# Patient Record
Sex: MALE | Race: WHITE | NOT HISPANIC OR LATINO | Employment: OTHER | URBAN - METROPOLITAN AREA
[De-identification: names, ages, dates, MRNs, and addresses within clinical notes are randomized per-mention and may not be internally consistent; named-entity substitution may affect disease eponyms.]

---

## 2017-02-23 ENCOUNTER — OFFICE VISIT (OUTPATIENT)
Dept: INTERNAL MEDICINE | Facility: CLINIC | Age: 67
End: 2017-02-23

## 2017-02-23 VITALS
HEART RATE: 82 BPM | DIASTOLIC BLOOD PRESSURE: 84 MMHG | WEIGHT: 142 LBS | HEIGHT: 70 IN | RESPIRATION RATE: 18 BRPM | BODY MASS INDEX: 20.33 KG/M2 | SYSTOLIC BLOOD PRESSURE: 130 MMHG

## 2017-02-23 DIAGNOSIS — Z12.11 SCREENING FOR COLON CANCER: ICD-10-CM

## 2017-02-23 DIAGNOSIS — Z72.0 TOBACCO ABUSE: ICD-10-CM

## 2017-02-23 DIAGNOSIS — IMO0001 ELEVATED BLOOD PRESSURE: Primary | ICD-10-CM

## 2017-02-23 DIAGNOSIS — Z23 ENCOUNTER FOR IMMUNIZATION: ICD-10-CM

## 2017-02-23 PROCEDURE — 99213 OFFICE O/P EST LOW 20 MIN: CPT | Performed by: FAMILY MEDICINE

## 2017-02-23 PROCEDURE — 90732 PPSV23 VACC 2 YRS+ SUBQ/IM: CPT | Performed by: FAMILY MEDICINE

## 2017-02-23 PROCEDURE — G0009 ADMIN PNEUMOCOCCAL VACCINE: HCPCS | Performed by: FAMILY MEDICINE

## 2017-02-23 NOTE — PROGRESS NOTES
"Subjective   Obed Cotto is a 66 y.o. male who presents to follow-up for Follow-up (elevated blood pressure)      History of Present Illness   He was last seen in the office on 11/23/16 for elevated blood pressure, tobacco abuse, constipation.  Previous intervention/treatment includes: recommended lifestyle changes, including smoking cessation.    Patient reports that he has been limiting his salt intake, including eliminating processed meats from his diet, using a salt substitute and has also been rinsing canned vegetables.  Has been trying to walk more, depending on the weather.  Continues to smoke about 3/4 to a 1 PPD.  Not interested in quitting smoking at this time.  Denies any concerning symptoms at this time.    Has never had a colonoscopy done before and declines it today.  Has also never had a shingles or pneumonia vaccine before.  Declines scheduling a Medicare wellness exam at this time.     Review of Systems   Constitutional: Negative for chills and fever.   Respiratory: Negative for cough, shortness of breath and wheezing.    Cardiovascular: Negative for chest pain and palpitations.   Gastrointestinal: Negative for abdominal pain, blood in stool, constipation, diarrhea, nausea and vomiting.   Genitourinary: Positive for difficulty urinating (improved). Negative for dysuria and hematuria.   Neurological: Negative for dizziness, syncope and headaches.   Psychiatric/Behavioral: The patient is not nervous/anxious.         Negative for depressed mood.     The following portions of the patient's history were reviewed and updated as appropriate: current medications, past medical history, past social history and problem list.    Objective   Visit Vitals   • /84   • Pulse 82   • Resp 18   • Ht 70\" (177.8 cm)   • Wt 142 lb (64.4 kg)   • BMI 20.37 kg/m2     Physical Exam   Constitutional: He is oriented to person, place, and time. He appears well-developed and well-nourished.   No acute distress. Appears " older than stated age.   HENT:   Head: Normocephalic and atraumatic.   Eyes: Conjunctivae and EOM are normal.   Neck: Normal range of motion.   Cardiovascular: Normal rate, regular rhythm, normal heart sounds and intact distal pulses.    Pulmonary/Chest: Effort normal and breath sounds normal. No respiratory distress. He has no wheezes.   Abdominal: Soft. Bowel sounds are normal. There is no tenderness.   Musculoskeletal: Normal range of motion.   Moves all extremities.   Neurological: He is alert and oriented to person, place, and time.   Skin: Skin is warm and dry.   Psychiatric: He has a normal mood and affect. His behavior is normal.       Assessment/Plan   Obed was seen today for follow-up.    Diagnoses and all orders for this visit:    Elevated blood pressure    Improving.  Blood pressure today in office was 130/84.  Recommended that he continue implementing his current lifestyle and diet changes.  Advised patient to return to the clinic in 3 months for next routine follow-up.    Screening for colon cancer  -     Cologuard    Patient declined colonoscopy today.  Will order Cologuard today.    Encounter for immunization  -     Pneumococcal Polysaccharide Vaccine 23-Valent Greater Than or Equal To 3yo Subcutaneous / IM    Patient declined Zostavax vaccine today, but will consider it in the future.    Tobacco abuse    Encouraged smoking cessation today.  Patient declined a screening chest CT today, but will consider it in the future.

## 2017-02-23 NOTE — PATIENT INSTRUCTIONS
I have ordered the colon cancer screening kit for you and this will be mailed to your home.  When you have collected your sample, please send it back to the company with the return label provided.  Please consider getting a shingles vaccine and doing a chest CT in the future.  Continue with your diet and lifestyle changes to help decrease your blood pressure.  Recommend that you cut back on your smoking for your overall health.  Please follow-up as indicated.  Return to the clinic sooner if you have any other concerns.

## 2017-05-23 ENCOUNTER — OFFICE VISIT (OUTPATIENT)
Dept: INTERNAL MEDICINE | Facility: CLINIC | Age: 67
End: 2017-05-23

## 2017-05-23 VITALS
HEIGHT: 70 IN | SYSTOLIC BLOOD PRESSURE: 134 MMHG | DIASTOLIC BLOOD PRESSURE: 72 MMHG | HEART RATE: 72 BPM | BODY MASS INDEX: 21.19 KG/M2 | WEIGHT: 148 LBS | RESPIRATION RATE: 18 BRPM

## 2017-05-23 DIAGNOSIS — Z71.85 IMMUNIZATION COUNSELING: ICD-10-CM

## 2017-05-23 DIAGNOSIS — IMO0001 ELEVATED BLOOD PRESSURE: Primary | ICD-10-CM

## 2017-05-23 DIAGNOSIS — R19.5 POSITIVE COLORECTAL CANCER SCREENING USING DNA-BASED STOOL TEST: ICD-10-CM

## 2017-05-23 DIAGNOSIS — Z72.0 TOBACCO ABUSE: ICD-10-CM

## 2017-05-23 PROCEDURE — 99214 OFFICE O/P EST MOD 30 MIN: CPT | Performed by: FAMILY MEDICINE

## 2017-11-29 ENCOUNTER — OFFICE VISIT (OUTPATIENT)
Dept: INTERNAL MEDICINE | Facility: CLINIC | Age: 67
End: 2017-11-29

## 2017-11-29 VITALS
BODY MASS INDEX: 20.33 KG/M2 | WEIGHT: 142 LBS | RESPIRATION RATE: 20 BRPM | DIASTOLIC BLOOD PRESSURE: 72 MMHG | HEIGHT: 70 IN | SYSTOLIC BLOOD PRESSURE: 144 MMHG | OXYGEN SATURATION: 99 % | HEART RATE: 73 BPM

## 2017-11-29 DIAGNOSIS — Z72.0 TOBACCO ABUSE: ICD-10-CM

## 2017-11-29 DIAGNOSIS — R03.0 PREHYPERTENSION: Primary | ICD-10-CM

## 2017-11-29 PROCEDURE — 99213 OFFICE O/P EST LOW 20 MIN: CPT | Performed by: FAMILY MEDICINE

## 2017-11-29 NOTE — PROGRESS NOTES
"Subjective   Obed Cotto is a 67 y.o. male who presents to follow-up for Prehypertension      History of Present Illness   He was last seen in the office on 05/23/17 for pre-hypertension management.  Previous intervention/treatment includes: lifestyle modifications to help with blood pressure, continue to monitor.    Patient admits that he has been trying to limit his salt intake, but his finances are limited.  Currently smoking 2/3-3/4 PPD, but states that he does not inhale all the way.  Reports that he has been dealing with a lot of stress over the past few months.  Denies any concerning symptoms at this time including chest pain, dyspnea, hemoptysis, unintentional weight changes, leg swelling, syncope, severe headaches, acute vision changes.    Review of Systems   Constitutional: Negative for chills and fever.   Respiratory: Negative for cough, shortness of breath and wheezing.    Cardiovascular: Negative for chest pain and palpitations.   Gastrointestinal: Negative for abdominal pain, constipation, diarrhea, nausea and vomiting.   Genitourinary: Negative for decreased urine volume, dysuria and hematuria.   Neurological: Negative for dizziness, syncope and headaches.     The following portions of the patient's history were reviewed and updated as appropriate: current medications, past medical history, past social history and problem list.    Objective   /72  Pulse 73  Resp 20  Ht 70\" (177.8 cm)  Wt 142 lb (64.4 kg)  SpO2 99%  BMI 20.37 kg/m2     Physical Exam   Constitutional: He is oriented to person, place, and time. He appears well-developed and well-nourished.   No acute distress.   HENT:   Head: Normocephalic and atraumatic.   Eyes: Conjunctivae and EOM are normal.   Neck: Normal range of motion.   Cardiovascular: Normal rate, regular rhythm, normal heart sounds and intact distal pulses.    Pulmonary/Chest: Effort normal and breath sounds normal. He has no wheezes.   Abdominal: Soft. Bowel sounds " are normal. There is no tenderness.   Musculoskeletal: Normal range of motion.   Moves all extremities.   Neurological: He is alert and oriented to person, place, and time.   Skin: Skin is warm and dry.   Psychiatric: He has a normal mood and affect. His behavior is normal.   Vitals reviewed.      Assessment/Plan   Obed was seen today for prehypertension.    Diagnoses and all orders for this visit:    Prehypertension    Blood pressure today in office was 144/72.  Patient denies any concerning symptoms at this time.  Encouraged patient to monitor his blood pressure at his pharmacy and continue with lifestyle modifications, including diet and exercise, to help with blood pressure control.  Advised patient to return to the clinic in 6 months for next routine follow-up or sooner if needed.    Tobacco abuse    Encouraged smoking cessation today.  Patient declined ordering a screening CT chest scan today.

## 2017-11-29 NOTE — PATIENT INSTRUCTIONS
You may get your shingles vaccine at your pharmacy.  Recommend that you wait until the beginning of the new year to get the newer vaccine.  You will get your pneumonia vaccine end of February when you are due for your next Medicare Wellness exam.  Recommend that you continue to work on limiting your salt intake and cutting back on your cigarette smoking.  Also recommend that you consider doing a CT of your chest to screen for lung cancer.  Please check your blood pressure at your pharmacy.  Your goal blood pressure is to be below 150/90.  Recommend that you check more often if you have any concerning symptoms (like headaches, dizziness, blurred vision, etc).  Things you can do to help decrease your blood pressure:  Maintain a normal body weight (BMI between 18.5 and 24.9).  Consume a diet rich in fruits, vegetables and low-fat dairy products with a reduced content of saturated and total fat.  Reduce dietary sodium intake to no more than 100 mEq per day (2.4 g sodium or 6 g sodium chloride).  Continue to engage in regular aerobic physical activity, such as brisk walking (at least 30 minutes per day, most days of the week).  Stop smoking.  Please follow-up as indicated.  Return to the clinic sooner if you have any other concerns.

## 2018-02-28 ENCOUNTER — OFFICE VISIT (OUTPATIENT)
Dept: INTERNAL MEDICINE | Facility: CLINIC | Age: 68
End: 2018-02-28

## 2018-02-28 VITALS
WEIGHT: 149 LBS | RESPIRATION RATE: 18 BRPM | BODY MASS INDEX: 21.33 KG/M2 | OXYGEN SATURATION: 98 % | HEIGHT: 70 IN | DIASTOLIC BLOOD PRESSURE: 70 MMHG | SYSTOLIC BLOOD PRESSURE: 160 MMHG | HEART RATE: 86 BPM

## 2018-02-28 DIAGNOSIS — Z00.00 HEALTH CARE MAINTENANCE: Primary | ICD-10-CM

## 2018-02-28 DIAGNOSIS — R03.0 PREHYPERTENSION: ICD-10-CM

## 2018-02-28 DIAGNOSIS — Z72.0 TOBACCO ABUSE: ICD-10-CM

## 2018-02-28 LAB
ALBUMIN SERPL-MCNC: 4.7 G/DL (ref 3.2–4.8)
ALBUMIN/GLOB SERPL: 2.2 G/DL (ref 1.5–2.5)
ALP SERPL-CCNC: 99 U/L (ref 25–100)
ALT SERPL-CCNC: 38 U/L (ref 7–40)
AST SERPL-CCNC: 24 U/L (ref 0–33)
BASOPHILS # BLD AUTO: 0.06 10*3/MM3 (ref 0–0.2)
BASOPHILS NFR BLD AUTO: 0.4 % (ref 0–1)
BILIRUB SERPL-MCNC: 0.6 MG/DL (ref 0.3–1.2)
BUN SERPL-MCNC: 14 MG/DL (ref 9–23)
BUN/CREAT SERPL: 20 (ref 7–25)
CALCIUM SERPL-MCNC: 10 MG/DL (ref 8.7–10.4)
CHLORIDE SERPL-SCNC: 102 MMOL/L (ref 99–109)
CHOLEST SERPL-MCNC: 167 MG/DL (ref 0–200)
CO2 SERPL-SCNC: 26 MMOL/L (ref 20–31)
CREAT SERPL-MCNC: 0.7 MG/DL (ref 0.6–1.3)
EOSINOPHIL # BLD AUTO: 0.2 10*3/MM3 (ref 0–0.3)
EOSINOPHIL NFR BLD AUTO: 1.2 % (ref 0–3)
ERYTHROCYTE [DISTWIDTH] IN BLOOD BY AUTOMATED COUNT: 13.5 % (ref 11.3–14.5)
GFR SERPLBLD CREATININE-BSD FMLA CKD-EPI: 112 ML/MIN/1.73
GFR SERPLBLD CREATININE-BSD FMLA CKD-EPI: 136 ML/MIN/1.73
GLOBULIN SER CALC-MCNC: 2.1 GM/DL
GLUCOSE SERPL-MCNC: 103 MG/DL (ref 70–100)
HBA1C MFR BLD: 5.9 % (ref 4.8–5.6)
HCT VFR BLD AUTO: 46.7 % (ref 38.9–50.9)
HDLC SERPL-MCNC: 37 MG/DL (ref 40–60)
HGB BLD-MCNC: 15.8 G/DL (ref 13.1–17.5)
IMM GRANULOCYTES # BLD: 0.1 10*3/MM3 (ref 0–0.03)
IMM GRANULOCYTES NFR BLD: 0.6 % (ref 0–0.6)
LDLC SERPL CALC-MCNC: 108 MG/DL (ref 0–100)
LYMPHOCYTES # BLD AUTO: 7.23 10*3/MM3 (ref 0.6–4.8)
LYMPHOCYTES NFR BLD AUTO: 44 % (ref 24–44)
MCH RBC QN AUTO: 33.1 PG (ref 27–31)
MCHC RBC AUTO-ENTMCNC: 33.8 G/DL (ref 32–36)
MCV RBC AUTO: 97.7 FL (ref 80–99)
MONOCYTES # BLD AUTO: 1.03 10*3/MM3 (ref 0–1)
MONOCYTES NFR BLD AUTO: 6.3 % (ref 0–12)
NEUTROPHILS # BLD AUTO: 7.83 10*3/MM3 (ref 1.5–8.3)
NEUTROPHILS NFR BLD AUTO: 47.5 % (ref 41–71)
PLATELET # BLD AUTO: 315 10*3/MM3 (ref 150–450)
POTASSIUM SERPL-SCNC: 4.3 MMOL/L (ref 3.5–5.5)
PROT SERPL-MCNC: 6.8 G/DL (ref 5.7–8.2)
RBC # BLD AUTO: 4.78 10*6/MM3 (ref 4.2–5.76)
SODIUM SERPL-SCNC: 137 MMOL/L (ref 132–146)
TRIGL SERPL-MCNC: 109 MG/DL (ref 0–150)
VLDLC SERPL CALC-MCNC: 21.8 MG/DL
WBC # BLD AUTO: 16.45 10*3/MM3 (ref 3.5–10.8)

## 2018-02-28 PROCEDURE — 99397 PER PM REEVAL EST PAT 65+ YR: CPT | Performed by: FAMILY MEDICINE

## 2018-02-28 PROCEDURE — G0438 PPPS, INITIAL VISIT: HCPCS | Performed by: FAMILY MEDICINE

## 2018-02-28 PROCEDURE — 96160 PT-FOCUSED HLTH RISK ASSMT: CPT | Performed by: FAMILY MEDICINE

## 2018-02-28 RX ORDER — ASPIRIN 81 MG/1
81 TABLET ORAL DAILY
Qty: 90 TABLET | Refills: 1 | Status: SHIPPED | OUTPATIENT
Start: 2018-02-28 | End: 2019-03-01

## 2018-02-28 NOTE — PROGRESS NOTES
QUICK REFERENCE INFORMATION:  The ABCs of the Annual Wellness Visit    Initial Medicare Wellness Visit    HEALTH RISK ASSESSMENT    1950    Recent Hospitalizations:  No hospitalization(s) within the last year..        Current Medical Providers:  Patient Care Team:  Keke Brasher MD as PCP - General (Family Medicine)        Smoking Status:  History   Smoking Status   • Current Every Day Smoker   • Packs/day: 1.00   • Years: 48.00   • Types: Cigarettes   Smokeless Tobacco   • Never Used       Alcohol Consumption:  History   Alcohol Use No       Depression Screen:   PHQ-2/PHQ-9 Depression Screening 2/28/2018   Little interest or pleasure in doing things 0   Feeling down, depressed, or hopeless 0   Trouble falling or staying asleep, or sleeping too much 0   Feeling tired or having little energy 0   Poor appetite or overeating 0   Feeling bad about yourself - or that you are a failure or have let yourself or your family down 0   Trouble concentrating on things, such as reading the newspaper or watching television 0   Moving or speaking so slowly that other people could have noticed. Or the opposite - being so fidgety or restless that you have been moving around a lot more than usual 0   Thoughts that you would be better off dead, or of hurting yourself in some way 0   Total Score 0   If you checked off any problems, how difficult have these problems made it for you to do your work, take care of things at home, or get along with other people? Not difficult at all       Health Habits and Functional and Cognitive Screening:  Functional & Cognitive Status 2/28/2018   Do you have difficulty preparing food and eating? No   Do you have difficulty bathing yourself, getting dressed or grooming yourself? No   Do you have difficulty using the toilet? No   Do you have difficulty moving around from place to place? No   Do you have trouble with steps or getting out of a bed or a chair? No   In the past year have you fallen or  experienced a near fall? No   Current Diet Frequent Junk Food   Dental Exam Not up to date   Eye Exam Not up to date   Exercise (times per week) 7 times per week   Current Exercise Activities Include Walking   Do you need help using the phone?  No   Are you deaf or do you have serious difficulty hearing?  No   Do you need help with transportation? No   Do you need help shopping? No   Do you need help preparing meals?  No   Do you need help with housework?  No   Do you need help with laundry? No   Do you need help taking your medications? No   Do you need help managing money? No   Have you felt unusual stress, anger or loneliness in the last month? No   Who do you live with? Child   If you need help, do you have trouble finding someone available to you? No   Have you been bothered in the last four weeks by sexual problems? No   Do you have difficulty concentrating, remembering or making decisions? No           Does the patient have evidence of cognitive impairment? No    Aspirin use counseling: Start ASA 81 mg daily       Recent Lab Results:    Visual Acuity:  No exam data present    Age-appropriate Screening Schedule:  Refer to the list below for future screening recommendations based on patient's age, sex and/or medical conditions. Orders for these recommended tests are listed in the plan section. The patient has been provided with a written plan.    Health Maintenance   Topic Date Due   • TDAP/TD VACCINES (1 - Tdap) [birth year]/1968   • INFLUENZA VACCINE  08/01/2017   • PNEUMOCOCCAL VACCINES (65+ LOW/MEDIUM RISK) (2 of 2 - PCV13) 02/23/2018   • COLONOSCOPY  02/23/2027   • ZOSTER VACCINE  Completed        Subjective   History of Present Illness    Obed Cotto is a 67 y.o. male who presents for an Annual Wellness Visit.    The following portions of the patient's history were reviewed and updated as appropriate: allergies, current medications, past family history, past medical history, past social history, past surgical  history and problem list.    Outpatient Medications Prior to Visit   Medication Sig Dispense Refill   • finasteride (PROPECIA) 1 MG tablet Take 1 mg by mouth Daily.     • tamsulosin (FLOMAX) 0.4 MG capsule 24 hr capsule Take 1 capsule by mouth Every Night.       No facility-administered medications prior to visit.        Patient Active Problem List   Diagnosis   • Tobacco abuse   • Urinary retention   • Prehypertension   • Positive colorectal cancer screening using DNA-based stool test       Advance Care Planning:  has NO advance directive - information provided to the patient today    Identification of Risk Factors:  Risk factors include: unhealthy diet, cardiovascular risk, tobacco use and financial stress.    Review of Systems   Constitutional: Negative for chills, fatigue and fever.   HENT: Negative for congestion, ear pain, rhinorrhea, sinus pressure and sore throat.    Eyes: Negative for pain and visual disturbance.   Respiratory: Negative for cough and shortness of breath.    Cardiovascular: Negative for chest pain and palpitations.   Gastrointestinal: Negative for abdominal pain, constipation, diarrhea, nausea and vomiting.   Genitourinary: Negative for decreased urine volume, dysuria and hematuria.   Musculoskeletal: Negative for back pain and gait problem.   Skin: Negative for pallor and rash.   Neurological: Negative for dizziness, syncope and headaches.   Psychiatric/Behavioral: The patient is not nervous/anxious.         Negative for depressed mood.     Compared to one year ago, the patient feels his physical health is the same.  Compared to one year ago, the patient feels his mental health is the same.    Objective    Physical Exam   Constitutional: He is oriented to person, place, and time. He appears well-developed and well-nourished.   No acute distress.   HENT:   Head: Normocephalic and atraumatic.   Right Ear: Hearing, tympanic membrane, external ear and ear canal normal.   Left Ear: Hearing,  "external ear and ear canal normal.   Nose: Nose normal.   Mouth/Throat: Oropharynx is clear and moist.   Cerumen obstructing left TM.   Eyes: Conjunctivae and EOM are normal. Pupils are equal, round, and reactive to light.   Neck: Normal range of motion. Neck supple. No thyromegaly present.   Cardiovascular: Normal rate, regular rhythm, normal heart sounds and intact distal pulses.    Pulmonary/Chest: Effort normal and breath sounds normal. No respiratory distress. He has no wheezes.   Abdominal: Soft. Bowel sounds are normal. There is no tenderness. There is no CVA tenderness and negative Garcia's sign.   Genitourinary:   Genitourinary Comments: Deferred.   Musculoskeletal: Normal range of motion.        Cervical back: Normal.        Thoracic back: Normal.        Lumbar back: Normal.   Normal gait.   Neurological: He is alert and oriented to person, place, and time.   Skin: Skin is warm and dry.   Psychiatric: He has a normal mood and affect. His behavior is normal. Judgment and thought content normal.   Vitals reviewed.      Vitals:    02/28/18 0950   BP: 160/70   Pulse: 86   Resp: 18   SpO2: 98%   Weight: 67.6 kg (149 lb)   Height: 177.8 cm (70\")   PainSc: 0-No pain       Body mass index is 21.38 kg/(m^2).  Discussed the patient's BMI with him. BMI is within normal parameters. No follow-up required.    Assessment/Plan   Patient Self-Management and Personalized Health Advice  The patient has been provided with information about: diet, prevention of cardiac or vascular disease and tobacco cessation and preventive services including:   · Advance directive, Diabetes screening, see lab orders, screening lung CT scan, colonoscopy, Zoster vaccine.    Visit Diagnoses:    ICD-10-CM ICD-9-CM   1. Health care maintenance Z00.00 V70.0   2. Prehypertension R03.0 796.2   3. Tobacco abuse Z72.0 305.1       Orders Placed This Encounter   Procedures   • Comprehensive Metabolic Panel   • Lipid Panel   • Hemoglobin A1c   • CBC & " Differential     Order Specific Question:   Manual Differential     Answer:   No       Outpatient Encounter Prescriptions as of 2/28/2018   Medication Sig Dispense Refill   • aspirin 81 MG EC tablet Take 1 tablet by mouth Daily. 90 tablet 1   • finasteride (PROPECIA) 1 MG tablet Take 1 mg by mouth Daily.     • tamsulosin (FLOMAX) 0.4 MG capsule 24 hr capsule Take 1 capsule by mouth Every Night.       No facility-administered encounter medications on file as of 2/28/2018.        Reviewed use of high risk medication in the elderly: yes  Reviewed for potential of harmful drug interactions in the elderly: yes    Follow Up:  Return in about 4 weeks (around 3/28/2018), or as needed, for Recheck blood pressure.      Blood pressure today in office was 160/70.  When rechecked, it was unchanged so will recheck blood pressure again in 4 weeks.  Discussed the risks and benefits of daily Aspirin use.  Patient agreed to starting Aspirin 81 mg so will prescribe it today.  Patient declined Zoster vaccine and screening lung CT scan today.  Again discussed patient's previously positive Cologard test - patient again declined having a colonoscopy today.  Patient declined further smoking cessation counseling today.  An After Visit Summary and PPPS with all of these plans were given to the patient.

## 2018-02-28 NOTE — PATIENT INSTRUCTIONS
Please go to the lab before you leave to have your labs drawn.  You will be called with your results.  Please consider doing a screening lung CT and colonoscopy.  Also, recommend that you work on quitting smoking to help with your overall health.  I have prescribed Aspirin 81 mg for you.  Your new medication has been electronically prescribed and will be at your pharmacy.  Please pick this up and take as directed.  Please follow-up as indicated.  Return to the clinic sooner if your blood pressure worsens or if you have any other concerns.

## 2018-03-03 LAB — SPECIMEN STATUS: NORMAL

## 2018-03-06 LAB
HCV AB S/CO SERPL IA: NORMAL S/CO RATIO
REQUEST PROBLEM: NORMAL
WRITTEN AUTHORIZATION: NORMAL

## 2018-03-28 ENCOUNTER — OFFICE VISIT (OUTPATIENT)
Dept: INTERNAL MEDICINE | Facility: CLINIC | Age: 68
End: 2018-03-28

## 2018-03-28 VITALS
HEIGHT: 70 IN | RESPIRATION RATE: 20 BRPM | WEIGHT: 147 LBS | BODY MASS INDEX: 21.05 KG/M2 | SYSTOLIC BLOOD PRESSURE: 144 MMHG | HEART RATE: 72 BPM | DIASTOLIC BLOOD PRESSURE: 70 MMHG

## 2018-03-28 DIAGNOSIS — D72.829 LEUKOCYTOSIS, UNSPECIFIED TYPE: ICD-10-CM

## 2018-03-28 DIAGNOSIS — R03.0 PRE-HYPERTENSION: Primary | ICD-10-CM

## 2018-03-28 DIAGNOSIS — R73.03 PREDIABETES: ICD-10-CM

## 2018-03-28 DIAGNOSIS — Z13.6 SCREENING FOR AAA (ABDOMINAL AORTIC ANEURYSM): ICD-10-CM

## 2018-03-28 LAB
BASOPHILS # BLD AUTO: 0.06 10*3/MM3 (ref 0–0.2)
BASOPHILS NFR BLD AUTO: 0.4 % (ref 0–1)
EOSINOPHIL # BLD AUTO: 0.23 10*3/MM3 (ref 0–0.3)
EOSINOPHIL NFR BLD AUTO: 1.7 % (ref 0–3)
ERYTHROCYTE [DISTWIDTH] IN BLOOD BY AUTOMATED COUNT: 13.8 % (ref 11.3–14.5)
HCT VFR BLD AUTO: 45.7 % (ref 38.9–50.9)
HGB BLD-MCNC: 14.9 G/DL (ref 13.1–17.5)
IMM GRANULOCYTES # BLD: 0.1 10*3/MM3 (ref 0–0.03)
IMM GRANULOCYTES NFR BLD: 0.7 % (ref 0–0.6)
LYMPHOCYTES # BLD AUTO: 5.6 10*3/MM3 (ref 0.6–4.8)
LYMPHOCYTES NFR BLD AUTO: 40.8 % (ref 24–44)
MCH RBC QN AUTO: 32.7 PG (ref 27–31)
MCHC RBC AUTO-ENTMCNC: 32.6 G/DL (ref 32–36)
MCV RBC AUTO: 100.2 FL (ref 80–99)
MONOCYTES # BLD AUTO: 0.75 10*3/MM3 (ref 0–1)
MONOCYTES NFR BLD AUTO: 5.5 % (ref 0–12)
NEUTROPHILS # BLD AUTO: 6.98 10*3/MM3 (ref 1.5–8.3)
NEUTROPHILS NFR BLD AUTO: 50.9 % (ref 41–71)
PLATELET # BLD AUTO: 308 10*3/MM3 (ref 150–450)
RBC # BLD AUTO: 4.56 10*6/MM3 (ref 4.2–5.76)
WBC # BLD AUTO: 13.72 10*3/MM3 (ref 3.5–10.8)

## 2018-03-28 PROCEDURE — 99214 OFFICE O/P EST MOD 30 MIN: CPT | Performed by: FAMILY MEDICINE

## 2018-03-28 NOTE — PATIENT INSTRUCTIONS
Continue walking regularly like you have been.  Recommend that you try to limit your carbohydrates (like sweets, pasta, rice, potatoes, fruits) to help with your blood sugar control.  Recommend that you consider getting an abdominal aortic aneurysm screening ultrasound done.  Please continue taking your current medications as directed by your specialist.  Please work on quitting smoking.  Please follow-up as indicated.  Return to the clinic sooner if you have any other concerns.

## 2018-03-28 NOTE — PROGRESS NOTES
"Subjective   Obed Cotto is a 68 y.o. male who presents to follow-up for Hypertension      History of Present Illness   Patient presents for blood pressure recheck.  Blood pressure was noted to be 160/70 at his office visit on 02/28/18.  Reports that he has been walking about [place].  Has also been using a salt substitute and admits that he is feeling slightly less stressed/anxious compared to his last visit.  Continues to smoke, unchanged from last time.  Denies any concerning symptoms at this time.      Review of Systems   Constitutional: Negative for chills and fever.   Respiratory: Negative for cough, shortness of breath and wheezing.    Cardiovascular: Negative for chest pain, palpitations and leg swelling.   Gastrointestinal: Negative for abdominal pain, constipation, diarrhea, nausea and vomiting.   Neurological: Negative for dizziness, syncope and headaches.     The following portions of the patient's history were reviewed and updated as appropriate: current medications, past medical history, past social history and problem list.    Objective   /70   Pulse 72   Resp 20   Ht 177.8 cm (70\")   Wt 66.7 kg (147 lb)   BMI 21.09 kg/m²      Physical Exam   Constitutional: He is oriented to person, place, and time. He appears well-developed and well-nourished.   No acute distress.   HENT:   Head: Normocephalic and atraumatic.   Eyes: Conjunctivae and EOM are normal.   Neck: Normal range of motion.   Cardiovascular: Normal rate, regular rhythm, normal heart sounds and intact distal pulses.    Pulmonary/Chest: Effort normal and breath sounds normal. He has no wheezes.   Abdominal: Soft. Bowel sounds are normal. There is no tenderness.   Musculoskeletal: Normal range of motion.   Moves all extremities.   Neurological: He is alert and oriented to person, place, and time.   Skin: Skin is warm and dry.   Psychiatric: He has a normal mood and affect. His behavior is normal.   Vitals " reviewed.      Assessment/Plan   Obed was seen today for hypertension.    Diagnoses and all orders for this visit:    Prehypertension    Improved.  Blood pressure today in office was 144/70.  Encouraged patient to continue working on diet and exercise to help with his blood pressure control.  Also encouraged patient to quit smoking.  Advised patient to return to the clinic in 5 months for recheck or sooner if needed.    Leukocytosis, unspecified type  -     CBC & Differential    Discussed recent lab results with patient today.  Patient denies any concerning symptoms at this time.  The above labs were ordered today.  Will consider further work-up or specialist referral or if lab results indicate.    Prediabetes    Discussed recent lab results with patient today.  Encouraged patient to continue working on diet and exercise to help with his blood sugar control.  Will plan to recheck his blood sugar/Hgb A1C at his next annual Medicare wellness exam or sooner if needed.    Screening for AAA (abdominal aortic aneurysm)    Discussed screening for AAA with patient, but he declined today.

## 2018-03-29 DIAGNOSIS — D75.89 MACROCYTOSIS WITHOUT ANEMIA: Primary | ICD-10-CM

## 2018-03-29 LAB
FOLATE SERPL-MCNC: 13.74 NG/ML (ref 3.2–20)
VIT B12 SERPL-MCNC: 363 PG/ML (ref 211–911)

## 2018-04-30 ENCOUNTER — LAB (OUTPATIENT)
Dept: INTERNAL MEDICINE | Facility: CLINIC | Age: 68
End: 2018-04-30

## 2018-04-30 DIAGNOSIS — D72.828 OTHER ELEVATED WHITE BLOOD CELL (WBC) COUNT: Primary | ICD-10-CM

## 2018-04-30 DIAGNOSIS — Z11.59 ENCOUNTER FOR HEPATITIS C SCREENING TEST FOR LOW RISK PATIENT: ICD-10-CM

## 2018-04-30 LAB
ERYTHROCYTE [DISTWIDTH] IN BLOOD BY AUTOMATED COUNT: 13.6 % (ref 11.3–14.5)
HCT VFR BLD AUTO: 45.1 % (ref 38.9–50.9)
HGB BLD-MCNC: 15.1 G/DL (ref 13.1–17.5)
MCH RBC QN AUTO: 33 PG (ref 27–31)
MCHC RBC AUTO-ENTMCNC: 33.5 G/DL (ref 32–36)
MCV RBC AUTO: 98.5 FL (ref 80–99)
PLATELET # BLD AUTO: 296 10*3/MM3 (ref 150–450)
RBC # BLD AUTO: 4.58 10*6/MM3 (ref 4.2–5.76)
WBC # BLD AUTO: 12 10*3/MM3 (ref 3.5–10.8)

## 2018-05-01 LAB — HCV AB S/CO SERPL IA: <0.1 S/CO RATIO (ref 0–0.9)

## 2018-05-02 NOTE — PROGRESS NOTES
Advised states he would like to come in on the lab in 4 weeks to see if WBC goes down if it hasnt pt would like the referral then is this ok?

## 2018-05-31 DIAGNOSIS — D72.829 LEUKOCYTOSIS, UNSPECIFIED TYPE: Primary | ICD-10-CM

## 2018-08-15 ENCOUNTER — OFFICE VISIT (OUTPATIENT)
Dept: INTERNAL MEDICINE | Facility: CLINIC | Age: 68
End: 2018-08-15

## 2018-08-15 VITALS
RESPIRATION RATE: 20 BRPM | DIASTOLIC BLOOD PRESSURE: 70 MMHG | HEIGHT: 70 IN | BODY MASS INDEX: 20.19 KG/M2 | WEIGHT: 141 LBS | OXYGEN SATURATION: 99 % | SYSTOLIC BLOOD PRESSURE: 130 MMHG | HEART RATE: 80 BPM

## 2018-08-15 DIAGNOSIS — R03.0 PREHYPERTENSION: Primary | ICD-10-CM

## 2018-08-15 DIAGNOSIS — D72.829 LEUKOCYTOSIS, UNSPECIFIED TYPE: ICD-10-CM

## 2018-08-15 DIAGNOSIS — R19.5 POSITIVE COLORECTAL CANCER SCREENING USING DNA-BASED STOOL TEST: ICD-10-CM

## 2018-08-15 PROCEDURE — 99213 OFFICE O/P EST LOW 20 MIN: CPT | Performed by: FAMILY MEDICINE

## 2018-08-15 NOTE — PATIENT INSTRUCTIONS
Please continue taking your current medications as directed.  Please follow-up as indicated.  Return to the clinic sooner if you have any other concerns.

## 2018-08-15 NOTE — PROGRESS NOTES
"Subjective   Obed Cotto is a 68 y.o. male who presents to follow-up for Hypertension      History of Present Illness   Patient presents for prehypertension management.  Has not had been able to check his blood pressure at his pharmacy since his last office visit.  Reports that he continues to walk regularly.  Continues to use a salt substitute with his food.  Continues to smoke, unchanged from last time, about 3/4 PPD.  Denies any concerning symptoms at this time.     Review of Systems   Constitutional: Negative for chills and fever.   Respiratory: Negative for cough, shortness of breath and wheezing.    Cardiovascular: Negative for chest pain, palpitations and leg swelling.   Gastrointestinal: Negative for abdominal pain, anal bleeding, blood in stool, constipation, diarrhea, nausea and vomiting.   Neurological: Negative for dizziness, syncope and headaches.     The following portions of the patient's history were reviewed and updated as appropriate: current medications, past medical history and problem list.    Objective   /70   Pulse 80   Resp 20   Ht 177.8 cm (70\")   Wt 64 kg (141 lb)   SpO2 99%   BMI 20.23 kg/m²      Physical Exam   Constitutional: He is oriented to person, place, and time. He appears well-developed and well-nourished.   No acute distress.   HENT:   Head: Normocephalic and atraumatic.   Eyes: Conjunctivae and EOM are normal.   Neck: Normal range of motion.   Cardiovascular: Normal rate, regular rhythm, normal heart sounds and intact distal pulses.    Pulmonary/Chest: Effort normal and breath sounds normal. He has no wheezes.   Abdominal: Soft. Bowel sounds are normal. There is no tenderness.   Musculoskeletal: Normal range of motion.   Moves all extremities.   Neurological: He is alert and oriented to person, place, and time.   Skin: Skin is warm and dry.   Psychiatric: He has a normal mood and affect. His behavior is normal.   Vitals reviewed.      Assessment/Plan   Obed was seen " today for hypertension.    Diagnoses and all orders for this visit:    Prehypertension    Stable.  Blood pressure today in office was 130/70.  Recommended that patient establish care with a new provider in 3 months for blood pressure recheck or sooner if needed.    Leukocytosis, unspecified type    Possibly related to smoking.    Patient denies any concerning symptoms at this time.  Discussed having patient see a specialist for further evaluation, but he declined today.  He also declined rechecking labs at this time.  Recommended that patient establish care with a new provider in 3 months for recheck or sooner if needed.    Positive colorectal cancer screening using DNA-based stool test    Patient denies any concerning symptoms at this time and declined having a colonoscopy done again today.

## 2018-09-06 ENCOUNTER — OFFICE VISIT (OUTPATIENT)
Dept: FAMILY MEDICINE CLINIC | Facility: CLINIC | Age: 68
End: 2018-09-06

## 2018-09-06 VITALS
SYSTOLIC BLOOD PRESSURE: 138 MMHG | TEMPERATURE: 98.1 F | OXYGEN SATURATION: 98 % | WEIGHT: 141.6 LBS | HEIGHT: 70 IN | DIASTOLIC BLOOD PRESSURE: 84 MMHG | BODY MASS INDEX: 20.27 KG/M2 | HEART RATE: 73 BPM

## 2018-09-06 DIAGNOSIS — J01.80 ACUTE NON-RECURRENT SINUSITIS OF OTHER SINUS: Primary | ICD-10-CM

## 2018-09-06 PROCEDURE — 99213 OFFICE O/P EST LOW 20 MIN: CPT | Performed by: FAMILY MEDICINE

## 2018-09-06 RX ORDER — DOXYCYCLINE 100 MG/1
100 TABLET ORAL 2 TIMES DAILY
Qty: 14 TABLET | Refills: 0 | Status: SHIPPED | OUTPATIENT
Start: 2018-09-06 | End: 2018-09-13

## 2018-09-06 NOTE — PATIENT INSTRUCTIONS
DO NOT use sudafed or Mucinex-D.       Guaifenesin oral ER tablets  What is this medicine?  GUAIFENESIN (gwye FEN e sin) is an expectorant. It helps to thin mucous and make coughs more productive. This medicine is used to treat coughs caused by colds or the flu. It is not intended to treat chronic cough caused by smoking, asthma, emphysema, or heart failure.  This medicine may be used for other purposes; ask your health care provider or pharmacist if you have questions.  COMMON BRAND NAME(S): Humibid, Mucinex  What should I tell my health care provider before I take this medicine?  They need to know if you have any of these conditions:  -fever  -kidney disease  -an unusual or allergic reaction to guaifenesin, other medicines, foods, dyes, or preservatives  -pregnant or trying to get pregnant  -breast-feeding  How should I use this medicine?  Take this medicine by mouth with a full glass of water. Follow the directions on the prescription label. Do not break, chew or crush this medicine. You may take with food or on an empty stomach. Take your medicine at regular intervals. Do not take your medicine more often than directed.  Talk to your pediatrician regarding the use of this medicine in children. While this drug may be prescribed for children as young as 12 years old for selected conditions, precautions do apply.  Overdosage: If you think you have taken too much of this medicine contact a poison control center or emergency room at once.  NOTE: This medicine is only for you. Do not share this medicine with others.  What if I miss a dose?  If you miss a dose, take it as soon as you can. If it is almost time for your next dose, take only that dose. Do not take double or extra doses.  What may interact with this medicine?  Interactions are not expected.  This list may not describe all possible interactions. Give your health care provider a list of all the medicines, herbs, non-prescription drugs, or dietary supplements  you use. Also tell them if you smoke, drink alcohol, or use illegal drugs. Some items may interact with your medicine.  What should I watch for while using this medicine?  Do not treat a cough for more than 1 week without consulting your doctor or health care professional. If you also have a high fever, skin rash, continuing headache, or sore throat, see your doctor.  For best results, drink 6 to 8 glasses water daily while you are taking this medicine.  What side effects may I notice from receiving this medicine?  Side effects that you should report to your doctor or health care professional as soon as possible:  -allergic reactions like skin rash, itching or hives, swelling of the face, lips, or tongue  Side effects that usually do not require medical attention (report to your doctor or health care professional if they continue or are bothersome):  -dizziness  -headache  -stomach upset  This list may not describe all possible side effects. Call your doctor for medical advice about side effects. You may report side effects to FDA at 6-662-FDA-1866.  Where should I keep my medicine?  Keep out of the reach of children.  Store at room temperature between 20 and 25 degrees C (68 and 77 degrees F). Keep container tightly closed. Throw away any unused medicine after the expiration date.  NOTE: This sheet is a summary. It may not cover all possible information. If you have questions about this medicine, talk to your doctor, pharmacist, or health care provider.  © 2018 [employer]vier/Gold Standard (2009-04-29 12:14:14)

## 2018-09-06 NOTE — PROGRESS NOTES
bOed Cotto presents today to establish care.    Chief Complaint   Patient presents with   • Sinusitis     ongoing for 8 days    • Cough   • Sore Throat        Sinusitis   This is a new problem. The current episode started 1 to 4 weeks ago. There has been no fever. Associated symptoms include chills, congestion, coughing and a sore throat. Pertinent negatives include no shortness of breath.   Cough   Associated symptoms include chills, rhinorrhea and a sore throat. Pertinent negatives include no chest pain, fever or shortness of breath. His past medical history is significant for environmental allergies.   Sore Throat    Associated symptoms include congestion and coughing. Pertinent negatives include no shortness of breath.    Got caught in the rain. Sneezing, blowing nose 100 times. Already had flu and pneumonia shots, worried about sinus infection. Scalp is sore. Productive cough this morning, clear, improving. Rale in his chest, no pain. Sinus problems all his life. Not tried any OTC medications.   Broke out in hives when he had PCN once.       Review of Systems   Constitutional: Positive for chills. Negative for fever.   HENT: Positive for congestion, rhinorrhea and sore throat.    Eyes: Negative.    Respiratory: Positive for cough. Negative for shortness of breath.    Cardiovascular: Negative for chest pain.   Gastrointestinal: Negative.    Endocrine: Negative.    Genitourinary: Negative.    Musculoskeletal: Negative.    Skin: Negative.    Allergic/Immunologic: Positive for environmental allergies.   Neurological: Negative.    Hematological: Negative.    Psychiatric/Behavioral: Negative.         Past Medical History:   Diagnosis Date   • Chronic bronchitis (CMS/HCC)    • Elevated blood pressure    • Hyperlipidemia    • Hypertension         Past Surgical History:   Procedure Laterality Date   • FINGER SURGERY      Right hand 4th finger   • FINGER SURGERY      R ring finger, partial amputation repair        Family  "History   Problem Relation Age of Onset   • Lymphoma Mother         Social History     Social History   • Marital status:      Spouse name: N/A   • Number of children: N/A   • Years of education: N/A     Occupational History   • Not on file.     Social History Main Topics   • Smoking status: Current Every Day Smoker     Packs/day: 1.00     Years: 48.00     Types: Cigarettes   • Smokeless tobacco: Never Used   • Alcohol use No   • Drug use: No   • Sexual activity: Defer     Other Topics Concern   • Not on file     Social History Narrative   • No narrative on file        Current Outpatient Prescriptions   Medication Sig Dispense Refill   • aspirin 81 MG EC tablet Take 1 tablet by mouth Daily. 90 tablet 1   • finasteride (PROPECIA) 1 MG tablet Take 1 mg by mouth Daily.     • tamsulosin (FLOMAX) 0.4 MG capsule 24 hr capsule Take 1 capsule by mouth Every Night.     • doxycycline (ADOXA) 100 MG tablet Take 1 tablet by mouth 2 (Two) Times a Day for 7 days. 14 tablet 0     No current facility-administered medications for this visit.        Allergies   Allergen Reactions   • Penicillins Hives        Visit Vitals  /84 (BP Location: Left arm, Patient Position: Sitting, Cuff Size: Adult)   Pulse 73   Temp 98.1 °F (36.7 °C)   Ht 177.8 cm (70\")   Wt 64.2 kg (141 lb 9.6 oz)   SpO2 98%   BMI 20.32 kg/m²        Physical Exam   Constitutional: He is oriented to person, place, and time. No distress.   HENT:   Right Ear: Tympanic membrane and ear canal normal.   Left Ear: Tympanic membrane and ear canal normal.   Nose: Mucosal edema ( moderate hypertrophy, erythema) present.   Mouth/Throat: Uvula is midline, oropharynx is clear and moist and mucous membranes are normal. Tonsils are 0 on the right. Tonsils are 0 on the left.   Cardiovascular: Normal rate and regular rhythm.    No murmur heard.  Pulmonary/Chest: Effort normal and breath sounds normal.   Lymphadenopathy:     He has cervical adenopathy.   Neurological: He is " alert and oriented to person, place, and time.   Psychiatric: He has a normal mood and affect.   Vitals reviewed.            Obed was seen today for sinusitis, cough and sore throat.    Diagnoses and all orders for this visit:    Acute non-recurrent sinusitis of other sinus  -     doxycycline (ADOXA) 100 MG tablet; Take 1 tablet by mouth 2 (Two) Times a Day for 7 days.    New. Allergy to PCN, will use doxy. May use OTC Mucinex as needed. Avoid sinus decongestants due to prehypertension.     Return if symptoms worsen or fail to improve.

## 2019-03-01 ENCOUNTER — OFFICE VISIT (OUTPATIENT)
Dept: FAMILY MEDICINE CLINIC | Facility: CLINIC | Age: 69
End: 2019-03-01

## 2019-03-01 VITALS
OXYGEN SATURATION: 97 % | HEART RATE: 90 BPM | DIASTOLIC BLOOD PRESSURE: 82 MMHG | BODY MASS INDEX: 21.9 KG/M2 | SYSTOLIC BLOOD PRESSURE: 132 MMHG | HEIGHT: 70 IN | WEIGHT: 153 LBS

## 2019-03-01 DIAGNOSIS — Z23 NEED FOR PNEUMOCOCCAL VACCINE: ICD-10-CM

## 2019-03-01 DIAGNOSIS — Z13.220 SCREENING, LIPID: ICD-10-CM

## 2019-03-01 DIAGNOSIS — Z72.0 TOBACCO ABUSE: ICD-10-CM

## 2019-03-01 DIAGNOSIS — H61.22 IMPACTED CERUMEN OF LEFT EAR: ICD-10-CM

## 2019-03-01 DIAGNOSIS — Z00.00 WELL ADULT EXAM: Primary | ICD-10-CM

## 2019-03-01 DIAGNOSIS — R73.03 PREDIABETES: ICD-10-CM

## 2019-03-01 DIAGNOSIS — Z13.0 SCREENING FOR DEFICIENCY ANEMIA: ICD-10-CM

## 2019-03-01 DIAGNOSIS — R19.5 POSITIVE COLORECTAL CANCER SCREENING USING DNA-BASED STOOL TEST: ICD-10-CM

## 2019-03-01 DIAGNOSIS — Z13.29 SCREENING FOR THYROID DISORDER: ICD-10-CM

## 2019-03-01 PROCEDURE — G0009 ADMIN PNEUMOCOCCAL VACCINE: HCPCS | Performed by: FAMILY MEDICINE

## 2019-03-01 PROCEDURE — 69209 REMOVE IMPACTED EAR WAX UNI: CPT | Performed by: FAMILY MEDICINE

## 2019-03-01 PROCEDURE — 96160 PT-FOCUSED HLTH RISK ASSMT: CPT | Performed by: FAMILY MEDICINE

## 2019-03-01 PROCEDURE — 99397 PER PM REEVAL EST PAT 65+ YR: CPT | Performed by: FAMILY MEDICINE

## 2019-03-01 PROCEDURE — 90670 PCV13 VACCINE IM: CPT | Performed by: FAMILY MEDICINE

## 2019-03-01 PROCEDURE — G0439 PPPS, SUBSEQ VISIT: HCPCS | Performed by: FAMILY MEDICINE

## 2019-03-01 NOTE — PATIENT INSTRUCTIONS
Medicare Wellness  Personal Prevention Plan of Service     Date of Office Visit:  2019  Encounter Provider:  Lorraine Snider MD  Place of Service:  [facility]  Patient Name: Obed Cotto  :  1950    As part of the Medicare Wellness portion of your visit today, we are providing you with this personalized preventive plan of services (PPPS). This plan is based upon recommendations of the [place] Preventive Services Task Force (USPSTF) and the Advisory Committee on Immunization Practices (ACIP).    This lists the preventive care services that should be considered, and provides dates of when you are due. Items listed as completed are up-to-date and do not require any further intervention.    Health Maintenance   Topic Date Due   • TDAP/TD VACCINES (1 - Tdap) 1968   • AAA SCREEN (ONE-TIME)  10/24/2016   • ZOSTER VACCINE (2 of 2) 2017   • PNEUMOCOCCAL VACCINES (65+ LOW/MEDIUM RISK) (2 of 2 - PCV13) 2018   • INFLUENZA VACCINE  2018   • MEDICARE ANNUAL WELLNESS  2019   • LIPID PANEL  2019   • LUNG CANCER SCREENING  2019   • COLOGUARD  2020   • HEPATITIS C SCREENING  Completed       Orders Placed This Encounter   Procedures   • Ear Cerumen Removal Lavage   • Pneumococcal Conjugate Vaccine 13-Valent All (PCV13)   • Comprehensive Metabolic Panel     Standing Status:   Future     Standing Expiration Date:   3/1/2020   • Lipid Panel     Standing Status:   Future     Standing Expiration Date:   3/1/2020   • TSH Rfx On Abnormal To Free T4     Standing Status:   Future     Standing Expiration Date:   3/1/2020   • Hemoglobin A1c     Standing Status:   Future     Standing Expiration Date:   3/1/2020   • CBC & Differential     Standing Status:   Future     Standing Expiration Date:   3/1/2020     Order Specific Question:   Manual Differential     Answer:   No       Return in about 1 year (around 3/1/2020) for Medicare  Wellness.

## 2019-03-01 NOTE — PROGRESS NOTES
QUICK REFERENCE INFORMATION:  The ABCs of the Annual Wellness Visit  Obed Cotto is a 68 y.o. male presenting for Medicare Subsequent Wellness visit and  Annual Exam (Medicare wellness)  .     Medicare Subsequent Wellness Visit    Chief Complaint   Patient presents with   • Annual Exam     Medicare wellness        HPI   He was taking baby aspirin but stopped when he ran out.     Eats coffee and cookies for breakfast.      PSA is stable at urologist.    [place]     Smokes but he doesn't inhale. Declines lung cancer screening.     Cologuard positive 3/14/17. He had trouble popping and was taking a laxative, he had noticed a little bit of red. He is straightened out now, goes every day. Declines colonoscopy.     Probably needs glasses but afford them.     Review of Systems   HENT: Negative for hearing loss.    Eyes: Positive for visual disturbance.   Gastrointestinal: Negative for blood in stool.   Neurological: Negative for memory problem.   Psychiatric/Behavioral: Negative for depressed mood.        The following portions of the patient's history were reviewed and updated as appropriate:   He  has a past medical history of BPH with elevated PSA, Chronic bronchitis (CMS/HCC), Elevated blood pressure, Hyperlipidemia, and Hypertension.  He does not have any pertinent problems on file.  He  has a past surgical history that includes Finger surgery and Finger surgery.  His family history includes Lymphoma in his mother.  He  reports that he has been smoking cigarettes.  He has a 48.00 pack-year smoking history. he has never used smokeless tobacco. He reports that he does not drink alcohol or use drugs.       Current Outpatient Medications on File Prior to Visit   Medication Sig   • finasteride (PROPECIA) 1 MG tablet Take 1 mg by mouth Daily.   • tamsulosin (FLOMAX) 0.4 MG capsule 24 hr capsule Take 1 capsule by mouth Every Night.   • [DISCONTINUED] aspirin 81 MG EC tablet Take 1 tablet by mouth  "Daily.     No current facility-administered medications on file prior to visit.      He is allergic to penicillins..      Objective    Visit Vitals  /82 (BP Location: Left arm, Patient Position: Sitting, Cuff Size: Adult)   Pulse 90   Ht 177.8 cm (70\")   Wt 69.4 kg (153 lb)   SpO2 97%   BMI 21.95 kg/m²      Physical Exam   Constitutional: He is oriented to person, place, and time. No distress.   HENT:   Right Ear: Tympanic membrane and ear canal normal.   Left Ear: An impacted cerumen is present.  Nose: Nose normal.   Mouth/Throat: Oropharynx is clear and moist.   Eyes: Conjunctivae are normal. Pupils are equal, round, and reactive to light.   Neck: Neck supple. No thyromegaly present.   Cardiovascular: Normal rate and regular rhythm.   No murmur heard.  Pulses:       Posterior tibial pulses are 2+ on the right side, and 2+ on the left side.   Pulmonary/Chest: Effort normal and breath sounds normal.   Abdominal: Soft. There is no hepatosplenomegaly. There is no tenderness.   Lymphadenopathy:     He has no cervical adenopathy.   Neurological: He is alert and oriented to person, place, and time.   Skin: Skin is warm and dry. No rash noted.   Psychiatric: He has a normal mood and affect. His behavior is normal. Judgment and thought content normal.   Vitals reviewed.         HEALTH RISK ASSESSMENT    1950    Recent Hospitalizations:  No hospitalization(s) within the last year..      Current Medical Providers:  Patient Care Team:  Lorraine Morfin MD as PCP - General (Family Medicine)      Smoking Status:  Social History     Tobacco Use   Smoking Status Current Every Day Smoker   • Packs/day: 1.00   • Years: 48.00   • Pack years: 48.00   • Types: Cigarettes   Smokeless Tobacco Never Used       Alcohol Consumption:  Social History     Substance and Sexual Activity   Alcohol Use No       Depression Screen:   PHQ-2/PHQ-9 Depression Screening 3/1/2019   Little interest or pleasure in doing things 0 "   Feeling down, depressed, or hopeless 0   Trouble falling or staying asleep, or sleeping too much -   Feeling tired or having little energy -   Poor appetite or overeating -   Feeling bad about yourself - or that you are a failure or have let yourself or your family down -   Trouble concentrating on things, such as reading the newspaper or watching television -   Moving or speaking so slowly that other people could have noticed. Or the opposite - being so fidgety or restless that you have been moving around a lot more than usual -   Thoughts that you would be better off dead, or of hurting yourself in some way -   Total Score 0   If you checked off any problems, how difficult have these problems made it for you to do your work, take care of things at home, or get along with other people? -       Health Habits and Functional and Cognitive Screening:  Functional & Cognitive Status 3/1/2019   Do you have difficulty preparing food and eating? No   Do you have difficulty bathing yourself, getting dressed or grooming yourself? No   Do you have difficulty using the toilet? No   Do you have difficulty moving around from place to place? No   Do you have trouble with steps or getting out of a bed or a chair? No   In the past year have you fallen or experienced a near fall? No   Current Diet Well Balanced Diet   Dental Exam Not up to date   Eye Exam Not up to date   Exercise (times per week) 5 times per week   Current Exercise Activities Include Walking   Do you need help using the phone?  No   Are you deaf or do you have serious difficulty hearing?  No   Do you need help with transportation? No   Do you need help shopping? No   Do you need help preparing meals?  No   Do you need help with housework?  No   Do you need help with laundry? No   Do you need help taking your medications? No   Do you need help managing money? No   Do you ever drive or ride in a car without wearing a seat belt? No   Have you felt unusual stress,  anger or loneliness in the last month? -   Who do you live with? -   If you need help, do you have trouble finding someone available to you? -   Have you been bothered in the last four weeks by sexual problems? -   Do you have difficulty concentrating, remembering or making decisions? -         Does the patient have evidence of cognitive impairment? Yes   Mini-cog 2/5.  Recalled 2 out of 3 words.  Clock with numbers in appropriate sequence but set to wrong time.  He denies any problems with his memory, forgetting things.    Aspirin use counseling? Does not need ASA (and currently is not on it)    Recent Lab Results:  CMP:  Lab Results   Component Value Date     (H) 02/28/2018    BUN 14 02/28/2018    CREATININE 0.70 02/28/2018    EGFRIFNONA 112 02/28/2018    EGFRIFAFRI 136 02/28/2018    BCR 20.0 02/28/2018     02/28/2018    K 4.3 02/28/2018    CO2 26.0 02/28/2018    CALCIUM 10.0 02/28/2018    PROTENTOTREF 6.8 02/28/2018    ALBUMIN 4.70 02/28/2018    LABGLOBREF 2.1 02/28/2018    LABIL2 2.2 02/28/2018    BILITOT 0.6 02/28/2018    ALKPHOS 99 02/28/2018    AST 24 02/28/2018    ALT 38 02/28/2018     Lipid Panel:  Lab Results   Component Value Date    TRIG 109 02/28/2018    HDL 37 (L) 02/28/2018     (H) 02/28/2018     HbA1c:  Lab Results   Component Value Date    HGBA1C 5.90 (H) 02/28/2018         Age-appropriate Screening Schedule:  Refer to the list below for future screening recommendations based on patient's age, sex and/or medical conditions. Orders for these recommended tests are listed in the plan section. The patient has been provided with a written plan.    Health Maintenance   Topic Date Due   • TDAP/TD VACCINES (1 - Tdap) 03/03/1968   • ZOSTER VACCINE (2 of 2) 07/18/2017   • PNEUMOCOCCAL VACCINES (65+ LOW/MEDIUM RISK) (2 of 2 - PCV13) 02/23/2018   • INFLUENZA VACCINE  08/01/2018   • LIPID PANEL  02/28/2019        Immunization History   Administered Date(s) Administered   • Pneumococcal  Conjugate 13-Valent (PCV13) 03/01/2019   • Pneumococcal Polysaccharide (PPSV23) 02/23/2017        Advance Care Planning:  has NO advance directive - information provided to the patient today    Identification of Risk Factors:  Risk factors include: tobacco use.    Compared to one year ago, the patient feels his physical health is the same.  Compared to one year ago, the patient feels his mental health is the same.  Reviewed use of high risk medication in the elderly: not applicable  Reviewed for potential of harmful drug interactions in the elderly: not applicable    Ear Cerumen Removal Lavage  Date/Time: 3/1/2019 10:22 AM  Performed by: Lorraine Morfin MD  Authorized by: Lorraine Morfin MD     Anesthesia:  Local Anesthetic: none  Location details: left ear  Patient tolerance: Patient tolerated the procedure well with no immediate complications  Comments: Successful removal.  Normal tympanic membrane and canal.  Procedure type: irrigation   Sedation:  Patient sedated: shaun            Obed was seen today for annual exam.    Diagnoses and all orders for this visit:    Well adult exam  Patient is nonfasting today.  He will return for fasting labs.  Prediabetes  -     Comprehensive Metabolic Panel; Future  -     Hemoglobin A1c; Future    Screening, lipid  -     Lipid Panel; Future    Screening for thyroid disorder  -     TSH Rfx On Abnormal To Free T4; Future    Screening for deficiency anemia  -     CBC & Differential; Future    Positive colorectal cancer screening using DNA-based stool test  Patient reports that he is not currently having symptoms and does not want to have a colonoscopy.  His next [place] guard would be due in 2020.  Tobacco abuse  Patient is not ready to quit.  He declines lung cancer screening.  Need for pneumococcal vaccine  -     Pneumococcal Conjugate Vaccine 13-Valent All (PCV13)    Impacted cerumen of left ear  -     Ear Cerumen Removal Lavage  Successful  removal.        Patient Self-Management and Personalized Health Advice  The patient has been provided with information about: designing advance directives and supplements and preventive services including:   · Diabetes screening, see lab orders, Fall Risk assessment done, Pneumococcal vaccine .      Follow Up:  Return in about 1 year (around 3/1/2020) for Medicare Wellness.     An After Visit Summary and PPPS with all of these plans were given to the patient.

## 2019-03-13 ENCOUNTER — LAB REQUISITION (OUTPATIENT)
Dept: LAB | Facility: HOSPITAL | Age: 69
End: 2019-03-13

## 2019-03-13 DIAGNOSIS — Z00.00 ROUTINE GENERAL MEDICAL EXAMINATION AT A HEALTH CARE FACILITY: ICD-10-CM

## 2019-03-13 LAB
ALBUMIN SERPL-MCNC: 4.77 G/DL (ref 3.2–4.8)
ALBUMIN/GLOB SERPL: 2.5 G/DL (ref 1.5–2.5)
ALP SERPL-CCNC: 91 U/L (ref 25–100)
ALT SERPL-CCNC: 28 U/L (ref 7–40)
AST SERPL-CCNC: 23 U/L (ref 0–33)
BASOPHILS # BLD AUTO: 0.06 10*3/MM3 (ref 0–0.2)
BASOPHILS NFR BLD AUTO: 0.4 % (ref 0–1)
BILIRUB SERPL-MCNC: 0.9 MG/DL (ref 0.3–1.2)
BUN SERPL-MCNC: 18 MG/DL (ref 9–23)
BUN/CREAT SERPL: 24 (ref 7–25)
CALCIUM SERPL-MCNC: 10.4 MG/DL (ref 8.7–10.4)
CHLORIDE SERPL-SCNC: 106 MMOL/L (ref 99–109)
CHOLEST SERPL-MCNC: 157 MG/DL (ref 0–200)
CO2 SERPL-SCNC: 28 MMOL/L (ref 20–31)
CREAT SERPL-MCNC: 0.75 MG/DL (ref 0.6–1.3)
EOSINOPHIL # BLD AUTO: 0.19 10*3/MM3 (ref 0–0.3)
EOSINOPHIL NFR BLD AUTO: 1.3 % (ref 0–3)
ERYTHROCYTE [DISTWIDTH] IN BLOOD BY AUTOMATED COUNT: 13.2 % (ref 11.3–14.5)
GLOBULIN SER CALC-MCNC: 1.9 GM/DL
GLUCOSE SERPL-MCNC: 114 MG/DL (ref 70–100)
HBA1C MFR BLD: 5.5 % (ref 4.8–5.6)
HCT VFR BLD AUTO: 45.2 % (ref 38.9–50.9)
HDLC SERPL-MCNC: 40 MG/DL (ref 40–60)
HGB BLD-MCNC: 15.3 G/DL (ref 13.1–17.5)
IMM GRANULOCYTES # BLD AUTO: 0.07 10*3/MM3 (ref 0–0.05)
IMM GRANULOCYTES NFR BLD AUTO: 0.5 % (ref 0–0.6)
LDLC SERPL CALC-MCNC: 99 MG/DL (ref 0–100)
LYMPHOCYTES # BLD AUTO: 5.34 10*3/MM3 (ref 0.6–4.8)
LYMPHOCYTES NFR BLD AUTO: 36 % (ref 24–44)
MCH RBC QN AUTO: 33.6 PG (ref 27–31)
MCHC RBC AUTO-ENTMCNC: 33.8 G/DL (ref 32–36)
MCV RBC AUTO: 99.1 FL (ref 80–99)
MONOCYTES # BLD AUTO: 1.03 10*3/MM3 (ref 0–1)
MONOCYTES NFR BLD AUTO: 6.9 % (ref 0–12)
NEUTROPHILS # BLD AUTO: 8.16 10*3/MM3 (ref 1.5–8.3)
NEUTROPHILS NFR BLD AUTO: 54.9 % (ref 41–71)
PLATELET # BLD AUTO: 318 10*3/MM3 (ref 150–450)
POTASSIUM SERPL-SCNC: 4.7 MMOL/L (ref 3.5–5.5)
PROT SERPL-MCNC: 6.7 G/DL (ref 5.7–8.2)
RBC # BLD AUTO: 4.56 10*6/MM3 (ref 4.2–5.76)
SODIUM SERPL-SCNC: 142 MMOL/L (ref 132–146)
TRIGL SERPL-MCNC: 92 MG/DL (ref 0–150)
TSH SERPL DL<=0.005 MIU/L-ACNC: 0.66 MIU/ML (ref 0.35–5.35)
VLDLC SERPL CALC-MCNC: 18.4 MG/DL
WBC # BLD AUTO: 14.85 10*3/MM3 (ref 3.5–10.8)

## 2019-03-13 PROCEDURE — 36415 COLL VENOUS BLD VENIPUNCTURE: CPT | Performed by: FAMILY MEDICINE

## 2019-05-07 ENCOUNTER — OFFICE VISIT (OUTPATIENT)
Dept: FAMILY MEDICINE CLINIC | Facility: CLINIC | Age: 69
End: 2019-05-07

## 2019-05-07 VITALS
HEART RATE: 92 BPM | TEMPERATURE: 98.2 F | SYSTOLIC BLOOD PRESSURE: 140 MMHG | OXYGEN SATURATION: 96 % | WEIGHT: 146 LBS | DIASTOLIC BLOOD PRESSURE: 72 MMHG | HEIGHT: 70 IN | BODY MASS INDEX: 20.9 KG/M2

## 2019-05-07 DIAGNOSIS — L98.9 SKIN LESION: ICD-10-CM

## 2019-05-07 DIAGNOSIS — J18.9 PNEUMONIA OF LEFT LOWER LOBE DUE TO INFECTIOUS ORGANISM: Primary | ICD-10-CM

## 2019-05-07 PROCEDURE — 99214 OFFICE O/P EST MOD 30 MIN: CPT | Performed by: FAMILY MEDICINE

## 2019-05-07 RX ORDER — LEVOFLOXACIN 750 MG/1
750 TABLET ORAL DAILY
Qty: 5 TABLET | Refills: 0 | Status: SHIPPED | OUTPATIENT
Start: 2019-05-07 | End: 2019-05-08 | Stop reason: SDDI

## 2019-05-07 NOTE — PROGRESS NOTES
Chief Complaint   Patient presents with   • Sinusitis     runny nose, place on skin on right side, blister on left hip        Sinusitis   This is a new problem. The current episode started 1 to 4 weeks ago (10 days). The problem has been gradually improving since onset. There has been no fever. Associated symptoms include congestion, coughing and a sore throat. Pertinent negatives include no ear pain or sinus pressure. Past treatments include nothing.      Worse with weather change.     Right chest wall dark place like a mole present for years.      Blister on left hip for 1-2 months, treated with neosporin and it improved. Dime sized red spot with a black spot in it. When walking sometimes feels like pins.     Review of Systems   Constitutional: Negative for fever.   HENT: Positive for congestion, postnasal drip, rhinorrhea, sore throat and voice change. Negative for ear pain and sinus pressure.    Respiratory: Positive for cough.    Neurological: Negative for headache.        Current Outpatient Medications on File Prior to Visit   Medication Sig Dispense Refill   • finasteride (PROPECIA) 1 MG tablet Take 1 mg by mouth Daily.     • tamsulosin (FLOMAX) 0.4 MG capsule 24 hr capsule Take 1 capsule by mouth Every Night.       No current facility-administered medications on file prior to visit.        Allergies   Allergen Reactions   • Penicillins Hives       Past Medical History:   Diagnosis Date   • BPH with elevated PSA    • Chronic bronchitis (CMS/HCC)    • Elevated blood pressure    • Hyperlipidemia    • Hypertension         Past Surgical History:   Procedure Laterality Date   • FINGER SURGERY      Right hand 4th finger   • FINGER SURGERY      R ring finger, partial amputation repair        Family History   Problem Relation Age of Onset   • Lymphoma Mother         Social History     Socioeconomic History   • Marital status:      Spouse name: Not on file   • Number of children: Not on file   • Years of  "education: Not on file   • Highest education level: Not on file   Tobacco Use   • Smoking status: Current Every Day Smoker     Packs/day: 1.00     Years: 48.00     Pack years: 48.00     Types: Cigarettes   • Smokeless tobacco: Never Used   Substance and Sexual Activity   • Alcohol use: No   • Drug use: No   • Sexual activity: Defer        Visit Vitals  /72 (BP Location: Left arm, Patient Position: Sitting, Cuff Size: Adult)   Pulse 92   Temp 98.2 °F (36.8 °C)   Ht 177.8 cm (70\")   Wt 66.2 kg (146 lb)   SpO2 96%   BMI 20.95 kg/m²        Physical Exam   Constitutional: No distress.   HENT:   Right Ear: Tympanic membrane and ear canal normal.   Left Ear: Tympanic membrane and ear canal normal.   Nose: Mucosal edema ( erythema) present.   Mouth/Throat: Oropharynx is clear and moist and mucous membranes are normal. No oral lesions.   Cardiovascular: Normal rate and regular rhythm.   No murmur heard.  Pulmonary/Chest: Effort normal. No respiratory distress. He has no wheezes. He has rales in the left lower field.   Skin:   Right chest wall 4 mm oval tan raised papule with asymmetric darkening.    Left lateral hip open comedone with black plugging, no erythema or drainage, nontender   Psychiatric: He has a normal mood and affect.   Vitals reviewed.                 Obed was seen today for sinusitis.    Diagnoses and all orders for this visit:    Pneumonia of left lower lobe due to infectious organism (CMS/[facility])  -     levoFLOXacin (LEVAQUIN) 750 MG tablet; Take 1 tablet by mouth Daily for 5 days.  New.  Allergic reaction to penicillin is hives therefore will not use cephalosporins either.  Treat with Levaquin for 5 days.  Advised if fever over 101, shortness of breath, worsening cough to return to clinic for reevaluation.  Recommend that he not walk for exercise this week while under treatment.  Skin lesion  Lesion on left hip appears to be a blocked pore and not concerning for infection he may discontinue Neosporin.  " Lesion on right chest wall likely seborrheic keratosis which is been present for years and not changing picture added to the chart and will monitor.      Return if symptoms worsen or fail to improve.

## 2019-05-08 ENCOUNTER — TELEPHONE (OUTPATIENT)
Dept: FAMILY MEDICINE CLINIC | Facility: CLINIC | Age: 69
End: 2019-05-08

## 2019-05-08 RX ORDER — AZITHROMYCIN 250 MG/1
TABLET, FILM COATED ORAL
Qty: 6 TABLET | Refills: 0 | Status: SHIPPED | OUTPATIENT
Start: 2019-05-08 | End: 2020-03-02

## 2020-03-02 ENCOUNTER — OFFICE VISIT (OUTPATIENT)
Dept: FAMILY MEDICINE CLINIC | Facility: CLINIC | Age: 70
End: 2020-03-02

## 2020-03-02 ENCOUNTER — LAB REQUISITION (OUTPATIENT)
Dept: LAB | Facility: HOSPITAL | Age: 70
End: 2020-03-02

## 2020-03-02 VITALS
SYSTOLIC BLOOD PRESSURE: 142 MMHG | HEIGHT: 70 IN | OXYGEN SATURATION: 97 % | BODY MASS INDEX: 20.7 KG/M2 | HEART RATE: 102 BPM | DIASTOLIC BLOOD PRESSURE: 70 MMHG | WEIGHT: 144.6 LBS

## 2020-03-02 DIAGNOSIS — I10 ESSENTIAL HYPERTENSION: ICD-10-CM

## 2020-03-02 DIAGNOSIS — Z00.00 ROUTINE GENERAL MEDICAL EXAMINATION AT A HEALTH CARE FACILITY: ICD-10-CM

## 2020-03-02 DIAGNOSIS — Z71.89 ADVANCED DIRECTIVES, COUNSELING/DISCUSSION: ICD-10-CM

## 2020-03-02 DIAGNOSIS — R73.03 PREDIABETES: ICD-10-CM

## 2020-03-02 DIAGNOSIS — Z00.00 WELL ADULT EXAM: Primary | ICD-10-CM

## 2020-03-02 DIAGNOSIS — D72.829 LEUKOCYTOSIS, UNSPECIFIED TYPE: ICD-10-CM

## 2020-03-02 DIAGNOSIS — N40.1 BENIGN PROSTATIC HYPERPLASIA WITH LOWER URINARY TRACT SYMPTOMS, SYMPTOM DETAILS UNSPECIFIED: ICD-10-CM

## 2020-03-02 PROCEDURE — 36415 COLL VENOUS BLD VENIPUNCTURE: CPT | Performed by: FAMILY MEDICINE

## 2020-03-02 PROCEDURE — 99397 PER PM REEVAL EST PAT 65+ YR: CPT | Performed by: FAMILY MEDICINE

## 2020-03-02 PROCEDURE — 96160 PT-FOCUSED HLTH RISK ASSMT: CPT | Performed by: FAMILY MEDICINE

## 2020-03-02 PROCEDURE — G0439 PPPS, SUBSEQ VISIT: HCPCS | Performed by: FAMILY MEDICINE

## 2020-03-02 NOTE — PATIENT INSTRUCTIONS
Medicare Wellness  Personal Prevention Plan of Service     Date of Office Visit:  2020  Encounter Provider:  Lorraine nSider MD  Place of Service:  [facility]  Patient Name: Obed Cotto  :  1950    As part of the Medicare Wellness portion of your visit today, we are providing you with this personalized preventive plan of services (PPPS). This plan is based upon recommendations of the [place] Preventive Services Task Force (USPSTF) and the Advisory Committee on Immunization Practices (ACIP).    This lists the preventive care services that should be considered, and provides dates of when you are due. Items listed as completed are up-to-date and do not require any further intervention.    Health Maintenance   Topic Date Due   • TDAP/TD VACCINES (1 - Tdap) 1960   • AAA SCREEN (ONE-TIME)  10/24/2016   • ZOSTER VACCINE (2 of 2) 2017   • LUNG CANCER SCREENING  2019   • LIPID PANEL  2020   • COLOGUARD  2020   • MEDICARE ANNUAL WELLNESS  2021   • HEPATITIS C SCREENING  Completed   • Pneumococcal Vaccine Once at 65 Years Old  Completed   • INFLUENZA VACCINE  Completed       Orders Placed This Encounter   Procedures   • Comprehensive Metabolic Panel     Standing Status:   Future     Standing Expiration Date:   3/2/2021   • Lipid Panel     Standing Status:   Future     Standing Expiration Date:   3/2/2021   • Hemoglobin A1c     Standing Status:   Future     Standing Expiration Date:   3/2/2021   • TSH Rfx On Abnormal To Free T4     Standing Status:   Future     Standing Expiration Date:   3/2/2021   • Ambulatory Referral to Advance Care Planning     Referral Priority:   Routine     Referral Type:   Routine Care     Number of Visits Requested:   1   • CBC & Differential     Standing Status:   Future     Standing Expiration Date:   3/2/2021     Order Specific Question:   Manual Differential     Answer:   No       Return in about 1  year (around 3/2/2021) for Medicare Wellness.

## 2020-03-03 LAB
ALBUMIN SERPL-MCNC: 4.9 G/DL (ref 3.8–4.8)
ALBUMIN/GLOB SERPL: 2.2 {RATIO} (ref 1.2–2.2)
ALP SERPL-CCNC: 88 IU/L (ref 39–117)
ALT SERPL-CCNC: 25 IU/L (ref 0–44)
AST SERPL-CCNC: 23 IU/L (ref 0–40)
BASOPHILS # BLD AUTO: 0.1 X10E3/UL (ref 0–0.2)
BASOPHILS NFR BLD AUTO: 1 %
BILIRUB SERPL-MCNC: 0.5 MG/DL (ref 0–1.2)
BUN SERPL-MCNC: 14 MG/DL (ref 8–27)
BUN/CREAT SERPL: 20 (ref 10–24)
CALCIUM SERPL-MCNC: 10.3 MG/DL (ref 8.6–10.2)
CHLORIDE SERPL-SCNC: 103 MMOL/L (ref 96–106)
CHOLEST SERPL-MCNC: 171 MG/DL (ref 100–199)
CO2 SERPL-SCNC: 22 MMOL/L (ref 20–29)
CREAT SERPL-MCNC: 0.69 MG/DL (ref 0.76–1.27)
EOSINOPHIL # BLD AUTO: 0.2 X10E3/UL (ref 0–0.4)
EOSINOPHIL NFR BLD AUTO: 2 %
ERYTHROCYTE [DISTWIDTH] IN BLOOD BY AUTOMATED COUNT: 12 % (ref 11.6–15.4)
GLOBULIN SER CALC-MCNC: 2.2 G/DL (ref 1.5–4.5)
GLUCOSE SERPL-MCNC: 103 MG/DL (ref 65–99)
HBA1C MFR BLD: 5.6 % (ref 4.8–5.6)
HCT VFR BLD AUTO: 47.2 % (ref 37.5–51)
HDLC SERPL-MCNC: 41 MG/DL
HGB BLD-MCNC: 16.3 G/DL (ref 13–17.7)
IMM GRANULOCYTES # BLD AUTO: 0.1 X10E3/UL (ref 0–0.1)
IMM GRANULOCYTES NFR BLD AUTO: 1 %
LDLC SERPL CALC-MCNC: 114 MG/DL (ref 0–99)
LYMPHOCYTES # BLD AUTO: 5.5 X10E3/UL (ref 0.7–3.1)
LYMPHOCYTES NFR BLD AUTO: 38 %
MCH RBC QN AUTO: 33.7 PG (ref 26.6–33)
MCHC RBC AUTO-ENTMCNC: 34.5 G/DL (ref 31.5–35.7)
MCV RBC AUTO: 98 FL (ref 79–97)
MONOCYTES # BLD AUTO: 1 X10E3/UL (ref 0.1–0.9)
MONOCYTES NFR BLD AUTO: 7 %
NEUTROPHILS # BLD AUTO: 7.5 X10E3/UL (ref 1.4–7)
NEUTROPHILS NFR BLD AUTO: 51 %
PLATELET # BLD AUTO: 323 X10E3/UL (ref 150–450)
POTASSIUM SERPL-SCNC: 5 MMOL/L (ref 3.5–5.2)
PROT SERPL-MCNC: 7.1 G/DL (ref 6–8.5)
RBC # BLD AUTO: 4.83 X10E6/UL (ref 4.14–5.8)
SODIUM SERPL-SCNC: 143 MMOL/L (ref 134–144)
TRIGL SERPL-MCNC: 79 MG/DL (ref 0–149)
TSH SERPL DL<=0.005 MIU/L-ACNC: 1.22 UIU/ML (ref 0.45–4.5)
VLDLC SERPL CALC-MCNC: 16 MG/DL (ref 5–40)
WBC # BLD AUTO: 14.4 X10E3/UL (ref 3.4–10.8)

## 2021-03-05 ENCOUNTER — TELEPHONE (OUTPATIENT)
Dept: FAMILY MEDICINE CLINIC | Facility: CLINIC | Age: 71
End: 2021-03-05

## 2021-03-05 ENCOUNTER — OFFICE VISIT (OUTPATIENT)
Dept: FAMILY MEDICINE CLINIC | Facility: CLINIC | Age: 71
End: 2021-03-05

## 2021-03-05 VITALS
HEART RATE: 101 BPM | OXYGEN SATURATION: 96 % | DIASTOLIC BLOOD PRESSURE: 84 MMHG | BODY MASS INDEX: 21.3 KG/M2 | SYSTOLIC BLOOD PRESSURE: 162 MMHG | WEIGHT: 148.8 LBS | HEIGHT: 70 IN

## 2021-03-05 DIAGNOSIS — Z71.89 ADVANCED CARE PLANNING/COUNSELING DISCUSSION: ICD-10-CM

## 2021-03-05 DIAGNOSIS — R73.03 PREDIABETES: ICD-10-CM

## 2021-03-05 DIAGNOSIS — Z13.0 SCREENING FOR DEFICIENCY ANEMIA: ICD-10-CM

## 2021-03-05 DIAGNOSIS — Z00.00 WELL ADULT EXAM: Primary | ICD-10-CM

## 2021-03-05 DIAGNOSIS — I10 ESSENTIAL HYPERTENSION: ICD-10-CM

## 2021-03-05 DIAGNOSIS — R00.0 TACHYCARDIA: ICD-10-CM

## 2021-03-05 DIAGNOSIS — R94.31 ABNORMAL EKG: ICD-10-CM

## 2021-03-05 PROCEDURE — 1126F AMNT PAIN NOTED NONE PRSNT: CPT | Performed by: FAMILY MEDICINE

## 2021-03-05 PROCEDURE — 96160 PT-FOCUSED HLTH RISK ASSMT: CPT | Performed by: FAMILY MEDICINE

## 2021-03-05 PROCEDURE — 1159F MED LIST DOCD IN RCRD: CPT | Performed by: FAMILY MEDICINE

## 2021-03-05 PROCEDURE — 99397 PER PM REEVAL EST PAT 65+ YR: CPT | Performed by: FAMILY MEDICINE

## 2021-03-05 PROCEDURE — G0439 PPPS, SUBSEQ VISIT: HCPCS | Performed by: FAMILY MEDICINE

## 2021-03-05 PROCEDURE — 1170F FXNL STATUS ASSESSED: CPT | Performed by: FAMILY MEDICINE

## 2021-03-05 RX ORDER — FINASTERIDE 5 MG/1
5 TABLET, FILM COATED ORAL DAILY
COMMUNITY
End: 2021-08-05 | Stop reason: SDUPTHER

## 2021-03-05 RX ORDER — METOPROLOL SUCCINATE 25 MG/1
25 TABLET, EXTENDED RELEASE ORAL DAILY
Qty: 30 TABLET | Refills: 1 | Status: SHIPPED | OUTPATIENT
Start: 2021-03-05 | End: 2021-07-08

## 2021-03-05 NOTE — PATIENT INSTRUCTIONS
Medicare Wellness  Personal Prevention Plan of Service     Date of Office Visit:  2021  Encounter Provider:  Lorraine Snider MD  Place of Service:  [facility]  Patient Name: Obed Cotto  :  1950    As part of the Medicare Wellness portion of your visit today, we are providing you with this personalized preventive plan of services (PPPS). This plan is based upon recommendations of the [place] Preventive Services Task Force (USPSTF) and the Advisory Committee on Immunization Practices (ACIP).    This lists the preventive care services that should be considered, and provides dates of when you are due. Items listed as completed are up-to-date and do not require any further intervention.    Health Maintenance   Topic Date Due   • COVID-19 Vaccine (1 of 2) 1965   • TDAP/TD VACCINES (1 - Tdap) 1968   • AAA SCREEN (ONE-TIME)  10/24/2016   • ZOSTER VACCINE (2 of 2) 2017   • LUNG CANCER SCREENING  2019   • COLOGUARD  2020   • INFLUENZA VACCINE  2020   • ANNUAL WELLNESS VISIT  2021   • LIPID PANEL  2021   • HEPATITIS C SCREENING  Completed   • Pneumococcal Vaccine 65+  Completed   • MENINGOCOCCAL VACCINE  Aged Out       Orders Placed This Encounter   Procedures   • Comprehensive Metabolic Panel     Standing Status:   Future     Standing Expiration Date:   3/5/2022   • Lipid Panel     Standing Status:   Future     Standing Expiration Date:   3/5/2022   • TSH Rfx On Abnormal To Free T4     Standing Status:   Future     Standing Expiration Date:   3/5/2022   • Hemoglobin A1c     Standing Status:   Future     Standing Expiration Date:   3/5/2022   • Ambulatory Referral to Advance Care Planning     Referral Priority:   Routine     Referral Type:   Routine Care     Number of Visits Requested:   1   • Ambulatory Referral to Cardiology     Referral Priority:   Urgent     Referral Type:   Consultation     Referral Reason:    Specialty Services Required     Requested Specialty:   Cardiology     Number of Visits Requested:   1   • ECG 12 Lead     Order Specific Question:   Reason for Exam:     Answer:   tachycardia   • CBC & Differential     Standing Status:   Future     Standing Expiration Date:   3/5/2022     Order Specific Question:   Manual Differential     Answer:   No       Return in about 1 year (around 3/5/2022) for Medicare Wellness.

## 2021-03-05 NOTE — PROGRESS NOTES
The ABCs of the Annual Wellness Visit  Subsequent Medicare Wellness Visit    Chief Complaint   Patient presents with   • Medicare Wellness-subsequent       Subjective   History of Present Illness:  Obed Cotto is a 71 y.o. male who presents for a Subsequent Medicare Wellness Visit.    Denies chest pain or palpitations. He walks 15 miles a week. Denies dyspnea on exertion. He had urology appt in the past week. BP usually 140/70s.         HEALTH RISK ASSESSMENT    Recent Hospitalizations:  No hospitalization(s) within the last year.    Current Medical Providers:  Patient Care Team:  Lorraine Morfin MD as PCP - General (Family Medicine)  Dieudonne Hartman MD as Consulting Physician (Urology)    Smoking Status:  Social History     Tobacco Use   Smoking Status Current Every Day Smoker   • Packs/day: 1.00   • Years: 48.00   • Pack years: 48.00   • Types: Cigarettes   Smokeless Tobacco Never Used       Alcohol Consumption:  Social History     Substance and Sexual Activity   Alcohol Use No       Depression Screen:   PHQ-2/PHQ-9 Depression Screening 3/5/2021   Little interest or pleasure in doing things 0   Feeling down, depressed, or hopeless 0   Trouble falling or staying asleep, or sleeping too much -   Feeling tired or having little energy -   Poor appetite or overeating -   Feeling bad about yourself - or that you are a failure or have let yourself or your family down -   Trouble concentrating on things, such as reading the newspaper or watching television -   Moving or speaking so slowly that other people could have noticed. Or the opposite - being so fidgety or restless that you have been moving around a lot more than usual -   Thoughts that you would be better off dead, or of hurting yourself in some way -   Total Score 0   If you checked off any problems, how difficult have these problems made it for you to do your work, take care of things at home, or get along with other people? -       Fall Risk  Screen:  KAIN Fall Risk Assessment was completed, and patient is at LOW risk for falls.Assessment completed on:3/5/2021   STEDELFINO Fall Risk Clinician Key Questions   Have you fallen in the past year?: No  Do you feel unsteady with walking?: No  Are you worried about falling?: No    Stay Idependant Patient Questions   Have you been advised to use a cane or a walker to get around safely?: No  Do you steady yourself by holding onto furniture when walking at home?: No  Do you need to push with your hands to stand up from a chair?: No  Do you have trouble stepping up onto a curb?: No  Do you have to rush to the toilet?: No  Have you lost some feeling in your feet?: No  Do you take medicine that sometimes makes you feel light headed or more tired than usual?: No  Do you take medicine to help you sleep or improve your mood?: No  Do you often feel sad or depressed?: No  Patient Fall Risk Assessment Score : 0        Health Habits and Functional and Cognitive Screening:  Functional & Cognitive Status 3/5/2021   Do you have difficulty preparing food and eating? No   Do you have difficulty bathing yourself, getting dressed or grooming yourself? No   Do you have difficulty using the toilet? No   Do you have difficulty moving around from place to place? No   Do you have trouble with steps or getting out of a bed or a chair? No   Current Diet Unhealthy Diet   Dental Exam Up to date   Eye Exam Not up to date   Exercise (times per week) 7 times per week   Current Exercise Activities Include Walking   Do you need help using the phone?  No   Are you deaf or do you have serious difficulty hearing?  No   Do you need help with transportation? No   Do you need help shopping? No   Do you need help preparing meals?  No   Do you need help with housework?  No   Do you need help with laundry? No   Do you need help taking your medications? No   Do you need help managing money? No   Do you ever drive or ride in a car without wearing a seat  belt? No   Have you felt unusual stress, anger or loneliness in the last month? No   Who do you live with? Child   If you need help, do you have trouble finding someone available to you? No   Have you been bothered in the last four weeks by sexual problems? No   Do you have difficulty concentrating, remembering or making decisions? No         Does the patient have evidence of cognitive impairment? No    Asprin use counseling:Does not need ASA (and currently is not on it)    Age-appropriate Screening Schedule:  Refer to the list below for future screening recommendations based on patient's age, sex and/or medical conditions. Orders for these recommended tests are listed in the plan section. The patient has been provided with a written plan.    Health Maintenance   Topic Date Due   • TDAP/TD VACCINES (1 - Tdap) 03/03/1968   • ZOSTER VACCINE (2 of 2) 07/18/2017   • INFLUENZA VACCINE  08/01/2020   • LIPID PANEL  03/02/2021          The following portions of the patient's history were reviewed and updated as appropriate: allergies, current medications, past family history, past medical history, past social history, past surgical history and problem list.    Outpatient Medications Prior to Visit   Medication Sig Dispense Refill   • finasteride (PROSCAR) 5 MG tablet Take 5 mg by mouth Daily.     • tamsulosin (FLOMAX) 0.4 MG capsule 24 hr capsule Take 1 capsule by mouth Every Night.     • finasteride (PROPECIA) 1 MG tablet Take 1 mg by mouth Daily.       No facility-administered medications prior to visit.        Patient Active Problem List   Diagnosis   • Tobacco abuse   • Urinary retention   • Prehypertension   • Positive colorectal cancer screening using DNA-based stool test   • Leukocytosis   • Prediabetes   • BPH (benign prostatic hyperplasia)   • BPH with elevated PSA   • Essential hypertension       Advanced Care Planning:  ACP discussion was held with the patient during this visit. Patient does not have an advance  "directive, information provided.        Review of Systems    Compared to one year ago, the patient feels his physical health is worse.  Compared to one year ago, the patient feels his mental health is the same.    Reviewed chart for potential of high risk medication in the elderly: not applicable  Reviewed chart for potential of harmful drug interactions in the elderly:not applicable    Objective         Vitals:    03/05/21 1401   BP: 162/84   BP Location: Left arm   Patient Position: Sitting   Cuff Size: Adult   Pulse: 101   SpO2: 96%   Weight: 67.5 kg (148 lb 12.8 oz)   Height: 179 cm (70.47\")   PainSc: 0-No pain       Body mass index is 21.07 kg/m².  Discussed the patient's BMI with him. The BMI is in the acceptable range.    Physical Exam  Constitutional:       General: He is not in acute distress.  HENT:      Right Ear: Tympanic membrane and ear canal normal.      Left Ear: Tympanic membrane and ear canal normal.   Eyes:      General:         Right eye: No discharge.         Left eye: No discharge.      Conjunctiva/sclera: Conjunctivae normal.   Neck:      Musculoskeletal: Neck supple.      Thyroid: No thyromegaly.   Cardiovascular:      Rate and Rhythm: Regular rhythm. Tachycardia present.      Heart sounds: Normal heart sounds.   Pulmonary:      Effort: Pulmonary effort is normal.      Breath sounds: Normal breath sounds.   Lymphadenopathy:      Head:      Right side of head: No submandibular, preauricular or posterior auricular adenopathy.      Left side of head: No submandibular, preauricular or posterior auricular adenopathy.      Cervical: No cervical adenopathy.   Skin:     General: Skin is warm and dry.   Neurological:      Mental Status: He is alert and oriented to person, place, and time.   Psychiatric:         Mood and Affect: Mood normal.         Behavior: Behavior normal.         Thought Content: Thought content normal.         Judgment: Judgment normal.               ECG 12 Lead    Date/Time: " 3/5/2021 2:50 PM  Performed by: Lorraine Morfin MD  Authorized by: Lorraine Morfin MD   Previous ECG: no previous ECG available  Rhythm: sinus tachycardia  Ectopy: atrial premature contractions  Rate comments: 102  Other findings: left ventricular hypertrophy    Clinical impression: abnormal EKG            No prior EKGs.     Assessment/Plan   Medicare Risks and Personalized Health Plan  CMS Preventative Services Quick Reference  Advance Directive Discussion  Cardiovascular risk  Diabetic Lab Screening    Counseled on health maintenance topics and preventative care recommendations: supplements      The above risks/problems have been discussed with the patient.  Pertinent information has been shared with the patient in the After Visit Summary.  Follow up plans and orders are seen below in the Assessment/Plan Section.    Diagnoses and all orders for this visit:    1. Well adult exam (Primary)  Check fasting labs today.  2. Tachycardia  -     ECG 12 Lead  -     Ambulatory Referral to Cardiology  -     metoprolol succinate XL (TOPROL-XL) 25 MG 24 hr tablet; Take 1 tablet by mouth Daily.  Dispense: 30 tablet; Refill: 1  -     Comprehensive Metabolic Panel; Future  -     TSH Rfx On Abnormal To Free T4; Future  -     TSH Rfx On Abnormal To Free T4  -     Comprehensive Metabolic Panel  New.  Also the finding of hypertension and abnormal EKG.  Will initiate beta-blocker today and refer to cardiology.  3. Advanced care planning/counseling discussion  -     Ambulatory Referral to Advance Care Planning    4. Essential hypertension  -     Ambulatory Referral to Cardiology  -     metoprolol succinate XL (TOPROL-XL) 25 MG 24 hr tablet; Take 1 tablet by mouth Daily.  Dispense: 30 tablet; Refill: 1  -     Comprehensive Metabolic Panel; Future  -     Lipid Panel; Future  -     TSH Rfx On Abnormal To Free T4; Future  -     TSH Rfx On Abnormal To Free T4  -     Lipid Panel  -     Comprehensive Metabolic  Panel  Patient has had hypertension for some time but not on medications.  His blood pressure is much higher than his previous readings.  Also in the setting of tachycardia and abnormal EKG we will initiate beta-blocker therapy and referred to cardiology.  5. Abnormal EKG  -     Ambulatory Referral to Cardiology  New.  No prior EKGs for comparison.  6. Prediabetes  -     Comprehensive Metabolic Panel; Future  -     Hemoglobin A1c; Future  -     Hemoglobin A1c  -     Comprehensive Metabolic Panel    7. Screening for deficiency anemia  -     CBC & Differential; Future  -     CBC & Differential      Follow Up:  Return in about 1 year (around 3/5/2022) for Medicare Wellness.     An After Visit Summary and PPPS were given to the patient.       Electronically signed by Lorraine Snider MD, 03/05/21, 5:14 PM EST.

## 2021-03-05 NOTE — TELEPHONE ENCOUNTER
Pt states that he thought Dr. LAUREN told him that he will need to have another EKG in a couple weeks. Was this discussed? I did not see an order in the chart. Please advise.

## 2021-03-06 LAB
ALBUMIN SERPL-MCNC: 4.5 G/DL (ref 3.5–5.2)
ALBUMIN/GLOB SERPL: 2.1 G/DL
ALP SERPL-CCNC: 83 U/L (ref 39–117)
ALT SERPL-CCNC: 19 U/L (ref 1–41)
AST SERPL-CCNC: 18 U/L (ref 1–40)
BASOPHILS # BLD AUTO: 0.1 10*3/MM3 (ref 0–0.2)
BASOPHILS NFR BLD AUTO: 0.7 % (ref 0–1.5)
BILIRUB SERPL-MCNC: 0.7 MG/DL (ref 0–1.2)
BUN SERPL-MCNC: 15 MG/DL (ref 8–23)
BUN/CREAT SERPL: 20.8 (ref 7–25)
CALCIUM SERPL-MCNC: 10 MG/DL (ref 8.6–10.5)
CHLORIDE SERPL-SCNC: 102 MMOL/L (ref 98–107)
CHOLEST SERPL-MCNC: 174 MG/DL (ref 0–200)
CO2 SERPL-SCNC: 27 MMOL/L (ref 22–29)
CREAT SERPL-MCNC: 0.72 MG/DL (ref 0.76–1.27)
EOSINOPHIL # BLD AUTO: 0.2 10*3/MM3 (ref 0–0.4)
EOSINOPHIL NFR BLD AUTO: 1.3 % (ref 0.3–6.2)
ERYTHROCYTE [DISTWIDTH] IN BLOOD BY AUTOMATED COUNT: 12 % (ref 12.3–15.4)
GLOBULIN SER CALC-MCNC: 2.1 GM/DL
GLUCOSE SERPL-MCNC: 116 MG/DL (ref 65–99)
HBA1C MFR BLD: 5.7 % (ref 4.8–5.6)
HCT VFR BLD AUTO: 44.2 % (ref 37.5–51)
HDLC SERPL-MCNC: 44 MG/DL (ref 40–60)
HGB BLD-MCNC: 15.6 G/DL (ref 13–17.7)
IMM GRANULOCYTES # BLD AUTO: 0.09 10*3/MM3 (ref 0–0.05)
IMM GRANULOCYTES NFR BLD AUTO: 0.6 % (ref 0–0.5)
LDLC SERPL CALC-MCNC: 113 MG/DL (ref 0–100)
LYMPHOCYTES # BLD AUTO: 5.01 10*3/MM3 (ref 0.7–3.1)
LYMPHOCYTES NFR BLD AUTO: 33.6 % (ref 19.6–45.3)
MCH RBC QN AUTO: 33.4 PG (ref 26.6–33)
MCHC RBC AUTO-ENTMCNC: 35.3 G/DL (ref 31.5–35.7)
MCV RBC AUTO: 94.6 FL (ref 79–97)
MONOCYTES # BLD AUTO: 1.04 10*3/MM3 (ref 0.1–0.9)
MONOCYTES NFR BLD AUTO: 7 % (ref 5–12)
NEUTROPHILS # BLD AUTO: 8.46 10*3/MM3 (ref 1.7–7)
NEUTROPHILS NFR BLD AUTO: 56.8 % (ref 42.7–76)
NRBC BLD AUTO-RTO: 0 /100 WBC (ref 0–0.2)
PLATELET # BLD AUTO: 308 10*3/MM3 (ref 140–450)
POTASSIUM SERPL-SCNC: 4.3 MMOL/L (ref 3.5–5.2)
PROT SERPL-MCNC: 6.6 G/DL (ref 6–8.5)
RBC # BLD AUTO: 4.67 10*6/MM3 (ref 4.14–5.8)
SODIUM SERPL-SCNC: 141 MMOL/L (ref 136–145)
TRIGL SERPL-MCNC: 93 MG/DL (ref 0–150)
TSH SERPL DL<=0.005 MIU/L-ACNC: 0.96 UIU/ML (ref 0.27–4.2)
VLDLC SERPL CALC-MCNC: 17 MG/DL (ref 5–40)
WBC # BLD AUTO: 14.9 10*3/MM3 (ref 3.4–10.8)

## 2021-03-08 NOTE — TELEPHONE ENCOUNTER
He needs to see a cardiology for further evaluation of the abnormal EKG.  I did not place an order because they will do one at the cardiologist office.  He will be contacted when the appointment is scheduled.

## 2021-03-08 NOTE — TELEPHONE ENCOUNTER
Attempted to call patient, no answer. LVM with office # given.     The patient will have another EKG done when he see's cardiology and to be further evaluated by them. He will be contacted for an appointment.     Hub may relay message and document.

## 2021-04-02 ENCOUNTER — PATIENT OUTREACH (OUTPATIENT)
Dept: CASE MANAGEMENT | Facility: OTHER | Age: 71
End: 2021-04-02

## 2021-04-02 NOTE — OUTREACH NOTE
Patient Outreach Note  Unable to reach patient x 3 attempts regarding Advance Care Planning referral. Voicemail left with contact information. Will arrange mailing of Advance Care Planning material with ACP Coordinators contact information for any questions. This note will be routed to PCP.     Emelia Durbin RN  Ambulatory     4/2/2021, 12:19 EDT

## 2021-04-09 ENCOUNTER — EPISODE CHANGES (OUTPATIENT)
Dept: CASE MANAGEMENT | Facility: OTHER | Age: 71
End: 2021-04-09

## 2021-05-30 ENCOUNTER — APPOINTMENT (OUTPATIENT)
Dept: MRI IMAGING | Facility: HOSPITAL | Age: 71
End: 2021-05-30

## 2021-05-30 ENCOUNTER — HOSPITAL ENCOUNTER (INPATIENT)
Facility: HOSPITAL | Age: 71
LOS: 4 days | Discharge: NURSING, REHAB OR LONG-TERM CARE | End: 2021-06-03
Attending: EMERGENCY MEDICINE | Admitting: INTERNAL MEDICINE

## 2021-05-30 ENCOUNTER — APPOINTMENT (OUTPATIENT)
Dept: CT IMAGING | Facility: HOSPITAL | Age: 71
End: 2021-05-30

## 2021-05-30 ENCOUNTER — APPOINTMENT (OUTPATIENT)
Dept: GENERAL RADIOLOGY | Facility: HOSPITAL | Age: 71
End: 2021-05-30

## 2021-05-30 ENCOUNTER — APPOINTMENT (OUTPATIENT)
Dept: CARDIOLOGY | Facility: HOSPITAL | Age: 71
End: 2021-05-30

## 2021-05-30 DIAGNOSIS — R41.841 COGNITIVE COMMUNICATION DEFICIT: ICD-10-CM

## 2021-05-30 DIAGNOSIS — R13.12 OROPHARYNGEAL DYSPHAGIA: Primary | ICD-10-CM

## 2021-05-30 DIAGNOSIS — R42 DIZZINESS: ICD-10-CM

## 2021-05-30 DIAGNOSIS — R53.1 ACUTE LEFT-SIDED WEAKNESS: ICD-10-CM

## 2021-05-30 DIAGNOSIS — I63.9 ACUTE ISCHEMIC STROKE (HCC): ICD-10-CM

## 2021-05-30 PROBLEM — R09.89 SUSPECTED CEREBROVASCULAR ACCIDENT (CVA): Status: ACTIVE | Noted: 2021-05-30

## 2021-05-30 LAB
ALBUMIN SERPL-MCNC: 4.2 G/DL (ref 3.5–5.2)
ALBUMIN/GLOB SERPL: 1.6 G/DL
ALP SERPL-CCNC: 105 U/L (ref 39–117)
ALT SERPL W P-5'-P-CCNC: 19 U/L (ref 1–41)
ALT SERPL W P-5'-P-CCNC: 19 U/L (ref 1–41)
AMPHET+METHAMPHET UR QL: NEGATIVE
AMPHETAMINES UR QL: NEGATIVE
ANION GAP SERPL CALCULATED.3IONS-SCNC: 10 MMOL/L (ref 5–15)
APTT PPP: 27.7 SECONDS (ref 22–39)
AST SERPL-CCNC: 17 U/L (ref 1–40)
AST SERPL-CCNC: 17 U/L (ref 1–40)
BARBITURATES UR QL SCN: NEGATIVE
BASE EXCESS BLDA CALC-SCNC: 0 MMOL/L (ref -5–5)
BASOPHILS # BLD AUTO: 0.12 10*3/MM3 (ref 0–0.2)
BASOPHILS NFR BLD AUTO: 0.8 % (ref 0–1.5)
BENZODIAZ UR QL SCN: NEGATIVE
BILIRUB SERPL-MCNC: 0.4 MG/DL (ref 0–1.2)
BILIRUB UR QL STRIP: NEGATIVE
BUN SERPL-MCNC: 18 MG/DL (ref 8–23)
BUN/CREAT SERPL: 25 (ref 7–25)
BUPRENORPHINE SERPL-MCNC: NEGATIVE NG/ML
CA-I BLDA-SCNC: 1.37 MMOL/L (ref 1.2–1.32)
CALCIUM SPEC-SCNC: 10.2 MG/DL (ref 8.6–10.5)
CANNABINOIDS SERPL QL: NEGATIVE
CHLORIDE SERPL-SCNC: 103 MMOL/L (ref 98–107)
CHOLEST SERPL-MCNC: 143 MG/DL (ref 0–200)
CLARITY UR: CLEAR
CO2 BLDA-SCNC: 27 MMOL/L (ref 24–29)
CO2 SERPL-SCNC: 24 MMOL/L (ref 22–29)
COCAINE UR QL: NEGATIVE
COLOR UR: YELLOW
CREAT BLDA-MCNC: 0.7 MG/DL (ref 0.6–1.3)
CREAT SERPL-MCNC: 0.72 MG/DL (ref 0.76–1.27)
D-LACTATE SERPL-SCNC: 1.2 MMOL/L (ref 0.5–2)
DEPRECATED RDW RBC AUTO: 45.9 FL (ref 37–54)
EOSINOPHIL # BLD AUTO: 0.4 10*3/MM3 (ref 0–0.4)
EOSINOPHIL NFR BLD AUTO: 2.7 % (ref 0.3–6.2)
ERYTHROCYTE [DISTWIDTH] IN BLOOD BY AUTOMATED COUNT: 12.9 % (ref 12.3–15.4)
ETHANOL BLD-MCNC: <10 MG/DL (ref 0–10)
FLUAV RNA RESP QL NAA+PROBE: NOT DETECTED
FLUBV RNA RESP QL NAA+PROBE: NOT DETECTED
GFR SERPL CREATININE-BSD FRML MDRD: 107 ML/MIN/1.73
GLOBULIN UR ELPH-MCNC: 2.6 GM/DL
GLUCOSE BLDC GLUCOMTR-MCNC: 121 MG/DL (ref 70–130)
GLUCOSE BLDC GLUCOMTR-MCNC: 123 MG/DL (ref 70–130)
GLUCOSE SERPL-MCNC: 128 MG/DL (ref 65–99)
GLUCOSE UR STRIP-MCNC: NEGATIVE MG/DL
HBA1C MFR BLD: 5.7 % (ref 4.8–5.6)
HCO3 BLDA-SCNC: 25.3 MMOL/L (ref 22–26)
HCT VFR BLD AUTO: 44.9 % (ref 37.5–51)
HCT VFR BLDA CALC: 43 % (ref 38–51)
HDLC SERPL-MCNC: 40 MG/DL (ref 40–60)
HGB BLD-MCNC: 14.9 G/DL (ref 13–17.7)
HGB BLDA-MCNC: 14.6 G/DL (ref 12–17)
HGB UR QL STRIP.AUTO: NEGATIVE
HOLD SPECIMEN: NORMAL
IMM GRANULOCYTES # BLD AUTO: 0.08 10*3/MM3 (ref 0–0.05)
IMM GRANULOCYTES NFR BLD AUTO: 0.5 % (ref 0–0.5)
INR PPP: 1 (ref 0.8–1.2)
KETONES UR QL STRIP: NEGATIVE
LDLC SERPL CALC-MCNC: 83 MG/DL (ref 0–100)
LDLC/HDLC SERPL: 2.04 {RATIO}
LEUKOCYTE ESTERASE UR QL STRIP.AUTO: NEGATIVE
LYMPHOCYTES # BLD AUTO: 5.06 10*3/MM3 (ref 0.7–3.1)
LYMPHOCYTES NFR BLD AUTO: 34.2 % (ref 19.6–45.3)
MCH RBC QN AUTO: 32.1 PG (ref 26.6–33)
MCHC RBC AUTO-ENTMCNC: 33.2 G/DL (ref 31.5–35.7)
MCV RBC AUTO: 96.8 FL (ref 79–97)
METHADONE UR QL SCN: NEGATIVE
MONOCYTES # BLD AUTO: 0.98 10*3/MM3 (ref 0.1–0.9)
MONOCYTES NFR BLD AUTO: 6.6 % (ref 5–12)
NEUTROPHILS NFR BLD AUTO: 55.2 % (ref 42.7–76)
NEUTROPHILS NFR BLD AUTO: 8.15 10*3/MM3 (ref 1.7–7)
NITRITE UR QL STRIP: NEGATIVE
NRBC BLD AUTO-RTO: 0 /100 WBC (ref 0–0.2)
NT-PROBNP SERPL-MCNC: 80.4 PG/ML (ref 0–900)
OPIATES UR QL: NEGATIVE
OXYCODONE UR QL SCN: NEGATIVE
PCO2 BLDA: 43.8 MM HG (ref 35–45)
PCP UR QL SCN: NEGATIVE
PH BLDA: 7.37 PH UNITS (ref 7.35–7.6)
PH UR STRIP.AUTO: 5.5 [PH] (ref 5–8)
PLATELET # BLD AUTO: 326 10*3/MM3 (ref 140–450)
PMV BLD AUTO: 10.2 FL (ref 6–12)
PO2 BLDA: 25 MMHG (ref 80–105)
POTASSIUM BLDA-SCNC: 3.8 MMOL/L (ref 3.5–4.9)
POTASSIUM SERPL-SCNC: 3.8 MMOL/L (ref 3.5–5.2)
PROCALCITONIN SERPL-MCNC: 0.04 NG/ML (ref 0–0.25)
PROPOXYPH UR QL: NEGATIVE
PROT SERPL-MCNC: 6.8 G/DL (ref 6–8.5)
PROT UR QL STRIP: NEGATIVE
PROTHROMBIN TIME: 11.5 SECONDS (ref 12.8–15.2)
RBC # BLD AUTO: 4.64 10*6/MM3 (ref 4.14–5.8)
SAO2 % BLDA: 42 % (ref 95–98)
SARS-COV-2 RNA RESP QL NAA+PROBE: NOT DETECTED
SODIUM BLD-SCNC: 139 MMOL/L (ref 138–146)
SODIUM SERPL-SCNC: 137 MMOL/L (ref 136–145)
SP GR UR STRIP: 1.04 (ref 1–1.03)
TRICYCLICS UR QL SCN: NEGATIVE
TRIGL SERPL-MCNC: 107 MG/DL (ref 0–150)
TROPONIN T SERPL-MCNC: <0.01 NG/ML (ref 0–0.03)
UROBILINOGEN UR QL STRIP: ABNORMAL
VLDLC SERPL-MCNC: 20 MG/DL (ref 5–40)
WBC # BLD AUTO: 14.79 10*3/MM3 (ref 3.4–10.8)
WHOLE BLOOD HOLD SPECIMEN: NORMAL
WHOLE BLOOD HOLD SPECIMEN: NORMAL

## 2021-05-30 PROCEDURE — 80306 DRUG TEST PRSMV INSTRMNT: CPT | Performed by: INTERNAL MEDICINE

## 2021-05-30 PROCEDURE — 87636 SARSCOV2 & INF A&B AMP PRB: CPT | Performed by: NURSE PRACTITIONER

## 2021-05-30 PROCEDURE — 0 IOPAMIDOL PER 1 ML: Performed by: EMERGENCY MEDICINE

## 2021-05-30 PROCEDURE — 93005 ELECTROCARDIOGRAM TRACING: CPT | Performed by: EMERGENCY MEDICINE

## 2021-05-30 PROCEDURE — 71045 X-RAY EXAM CHEST 1 VIEW: CPT

## 2021-05-30 PROCEDURE — 83880 ASSAY OF NATRIURETIC PEPTIDE: CPT | Performed by: INTERNAL MEDICINE

## 2021-05-30 PROCEDURE — 84132 ASSAY OF SERUM POTASSIUM: CPT

## 2021-05-30 PROCEDURE — 85014 HEMATOCRIT: CPT

## 2021-05-30 PROCEDURE — 82947 ASSAY GLUCOSE BLOOD QUANT: CPT

## 2021-05-30 PROCEDURE — 82330 ASSAY OF CALCIUM: CPT

## 2021-05-30 PROCEDURE — 82565 ASSAY OF CREATININE: CPT

## 2021-05-30 PROCEDURE — 80053 COMPREHEN METABOLIC PANEL: CPT | Performed by: INTERNAL MEDICINE

## 2021-05-30 PROCEDURE — 85610 PROTHROMBIN TIME: CPT

## 2021-05-30 PROCEDURE — 99223 1ST HOSP IP/OBS HIGH 75: CPT | Performed by: STUDENT IN AN ORGANIZED HEALTH CARE EDUCATION/TRAINING PROGRAM

## 2021-05-30 PROCEDURE — 81003 URINALYSIS AUTO W/O SCOPE: CPT | Performed by: EMERGENCY MEDICINE

## 2021-05-30 PROCEDURE — 84460 ALANINE AMINO (ALT) (SGPT): CPT | Performed by: EMERGENCY MEDICINE

## 2021-05-30 PROCEDURE — 84145 PROCALCITONIN (PCT): CPT | Performed by: NURSE PRACTITIONER

## 2021-05-30 PROCEDURE — 85730 THROMBOPLASTIN TIME PARTIAL: CPT | Performed by: EMERGENCY MEDICINE

## 2021-05-30 PROCEDURE — 70498 CT ANGIOGRAPHY NECK: CPT

## 2021-05-30 PROCEDURE — 99223 1ST HOSP IP/OBS HIGH 75: CPT | Performed by: INTERNAL MEDICINE

## 2021-05-30 PROCEDURE — 82077 ASSAY SPEC XCP UR&BREATH IA: CPT | Performed by: INTERNAL MEDICINE

## 2021-05-30 PROCEDURE — 85025 COMPLETE CBC W/AUTO DIFF WBC: CPT | Performed by: EMERGENCY MEDICINE

## 2021-05-30 PROCEDURE — 83036 HEMOGLOBIN GLYCOSYLATED A1C: CPT | Performed by: NURSE PRACTITIONER

## 2021-05-30 PROCEDURE — 92610 EVALUATE SWALLOWING FUNCTION: CPT

## 2021-05-30 PROCEDURE — 0042T HC CT CEREBRAL PERFUSION W/WO CONTRAST: CPT

## 2021-05-30 PROCEDURE — 85060 BLOOD SMEAR INTERPRETATION: CPT | Performed by: INTERNAL MEDICINE

## 2021-05-30 PROCEDURE — 99285 EMERGENCY DEPT VISIT HI MDM: CPT

## 2021-05-30 PROCEDURE — 80061 LIPID PANEL: CPT | Performed by: NURSE PRACTITIONER

## 2021-05-30 PROCEDURE — 70551 MRI BRAIN STEM W/O DYE: CPT

## 2021-05-30 PROCEDURE — 82803 BLOOD GASES ANY COMBINATION: CPT

## 2021-05-30 PROCEDURE — 84484 ASSAY OF TROPONIN QUANT: CPT | Performed by: EMERGENCY MEDICINE

## 2021-05-30 PROCEDURE — 84295 ASSAY OF SERUM SODIUM: CPT

## 2021-05-30 PROCEDURE — 82962 GLUCOSE BLOOD TEST: CPT

## 2021-05-30 PROCEDURE — 70450 CT HEAD/BRAIN W/O DYE: CPT

## 2021-05-30 PROCEDURE — 84450 TRANSFERASE (AST) (SGOT): CPT | Performed by: EMERGENCY MEDICINE

## 2021-05-30 PROCEDURE — 83605 ASSAY OF LACTIC ACID: CPT | Performed by: NURSE PRACTITIONER

## 2021-05-30 PROCEDURE — 70496 CT ANGIOGRAPHY HEAD: CPT

## 2021-05-30 PROCEDURE — 93306 TTE W/DOPPLER COMPLETE: CPT

## 2021-05-30 PROCEDURE — 92523 SPEECH SOUND LANG COMPREHEN: CPT

## 2021-05-30 RX ORDER — FINASTERIDE 5 MG/1
5 TABLET, FILM COATED ORAL DAILY
Status: DISCONTINUED | OUTPATIENT
Start: 2021-05-30 | End: 2021-05-31

## 2021-05-30 RX ORDER — LORAZEPAM 2 MG/ML
1 INJECTION INTRAMUSCULAR
Status: DISCONTINUED | OUTPATIENT
Start: 2021-05-30 | End: 2021-05-30

## 2021-05-30 RX ORDER — SODIUM CHLORIDE 0.9 % (FLUSH) 0.9 %
10 SYRINGE (ML) INJECTION AS NEEDED
Status: DISCONTINUED | OUTPATIENT
Start: 2021-05-30 | End: 2021-06-03 | Stop reason: HOSPADM

## 2021-05-30 RX ORDER — TAMSULOSIN HYDROCHLORIDE 0.4 MG/1
0.4 CAPSULE ORAL NIGHTLY
Status: DISCONTINUED | OUTPATIENT
Start: 2021-05-30 | End: 2021-05-31

## 2021-05-30 RX ORDER — SODIUM CHLORIDE 0.9 % (FLUSH) 0.9 %
10 SYRINGE (ML) INJECTION EVERY 12 HOURS SCHEDULED
Status: DISCONTINUED | OUTPATIENT
Start: 2021-05-30 | End: 2021-05-30

## 2021-05-30 RX ORDER — CLOPIDOGREL BISULFATE 75 MG/1
300 TABLET ORAL ONCE
Status: COMPLETED | OUTPATIENT
Start: 2021-05-30 | End: 2021-05-30

## 2021-05-30 RX ORDER — ACETAMINOPHEN 325 MG/1
650 TABLET ORAL EVERY 4 HOURS PRN
Status: DISCONTINUED | OUTPATIENT
Start: 2021-05-30 | End: 2021-06-03 | Stop reason: HOSPADM

## 2021-05-30 RX ORDER — ATORVASTATIN CALCIUM 40 MG/1
80 TABLET, FILM COATED ORAL NIGHTLY
Status: DISCONTINUED | OUTPATIENT
Start: 2021-05-30 | End: 2021-05-30 | Stop reason: SDUPTHER

## 2021-05-30 RX ORDER — LORAZEPAM 2 MG/ML
0.5 INJECTION INTRAMUSCULAR
Status: DISCONTINUED | OUTPATIENT
Start: 2021-05-30 | End: 2021-05-30

## 2021-05-30 RX ORDER — ATORVASTATIN CALCIUM 40 MG/1
80 TABLET, FILM COATED ORAL NIGHTLY
Status: DISCONTINUED | OUTPATIENT
Start: 2021-05-30 | End: 2021-06-03 | Stop reason: HOSPADM

## 2021-05-30 RX ORDER — LORAZEPAM 2 MG/ML
0.5 INJECTION INTRAMUSCULAR ONCE
Status: DISCONTINUED | OUTPATIENT
Start: 2021-05-30 | End: 2021-06-03 | Stop reason: HOSPADM

## 2021-05-30 RX ORDER — ONDANSETRON 4 MG/1
4 TABLET, FILM COATED ORAL EVERY 6 HOURS PRN
Status: DISCONTINUED | OUTPATIENT
Start: 2021-05-30 | End: 2021-06-03 | Stop reason: HOSPADM

## 2021-05-30 RX ORDER — ACETAMINOPHEN 160 MG/5ML
650 SOLUTION ORAL EVERY 4 HOURS PRN
Status: DISCONTINUED | OUTPATIENT
Start: 2021-05-30 | End: 2021-06-03 | Stop reason: HOSPADM

## 2021-05-30 RX ORDER — LORAZEPAM 0.5 MG/1
0.5 TABLET ORAL
Status: DISCONTINUED | OUTPATIENT
Start: 2021-05-30 | End: 2021-05-30

## 2021-05-30 RX ORDER — SODIUM CHLORIDE 0.9 % (FLUSH) 0.9 %
10 SYRINGE (ML) INJECTION EVERY 12 HOURS SCHEDULED
Status: DISCONTINUED | OUTPATIENT
Start: 2021-05-30 | End: 2021-06-03 | Stop reason: HOSPADM

## 2021-05-30 RX ORDER — ASPIRIN 300 MG/1
300 SUPPOSITORY RECTAL DAILY
Status: DISCONTINUED | OUTPATIENT
Start: 2021-05-31 | End: 2021-06-03 | Stop reason: HOSPADM

## 2021-05-30 RX ORDER — SODIUM CHLORIDE 9 MG/ML
100 INJECTION, SOLUTION INTRAVENOUS CONTINUOUS
Status: DISCONTINUED | OUTPATIENT
Start: 2021-05-30 | End: 2021-06-03 | Stop reason: HOSPADM

## 2021-05-30 RX ORDER — ASPIRIN 300 MG/1
300 SUPPOSITORY RECTAL ONCE
Status: COMPLETED | OUTPATIENT
Start: 2021-05-30 | End: 2021-05-30

## 2021-05-30 RX ORDER — ACETAMINOPHEN 650 MG/1
650 SUPPOSITORY RECTAL EVERY 4 HOURS PRN
Status: DISCONTINUED | OUTPATIENT
Start: 2021-05-30 | End: 2021-06-03 | Stop reason: HOSPADM

## 2021-05-30 RX ORDER — ASPIRIN 325 MG
325 TABLET ORAL DAILY
Status: DISCONTINUED | OUTPATIENT
Start: 2021-05-31 | End: 2021-06-03 | Stop reason: HOSPADM

## 2021-05-30 RX ORDER — LORAZEPAM 1 MG/1
1 TABLET ORAL
Status: DISCONTINUED | OUTPATIENT
Start: 2021-05-30 | End: 2021-05-30

## 2021-05-30 RX ORDER — ONDANSETRON 2 MG/ML
4 INJECTION INTRAMUSCULAR; INTRAVENOUS EVERY 6 HOURS PRN
Status: DISCONTINUED | OUTPATIENT
Start: 2021-05-30 | End: 2021-06-03 | Stop reason: HOSPADM

## 2021-05-30 RX ADMIN — FINASTERIDE 5 MG: 5 TABLET, FILM COATED ORAL at 11:19

## 2021-05-30 RX ADMIN — SODIUM CHLORIDE 1000 ML: 9 INJECTION, SOLUTION INTRAVENOUS at 23:25

## 2021-05-30 RX ADMIN — CLOPIDOGREL BISULFATE 300 MG: 75 TABLET ORAL at 11:19

## 2021-05-30 RX ADMIN — TAMSULOSIN HYDROCHLORIDE 0.4 MG: 0.4 CAPSULE ORAL at 13:21

## 2021-05-30 RX ADMIN — SODIUM CHLORIDE 75 ML/HR: 9 INJECTION, SOLUTION INTRAVENOUS at 11:47

## 2021-05-30 RX ADMIN — ATORVASTATIN CALCIUM 80 MG: 40 TABLET, FILM COATED ORAL at 20:07

## 2021-05-30 RX ADMIN — ASPIRIN 300 MG: 300 SUPPOSITORY RECTAL at 09:50

## 2021-05-30 RX ADMIN — IOPAMIDOL 115 ML: 755 INJECTION, SOLUTION INTRAVENOUS at 04:52

## 2021-05-30 RX ADMIN — SODIUM CHLORIDE 1000 ML: 9 INJECTION, SOLUTION INTRAVENOUS at 20:07

## 2021-05-31 ENCOUNTER — APPOINTMENT (OUTPATIENT)
Dept: CT IMAGING | Facility: HOSPITAL | Age: 71
End: 2021-05-31

## 2021-05-31 LAB
ANION GAP SERPL CALCULATED.3IONS-SCNC: 8 MMOL/L (ref 5–15)
BH CV ECHO MEAS - AO MAX PG (FULL): 0.5 MMHG
BH CV ECHO MEAS - AO MAX PG: 6.6 MMHG
BH CV ECHO MEAS - AO MEAN PG (FULL): 1 MMHG
BH CV ECHO MEAS - AO MEAN PG: 4 MMHG
BH CV ECHO MEAS - AO ROOT AREA (BSA CORRECTED): 1.8
BH CV ECHO MEAS - AO ROOT AREA: 8.6 CM^2
BH CV ECHO MEAS - AO ROOT DIAM: 3.3 CM
BH CV ECHO MEAS - AO V2 MAX: 128 CM/SEC
BH CV ECHO MEAS - AO V2 MEAN: 88.7 CM/SEC
BH CV ECHO MEAS - AO V2 VTI: 28.4 CM
BH CV ECHO MEAS - ASC AORTA: 3.1 CM
BH CV ECHO MEAS - AVA(I,A): 2.8 CM^2
BH CV ECHO MEAS - AVA(I,D): 2.8 CM^2
BH CV ECHO MEAS - AVA(V,A): 3 CM^2
BH CV ECHO MEAS - AVA(V,D): 3 CM^2
BH CV ECHO MEAS - BSA(HAYCOCK): 1.8 M^2
BH CV ECHO MEAS - BSA: 1.9 M^2
BH CV ECHO MEAS - BZI_BMI: 19.8 KILOGRAMS/M^2
BH CV ECHO MEAS - BZI_METRIC_HEIGHT: 182.9 CM
BH CV ECHO MEAS - BZI_METRIC_WEIGHT: 66.2 KG
BH CV ECHO MEAS - EDV(CUBED): 75.2 ML
BH CV ECHO MEAS - EDV(MOD-SP2): 114 ML
BH CV ECHO MEAS - EDV(MOD-SP4): 106 ML
BH CV ECHO MEAS - EDV(TEICH): 79.5 ML
BH CV ECHO MEAS - EF(CUBED): 69.8 %
BH CV ECHO MEAS - EF(MOD-BP): 65 %
BH CV ECHO MEAS - EF(MOD-SP2): 65.8 %
BH CV ECHO MEAS - EF(MOD-SP4): 62.3 %
BH CV ECHO MEAS - EF(TEICH): 61.8 %
BH CV ECHO MEAS - ESV(CUBED): 22.7 ML
BH CV ECHO MEAS - ESV(MOD-SP2): 39 ML
BH CV ECHO MEAS - ESV(MOD-SP4): 40 ML
BH CV ECHO MEAS - ESV(TEICH): 30.3 ML
BH CV ECHO MEAS - FS: 32.9 %
BH CV ECHO MEAS - IVS/LVPW: 1.3
BH CV ECHO MEAS - IVSD: 1.3 CM
BH CV ECHO MEAS - LA DIMENSION: 3.8 CM
BH CV ECHO MEAS - LA/AO: 1.1
BH CV ECHO MEAS - LAD MAJOR: 5 CM
BH CV ECHO MEAS - LAT PEAK E' VEL: 6.5 CM/SEC
BH CV ECHO MEAS - LATERAL E/E' RATIO: 11.5
BH CV ECHO MEAS - LV DIASTOLIC VOL/BSA (35-75): 56.9 ML/M^2
BH CV ECHO MEAS - LV IVRT: 0.06 SEC
BH CV ECHO MEAS - LV MASS(C)D: 162.9 GRAMS
BH CV ECHO MEAS - LV MASS(C)DI: 87.4 GRAMS/M^2
BH CV ECHO MEAS - LV MAX PG: 6.1 MMHG
BH CV ECHO MEAS - LV MEAN PG: 3 MMHG
BH CV ECHO MEAS - LV SYSTOLIC VOL/BSA (12-30): 21.5 ML/M^2
BH CV ECHO MEAS - LV V1 MAX: 123 CM/SEC
BH CV ECHO MEAS - LV V1 MEAN: 73.7 CM/SEC
BH CV ECHO MEAS - LV V1 VTI: 25.4 CM
BH CV ECHO MEAS - LVIDD: 4.2 CM
BH CV ECHO MEAS - LVIDS: 2.8 CM
BH CV ECHO MEAS - LVLD AP2: 8.4 CM
BH CV ECHO MEAS - LVLD AP4: 8.6 CM
BH CV ECHO MEAS - LVLS AP2: 7 CM
BH CV ECHO MEAS - LVLS AP4: 7 CM
BH CV ECHO MEAS - LVOT AREA (M): 3.1 CM^2
BH CV ECHO MEAS - LVOT AREA: 3.1 CM^2
BH CV ECHO MEAS - LVOT DIAM: 2 CM
BH CV ECHO MEAS - LVPWD: 0.98 CM
BH CV ECHO MEAS - MED PEAK E' VEL: 6.3 CM/SEC
BH CV ECHO MEAS - MEDIAL E/E' RATIO: 11.9
BH CV ECHO MEAS - MV A MAX VEL: 79.5 CM/SEC
BH CV ECHO MEAS - MV DEC SLOPE: 190 CM/SEC^2
BH CV ECHO MEAS - MV DEC TIME: 0.24 SEC
BH CV ECHO MEAS - MV E MAX VEL: 74.5 CM/SEC
BH CV ECHO MEAS - MV E/A: 0.94
BH CV ECHO MEAS - MV P1/2T MAX VEL: 86.9 CM/SEC
BH CV ECHO MEAS - MV P1/2T: 134 MSEC
BH CV ECHO MEAS - MVA P1/2T LCG: 2.5 CM^2
BH CV ECHO MEAS - MVA(P1/2T): 1.6 CM^2
BH CV ECHO MEAS - PA ACC SLOPE: 648 CM/SEC^2
BH CV ECHO MEAS - PA ACC TIME: 0.14 SEC
BH CV ECHO MEAS - PA MAX PG: 4.1 MMHG
BH CV ECHO MEAS - PA PR(ACCEL): 16.5 MMHG
BH CV ECHO MEAS - PA V2 MAX: 101 CM/SEC
BH CV ECHO MEAS - PI END-D VEL: 99.3 CM/SEC
BH CV ECHO MEAS - PULM A REVS VEL: 33.1 CM/SEC
BH CV ECHO MEAS - PULM DIAS VEL: 33.1 CM/SEC
BH CV ECHO MEAS - PULM S/D: 1.2
BH CV ECHO MEAS - PULM SYS VEL: 38.5 CM/SEC
BH CV ECHO MEAS - RAP SYSTOLE: 3 MMHG
BH CV ECHO MEAS - RVSP: 27 MMHG
BH CV ECHO MEAS - SI(AO): 130.3 ML/M^2
BH CV ECHO MEAS - SI(CUBED): 28.2 ML/M^2
BH CV ECHO MEAS - SI(LVOT): 42.8 ML/M^2
BH CV ECHO MEAS - SI(MOD-SP2): 40.2 ML/M^2
BH CV ECHO MEAS - SI(MOD-SP4): 35.4 ML/M^2
BH CV ECHO MEAS - SI(TEICH): 26.4 ML/M^2
BH CV ECHO MEAS - SV(AO): 242.9 ML
BH CV ECHO MEAS - SV(CUBED): 52.5 ML
BH CV ECHO MEAS - SV(LVOT): 79.8 ML
BH CV ECHO MEAS - SV(MOD-SP2): 75 ML
BH CV ECHO MEAS - SV(MOD-SP4): 66 ML
BH CV ECHO MEAS - SV(TEICH): 49.1 ML
BH CV ECHO MEAS - TAPSE (>1.6): 2.6 CM
BH CV ECHO MEAS - TR MAX PG: 24 MMHG
BH CV ECHO MEAS - TR MAX VEL: 243 CM/SEC
BH CV ECHO MEASUREMENTS AVERAGE E/E' RATIO: 11.64
BH CV VAS BP LEFT ARM: NORMAL MMHG
BH CV XLRA - RV BASE: 4.8 CM
BH CV XLRA - RV LENGTH: 7.2 CM
BH CV XLRA - RV MID: 4 CM
BH CV XLRA - TDI S': 18.4 CM/SEC
BUN SERPL-MCNC: 10 MG/DL (ref 8–23)
BUN/CREAT SERPL: 18.2 (ref 7–25)
CALCIUM SPEC-SCNC: 8.8 MG/DL (ref 8.6–10.5)
CHLORIDE SERPL-SCNC: 110 MMOL/L (ref 98–107)
CO2 SERPL-SCNC: 21 MMOL/L (ref 22–29)
CREAT SERPL-MCNC: 0.55 MG/DL (ref 0.76–1.27)
CYTOLOGIST CVX/VAG CYTO: NORMAL
DEPRECATED RDW RBC AUTO: 45.5 FL (ref 37–54)
ERYTHROCYTE [DISTWIDTH] IN BLOOD BY AUTOMATED COUNT: 12.8 % (ref 12.3–15.4)
GFR SERPL CREATININE-BSD FRML MDRD: 146 ML/MIN/1.73
GLUCOSE BLDC GLUCOMTR-MCNC: 106 MG/DL (ref 70–130)
GLUCOSE SERPL-MCNC: 106 MG/DL (ref 65–99)
HCT VFR BLD AUTO: 39.8 % (ref 37.5–51)
HGB BLD-MCNC: 13.3 G/DL (ref 13–17.7)
LEFT ATRIUM VOLUME INDEX: 28.4 ML/M^2
LEFT ATRIUM VOLUME: 53 ML
LV EF 2D ECHO EST: 65 %
MCH RBC QN AUTO: 32.4 PG (ref 26.6–33)
MCHC RBC AUTO-ENTMCNC: 33.4 G/DL (ref 31.5–35.7)
MCV RBC AUTO: 96.8 FL (ref 79–97)
PATH INTERP BLD-IMP: NORMAL
PLATELET # BLD AUTO: 283 10*3/MM3 (ref 140–450)
PMV BLD AUTO: 10.1 FL (ref 6–12)
POTASSIUM SERPL-SCNC: 3.8 MMOL/L (ref 3.5–5.2)
RBC # BLD AUTO: 4.11 10*6/MM3 (ref 4.14–5.8)
SODIUM SERPL-SCNC: 139 MMOL/L (ref 136–145)
WBC # BLD AUTO: 17.15 10*3/MM3 (ref 3.4–10.8)

## 2021-05-31 PROCEDURE — 70450 CT HEAD/BRAIN W/O DYE: CPT

## 2021-05-31 PROCEDURE — 85027 COMPLETE CBC AUTOMATED: CPT | Performed by: INTERNAL MEDICINE

## 2021-05-31 PROCEDURE — 99233 SBSQ HOSP IP/OBS HIGH 50: CPT | Performed by: STUDENT IN AN ORGANIZED HEALTH CARE EDUCATION/TRAINING PROGRAM

## 2021-05-31 PROCEDURE — 82962 GLUCOSE BLOOD TEST: CPT

## 2021-05-31 PROCEDURE — 99232 SBSQ HOSP IP/OBS MODERATE 35: CPT | Performed by: INTERNAL MEDICINE

## 2021-05-31 PROCEDURE — 80048 BASIC METABOLIC PNL TOTAL CA: CPT | Performed by: INTERNAL MEDICINE

## 2021-05-31 RX ORDER — MIDODRINE HYDROCHLORIDE 5 MG/1
2.5 TABLET ORAL ONCE
Status: COMPLETED | OUTPATIENT
Start: 2021-05-31 | End: 2021-05-31

## 2021-05-31 RX ORDER — CLOPIDOGREL BISULFATE 75 MG/1
75 TABLET ORAL DAILY
Status: DISCONTINUED | OUTPATIENT
Start: 2021-05-31 | End: 2021-06-03 | Stop reason: HOSPADM

## 2021-05-31 RX ADMIN — MIDODRINE HYDROCHLORIDE 2.5 MG: 5 TABLET ORAL at 13:58

## 2021-05-31 RX ADMIN — FINASTERIDE 5 MG: 5 TABLET, FILM COATED ORAL at 08:03

## 2021-05-31 RX ADMIN — SODIUM CHLORIDE 500 ML: 9 INJECTION, SOLUTION INTRAVENOUS at 20:09

## 2021-05-31 RX ADMIN — SODIUM CHLORIDE, PRESERVATIVE FREE 10 ML: 5 INJECTION INTRAVENOUS at 08:03

## 2021-05-31 RX ADMIN — MIDODRINE HYDROCHLORIDE 2.5 MG: 5 TABLET ORAL at 16:33

## 2021-05-31 RX ADMIN — ATORVASTATIN CALCIUM 80 MG: 40 TABLET, FILM COATED ORAL at 19:50

## 2021-05-31 RX ADMIN — ASPIRIN 325 MG ORAL TABLET 325 MG: 325 PILL ORAL at 08:02

## 2021-05-31 RX ADMIN — CLOPIDOGREL BISULFATE 75 MG: 75 TABLET ORAL at 08:03

## 2021-05-31 RX ADMIN — TAMSULOSIN HYDROCHLORIDE 0.4 MG: 0.4 CAPSULE ORAL at 08:02

## 2021-05-31 RX ADMIN — SODIUM CHLORIDE, PRESERVATIVE FREE 10 ML: 5 INJECTION INTRAVENOUS at 19:50

## 2021-05-31 RX ADMIN — SODIUM CHLORIDE 100 ML/HR: 9 INJECTION, SOLUTION INTRAVENOUS at 15:34

## 2021-06-01 ENCOUNTER — ANCILLARY PROCEDURE (OUTPATIENT)
Dept: SPEECH THERAPY | Facility: HOSPITAL | Age: 71
End: 2021-06-01

## 2021-06-01 LAB
ANION GAP SERPL CALCULATED.3IONS-SCNC: 7 MMOL/L (ref 5–15)
BUN SERPL-MCNC: 8 MG/DL (ref 8–23)
BUN/CREAT SERPL: 14.3 (ref 7–25)
CALCIUM SPEC-SCNC: 9.2 MG/DL (ref 8.6–10.5)
CHLORIDE SERPL-SCNC: 106 MMOL/L (ref 98–107)
CO2 SERPL-SCNC: 25 MMOL/L (ref 22–29)
CREAT SERPL-MCNC: 0.56 MG/DL (ref 0.76–1.27)
DEPRECATED RDW RBC AUTO: 44.6 FL (ref 37–54)
ERYTHROCYTE [DISTWIDTH] IN BLOOD BY AUTOMATED COUNT: 12.6 % (ref 12.3–15.4)
GFR SERPL CREATININE-BSD FRML MDRD: 143 ML/MIN/1.73
GLUCOSE BLDC GLUCOMTR-MCNC: 101 MG/DL (ref 70–130)
GLUCOSE SERPL-MCNC: 105 MG/DL (ref 65–99)
HCT VFR BLD AUTO: 40.6 % (ref 37.5–51)
HGB BLD-MCNC: 13.9 G/DL (ref 13–17.7)
MCH RBC QN AUTO: 32.7 PG (ref 26.6–33)
MCHC RBC AUTO-ENTMCNC: 34.2 G/DL (ref 31.5–35.7)
MCV RBC AUTO: 95.5 FL (ref 79–97)
PLATELET # BLD AUTO: 297 10*3/MM3 (ref 140–450)
PMV BLD AUTO: 10.3 FL (ref 6–12)
POTASSIUM SERPL-SCNC: 3.3 MMOL/L (ref 3.5–5.2)
POTASSIUM SERPL-SCNC: 3.7 MMOL/L (ref 3.5–5.2)
RBC # BLD AUTO: 4.25 10*6/MM3 (ref 4.14–5.8)
SODIUM SERPL-SCNC: 138 MMOL/L (ref 136–145)
WBC # BLD AUTO: 16.69 10*3/MM3 (ref 3.4–10.8)

## 2021-06-01 PROCEDURE — 80048 BASIC METABOLIC PNL TOTAL CA: CPT | Performed by: INTERNAL MEDICINE

## 2021-06-01 PROCEDURE — 97110 THERAPEUTIC EXERCISES: CPT

## 2021-06-01 PROCEDURE — 99232 SBSQ HOSP IP/OBS MODERATE 35: CPT | Performed by: INTERNAL MEDICINE

## 2021-06-01 PROCEDURE — 84132 ASSAY OF SERUM POTASSIUM: CPT | Performed by: INTERNAL MEDICINE

## 2021-06-01 PROCEDURE — 92612 ENDOSCOPY SWALLOW (FEES) VID: CPT

## 2021-06-01 PROCEDURE — 99231 SBSQ HOSP IP/OBS SF/LOW 25: CPT | Performed by: NURSE PRACTITIONER

## 2021-06-01 PROCEDURE — 85027 COMPLETE CBC AUTOMATED: CPT | Performed by: INTERNAL MEDICINE

## 2021-06-01 PROCEDURE — 97166 OT EVAL MOD COMPLEX 45 MIN: CPT

## 2021-06-01 PROCEDURE — 97116 GAIT TRAINING THERAPY: CPT

## 2021-06-01 PROCEDURE — 97162 PT EVAL MOD COMPLEX 30 MIN: CPT

## 2021-06-01 PROCEDURE — 82962 GLUCOSE BLOOD TEST: CPT

## 2021-06-01 RX ORDER — POTASSIUM CHLORIDE 1.5 G/1.77G
40 POWDER, FOR SOLUTION ORAL AS NEEDED
Status: DISCONTINUED | OUTPATIENT
Start: 2021-06-01 | End: 2021-06-03 | Stop reason: HOSPADM

## 2021-06-01 RX ORDER — POTASSIUM CHLORIDE 7.45 MG/ML
10 INJECTION INTRAVENOUS
Status: DISCONTINUED | OUTPATIENT
Start: 2021-06-01 | End: 2021-06-03 | Stop reason: HOSPADM

## 2021-06-01 RX ORDER — POTASSIUM CHLORIDE 750 MG/1
40 CAPSULE, EXTENDED RELEASE ORAL AS NEEDED
Status: DISCONTINUED | OUTPATIENT
Start: 2021-06-01 | End: 2021-06-03 | Stop reason: HOSPADM

## 2021-06-01 RX ADMIN — ASPIRIN 325 MG ORAL TABLET 325 MG: 325 PILL ORAL at 08:37

## 2021-06-01 RX ADMIN — CLOPIDOGREL BISULFATE 75 MG: 75 TABLET ORAL at 08:37

## 2021-06-01 RX ADMIN — SODIUM CHLORIDE 100 ML/HR: 9 INJECTION, SOLUTION INTRAVENOUS at 11:58

## 2021-06-01 RX ADMIN — POTASSIUM CHLORIDE 40 MEQ: 750 CAPSULE, EXTENDED RELEASE ORAL at 10:03

## 2021-06-01 RX ADMIN — SODIUM CHLORIDE, PRESERVATIVE FREE 10 ML: 5 INJECTION INTRAVENOUS at 20:30

## 2021-06-01 RX ADMIN — ATORVASTATIN CALCIUM 80 MG: 40 TABLET, FILM COATED ORAL at 20:30

## 2021-06-01 RX ADMIN — POTASSIUM CHLORIDE 40 MEQ: 750 CAPSULE, EXTENDED RELEASE ORAL at 14:09

## 2021-06-01 NOTE — PLAN OF CARE
Goal Outcome Evaluation:        Pt alert and oriented. VSS. NIH 7. No c/o pain. Worked with therapy and got up in chair. Several loose Bms today. CM following for placement.

## 2021-06-01 NOTE — PLAN OF CARE
Goal Outcome Evaluation:     Progress: no change     Pt alert and oriented x4. VSS. Stroke navigator notified at beginning of shift for BP of 121/65 since Dr. Rodríguez's note stated to keep -200 or possible transfer to neuro ICU. 500ml NS bolus ordered and administered. Stroke navigator wanted to be notified of midnight BP if it was still under 140. Midnight BP was 143/94. Left arm still unable to move. Left leg could be raised off the bed. NIH 7. SCDs in place. HOB flat. NS @ 100 running. NSR to Sinus bradycardia on telemetry, sometimes dropping down to 45 or 46 bpm. Q4 Neuro checks normal

## 2021-06-01 NOTE — MBS/VFSS/FEES
Acute Care - Speech Language Pathology   Swallow Initial Evaluation  [place]   Fiberoptic Endoscopic Evaluation of Swallowing (FEES)       Patient Name: Obed Cotto  : 1950  MRN: [id number]  Today's Date: 2021               Admit Date: 2021    Visit Dx:     ICD-10-CM ICD-9-CM   1. Oropharyngeal dysphagia  R13.12 787.22   2. Cognitive communication deficit  R41.841 799.52     Patient Active Problem List   Diagnosis   • Tobacco abuse   • Urinary retention   • Prehypertension   • Positive colorectal cancer screening using DNA-based stool test   • Leukocytosis   • Prediabetes   • BPH (benign prostatic hyperplasia)   • BPH with elevated PSA   • Essential hypertension   • Suspected cerebrovascular accident (CVA)   • Stroke (CMS/HCC)     Past Medical History:   Diagnosis Date   • Acute ischemic stroke (CMS/HCC) 2021     posterior limb of the right internal capsule   • BPH with elevated PSA    • Chronic bronchitis (CMS/HCC)    • Elevated blood pressure    • Hyperlipidemia    • Hypertension    • Lacunar infarction (CMS/HCC)    • Noncompliance with medication regimen     pt states he never took his metoprolol   • Prediabetes      Past Surgical History:   Procedure Laterality Date   • FINGER SURGERY      Right hand 4th finger   • FINGER SURGERY      R ring finger, partial amputation repair        SWALLOW EVALUATION (last 72 hours)      SLP Adult Swallow Evaluation     Row Name 21 1100 21 1015                Rehab Evaluation    Document Type  evaluation  -DV  evaluation  -RD       Subjective Information  no complaints  -DV  complains of being bored  -RD       Patient Observations  alert;cooperative  -DV  alert;agree to therapy  -RD       Patient/Family/Caregiver Comments/Observations  no family present  -DV  visitor present  -RD       Care Plan Review  evaluation/treatment results reviewed;care plan/treatment goals reviewed;risks/benefits reviewed;current/potential barriers  reviewed;patient/other agree to care plan  -DV  evaluation/treatment results reviewed;care plan/treatment goals reviewed;risks/benefits reviewed;current/potential barriers reviewed;patient/other agree to care plan  -RD       Patient Effort  good  -DV  adequate  -RD       Comment  evaluation completed in collaboration with   -DV  --       Symptoms Noted During/After Treatment  other (see comments) runny nose after  -DV  --          General Information    Patient Profile Reviewed  yes  -DV  yes  -RD       Pertinent History Of Current Problem  see initial eval  -DV  Adm w/ CVA- R internal capsule infarct per MRI. Old R & L infarcts per MRI. Incr'd WBC, HTN, tobacco abuse. Failed RN dysphagia screen. per stroke protocol.   -RD       Current Method of Nutrition  mechanical soft, no mixed consistencies;nectar/syrup-thick liquids  -DV  NPO  -RD       Precautions/Limitations, Vision  vision impairment, bilaterally  -DV  vision impairment, bilaterally  -RD       Precautions/Limitations, Hearing  WFL;for purposes of eval  -DV  WFL;for purposes of eval  -RD       Prior Level of Function-Communication  unknown  -DV  unknown  -RD       Prior Level of Function-Swallowing  mechanical soft textures  -DV  mechanical soft textures  -RD       Plans/Goals Discussed with  patient;agreed upon  -DV  patient;agreed upon  -RD       Barriers to Rehab  none identified  -DV  none identified  -RD       Patient's Goals for Discharge  return to regular diet  -DV  eat/drink without coughing/choking;return to PO diet;return home  -RD       Family Goals for Discharge  --  family did not state  -RD          Pain    Additional Documentation  Pain Scale: Numbers Pre/Post-Treatment (Group)  -DV  Pain Scale: FACES Pre/Post-Treatment (Group)  -RD          Pain Scale: Numbers Pre/Post-Treatment    Pretreatment Pain Rating  0/10 - no pain  -DV  --       Posttreatment Pain Rating  0/10 - no pain  -DV  --          Pain Scale: FACES Pre/Post-Treatment     Pain: FACES Scale, Pretreatment  --  2-->hurts little bit  -RD       Posttreatment Pain Rating  --  2-->hurts little bit  -RD       Pre/Posttreatment Pain Comment  --  c/o discomfort in bed  -RD          Oral Motor Structure and Function    Dentition Assessment  --  missing teeth;poor oral hygiene;teeth are in poor condition  -RD       Secretion Management  --  WNL/WFL  -RD       Mucosal Quality  --  dry  -RD       Volitional Swallow  --  delayed  -RD       Volitional Cough  --  WFL  -RD          Oral Musculature and Cranial Nerve Assessment    Oral Motor General Assessment  --  oral labial or buccal impairment  -RD       Oral Labial or Buccal Impairment, Detail, Cranial Nerve VII (Facial):  --  left labial droop  -RD          General Eating/Swallowing Observations    Respiratory Support Currently in Use  room air  -DV  room air  -RD       Eating/Swallowing Skills  fed by SLP;self-fed  -DV  fed by SLP;self-fed  -RD       Positioning During Eating  upright 90 degree;upright in bed  -DV  upright 90 degree;upright in bed  -RD       Utensils Used  --  spoon;cup;straw  -RD       Consistencies Trialed  --  thin liquids;nectar/syrup-thick liquids;pureed;regular textures  -RD          Clinical Swallow Eval    Oral Prep Phase  --  impaired  -RD       Oral Transit  --  impaired  -RD       Oral Residue  --  WFL  -RD       Pharyngeal Phase  --  suspected pharyngeal impairment  -RD       Esophageal Phase  --  unremarkable  -RD          Oral Prep Concerns    Oral Prep Concerns  --  prolonged mastication  -RD       Prolonged Mastication  --  regular consistencies  -RD       Oral Prep Concerns, Comment  --  Pt reports eating softer foods at baseline 2' poor dentition  -RD          Oral Transit Concerns    Oral Transit Concerns  --  increased oral transit time  -RD       Increased Oral Transit Time  --  regular consistencies  -RD          Pharyngeal Phase Concerns    Pharyngeal Phase Concerns  --  cough  -RD       Cough  --   thin;nectar;other (see comments) nectar via straw only  -RD       Pharyngeal Phase Concerns, Comment  --  Overt s/s of aspiration c/b coughing w/ thins inconsistently as well as nectar-thick liquids via straw only. Swallow appeared delayed per palpation w/ thins. No immediate s/s of aspiration w/ single sips nectar-thick via tsp/cup, pureed, or solids. Need to r/o pharyngeal dysphagia. Plan for modified diet until FEES tomorrow. Pt agreeable.   -RD          Fiberoptic Endoscopic Evaluation of Swallowing (FEES)    Risks/Benefits Reviewed  risks/benefits explained;patient;agreed to eval  -DV  --       Nasal Entry  right:  -DV  --       Scope serial number/identification  [id number]  -DV  --          Anatomy and Physiology    Anatomic Considerations  erythema;vocal folds;arytenoids;anatomic deviation observed (see comments)  -DV  --       Comment  protruding area of L posterior pharyngeal wall, ?tissue vs underlying hyoid made visible by L sided atrophy  -DV  --       Velopharyngeal  CNA  -DV  --       Base of Tongue  asymmetrical;range reduced;other (see comments) range reduced on L  -DV  --       Epiglottis  WFL  -DV  --       Laryngeal Function Breathing  symmetrical  -DV  --       Laryngeal Function Phonation  symmetrical  -DV  --       Laryngeal Function to Breath Hold  TVF/FVF/Arytenoid  -DV  --       Secretion Rating Scale (Paul et al. 1996)  0- normal, no visible secretions  -DV  --       Spontaneous Swallow  frequency adequate  -DV  --       Sensory  sensed scope  -DV  --       Utensils Used  Spoon;Cup;Straw  -DV  --       Consistencies Trialed  nectar-thick liquids;honey-thick liquids;pudding/puree;regular textures;ice chips  -DV  --          FEES Interpretation    Oral Phase  anterior loss  -DV  --          Initiation of Pharyngeal Swallow    Initiation of Pharyngeal Swallow  WFL  -DV  --       Pharyngeal Phase  impaired pharyngeal phase of swallowing  -DV  --       Aspiration During the Swallow  nectar-thick  liquids;honey-thick liquids;secondary to reduced laryngeal elevation;secondary to reduced vestibular closure;secondary to reduced laryngeal (VF) closure  -DV  --       Response to Aspiration  inconsistent response;other (see comments) mostly silent, only x1 appropriate throat clear response  -DV  --       Rosenbek's Scale  nectar:;honey:;8-->Level 8;pudding/puree:;regular textures:;1-->Level 1  -DV  --       Residue  other (see comments) minimal to no residue  -DV  --       Attempted Compensatory Maneuvers  bolus size;bolus presentation style;chin tuck;head turn to left;throat clear after swallow  -DV  --       Response to Attempted Compensatory Maneuvers  did not prevent aspiration  -DV  --       FEES Summary  FEES completed this am. Moderate-severe oropharyngeal dysphagia. Base of tongue range reduced on L side, consistent with L-sided extremity weakness. Anterior loss of liquids via cup and spoon. Initiation of swallow WFL. Aspiration occured during the swallow with nectar- and honey-thick liquids despite presentation style and despite attempted compensatory strategies. Mostly silent response, only x1 throat clear noted in response to aspiration. Cued cough + re-swallow ineffective at fully clearing material. No aspiration/penetration with pudding or regular solid consistencies. Minimal to no pharyngeal residue across all tested consistencies. Recommend L4 mechanical soft solids with pudding-thick liquids. Discussed with hospitalist potential need for additional means of hydration while pt on pudding-thick liquids. SLP to f/u in tx.  -DV  --          Clinical Impression    SLP Swallowing Diagnosis  mild;oral dysphagia;mod-severe;pharyngeal dysphagia  -DV  oral dysphagia;R/O pharyngeal dysphagia  -RD       Functional Impact  risk of aspiration/pneumonia  -DV  risk of aspiration/pneumonia  -RD       Rehab Potential/Prognosis, Swallowing  good, to achieve stated therapy goals  -DV  good, to achieve stated therapy  goals  -RD       Swallow Criteria for Skilled Therapeutic Interventions Met  demonstrates skilled criteria  -DV  demonstrates skilled criteria  -RD          Recommendations    Therapy Frequency (Swallow)  5 days per week  -DV  PRN  -RD       Predicted Duration Therapy Intervention (Days)  until discharge  -DV  until discharge  -RD       SLP Diet Recommendation  mechanical soft with no mixed consistencies;pudding thick liquids  -DV  mechanical soft with no mixed consistencies;nectar thick liquids  -RD       Recommended Diagnostics  --  reassess via FEES;other (see comments) 5/31/21 as appropriate  -RD       Recommended Precautions and Strategies  upright posture during/after eating;general aspiration precautions  -DV  upright posture during/after eating;small bites of food and sips of liquid;no straw;general aspiration precautions;reflux precautions  -RD       Oral Care Recommendations  Oral Care BID/PRN  -DV  Oral Care BID/PRN  -RD       SLP Rec. for Method of Medication Administration  meds whole;with pudding or applesauce;as tolerated  -DV  meds whole;with pudding or applesauce;as tolerated  -RD       Monitor for Signs of Aspiration  yes;notify SLP if any concerns  -DV  yes;notify SLP if any concerns  -RD       Anticipated Discharge Disposition (SLP)  inpatient rehabilitation facility;anticipate therapy at next level of care  -DV  inpatient rehabilitation facility;anticipate therapy at next level of care  -RD         User Key  (r) = Recorded By, (t) = Taken By, (c) = Cosigned By    Initials Name Effective Dates    RD Nida Villa MS CCC-SLP 04/03/18 -     Cynthia Wakefield MS CCC-SLP 02/28/20 -           EDUCATION  The patient has been educated in the following areas:   Dysphagia (Swallowing Impairment) Modified Diet Instruction.    SLP Recommendation and Plan  SLP Swallowing Diagnosis: mild, oral dysphagia, mod-severe, pharyngeal dysphagia  SLP Diet Recommendation: mechanical soft with no mixed  consistencies, pudding thick liquids  Recommended Precautions and Strategies: upright posture during/after eating, general aspiration precautions  SLP Rec. for Method of Medication Administration: meds whole, with pudding or applesauce, as tolerated     Monitor for Signs of Aspiration: yes, notify SLP if any concerns     Swallow Criteria for Skilled Therapeutic Interventions Met: demonstrates skilled criteria  Anticipated Discharge Disposition (SLP): inpatient rehabilitation facility, anticipate therapy at next level of care  Rehab Potential/Prognosis, Swallowing: good, to achieve stated therapy goals  Therapy Frequency (Swallow): 5 days per week  Predicted Duration Therapy Intervention (Days): until discharge                         Plan of Care Reviewed With: patient    SLP GOALS     Row Name 06/01/21 1100 05/30/21 1015          Oral Nutrition/Hydration Goal 1 (SLP)    Oral Nutrition/Hydration Goal 1, SLP  LTG: Pt will demonstrate functional swallow for return to baseline diet of soft solids and thin liquids with no s/sx aspiration with use of compensatory strategies as indicated.  -DV  --     Time Frame (Oral Nutrition/Hydration Goal 1, SLP)  by discharge  -DV  --        Oral Nutrition/Hydration Goal 2 (SLP)    Oral Nutrition/Hydration Goal 2, SLP  Pt will tolerate current diet of mechanical soft solids and pudding-thick liquids with no s/sx aspiration with 100% accuracy.  -DV  --     Time Frame (Oral Nutrition/Hydration Goal 2, SLP)  short term goal (STG)  -DV  --        Oral Nutrition/Hydration Goal (SLP)    Oral Nutrition/Hydration Goal, SLP  Pt will accept trials of ice chips and thin liquids with no s/sx aspiration with 80% accuracy to determine readiness for repeat swallow evaluation.  -DV  --     Time Frame (Oral Nutrition/Hydration Goal, SLP)  short term goal (STG)  -DV  --        Labial Strengthening Goal 1 (SLP)    Activity (Labial Strengthening Goal 1, SLP)  increase labial tone  -DV  --     Increase  Labial Tone  labial resistance exercises;swallow trials  -DV  --     Bertie/Accuracy (Labial Strengthening Goal 1, SLP)  with minimal cues (75-90% accuracy)  -DV  --     Time Frame (Labial Strengthening Goal 1, SLP)  short term goal (STG)  -DV  --        Pharyngeal Strengthening Exercise Goal 1 (SLP)    Activity (Pharyngeal Strengthening Goal 1, SLP)  increase superior movement of the hyolaryngeal complex;increase epiglottic inversion and retroflexion;increase closure at entrance to airway/closure of airway at glottis  -DV  --     Increase Superior Movement of the Hyolaryngeal Complex  falsetto;Mendelsohn  -DV  --     Increase Epiglottic Inversion and Retroflexion  shaker;Mendelsohn  -DV  --     Increase Closure at Entrance to Airway/Closure of Airway at Glottis  super-supraglottic swallow  -DV  --     Bertie/Accuracy (Pharyngeal Strengthening Goal 1, SLP)  with minimal cues (75-90% accuracy)  -DV  --     Time Frame (Pharyngeal Strengthening Goal 1, SLP)  short term goal (STG)  -DV  --        Articulation Goal 1 (SLP)    Improve Articulation Goal 1 (SLP)  --  by over-articulating in connected speech;90%;independently (over 90% accuracy)  -RD     Time Frame (Articulation Goal 1, SLP)  --  short term goal (STG)  -RD        Prosody Goal 1 ([facility])    Improve Prosody by Goal 1 (SLP)  --  increasing rate;90%;independently (over 90% accuracy)  -RD     Time Frame (Prosody Goal 1, SLP)  --  short term goal (STG)  -RD        Attention Goal 1 (SLP)    Improve Attention by Goal 1 (SLP)  --  looking at speaker;complete sustained attention task;90%;with minimal cues (75-90%)  -RD     Time Frame (Attention Goal 1, SLP)  --  short term goal (STG)  -RD        Orientation Goal 1 ([facility])    Improve Orientation Through Goal 1 (SLP)  --  demonstrating orientation to day;demonstrating orientation to month;demonstrating orientation to year;90%;independently (over 90% accuracy)  -RD     Time Frame (Orientation Goal 1, SLP)  --   short term goal (STG)  -RD        Additional Goal 1 (SLP)    Additional Goal 1, SLP  --  LTG: Pt will improve cognitive-communication skills to actively participate in care w/ 100% accuracy w/o cues  -RD     Time Frame (Additional Goal 1, SLP)  --  by discharge  -RD       User Key  (r) = Recorded By, (t) = Taken By, (c) = Cosigned By    Initials Name Provider Type    Nida James MS CCC-SLP Speech and Language Pathologist    Cynthia Wakefield MS CCC-SLP Speech and Language Pathologist             Time Calculation:   Time Calculation- SLP     Row Name 06/01/21 1311             Time Calculation- SLP    SLP Start Time  1100  -DV      SLP Received On  06/01/21  -DV         Untimed Charges    SLP Eval/Re-eval   ST Fiberoptic Endo Eval Swallow - 52225  -DV      [id number]-BL Fiberoptic Endo Eval Swallow Minutes  120  -DV         Total Minutes    Untimed Charges Total Minutes  120  -DV       Total Minutes  120  -DV        User Key  (r) = Recorded By, (t) = Taken By, (c) = Cosigned By    Initials Name Provider Type    Cynthia Wakefield MS CCC-SLP Speech and Language Pathologist          Therapy Charges for Today     Code Description Service Date Service Provider Modifiers Qty    [id number] HC ST FIBEROPTIC ENDO EVAL SWALL 8 6/1/2021 Cynthia Hope MS CCC-SLP GN 1            Patient was not wearing a face mask and did exhibit coughing during this therapy encounter.  Procedure performed was aerosolizing, involved close contact (within 6 feet for at least 15 minutes or longer), and involved contact with infectious secretions or specimens.  Therapist used appropriate personal protective equipment including gloves, standard procedure mask and eye protection.  Appropriate PPE was worn during the entire therapy session.  Hand hygiene was completed before and after therapy session.  LOLIS Simeon was also present during this encounter and the aforementioned applies to them, as well.       Cynthia Hope MS CCC-LOLIS  6/1/2021

## 2021-06-01 NOTE — PLAN OF CARE
Problem: Adult Inpatient Plan of Care  Goal: Plan of Care Review  Outcome: Ongoing, Progressing  Flowsheets (Taken 6/1/2021 1100)  Plan of Care Reviewed With: patient   Goal Outcome Evaluation:  Plan of Care Reviewed With: patient      SLP evaluation completed. Will address dysphagia in tx. Please see note for further details and recommendations.

## 2021-06-01 NOTE — PLAN OF CARE
Problem: Adult Inpatient Plan of Care  Goal: Plan of Care Review  Recent Flowsheet Documentation  Taken 6/1/2021 1118 by Anahi King, PT  Progress: improving  Plan of Care Reviewed With:   patient   son   daughter  Outcome Summary: Patient ambulated 10 feet with max assist x2, leaning heavily to L, ataxic on L with narrow base, limited by weakness and significantly impaired balance. Patient with significant tone in L quad, patient c/o spasming in L quad. Demonstrates significantly impaired balance with all mobility and static sitting EOB. Will attempt to progress to gait training with L UE platform RW next visit. Recommend IRF at d/c. Will continue to progress as able.   Goal Outcome Evaluation:  Plan of Care Reviewed With: patient, son, daughter  Progress: improving  Outcome Summary: Patient ambulated 10 feet with max assist x2, leaning heavily to L, ataxic on L with narrow base, limited by weakness and significantly impaired balance. Patient with significant tone in L quad, patient c/o spasming in L quad. Demonstrates significantly impaired balance with all mobility and static sitting EOB. Will attempt to progress to gait training with L UE platform RW next visit. Recommend IRF at d/c. Will continue to progress as able.

## 2021-06-01 NOTE — PROGRESS NOTES
Stroke Progress Note       Chief Complaint: Left-sided weakness    Subjective    Subjective     Subjective:  Resting in bed. No acute events overnight.     Review of Systems   Constitutional: No fatigue  HENT: Negative for nosebleeds and rhinorrhea.    Eyes: Negative for redness.   Respiratory: Negative for cough.    Gastrointestinal: Negative for anal bleeding.   Endocrine: Negative for polydipsia.   Genitourinary: Negative for enuresis and urgency.   Musculoskeletal: Negative for joint swelling.   Neurological: Negative for tremors.   Psychiatric/Behavioral: Negative for hallucinations.     Objective      Temp:  [97.3 °F (36.3 °C)-98.9 °F (37.2 °C)] 98.4 °F (36.9 °C)  Heart Rate:  [57-89] 58  Resp:  [16-18] 18  BP: (121-143)/(62-94) 131/62        NEURO    MENTAL STATUS: AAOx3, memory intact, fund of knowledge appropriate    LANG/SPEECH: Naming and repetition intact, fluent, follows 3-step commands    CRANIAL NERVES:      II: Pupils equal and reactive        III, IV, VI: EOM intact, no gaze preference or deviation, no nystagmus.     Lig lag on the left eye      V: normal sensation bilaterally      VII: left facial droop.      VIII: normal hearing to speech      IX, X: normal palatal elevation, no uvular deviation      XI: not done       XII: midline tongue protrusion    MOTOR:  Normal tone throughout  Right side 5/5  Left upper ext 3/5  Left lower ext 4-/5    SENSORY:normal to light touch    COORDINATION: unable to do due to patient condition    GAIT: Not assessed due to patient condition      Results Review:    I reviewed the patient's new clinical results.    Lab Results (last 24 hours)       Procedure Component Value Units Date/Time    Basic Metabolic Panel [[id number]]  (Abnormal) Collected: 06/01/21 0800    Specimen: Blood Updated: 06/01/21 0913     Glucose 105 mg/dL      BUN 8 mg/dL      Creatinine 0.56 mg/dL      Sodium 138 mmol/L      Potassium 3.3 mmol/L      Chloride 106 mmol/L      CO2 25.0 mmol/L       Calcium 9.2 mg/dL      eGFR Non African Amer 143 mL/min/1.73      BUN/Creatinine Ratio 14.3     Anion Gap 7.0 mmol/L     Narrative:      GFR Normal >60  Chronic Kidney Disease <60  Kidney Failure <15      CBC (No Diff) [[id number]]  (Abnormal) Collected: 06/01/21 0800    Specimen: Blood Updated: 06/01/21 0829     WBC 16.69 10*3/mm3      RBC 4.25 10*6/mm3      Hemoglobin 13.9 g/dL      Hematocrit 40.6 %      MCV 95.5 fL      MCH 32.7 pg      MCHC 34.2 g/dL      RDW 12.6 %      RDW-SD 44.6 fl      MPV 10.3 fL      Platelets 297 10*3/mm3     POC Glucose Once [[id number]]  (Normal) Collected: 05/31/21 2028    Specimen: Blood Updated: 05/31/21 2029     Glucose 106 mg/dL           CT Head Without Contrast    Result Date: 5/31/2021  Continued hyperdense asymmetry along the right tentorium concerning for subdural hemorrhage with stable to slightly decreased appearance or prominence from prior. No new intraparenchymal findings or hemorrhage and no intraventricular hemorrhage or hydrocephalus.  DICTATED:   05/31/2021 EDITED/ls :   05/31/2021       Results for orders placed during the hospital encounter of 05/30/21    Adult Transthoracic Echo Complete W/ Cont if Necessary Per Protocol (With Agitated Saline)    Interpretation Summary  · Saline test results are negative.  · Estimated left ventricular EF = 65% Left ventricular ejection fraction appears to be 61 - 65%. Left ventricular systolic function is normal.  · There is mild mitral valve prolapse of the posterior mitral leaflet.  · Left ventricular wall thickness is consistent with mild basal asymmetric hypertrophy.  · The right atrial cavity is mildly dilated.  · The right ventricular cavity is mildly dilated.  · Estimated right ventricular systolic pressure from tricuspid regurgitation is normal (<35 mmHg). Calculated right ventricular systolic pressure from tricuspid regurgitation is 27 mmHg.  · Left ventricular diastolic function was normal.            Assessment/Plan      Assessment/Plan:             This 71-year-old with multiple vascular risk factors and medication noncompliance presented with left-sided weakness.      CTA evidenced-supraclinoid right ICA calcification, not flow-limiting. Mild intracranial atherosclerosis   CT perfusion evidenced slow flow in the right basal ganglia  MRI of the brain-evidenced history diffusion in the right basal ganglia, with no much significant flair change. T2 flair evidence chronic microvascular ischemia   TTE- normal      1a. Acute ischemic stroke, right basal ganglia, likely etiology small vessel disease  Continue midodrine for now, bp staying 130s/140s. No need for further fluid boluses   1b. Stroke secondary prevention- will maintain on DAPT  1c. Stroke recovery- ok to work with PT/OT/SLP  1d. Stroke education- Educated on modifiable stroke risk factors, especially smoking.              #2 Hypertension-will continue midodrine for now and allow bp to autoregulate. Do not start antihypertensives at this time and no further fluid boluses.  #3 Tobacco abuse- counseled on smoking cessation.    #4. Hyperlipidemia- LDL is 83, continue high dose of Lipitor 80.       Stroke neurology will continue to follow. Case discussed with Dr Gomez, patient and nursing.     CHANG Do  06/01/21  10:26 EDT

## 2021-06-01 NOTE — THERAPY EVALUATION
Patient Name: Obed Cotto  : 1950    MRN: [id number]                              Today's Date: 2021       Admit Date: 2021    Visit Dx:     ICD-10-CM ICD-9-CM   1. Dysphagia, unspecified type  R13.10 787.20   2. Cognitive communication deficit  R41.841 799.52     Patient Active Problem List   Diagnosis   • Tobacco abuse   • Urinary retention   • Prehypertension   • Positive colorectal cancer screening using DNA-based stool test   • Leukocytosis   • Prediabetes   • BPH (benign prostatic hyperplasia)   • BPH with elevated PSA   • Essential hypertension   • Suspected cerebrovascular accident (CVA)   • Stroke (CMS/HCC)     Past Medical History:   Diagnosis Date   • Acute ischemic stroke (CMS/HCC) 2021     posterior limb of the right internal capsule   • BPH with elevated PSA    • Chronic bronchitis (CMS/HCC)    • Elevated blood pressure    • Hyperlipidemia    • Hypertension    • Lacunar infarction (CMS/HCC)    • Noncompliance with medication regimen     pt states he never took his metoprolol   • Prediabetes      Past Surgical History:   Procedure Laterality Date   • FINGER SURGERY      Right hand 4th finger   • FINGER SURGERY      R ring finger, partial amputation repair     General Information     Row Name 21 0832          OT Time and Intention    Document Type  evaluation  -TB     Mode of Treatment  occupational therapy;individual therapy  -TB     Row Name 21 0832          General Information    Patient Profile Reviewed  yes  -TB     Prior Level of Function  independent:;all household mobility;community mobility;transfer;bed mobility;ADL's;driving  -TB     Existing Precautions/Restrictions  fall;other (see comments) Acute L side weakness - LUE>LLE, facial droop with dysarthria  -TB     Barriers to Rehab  medically complex  -TB     Row Name 21 0832          Occupational Profile    Reason for Services/Referral (Occupational Profile)  to advance occupational engagement/facilitate  return to PLOF  -TB     Row Name 06/01/21 0832          Living Environment    Lives With  child(ibll), adult;other (see comments) Adult son, DIL and 15 y/o grandson  -TB     Row Name 06/01/21 0832          Home Main Entrance    Number of Stairs, Main Entrance  one threshold only at front and garage entrances  -TB     Row Name 06/01/21 0832          Stairs Within Home, Primary    Number of Stairs, Within Home, Primary  none  -TB     Row Name 06/01/21 0832          Cognition    Orientation Status (Cognition)  oriented x 4  -TB     Row Name 06/01/21 0832          Safety Issues, Functional Mobility    Safety Issues Affecting Function (Mobility)  safety precautions follow-through/compliance;safety precaution awareness;insight into deficits/self-awareness  -TB     Impairments Affecting Function (Mobility)  muscle tone abnormal;motor control;postural/trunk control;range of motion (ROM)  -TB     Comment, Safety Issues/Impairments (Mobility)  OT cleared for bed level assessment only per Neurology order.  -TB       User Key  (r) = Recorded By, (t) = Taken By, (c) = Cosigned By    Initials Name Provider Type    TB Emily Gil, OT Occupational Therapist          Mobility/ADL's     Row Name 06/01/21 0835          Bed Mobility    Bed Mobility  rolling left;rolling right  -TB     Rolling Left [place] (Bed Mobility)  minimum assist (75% patient effort);verbal cues  -TB     Rolling Right [place] (Bed Mobility)  maximum assist (25% patient effort);verbal cues  -TB     Bed Mobility, Safety Issues  decreased use of arms for pushing/pulling;decreased use of legs for bridging/pushing  -TB     Assistive Device (Bed Mobility)  bed rails  -TB     Comment (Bed Mobility)  Pt alert and following commands. Motivated to work with therapy. Eager to be cleared for OOB activity.  -TB     Row Name 06/01/21 0835          Transfers    Comment (Transfers)  OT cleared for bed level assessment only per Neurology order.  -TB     Row  "Name 06/01/21 0835          Functional Mobility    Functional Mobility- Comment  OT cleared for bed level assessment only per Neurology order.  -TB     Row Name 06/01/21 0835          Activities of Daily Living    BADL Assessment/Intervention  upper body dressing;grooming;feeding  -TB     Row Name 06/01/21 0835          Upper Body Dressing Assessment/Training    [place] Level (Upper Body Dressing)  doff;don;pajama/robe;moderate assist (50% patient effort);verbal cues  -TB     Position (Upper Body Dressing)  supine Pt is not cleared for EOB at this time  -TB     Comment (Upper Body Dressing)  Education initiated for LUE gayathri dressing strategies. Follow up teaching recommended.  -TB     Row Name 06/01/21 0835          Grooming Assessment/Training    [place] Level (Grooming)  set up;wash face, hands;minimum assist (75% patient effort);hair care, combing/brushing  -TB     Position (Grooming)  supine  -TB     Row Name 06/01/21 0835          Self-Feeding Assessment/Training    Comment (Feeding)  Dysphagia/diet per ST. Pt reports \"everything tastes bland.\" Declined breakfast meal.  -       User Key  (r) = Recorded By, (t) = Taken By, (c) = Cosigned By    Initials Name Provider Type     Emily Gil, OT Occupational Therapist        Obj/Interventions     Row Name 06/01/21 0840          Sensory Assessment (Somatosensory)    Sensory Assessment (Somatosensory)  left UE  -TB     Left UE Sensory Assessment  light touch awareness;light touch localization;intact  -     Sensory Subjective Reports  -- Pt reports BUE/hand sensation equal and intact. Able to localize touch.  -TB     Row Name 06/01/21 0840          Vision Assessment/Intervention    Visual Impairment/Limitations  blurry vision  -TB     Vision Assessment Comment  Pt reports blurred vision at baseline. \"I've needed new glasses for 25 years.\" Pt demonstrates smooth tracking to L side. Crosses midline and tracks all quadrants.  -TB     Row Name " 06/01/21 0840          Range of Motion Comprehensive    General Range of Motion  upper extremity range of motion deficits identified  -TB     Comment, General Range of Motion  Pt is R hand dominant. AROM RUE WNL. Pt presents with increased tone LUE. PROM to WFL hand/wrist/elbow. Increased tone limits L shoulder at end range FE/ABDuction  -     Row Name 06/01/21 0840          Strength Comprehensive (MMT)    Comment, General Manual Muscle Testing (MMT) Assessment  RUE WFL. LUE trace hand/wrist, 2/5 bicep  -     Row Name 06/01/21 0840          Shoulder (Therapeutic Exercise)    Shoulder (Therapeutic Exercise)  PROM (passive range of motion)  -     Shoulder PROM (Therapeutic Exercise)  left;flexion;extension;aBduction;aDduction;horizontal aBduction/aDduction;10 repetitions Slow reps with prolonged static stretch at end ranges  -     Row Name 06/01/21 0840          Elbow/Forearm (Therapeutic Exercise)    Elbow/Forearm (Therapeutic Exercise)  AAROM (active assistive range of motion);PROM (passive range of motion)  -     Elbow/Forearm AAROM (Therapeutic Exercise)  left;flexion;extension;10 repetitions  -     Elbow/Forearm PROM (Therapeutic Exercise)  left;supination;pronation;10 repetitions;15 second hold  -     Row Name 06/01/21 0840          Wrist (Therapeutic Exercise)    Wrist (Therapeutic Exercise)  PROM (passive range of motion)  -     Wrist PROM (Therapeutic Exercise)  left;flexion;extension;10 repetitions;15 second hold  -     Row Name 06/01/21 0840          Hand (Therapeutic Exercise)    Hand (Therapeutic Exercise)  PROM (passive range of motion)  -     Hand PROM (Therapeutic Exercise)  left;finger flexion;finger extension;10 repetitions;15 second hold  -     Row Name 06/01/21 0840          Motor Skills    Motor Skills  neuro-muscular function  -     Neuromuscular Function  left;upper extremity;severe impairment  -     Row Name 06/01/21 0840          Balance    Comment, Balance  OT  cleared for bed level assessment only per Neurology order.  -TB     Row Name 06/01/21 0840          Therapeutic Exercise    Therapeutic Exercise  (S) hand;wrist;elbow/forearm;shoulder Education initiated for LUE SROM HEP followed by teaching for Jt protection and positioning to prevent contractures in light of current increased tone. Pt demonstrates understanding. Follow up teaching recommended.  -TB       User Key  (r) = Recorded By, (t) = Taken By, (c) = Cosigned By    Initials Name Provider Type    TB Emily Gil OT Occupational Therapist        Goals/Plan     Row Name 06/01/21 0858          Bed Mobility Goal 1 (OT)    Activity/Assistive Device (Bed Mobility Goal 1, OT)  sit to supine;scooting  -TB     [place] Level/Cues Needed (Bed Mobility Goal 1, OT)  moderate assist (50-74% patient effort);tactile cues required;verbal cues required  -TB     Time Frame (Bed Mobility Goal 1, OT)  by discharge  -TB     Strategies/Barriers (Bed Mobility Goal 1, OT)  Acute L side weakness. Transitioning to stronger R side to exit bed.  -TB     Progress/Outcomes (Bed Mobility Goal 1, OT)  goal ongoing  -TB     Row Name 06/01/21 0858          Transfer Goal 1 (OT)    Activity/Assistive Device (Transfer Goal 1, OT)  sit-to-stand/stand-to-sit;toilet;commode, bedside with drop arms  -TB     [place] Level/Cues Needed (Transfer Goal 1, OT)  maximum assist (25-49% patient effort);tactile cues required;verbal cues required  -TB     Time Frame (Transfer Goal 1, OT)  by discharge  -TB     Strategies/Barriers (Transfers Goal 1, OT)  Acute L side weakness  -TB     Progress/Outcome (Transfer Goal 1, OT)  goal ongoing  -TB     Row Name 06/01/21 0858          Dressing Goal 1 (OT)    Activity/Device (Dressing Goal 1, OT)  upper body dressing  -TB     [place]/Cues Needed (Dressing Goal 1, OT)  moderate assist (50-74% patient effort);tactile cues required;verbal cues required  -TB     Time Frame (Dressing Goal 1, OT)  by  discharge  -TB     Strategies/Barriers (Dressing Goal 1, OT)  Acute L side weakness  -TB     Progress/Outcome (Dressing Goal 1, OT)  goal ongoing  -TB     Row Name 06/01/21 0858          ROM Goal 1 (OT)    ROM Goal 1 (OT)  Pt/family demonstrate understanding to complete LUE SROM/PROM/AAROM HEP to support function. Pt/family demonstrate understanding of LUE positioning as rest to prevent contractures.  -TB     Time Frame (ROM Goal 1, OT)  by discharge  -TB     Strategies/Barriers (ROM Goal 1, OT)  Acute L side weakness  -TB     Progress/Outcome (ROM Goal 1, OT)  goal ongoing  -TB       User Key  (r) = Recorded By, (t) = Taken By, (c) = Cosigned By    Initials Name Provider Type    TB Emily Gil, OT Occupational Therapist        Clinical Impression     Row Name 06/01/21 0852          Pain Assessment    Additional Documentation  Pain Scale: Numbers Pre/Post-Treatment (Group)  -TB     Row Name 06/01/21 0852          Pain Scale: Numbers Pre/Post-Treatment    Pretreatment Pain Rating  0/10 - no pain  -TB     Posttreatment Pain Rating  0/10 - no pain  -TB     Pre/Posttreatment Pain Comment  Pt denies pain with activity  -TB     Pain Intervention(s)  Repositioned  -TB     Row Name 06/01/21 0852          Plan of Care Review    Plan of Care Reviewed With  patient  -TB     Outcome Summary  OT IE completed. Pt is alert, Ox4 and motivated to work with therapy. Pt assessed from bed level per MD orders. Pt expresses eagerness to begin OOB activities. Pt repositioned in bed with Max A. Pt presents with LUE increased tone, decreased AROM and balance deficits which will limit mobility and self-care.  Education initiated for LUE SROM HEP, Jt protection and positioning to prevent contractures and ADL retraining. OT will follow IP. Recommend IRF at d/c for best outcome.  -TB     Row Name 06/01/21 0852          Therapy Assessment/Plan (OT)    Rehab Potential (OT)  fair, will monitor progress closely  -TB     Criteria for  Skilled Therapeutic Interventions Met (OT)  yes;skilled treatment is necessary  -TB     Therapy Frequency (OT)  daily  -TB     Row Name 06/01/21 0852          Therapy Plan Review/Discharge Plan (OT)    Equipment Needs Upon Discharge (OT)  -- Defer to IRF  -TB     Anticipated Discharge Disposition (OT)  inpatient rehabilitation facility  -TB     Row Name 06/01/21 0852          Vital Signs    Pre Systolic BP Rehab  -- RN cleared OT. Neurology set BP parameters /200. Pt's SBP is running lower. RN aware.  -TB     O2 Delivery Pre Treatment  room air  -TB     O2 Delivery Intra Treatment  room air  -TB     O2 Delivery Post Treatment  room air  -TB     Pre Patient Position  Supine  -TB     Intra Patient Position  Side Lying  -TB     Post Patient Position  Supine  -TB     Row Name 06/01/21 0852          Positioning and Restraints    Pre-Treatment Position  in bed  -TB     Post Treatment Position  bed  -TB     In Bed  supine;call light within reach;encouraged to call for assist;exit alarm on;SCD pump applied  -TB       User Key  (r) = Recorded By, (t) = Taken By, (c) = Cosigned By    Initials Name Provider Type    TB Emily Gil, OT Occupational Therapist        Outcome Measures     Row Name 06/01/21 0902          How much help from another is currently needed...    Putting on and taking off regular lower body clothing?  2  -TB     Bathing (including washing, rinsing, and drying)  2  -TB     Toileting (which includes using toilet bed pan or urinal)  1  -TB     Putting on and taking off regular upper body clothing  2  -TB     Taking care of personal grooming (such as brushing teeth)  3  -TB     Eating meals  3  -TB     AM-PAC 6 Clicks Score (OT)  13  -TB     Row Name 06/01/21 0902          Modified [place] Scale    Pre-Stroke Modified [place] Scale  0 - No Symptoms at all.  -TB     Modified [place] Scale  4 - Moderately severe disability.  Unable to walk without assistance, and unable to attend to own bodily  needs without assistance.  -     Row Name 06/01/21 0902          Functional Assessment    Outcome Measure Options  AM-PAC 6 Clicks Daily Activity (OT);Modified [place]  -       User Key  (r) = Recorded By, (t) = Taken By, (c) = Cosigned By    Initials Name Provider Type    TB Emily Gil OT Occupational Therapist        Occupational Therapy Education                 Title: PT OT SLP Therapies (Done)     Topic: Occupational Therapy (Done)     Point: ADL training (Done)     Description:   Instruct learner(s) on proper safety adaptation and remediation techniques during self care or transfers.   Instruct in proper use of assistive devices.              Learning Progress Summary           Patient Acceptance, E,D,TB, VU,DU,NR by TB at 6/1/2021 0902    Comment: Education initiated for LUE: SROM HEP, positioning to prevent contracture, and ADL retraining                   Point: Home exercise program (Done)     Description:   Instruct learner(s) on appropriate technique for monitoring, assisting and/or progressing therapeutic exercises/activities.              Learning Progress Summary           Patient Acceptance, E,D,TB, VU,DU,NR by TB at 6/1/2021 0902    Comment: Education initiated for LUE: SROM HEP, positioning to prevent contracture, and ADL retraining                   Point: Precautions (Done)     Description:   Instruct learner(s) on prescribed precautions during self-care and functional transfers.              Learning Progress Summary           Patient Acceptance, E,D,TB, VU,DU,NR by TB at 6/1/2021 0902    Comment: Education initiated for LUE: SROM HEP, positioning to prevent contracture, and ADL retraining                               User Key     Initials Effective Dates Name Provider Type [facility] 06/08/18 -  Emily Gil OT Occupational Therapist OT              OT Recommendation and Plan  Therapy Frequency (OT): daily  Plan of Care Review  Plan of Care Reviewed With:  patient  Outcome Summary: OT IE completed. Pt is alert, Ox4 and motivated to work with therapy. Pt assessed from bed level per MD orders. Pt expresses eagerness to begin OOB activities. Pt repositioned in bed with [place] JEROME. Pt presents with LUE increased tone, decreased AROM and balance deficits which will limit mobility and self-care.  Education initiated for LUE SROM HEP, Jt protection and positioning to prevent contractures and ADL retraining. OT will follow IP. Recommend IRF at d/c for best outcome.     Time Calculation:   Time Calculation- OT     Row Name 06/01/21 0800             Time Calculation- OT    OT Start Time  0800  -TB      OT Received On  06/01/21  -TB      OT Goal Re-Cert Due Date  06/11/21  -TB         Untimed Charges    OT Eval/Re-eval Minutes  60  -TB         Total Minutes    Untimed Charges Total Minutes  60  -TB       Total Minutes  60  -TB        User Key  (r) = Recorded By, (t) = Taken By, (c) = Cosigned By    Initials Name Provider Type    TB Emily Gil OT Occupational Therapist        Therapy Charges for Today     Code Description Service Date Service Provider Modifiers Qty    [id number] HC OT EVAL MOD COMPLEXITY 4 6/1/2021 Emily Gil OT GO 1               Emily Gil OT  6/1/2021

## 2021-06-01 NOTE — PROGRESS NOTES
"    [facility] Services  PROGRESS NOTE    Patient Name: Obed Cotto  : 1950  MRN: [id number]    Date of Admission: 2021  Primary Care Physician: Lorraine Morfin MD    Subjective   Subjective     CC:  Left sided weakness    HPI:  About the same today, still unable to move LUE but no worse.  Had FEES this AM and aspirating everything but pudding thick.  Just states that he is \"bored.\"    ROS:  Gen- No fevers, chills  CV- No chest pain, palpitations  Resp- No cough, dyspnea  GI- No N/V/D, abd pain        Objective   Objective     Vital Signs:   Temp:  [97.3 °F (36.3 °C)-98.9 °F (37.2 °C)] 98.4 °F (36.9 °C)  Heart Rate:  [57-89] 58  Resp:  [16-18] 18  BP: (121-143)/(62-94) 131/62  Total (NIH Stroke Scale): 7     Physical Exam:  Constitutional: No acute distress, awake, alert  HENT: NCAT, mucous membranes moist,dysarthria  Respiratory: Clear to auscultation bilaterally, respiratory effort normal   Cardiovascular: RRR, no murmurs, rubs, or gallops  Gastrointestinal: Positive bowel sounds, soft, nontender, nondistended  Musculoskeletal: No bilateral ankle edema  Psychiatric: Appropriate affect, cooperative  Neurologic: Oriented x 3, now unable to move LUE, decreased strength in LLE 3/5, Cranial Nerves grossly intact to confrontation, dysarthria but understandable  Skin: No rashes      Results Reviewed:  Results from last 7 days   Lab Units 21  0821  0650 219 21  0426   WBC 10*3/mm3 16.69* 17.15* 14.79*  --    HEMOGLOBIN g/dL 13.9 13.3 14.9  --    HEMATOCRIT % 40.6 39.8 44.9  --    PLATELETS 10*3/mm3 297 283 326  --    INR   --   --   --  1.0   PROCALCITONIN ng/mL  --   --  0.04  --      Results from last 7 days   Lab Units 21  0821  0650 21  0429   SODIUM mmol/L 138 139 137   POTASSIUM mmol/L 3.3* 3.8 3.8   CHLORIDE mmol/L 106 110* 103   CO2 mmol/L 25.0 21.0* 24.0   BUN mg/dL 8 10 18   CREATININE mg/dL 0.56* 0.55* " 0.72*   GLUCOSE mg/dL 105* 106* 128*   CALCIUM mg/dL 9.2 8.8 10.2   ALT (SGPT) U/L  --   --  19  19   AST (SGOT) U/L  --   --  17  17   TROPONIN T ng/mL  --   --  <0.010   PROBNP pg/mL  --   --  80.4     Estimated Creatinine Clearance: 78.2 mL/min (A) (by C-G formula based on SCr of 0.56 mg/dL (L)).    Microbiology Results Abnormal     Procedure Component Value - Date/Time    COVID PRE-OP / PRE-PROCEDURE SCREENING ORDER (NO ISOLATION) - Swab, Nasopharynx [[id number]]  (Normal) Collected: 05/30/21 0611    Lab Status: Final result Specimen: Swab from Nasopharynx Updated: 05/30/21 0655    Narrative:      The following orders were created for panel order COVID PRE-OP / PRE-PROCEDURE SCREENING ORDER (NO ISOLATION) - Swab, Nasopharynx.  Procedure                               Abnormality         Status                     ---------                               -----------         ------                     COVID-19 and FLU A/B PCR...[[id number]]  Normal              Final result                 Please view results for these tests on the individual orders.    COVID-19 and FLU A/B PCR - Swab, Nasopharynx [[id number]]  (Normal) Collected: 05/30/21 0611    Lab Status: Final result Specimen: Swab from Nasopharynx Updated: 05/30/21 0655     COVID19 Not Detected     Influenza A PCR Not Detected     Influenza B PCR Not Detected    Narrative:      Fact sheet for providers: https://www.fda.gov/media/428048/download    Fact sheet for patients: https://www.fda.gov/media/645137/download    Test performed by PCR.          Imaging Results (Last 24 Hours)     Procedure Component Value Units Date/Time    CT Head Without Contrast [[id number]] Collected: 05/31/21 1244     Updated: 05/31/21 1629    Narrative:      EXAMINATION: CT HEAD WO CONTRAST - 05/31/2021     INDICATION: R13.10-Dysphagia, unspecified; R41.841-Cognitive  communication deficit. Follow up stroke.     TECHNIQUE: CT head without intravenous contrast administration.     The  radiation dose reduction device was turned on for each scan per the  ALARA (As Low as Reasonably Achievable) protocol.     COMPARISON: CT head perfusion and angiogram one-day prior 05/30/2021.     FINDINGS: Midline structures are symmetric without evidence of mass,  mass effect or midline shift with continued hyperdense asymmetry along  the right tentorium concerning for subdural hemorrhage with stable to  slightly decreased appearance or prominence from prior. No new  intraparenchymal findings or hemorrhage and no intraventricular  hemorrhage or hydrocephalus. Globes and orbits unremarkable. Paranasal  sinuses and mastoid air cells are grossly clear. Calvarium intact.       Impression:      Continued hyperdense asymmetry along the right tentorium  concerning for subdural hemorrhage with stable to slightly decreased  appearance or prominence from prior. No new intraparenchymal findings or  hemorrhage and no intraventricular hemorrhage or hydrocephalus.     DICTATED:   05/31/2021  EDITED/ls :   05/31/2021                Results for orders placed during the hospital encounter of 05/30/21    Adult Transthoracic Echo Complete W/ Cont if Necessary Per Protocol (With Agitated Saline)    Interpretation Summary  · Saline test results are negative.  · Estimated left ventricular EF = 65% Left ventricular ejection fraction appears to be 61 - 65%. Left ventricular systolic function is normal.  · There is mild mitral valve prolapse of the posterior mitral leaflet.  · Left ventricular wall thickness is consistent with mild basal asymmetric hypertrophy.  · The right atrial cavity is mildly dilated.  · The right ventricular cavity is mildly dilated.  · Estimated right ventricular systolic pressure from tricuspid regurgitation is normal (<35 mmHg). Calculated right ventricular systolic pressure from tricuspid regurgitation is 27 mmHg.  · Left ventricular diastolic function was normal.      I have reviewed the medications:  Scheduled  "Meds:aspirin, 325 mg, Oral, Daily   Or  aspirin, 300 mg, Rectal, Daily  atorvastatin, 80 mg, Oral, Nightly  clopidogrel, 75 mg, Oral, Daily  LORazepam, 0.5 mg, Intravenous, Once  sodium chloride, 10 mL, Intravenous, Q12H      Continuous Infusions:sodium chloride, 100 mL/hr, Last Rate: 100 mL/hr (05/31/21 1534)      PRN Meds:.•  acetaminophen **OR** acetaminophen **OR** acetaminophen  •  ondansetron **OR** ondansetron  •  sodium chloride  •  sodium chloride  •  sodium chloride    Assessment/Plan   Assessment & Plan     Active Hospital Problems    Diagnosis  POA   • **Suspected cerebrovascular accident (CVA) [R09.89]  Yes   • Stroke (CMS/HCC) [I63.9]  Yes   • Essential hypertension [I10]  Yes   • Leukocytosis [D72.829]  Yes   • Tobacco abuse [Z72.0]  Yes      Resolved Hospital Problems   No resolved problems to display.        Brief Hospital Course to date:  Obed Cotto is a 71 y.o. male w/ a hx of HTN, HLD, chronic bronchitis, ongoing tobacco use, BPH who presented to the ED w/ c/o left sided weakness.      **Acute CVA posterior right internal capsule  **Trace subdural hemorrhage R>L along tentorium (per CT head)  -CT head, (partner discussed case with Dr. Horne-impression is likely over read, no real subdural hematoma, patient denies any complaint of head trauma.)  -repeat CT head today shows \"continued hyperdense asymmetry along right tentorium concerning for SDH with stable to solid decreased appearance from prior.\"  D/w neuro/Dr. Varela who agrees that this is likely artifact.  With His LUE worsened he is concerned that expanding stroke, so per his rec will try a low dose midodrine to increase his BP.  Stopped flomax and proscar in case they are affecting BP at all.    -CTA head/neck shows minimal atherosclerotic disease at the left carotid birfucation, kinking of mid left subclavian artery, some component of associated stenosis.   -CT cerebral perfusion normal  -MRI Brain-shows small area of acute infarct in " posterior limb of right internal capsule.  Aspirin 300 mg ordered.  -ECHO shows EF 65%, mild mitral valve prolapse, NO PFO,   -can now stop bedrest per neuro, DAPT, and keep -200.  BP better today holding flomax and proscar  -pt, ot, speech and case mgt consults  -high dose statin  -neuro checks and stroke scale per protocol   -bedside dysphagia screen failed with thins and straws, s/p FEEs today and recommended pudding thick  -tsh wnl,tot chol 143 HDL 40 LDL 83, hem a1c 5.7  -advised patient absolutely must quit smoking     **Leukocytosis (appears to be chronically elevated)   -WBC appears chronically elevated evidence as far back as 2018  -afebrile  -CXR shows no acute  -UA unremarkable  -lactic and procal both wnl  -monitor      **HTN  **HLD  -not on routine statin at home, started lipitor 80mg  -routine Toprol held for now- allowing permissive HTN     Hypokalemia  --replacement protocol     **Chronic bronchitis/smoker  -Smoking cessation advise given.    **Dysphagia  --s/p FEES today and SLP recs pudding thick        **BPH  -holding both Proscar and Flomax for now to allow permissive HTN        DVT prophylaxis:  Mechanical     Disposition: I expect the patient to be discharged when has rehab bed      CODE STATUS:   Code Status and Medical Interventions:   Ordered at: 05/30/21 0854     Level Of Support Discussed With:    Patient     Code Status:    CPR     Medical Interventions (Level of Support Prior to Arrest):    Full       Fredis Schmitz MD  06/01/21

## 2021-06-01 NOTE — PLAN OF CARE
Problem: Adult Inpatient Plan of Care  Goal: Plan of Care Review  Recent Flowsheet Documentation  Taken 6/1/2021 0852 by Emily Gil OT  Plan of Care Reviewed With: patient  Outcome Summary: OT IE completed. Pt is alert, Ox4 and motivated to work with therapy. Pt assessed from bed level per MD orders. Pt expresses eagerness to begin OOB activities. Pt repositioned in bed with Max A. Pt presents with LUE increased tone, decreased AROM and balance deficits which will limit mobility and self-care.  Education initiated for LUE SROM HEP, Jt protection and positioning to prevent contractures and ADL retraining. OT will follow IP. Recommend IRF at d/c for best outcome.

## 2021-06-01 NOTE — CASE MANAGEMENT/SOCIAL WORK
Discharge Planning Assessment  [facility]     Patient Name: Obed Cotto  MRN: [id number]  Today's Date: 6/1/2021    Admit Date: 5/30/2021    Discharge Needs Assessment    No documentation.       Discharge Plan     Row Name 06/01/21 1244       Plan    Plan  [facility]    Patient/Family in Agreement with Plan  yes    Plan Comments  Followed up with  [facility] regarding Mr. Cotto's weekend referral.  Referral is being reviewed by [facility] liaison.  Mr. Cotto is pending PT evaluation.  A prior auth with the patient's [employer] will be required for the rehab admission.    CM will follow up.    Final Discharge Disposition Code  03 - skilled nursing facility (SNF)        Continued Care and Services - Admitted Since 5/30/2021     Destination     Service Provider Request Status Selected Services Address Phone Fax Patient Preferred    [facility]  Pending - No Request Sent N/A [address] [facility] [address] [address] [phone] --              Expected Discharge Date and Time     Expected Discharge Date Expected Discharge Time    Jun 4, 2021         Demographic Summary    No documentation.       Functional Status    No documentation.       Psychosocial    No documentation.       Abuse/Neglect    No documentation.       Legal    No documentation.       Substance Abuse    No documentation.       Patient Forms    No documentation.           Renata Hung, RITA

## 2021-06-01 NOTE — THERAPY EVALUATION
Patient Name: Obed Cotto  : 1950    MRN: [id number]                              Today's Date: 2021       Admit Date: 2021    Visit Dx:     ICD-10-CM ICD-9-CM   1. Oropharyngeal dysphagia  R13.12 787.22   2. Cognitive communication deficit  R41.841 799.52     Patient Active Problem List   Diagnosis   • Tobacco abuse   • Urinary retention   • Prehypertension   • Positive colorectal cancer screening using DNA-based stool test   • Leukocytosis   • Prediabetes   • BPH (benign prostatic hyperplasia)   • BPH with elevated PSA   • Essential hypertension   • Suspected cerebrovascular accident (CVA)   • Stroke (CMS/HCC)     Past Medical History:   Diagnosis Date   • Acute ischemic stroke (CMS/HCC) 2021     posterior limb of the right internal capsule   • BPH with elevated PSA    • Chronic bronchitis (CMS/HCC)    • Elevated blood pressure    • Hyperlipidemia    • Hypertension    • Lacunar infarction (CMS/HCC)    • Noncompliance with medication regimen     pt states he never took his metoprolol   • Prediabetes      Past Surgical History:   Procedure Laterality Date   • FINGER SURGERY      Right hand 4th finger   • FINGER SURGERY      R ring finger, partial amputation repair     General Information     Row Name 21 1118          Physical Therapy Time and Intention    Document Type  evaluation  -LR     Mode of Treatment  physical therapy;individual therapy  -LR     Row Name 21 1118          General Information    Patient Profile Reviewed  yes  -LR     Prior Level of Function  independent:;all household mobility;community mobility;gait;transfer;bed mobility;ADL's;home management;cooking;cleaning;driving;shopping;using stairs  -LR     Existing Precautions/Restrictions  fall;other (see comments) L sided weakness (L UE>L LE), dysarthria  -LR     Barriers to Rehab  medically complex  -LR     Row Name 21 1118          Living Environment    Lives With  child(bill), adult;other (see  comments);grandchild(bill) lives with son, daughter in law, and grandson; someone is with patient at all times  -LR     Row Name 06/01/21 1118          Home Main Entrance    Number of Stairs, Main Entrance  one threshold step  -LR     Stair Railings, Main Entrance  none  -LR     Row Name 06/01/21 1118          Stairs Within Home, Primary    Number of Stairs, Within Home, Primary  none  -LR     Stairs Comment, Within Home, Primary  1 story home  -LR     Row Name 06/01/21 1118          Cognition    Orientation Status (Cognition)  oriented x 4  -LR     Row Name 06/01/21 1118          Safety Issues, Functional Mobility    Safety Issues Affecting Function (Mobility)  ability to follow commands;awareness of need for assistance;insight into deficits/self-awareness;judgment;safety precaution awareness;safety precautions follow-through/compliance;sequencing abilities  -LR     Impairments Affecting Function (Mobility)  balance;coordination;endurance/activity tolerance;postural/trunk control;muscle tone abnormal;strength;range of motion (ROM);motor control;motor planning;grasp  -LR       User Key  (r) = Recorded By, (t) = Taken By, (c) = Cosigned By    Initials Name Provider Type    LR Anahi King, PT Physical Therapist        Mobility     Row Name 06/01/21 1118          Bed Mobility    Bed Mobility  supine-sit  -LR     Supine-Sit [place] (Bed Mobility)  verbal cues;maximum assist (25% patient effort)  -LR     Assistive Device (Bed Mobility)  head of bed elevated;draw sheet  -LR     Comment (Bed Mobility)  Verbal cues to move LEs towards EOB, min assist to move L LE. Cues to push up from bed to raise trunk into sitting and to scoot hips out to get feet on floor. Max assist required at trunk to sit up and then via draw sheet to scoot hips out and around. Patient leaning posteriorly upon sitting up. Repeated cues to use R UE for support and to use abdominal muscles to pull trunk forward. Improved with cues. C/o  slight lightheadedness upon sitting up. Little improvement with rest. BP WNL.  -LR     Row Name 06/01/21 1118          Transfers    Comment (Transfers)  Verbal cues and assist for correct positioning of feet prior to t/f. Verbal cues to lean forward and push up from bed to stand. Cues to reach back for chair with R UE to lower into sitting.  -LR     Row Name 06/01/21 1118          Bed-Chair Transfer    Bed-Chair [place] (Transfers)  not tested  -LR     Row Name 06/01/21 1118          Sit-Stand Transfer    Sit-Stand [place] (Transfers)  verbal cues;moderate assist (50% patient effort);2 person assist  -LR     Assistive Device (Sit-Stand Transfers)  other (see comments) B UE support  -LR     Row Name 06/01/21 1118          Gait/Stairs (Locomotion)    [place] Level (Gait)  verbal cues;2 person assist;maximum assist (25% patient effort)  -LR     Assistive Device (Gait)  other (see comments) B UE support  -LR     Distance in Feet (Gait)  10  -LR     Deviations/Abnormal Patterns (Gait)  bilateral deviations;al decreased;gait speed decreased;stride length decreased;base of support, narrow;left sided deviations;ataxic  -LR     Bilateral Gait Deviations  forward flexed posture;heel strike decreased;leans left  -LR     Left Sided Gait Deviations  weight shift ability decreased;foot drop/toe drag  -LR     [place] Level (Stairs)  not tested  -LR     Comment (Gait/Stairs)  Patient ambulated with step to gait pattern at slow pace with narrow base, ataxic gait on L, and significant lean to the left. Repeated cues to correct balance, to shift weight to the right, and for correct sequencing of steps. Occasional assist required to initiate advancement of L foot. L lean worsened as patient fatigued. Chair brought up behind patient to return to sitting once too fatigued to progress.  -LR       User Key  (r) = Recorded By, (t) = Taken By, (c) = Cosigned By    Initials Name Provider Type    Anahi Huntley  [facility] [place] Physical Therapist        Obj/Interventions     Row Name 06/01/21 1118          Range of Motion Comprehensive    General Range of Motion  lower extremity range of motion deficits identified  -LR     Row Name 06/01/21 1118          Strength Comprehensive (MMT)    General Manual Muscle Testing (MMT) Assessment  lower extremity strength deficits identified  -LR     Row Name 06/01/21 1118          Motor Skills    Motor Skills  coordination;muscle tone  -     Coordination  gross motor deficit;left;lower extremity;ataxia;severe impairment patient could not perform [place] on L d/t inability to actively DF and could not perform heel shin slide on L d/t impaired ROM/strength in L LE  -LR     Muscle Tone  left;lower extremity(s);severe impairment;hypertonia;other (see comments) L quad spasming, patient unable to fully flex L knee d/t these spasms  -LR     Therapeutic Exercise  ankle;hip;knee cues for technique; unable to actively flex at L hip for seated marches, unable to perform L LAQ d/t increased L quad tone/spasms; max assist L seated marches, mod assist heel slides  -     Row Name 06/01/21 1118          Hip (Therapeutic Exercise)    Hip (Therapeutic Exercise)  strengthening exercise;isometric exercises  -LR     Hip Isometrics (Therapeutic Exercise)  bilateral;gluteal sets;aDduction;sitting;10 repetitions;other (see comments) seated hip adduction pillow squeezes  -     Hip Strengthening (Therapeutic Exercise)  bilateral;aBduction;heel slides;marching while seated;sitting;10 repetitions  -     Row Name 06/01/21 1118          Knee (Therapeutic Exercise)    Knee (Therapeutic Exercise)  strengthening exercise;isometric exercises  -LR     Knee Isometrics (Therapeutic Exercise)  bilateral;quad sets;sitting;10 repetitions  -     Knee Strengthening (Therapeutic Exercise)  bilateral;SLR (straight leg raise);SAQ (short arc quad);sitting;10 repetitions;right;LAQ (long arc quad) patient could not flex at L knee  d/t L quad spasm  -LR     Row Name 06/01/21 1118          Ankle (Therapeutic Exercise)    Ankle (Therapeutic Exercise)  AROM (active range of motion);PROM (passive range of motion)  -LR     Ankle AROM (Therapeutic Exercise)  plantarflexion;right;dorsiflexion;sitting;10 repetitions  -LR     Ankle PROM (Therapeutic Exercise)  left;dorsiflexion;plantarflexion;sitting;10 repetitions  -LR     Row Name 06/01/21 1118          Balance    Balance Assessment  sitting static balance;sitting dynamic balance;standing static balance;standing dynamic balance  -LR     Static Sitting Balance  moderate impairment;supported;sitting, edge of bed  -LR     Dynamic Sitting Balance  severe impairment;supported;sitting, edge of bed  -LR     Static Standing Balance  moderate impairment;supported;standing  -LR     Dynamic Standing Balance  severe impairment;supported;standing  -LR     Comment, Balance  patient with significant L lean with mobility, patient reports he cannot tell he is leaning  -LR     Row Name 06/01/21 1118          Sensory Assessment (Somatosensory)    Sensory Assessment (Somatosensory)  LE sensation intact;other (see comments) denies numbness/tingling in LEs, reports light touch equal and intact upon assessment  -LR     Row Name 06/01/21 1118          General Lower Extremity Assessment (Range of Motion)    Lower Extremity: Range of Motion  RLE ROM WFL  -LR     Comment: Lower Extremity ROM  L ankle DF AROM impaired 100%, L knee flexion AROM impaired 75% d/t increased L quad tone/spasms, L hip flexion AROM impaired 100% d/t weakness  -LR     Row Name 06/01/21 1118          Lower Extremity (Manual Muscle Testing)    Lower Extremity: Manual Muscle Testing (MMT)  right LE strength is WNL;left hip strength deficit;left knee strength deficit;left ankle strength deficit  -LR     Comment, MMT: Lower Extremity  L ankle DF 0/5, no activation noted; L knee flexors: 3-/5, L knee extensors: 4/5, L hip flexors: 2-/5  -LR       User Key   (r) = Recorded By, (t) = Taken By, (c) = Cosigned By    Initials Name Provider Type    LR Anahi King, PT Physical Therapist        Goals/Plan     Row Name 06/01/21 1118          Bed Mobility Goal 1 (PT)    Activity/Assistive Device (Bed Mobility Goal 1, PT)  sit to supine/supine to sit  -LR     [place] Level/Cues Needed (Bed Mobility Goal 1, PT)  minimum assist (75% or more patient effort);2 person assist  -LR     Time Frame (Bed Mobility Goal 1, PT)  long term goal (LTG);5 days  -LR     Progress/Outcomes (Bed Mobility Goal 1, PT)  goal ongoing  -LR     Row Name 06/01/21 1118          Transfer Goal 1 (PT)    Activity/Assistive Device (Transfer Goal 1, PT)  sit-to-stand/stand-to-sit;walker, rolling platform  -LR     [place] Level/Cues Needed (Transfer Goal 1, PT)  minimum assist (75% or more patient effort);2 person assist  -LR     Time Frame (Transfer Goal 1, PT)  long term goal (LTG);5 days  -LR     Progress/Outcome (Transfer Goal 1, PT)  goal ongoing  -LR     Row Name 06/01/21 1118          Gait Training Goal 1 (PT)    Activity/Assistive Device (Gait Training Goal 1, PT)  gait (walking locomotion);walker, rolling platform  -LR     [place] Level (Gait Training Goal 1, PT)  moderate assist (50-74% patient effort);2 person assist  -LR     Distance (Gait Training Goal 1, PT)  50 feet  -LR     Time Frame (Gait Training Goal 1, PT)  long term goal (LTG);5 days  -LR     Progress/Outcome (Gait Training Goal 1, PT)  goal ongoing  -LR       User Key  (r) = Recorded By, (t) = Taken By, (c) = Cosigned By    Initials Name Provider Type    LR Anahi King, PT Physical Therapist        Clinical Impression     Row Name 06/01/21 1118          Pain    Additional Documentation  Pain Scale: Numbers Pre/Post-Treatment (Group)  -LR     Row Name 06/01/21 1118          Pain Scale: Numbers Pre/Post-Treatment    Pretreatment Pain Rating  0/10 - no pain  -LR     Posttreatment Pain Rating  0/10 - no pain   -LR     Pain Intervention(s)  Ambulation/increased activity;Repositioned  -LR     Row Name 06/01/21 1118          Plan of Care Review    Plan of Care Reviewed With  patient;son;daughter  -LR     Progress  improving  -LR     Outcome Summary  Patient ambulated 10 feet with max assist x2, leaning heavily to L, ataxic on L with narrow base, limited by weakness and significantly impaired balance. Patient with significant tone in L quad, patient c/o spasming in L quad. Demonstrates significantly impaired balance with all mobility and static sitting EOB. Will attempt to progress to gait training with L UE platform RW next visit. Recommend IRF at d/c. Will continue to progress as able.  -LR     Row Name 06/01/21 1118          Therapy Assessment/Plan (PT)    Patient/Family Therapy Goals Statement (PT)  get better  -LR     Rehab Potential (PT)  fair, will monitor progress closely  -LR     Criteria for Skilled Interventions Met (PT)  yes;meets criteria;skilled treatment is necessary  -LR     Row Name 06/01/21 1118          Vital Signs    Pre Systolic BP Rehab  149  -LR     Pre Treatment Diastolic BP  76  -LR     Intra Systolic BP Rehab  154  -LR     Intra Treatment Diastolic BP  74  -LR     Post Systolic BP Rehab  136  -LR     Post Treatment Diastolic BP  71  -LR     Pretreatment Heart Rate (beats/min)  76  -LR     Posttreatment Heart Rate (beats/min)  70  -LR     Pre SpO2 (%)  94  -LR     O2 Delivery Pre Treatment  room air  -LR     Post SpO2 (%)  94  -LR     O2 Delivery Post Treatment  room air  -LR     Pre Patient Position  Supine  -LR     Intra Patient Position  Sitting  -LR     Post Patient Position  Sitting  -LR     Row Name 06/01/21 1118          Positioning and Restraints    Pre-Treatment Position  in bed  -LR     Post Treatment Position  chair  -LR     In Chair  notified nsg;reclined;sitting;call light within reach;encouraged to call for assist;exit alarm on;with family/caregiver;compression device;legs  elevated;waffle cushion;on mechanical lift sling  -LR       User Key  (r) = Recorded By, (t) = Taken By, (c) = Cosigned By    Initials Name Provider Type    Anahi Huntley, PT Physical Therapist        Outcome Measures     Row Name 06/01/21 1318          How much help from another person do you currently need...    Turning from your back to your side while in flat bed without using bedrails?  2  -LR     Moving from lying on back to sitting on the side of a flat bed without bedrails?  2  -LR     Moving to and from a bed to a chair (including a wheelchair)?  2  -LR     Standing up from a chair using your arms (e.g., wheelchair, bedside chair)?  2  -LR     Climbing 3-5 steps with a railing?  1  -LR     To walk in hospital room?  2  -LR     AM-PAC 6 Clicks Score (PT)  11  -LR     Row Name 06/01/21 1318          Modified [place] Scale    Pre-Stroke Modified [place] Scale  0 - No Symptoms at all.  -LR     Modified [place] Scale  4 - Moderately severe disability.  Unable to walk without assistance, and unable to attend to own bodily needs without assistance.  -LR     Row Name 06/01/21 1318          Functional Assessment    Outcome Measure Options  AM-PAC 6 Clicks Basic Mobility (PT);Modified Randall  -LR       User Key  (r) = Recorded By, (t) = Taken By, (c) = Cosigned By    Initials Name Provider Type    Anahi Huntley, PT Physical Therapist        Physical Therapy Education                 Title: PT OT SLP Therapies (Done)     Topic: Physical Therapy (Done)     Point: Mobility training (Done)     Learning Progress Summary           Patient Acceptance, E,D, VU,NR by LR at 6/1/2021 1118    Comment: Educated on precautions, safety with mobility, LE HEP, benefits of mobility, correct supine to sit t/f technique, correct sit<->stand t/f technique, correct gait mechanics, and progression of POC.   Family Acceptance, E,D, VU,NR by LR at 6/1/2021 1118    Comment: Educated on precautions, safety with mobility,  LE HEP, benefits of mobility, correct supine to sit t/f technique, correct sit<->stand t/f technique, correct gait mechanics, and progression of POC.                   Point: Home exercise program (Done)     Learning Progress Summary           Patient Acceptance, E,D, VU,NR by LR at 6/1/2021 1118    Comment: Educated on precautions, safety with mobility, LE HEP, benefits of mobility, correct supine to sit t/f technique, correct sit<->stand t/f technique, correct gait mechanics, and progression of POC.   Family Acceptance, E,D, VU,NR by LR at 6/1/2021 1118    Comment: Educated on precautions, safety with mobility, LE HEP, benefits of mobility, correct supine to sit t/f technique, correct sit<->stand t/f technique, correct gait mechanics, and progression of POC.                   Point: Body mechanics (Done)     Learning Progress Summary           Patient Acceptance, E,D, VU,NR by LR at 6/1/2021 1118    Comment: Educated on precautions, safety with mobility, LE HEP, benefits of mobility, correct supine to sit t/f technique, correct sit<->stand t/f technique, correct gait mechanics, and progression of POC.   Family Acceptance, E,D, VU,NR by LR at 6/1/2021 1118    Comment: Educated on precautions, safety with mobility, LE HEP, benefits of mobility, correct supine to sit t/f technique, correct sit<->stand t/f technique, correct gait mechanics, and progression of POC.                   Point: Precautions (Done)     Learning Progress Summary           Patient Acceptance, E,D, VU,NR by LR at 6/1/2021 1118    Comment: Educated on precautions, safety with mobility, LE HEP, benefits of mobility, correct supine to sit t/f technique, correct sit<->stand t/f technique, correct gait mechanics, and progression of POC.   Family Acceptance, E,D, VU,NR by LR at 6/1/2021 1118    Comment: Educated on precautions, safety with mobility, LE HEP, benefits of mobility, correct supine to sit t/f technique, correct sit<->stand t/f technique,  correct gait mechanics, and progression of POC.                               User Key     Initials Effective Dates Name Provider Type Discipline    LR 06/19/15 -  Anahi King, PT Physical Therapist PT              PT Recommendation and Plan  Planned Therapy Interventions (PT): balance training, bed mobility training, gait training, home exercise program, patient/family education, ROM (range of motion), strengthening, transfer training  Plan of Care Reviewed With: patient, son, daughter  Progress: improving  Outcome Summary: Patient ambulated 10 feet with max assist x2, leaning heavily to L, ataxic on L with narrow base, limited by weakness and significantly impaired balance. Patient with significant tone in L quad, patient c/o spasming in L quad. Demonstrates significantly impaired balance with all mobility and static sitting EOB. Will attempt to progress to gait training with L UE platform RW next visit. Recommend IRF at d/c. Will continue to progress as able.     Time Calculation:   PT Charges     Row Name 06/01/21 1118             Time Calculation    Start Time  1118  -LR      PT Received On  06/01/21  -LR      PT Goal Re-Cert Due Date  06/11/21  -LR         Timed Charges    61264 - PT Therapeutic Exercise Minutes  12  -LR      73993 - Gait Training Minutes   8  -LR      79087 - PT Therapeutic Activity Minutes  5  -LR         Untimed Charges    PT Eval/Re-eval Minutes  35  -LR         Total Minutes    Timed Charges Total Minutes  25  -LR      Untimed Charges Total Minutes  35  -LR       Total Minutes  60  -LR        User Key  (r) = Recorded By, (t) = Taken By, (c) = Cosigned By    Initials Name Provider Type    LR Anahi King, PT Physical Therapist        Therapy Charges for Today     Code Description Service Date Service Provider Modifiers Qty    05915868494 HC PT THER PROC EA 15 MIN 6/1/2021 Anahi King, PT GP 1    87498764810 HC GAIT TRAINING EA 15 MIN 6/1/2021 Anahi King  Mabel, PT GP 1    [id number]  PT THER SUPP EA 15 MIN 6/1/2021 Anahi King, PT GP 3    [id number]  PT EVAL MOD COMPLEXITY 3 6/1/2021 Anaih King, PT GP 1          PT G-Codes  Outcome Measure Options: AM-PAC 6 Clicks Basic Mobility (PT), Modified Randall  AM-PAC 6 Clicks Score (PT): 11  AM-PAC 6 Clicks Score (OT): 13  Modified [place] Scale: 4 - Moderately severe disability.  Unable to walk without assistance, and unable to attend to own bodily needs without assistance.    Anahi King, PT  6/1/2021

## 2021-06-01 NOTE — CONSULTS
Chart review for diabetes educator consult.    At the time of this review patient A1c is 5.7 , they have no noted history of diabetes and no home medications noted for treatment of diabetes. At this time we do not feel the patient would benefit from diabetes education. Thank you for this consult, should patient needs change please re consult us.

## 2021-06-02 ENCOUNTER — APPOINTMENT (OUTPATIENT)
Dept: GENERAL RADIOLOGY | Facility: HOSPITAL | Age: 71
End: 2021-06-02

## 2021-06-02 LAB
ANION GAP SERPL CALCULATED.3IONS-SCNC: 8 MMOL/L (ref 5–15)
BUN SERPL-MCNC: 7 MG/DL (ref 8–23)
BUN/CREAT SERPL: 13.5 (ref 7–25)
CALCIUM SPEC-SCNC: 9 MG/DL (ref 8.6–10.5)
CHLORIDE SERPL-SCNC: 106 MMOL/L (ref 98–107)
CO2 SERPL-SCNC: 24 MMOL/L (ref 22–29)
CREAT SERPL-MCNC: 0.52 MG/DL (ref 0.76–1.27)
DEPRECATED RDW RBC AUTO: 43.9 FL (ref 37–54)
ERYTHROCYTE [DISTWIDTH] IN BLOOD BY AUTOMATED COUNT: 12.4 % (ref 12.3–15.4)
GFR SERPL CREATININE-BSD FRML MDRD: >150 ML/MIN/1.73
GLUCOSE BLDC GLUCOMTR-MCNC: 107 MG/DL (ref 70–130)
GLUCOSE SERPL-MCNC: 98 MG/DL (ref 65–99)
HCT VFR BLD AUTO: 39.6 % (ref 37.5–51)
HGB BLD-MCNC: 13.5 G/DL (ref 13–17.7)
MAGNESIUM SERPL-MCNC: 2.2 MG/DL (ref 1.6–2.4)
MCH RBC QN AUTO: 32.7 PG (ref 26.6–33)
MCHC RBC AUTO-ENTMCNC: 34.1 G/DL (ref 31.5–35.7)
MCV RBC AUTO: 95.9 FL (ref 79–97)
PLATELET # BLD AUTO: 291 10*3/MM3 (ref 140–450)
PMV BLD AUTO: 10.5 FL (ref 6–12)
POTASSIUM SERPL-SCNC: 3.7 MMOL/L (ref 3.5–5.2)
RBC # BLD AUTO: 4.13 10*6/MM3 (ref 4.14–5.8)
SODIUM SERPL-SCNC: 138 MMOL/L (ref 136–145)
WBC # BLD AUTO: 18.56 10*3/MM3 (ref 3.4–10.8)

## 2021-06-02 PROCEDURE — 99231 SBSQ HOSP IP/OBS SF/LOW 25: CPT | Performed by: NURSE PRACTITIONER

## 2021-06-02 PROCEDURE — 85027 COMPLETE CBC AUTOMATED: CPT | Performed by: INTERNAL MEDICINE

## 2021-06-02 PROCEDURE — 80048 BASIC METABOLIC PNL TOTAL CA: CPT | Performed by: INTERNAL MEDICINE

## 2021-06-02 PROCEDURE — 83735 ASSAY OF MAGNESIUM: CPT | Performed by: INTERNAL MEDICINE

## 2021-06-02 PROCEDURE — 71045 X-RAY EXAM CHEST 1 VIEW: CPT

## 2021-06-02 PROCEDURE — 97530 THERAPEUTIC ACTIVITIES: CPT

## 2021-06-02 PROCEDURE — 99232 SBSQ HOSP IP/OBS MODERATE 35: CPT | Performed by: INTERNAL MEDICINE

## 2021-06-02 PROCEDURE — 82962 GLUCOSE BLOOD TEST: CPT

## 2021-06-02 RX ADMIN — ATORVASTATIN CALCIUM 80 MG: 40 TABLET, FILM COATED ORAL at 20:28

## 2021-06-02 RX ADMIN — SODIUM CHLORIDE, PRESERVATIVE FREE 10 ML: 5 INJECTION INTRAVENOUS at 08:06

## 2021-06-02 RX ADMIN — ASPIRIN 325 MG ORAL TABLET 325 MG: 325 PILL ORAL at 08:06

## 2021-06-02 RX ADMIN — CLOPIDOGREL BISULFATE 75 MG: 75 TABLET ORAL at 08:06

## 2021-06-02 RX ADMIN — SODIUM CHLORIDE, PRESERVATIVE FREE 10 ML: 5 INJECTION INTRAVENOUS at 20:28

## 2021-06-02 NOTE — PLAN OF CARE
Problem: Adult Inpatient Plan of Care  Goal: Plan of Care Review  Outcome: Ongoing, Progressing  Flowsheets (Taken 6/2/2021 0615)  Progress: improving  Plan of Care Reviewed With: patient  Goal: Patient-Specific Goal (Individualized)  Outcome: Ongoing, Progressing  Goal: Absence of Hospital-Acquired Illness or Injury  Outcome: Ongoing, Progressing  Intervention: Identify and Manage Fall Risk  Recent Flowsheet Documentation  Taken 6/2/2021 0600 by Veronica Ag RN  Safety Promotion/Fall Prevention:   activity supervised   assistive device/personal items within reach   safety round/check completed  Taken 6/2/2021 0400 by Veronica Ag RN  Safety Promotion/Fall Prevention:   activity supervised   assistive device/personal items within reach   safety round/check completed  Taken 6/2/2021 0200 by Veronica Ag RN  Safety Promotion/Fall Prevention:   activity supervised   assistive device/personal items within reach   safety round/check completed  Taken 6/2/2021 0000 by Veronica Ag RN  Safety Promotion/Fall Prevention:   activity supervised   assistive device/personal items within reach   safety round/check completed  Taken 6/1/2021 2200 by Veronica Ag RN  Safety Promotion/Fall Prevention:   activity supervised   assistive device/personal items within reach   safety round/check completed  Taken 6/1/2021 2000 by Veronica Ag RN  Safety Promotion/Fall Prevention:   activity supervised   assistive device/personal items within reach   clutter free environment maintained   fall prevention program maintained   lighting adjusted   muscle strengthening facilitated   nonskid shoes/slippers when out of bed   room organization consistent   safety round/check completed  Intervention: Prevent Skin Injury  Recent Flowsheet Documentation  Taken 6/2/2021 0600 by Veronica Ag RN  Body Position:   weight shift assistance provided   neutral body alignment  Taken 6/2/2021 0400 by Veronica Ag RN  Body  Position:   weight shift assistance provided   neutral body alignment  Taken 6/2/2021 0200 by Veronica Ag RN  Body Position: weight shift assistance provided  Taken 6/2/2021 0000 by Veronica Ag RN  Body Position: weight shift assistance provided  Taken 6/1/2021 2200 by Veronica Ag RN  Body Position:   weight shift assistance provided   neutral body alignment  Taken 6/1/2021 2000 by Veronica Ag RN  Body Position: supine  Skin Protection:   incontinence pads utilized   adhesive use limited  Intervention: Prevent and Manage VTE (venous thromboembolism) Risk  Recent Flowsheet Documentation  Taken 6/2/2021 0600 by Veronica Ag RN  VTE Prevention/Management:   bilateral   sequential compression devices on  Taken 6/2/2021 0400 by Veronica Ag RN  VTE Prevention/Management:   bilateral   sequential compression devices on  Taken 6/2/2021 0200 by Veronica Ag RN  VTE Prevention/Management:   bilateral   sequential compression devices on  Taken 6/2/2021 0000 by Veronica Ag RN  VTE Prevention/Management:   bilateral   sequential compression devices on  Taken 6/1/2021 2200 by Veronica Ag RN  VTE Prevention/Management:   bilateral   sequential compression devices on  Taken 6/1/2021 2000 by Veronica Ag RN  VTE Prevention/Management:   bilateral   sequential compression devices on  Intervention: Prevent Infection  Recent Flowsheet Documentation  Taken 6/2/2021 0600 by Veronica Ag RN  Infection Prevention:   hand hygiene promoted   rest/sleep promoted  Taken 6/2/2021 0400 by Veronica Ag RN  Infection Prevention:   hand hygiene promoted   rest/sleep promoted  Taken 6/2/2021 0200 by Veronica Ag RN  Infection Prevention:   hand hygiene promoted   rest/sleep promoted  Taken 6/2/2021 0000 by Veronica Ag RN  Infection Prevention:   hand hygiene promoted   rest/sleep promoted  Taken 6/1/2021 2200 by Veronica Ag RN  Infection Prevention:   hand hygiene promoted    rest/sleep promoted  Taken 6/1/2021 2000 by Veronica Ag RN  Infection Prevention:   hand hygiene promoted   rest/sleep promoted  Goal: Optimal Comfort and Wellbeing  Outcome: Ongoing, Progressing  Intervention: Provide Person-Centered Care  Recent Flowsheet Documentation  Taken 6/2/2021 0600 by Veronica Ag RN  Trust Relationship/Rapport: care explained  Taken 6/2/2021 0400 by Veronica Ag RN  Trust Relationship/Rapport: care explained  Taken 6/2/2021 0200 by Veronica Ag RN  Trust Relationship/Rapport: care explained  Taken 6/2/2021 0000 by Veronica Ag RN  Trust Relationship/Rapport: care explained  Taken 6/1/2021 2200 by Veronica Ag RN  Trust Relationship/Rapport: care explained  Taken 6/1/2021 2000 by Veronica Ag RN  Trust Relationship/Rapport:   care explained   choices provided  Goal: Readiness for Transition of Care  Outcome: Ongoing, Progressing     Problem: Skin Injury Risk Increased  Goal: Skin Health and Integrity  Outcome: Ongoing, Progressing  Intervention: Optimize Skin Protection  Recent Flowsheet Documentation  Taken 6/2/2021 0600 by Veronica Ag RN  Head of Bed (HOB): HOB elevated  Taken 6/2/2021 0400 by Veronica Ag RN  Head of Bed (HOB): HOB flat  Taken 6/2/2021 0200 by Veronica Ag RN  Head of Bed (HOB): HOB at 15 degrees  Taken 6/2/2021 0000 by Veronica Ag RN  Head of Bed (HOB): HOB elevated  Taken 6/1/2021 2200 by Veronica Ag RN  Head of Bed (HOB): HOB at 15 degrees  Taken 6/1/2021 2000 by Veronica Ag RN  Pressure Reduction Techniques: frequent weight shift encouraged  Head of Bed (HOB): HOB at 30 degrees  Pressure Reduction Devices: pressure-redistributing mattress utilized  Skin Protection:   incontinence pads utilized   adhesive use limited  Intervention: Promote and Optimize Oral Intake  Recent Flowsheet Documentation  Taken 6/1/2021 2000 by Veronica Ag RN  Oral Nutrition Promotion: rest periods promoted     Problem: Fall Injury  Risk  Goal: Absence of Fall and Fall-Related Injury  Outcome: Ongoing, Progressing  Intervention: Identify and Manage Contributors to Fall Injury Risk  Recent Flowsheet Documentation  Taken 6/2/2021 0600 by Veronica Ag RN  Self-Care Promotion: independence encouraged  Taken 6/2/2021 0400 by Veronica Ag RN  Self-Care Promotion: independence encouraged  Taken 6/2/2021 0000 by Veronica Ag RN  Self-Care Promotion: independence encouraged  Taken 6/1/2021 2200 by Veronica Ag RN  Self-Care Promotion: independence encouraged  Taken 6/1/2021 2000 by Veronica Ag RN  Medication Review/Management: medications reviewed  Self-Care Promotion: independence encouraged  Intervention: Promote Injury-Free Environment  Recent Flowsheet Documentation  Taken 6/2/2021 0600 by Veronica Ag RN  Safety Promotion/Fall Prevention:   activity supervised   assistive device/personal items within reach   safety round/check completed  Taken 6/2/2021 0400 by Veronica Ag RN  Safety Promotion/Fall Prevention:   activity supervised   assistive device/personal items within reach   safety round/check completed  Taken 6/2/2021 0200 by Veronica Ag RN  Safety Promotion/Fall Prevention:   activity supervised   assistive device/personal items within reach   safety round/check completed  Taken 6/2/2021 0000 by Veronica Ag RN  Safety Promotion/Fall Prevention:   activity supervised   assistive device/personal items within reach   safety round/check completed  Taken 6/1/2021 2200 by Veronica Ag RN  Safety Promotion/Fall Prevention:   activity supervised   assistive device/personal items within reach   safety round/check completed  Taken 6/1/2021 2000 by Veronica Ag RN  Safety Promotion/Fall Prevention:   activity supervised   assistive device/personal items within reach   clutter free environment maintained   fall prevention program maintained   lighting adjusted   muscle strengthening facilitated   nonskid  shoes/slippers when out of bed   room organization consistent   safety round/check completed     Problem: Adjustment to Illness (Stroke, Ischemic/Transient Ischemic Attack)  Goal: Optimal Coping  Outcome: Ongoing, Progressing  Intervention: Support Patient/Family Psychosocial Response to Stroke  Recent Flowsheet Documentation  Taken 6/1/2021 2000 by Veronica Ag RN  Supportive Measures:   active listening utilized   positive reinforcement provided  Family/Support System Care: support provided     Problem: Bowel Elimination Impaired (Stroke, Ischemic/Transient Ischemic Attack)  Goal: Effective Bowel Elimination  Outcome: Ongoing, Progressing     Problem: Cerebral Tissue Perfusion Risk (Stroke, Ischemic/Transient Ischemic Attack)  Goal: Optimal Cerebral Tissue Perfusion  Outcome: Ongoing, Progressing  Intervention: Protect and Optimize Cerebral Perfusion  Recent Flowsheet Documentation  Taken 6/1/2021 2000 by Veronica Ag RN  Sensory Stimulation Regulation: care clustered  Cerebral Perfusion Promotion: blood pressure monitored  Intervention: Optimize Oxygenation and Ventilation  Recent Flowsheet Documentation  Taken 6/2/2021 0600 by Veronica Ag RN  Head of Bed (HOB): HOB elevated  Taken 6/2/2021 0400 by Veronica Ag RN  Head of Bed (HOB): HOB flat  Taken 6/2/2021 0200 by Veronica Ag RN  Head of Bed (HOB): HOB at 15 degrees  Taken 6/2/2021 0000 by Veronica Ag RN  Head of Bed (HOB): HOB elevated  Taken 6/1/2021 2200 by Veronica Ag RN  Head of Bed (HOB): HOB at 15 degrees  Taken 6/1/2021 2000 by Veronica Ag RN  Head of Bed (HOB): HOB at 30 degrees     Problem: Communication Impairment (Stroke, Ischemic/Transient Ischemic Attack)  Goal: Improved Communication Skills  Outcome: Ongoing, Progressing  Intervention: Optimize Cognitive and Communication Skills  Recent Flowsheet Documentation  Taken 6/2/2021 0600 by Veronica Ag RN  Communication Enhancement Strategies: call light  answered in person  Taken 6/2/2021 0400 by Veronica Ag RN  Communication Enhancement Strategies: call light answered in person  Taken 6/2/2021 0200 by Veronica Ag RN  Communication Enhancement Strategies: call light answered in person  Taken 6/2/2021 0000 by Veronica Ag RN  Communication Enhancement Strategies: call light answered in person  Taken 6/1/2021 2200 by Veronica Ag RN  Communication Enhancement Strategies: call light answered in person  Taken 6/1/2021 2000 by Veronica Ag RN  Reorientation Measures: clock in view  Environment Familiarity/Consistency: daily routine followed  Communication Enhancement Strategies: call light answered in person     Problem: Eating/Swallowing Impairment (Stroke, Ischemic/Transient Ischemic Attack)  Goal: Oral Intake without Aspiration  Outcome: Ongoing, Progressing  Intervention: Optimize Eating and Swallowing  Recent Flowsheet Documentation  Taken 6/1/2021 2000 by Veronica Ag RN  Aspiration Precautions: awake/alert before oral intake  Swallowing Interventions: Dysphagia: small bites/sips encouraged     Problem: Functional Ability Impaired (Stroke, Ischemic/Transient Ischemic Attack)  Goal: Optimal Functional Ability  Outcome: Ongoing, Progressing  Intervention: Optimize Functional Ability  Recent Flowsheet Documentation  Taken 6/2/2021 0600 by Veronica Ag RN  Activity Management: activity adjusted per tolerance  Self-Care Promotion: independence encouraged  Taken 6/2/2021 0400 by Veronica Ag RN  Activity Management: activity adjusted per tolerance  Self-Care Promotion: independence encouraged  Taken 6/2/2021 0200 by Veronica Ag RN  Activity Management: activity adjusted per tolerance  Taken 6/2/2021 0000 by Veronica Ag RN  Activity Management: activity adjusted per tolerance  Self-Care Promotion: independence encouraged  Taken 6/1/2021 2200 by Veronica Ag RN  Activity Management: activity adjusted per tolerance  Self-Care  Promotion: independence encouraged  Taken 6/1/2021 2000 by Veronica Ag RN  Activity Management: activity adjusted per tolerance  Self-Care Promotion: independence encouraged     Problem: Hemodynamic Instability (Stroke, Ischemic/Transient Ischemic Attack)  Goal: Vital Signs Remain in Desired Range  Outcome: Ongoing, Progressing  Intervention: Optimize Blood Flow  Recent Flowsheet Documentation  Taken 6/1/2021 2000 by Veronica Ag RN  Fluid/Electrolyte Management: oral rehydration therapy initiated     Problem: Pain (Stroke, Ischemic/Transient Ischemic Attack)  Goal: Acceptable Pain Control  Outcome: Ongoing, Progressing     Problem: Sensorimotor Impairment (Stroke, Ischemic/Transient Ischemic Attack)  Goal: Improved Sensorimotor Function  Outcome: Ongoing, Progressing  Intervention: Optimize Range of Motion, Motor Control and Function  Recent Flowsheet Documentation  Taken 6/2/2021 0600 by Veronica Ag RN  Positioning/Transfer Devices:   pillows   in use  Taken 6/2/2021 0400 by Veronica Ag RN  Positioning/Transfer Devices:   pillows   in use  Taken 6/2/2021 0200 by Veronica Ag RN  Positioning/Transfer Devices:   pillows   in use  Taken 6/2/2021 0000 by Veronica Ag RN  Positioning/Transfer Devices:   pillows   in use  Taken 6/1/2021 2200 by Veronica Ag RN  Positioning/Transfer Devices:   pillows   in use  Taken 6/1/2021 2000 by Veronica Ag RN  Positioning/Transfer Devices:   pillows   in use  Range of Motion: active ROM (range of motion) encouraged  Intervention: Optimize Sensory and Perceptual Abilities  Recent Flowsheet Documentation  Taken 6/1/2021 2000 by Veronica Ag RN  Pressure Reduction Techniques: frequent weight shift encouraged  Pressure Reduction Devices: pressure-redistributing mattress utilized     Problem: Urinary Elimination Impaired (Stroke, Ischemic/Transient Ischemic Attack)  Goal: Effective Urinary Elimination  Outcome: Ongoing, Progressing  Intervention:  Promote Effective Bladder Elimination  Recent Flowsheet Documentation  Taken 6/1/2021 2000 by Veronica Ag RN  Urinary Elimination Promotion:   toileting offered   toileting scheduled   Goal Outcome Evaluation:  Plan of Care Reviewed With: patient  Progress: improving  Pt currently in bed resting quietly. No complaints of pain or discomfort at this time. Left sided weakness continues. NIH of 7. Pt incontinent of bladder. Vitals WNL on room air. SKUDS on. No other observations at this time.Will continue to monitor, call bell in reach.

## 2021-06-02 NOTE — PLAN OF CARE
Goal Outcome Evaluation:  Plan of Care Reviewed With: patient  Progress: improving   Pt. Has not complained of any pain. NIH=8. He worked with therapy and sat up in the chair for several hours. He has had several Bms today. Plan is for [facility] tomorrow if medically ready. Will continue tomorrow.

## 2021-06-02 NOTE — PLAN OF CARE
Problem: Adult Inpatient Plan of Care  Goal: Plan of Care Review  Recent Flowsheet Documentation  Taken 6/2/2021 1412 by Emily Gil OT  Plan of Care Reviewed With:   patient   son  Outcome Summary: Pt is alert and eager to get OOB. Mod Ax2 supine to sit. Presents with L side posterior lean in sitting EOB. Assist to achieve and hold midline orientation. Max Ax2 Std Pvt EOB to UIC. Education reinforced for LUE: ADL retraining, SROM/PROM HEP and positioning/jt protection at rest. Family present for teaching. OT will continue to follow IP. Recommend IRF for best outcome.

## 2021-06-02 NOTE — CASE MANAGEMENT/SOCIAL WORK
Continued Stay Note  [facility]     Patient Name: Obed Cotto  MRN: [id number]  Today's Date: 6/2/2021    Admit Date: 5/30/2021    Discharge Plan     Row Name 06/02/21 1524       Plan    Plan  [facility]    Plan Comments  Received a call from No[facility] with [facility]. [facility] accepted patient for IP rehab. Patient's [employer] Medicare insurance has also approved  IP rehab. Spoke with patient at bedside and he is agreeable to discharge plan. [facility] ambulance tentatively set for Thursday, 6/3 @ 3:30pm. CM following.    Final Discharge Disposition Code  62 - inpatient rehab facility             Expected Discharge Date and Time     Expected Discharge Date Expected Discharge Time    Jun 4, 2021             Richelle Motta RN

## 2021-06-02 NOTE — PROGRESS NOTES
Stroke Progress Note       Chief Complaint: Left-sided weakness    Subjective    Subjective     Subjective:  Resting in chair. No acute events overnight. Complaining of loose stools.    Review of Systems   Constitutional: No fatigue  HENT: Negative for nosebleeds and rhinorrhea.    Eyes: Negative for redness.   Respiratory: Negative for cough.    Gastrointestinal: Negative for anal bleeding. + diarrhea  Endocrine: Negative for polydipsia.   Genitourinary: Negative for enuresis and urgency.   Musculoskeletal: Negative for joint swelling.   Neurological: Negative for tremors.   Psychiatric/Behavioral: Negative for hallucinations.     Objective      Temp:  [98 °F (36.7 °C)-98.7 °F (37.1 °C)] 98.5 °F (36.9 °C)  Heart Rate:  [61-80] 61  Resp:  [16-18] 16  BP: (147-158)/(72-90) 153/80        NEURO    MENTAL STATUS: AAOx3, memory intact, fund of knowledge appropriate    LANG/SPEECH: Naming and repetition intact, fluent, follows 3-step commands    CRANIAL NERVES:      II: Pupils equal and reactive        III, IV, VI: EOM intact, no gaze preference or deviation, no nystagmus.     Lig lag on the left eye      V: normal sensation bilaterally      VII: left facial droop.      VIII: normal hearing to speech      IX, X: normal palatal elevation, no uvular deviation      XI: not done       XII: midline tongue protrusion    MOTOR:  Normal tone throughout  Right side 5/5  Left upper ext 3/5  Left lower ext 4-/5    SENSORY:normal to light touch    COORDINATION: unable to do due to patient condition    GAIT: Not assessed due to patient condition      Results Review:    I reviewed the patient's new clinical results.    Lab Results (last 24 hours)     Procedure Component Value Units Date/Time    Basic Metabolic Panel [[id number]]  (Abnormal) Collected: 06/02/21 0609    Specimen: Blood Updated: 06/02/21 0810     Glucose 98 mg/dL      BUN 7 mg/dL      Creatinine 0.52 mg/dL      Sodium 138 mmol/L      Potassium 3.7 mmol/L      Chloride 106  mmol/L      CO2 24.0 mmol/L      Calcium 9.0 mg/dL      eGFR Non African Amer >150 mL/min/1.73      BUN/Creatinine Ratio 13.5     Anion Gap 8.0 mmol/L     Narrative:      GFR Normal >60  Chronic Kidney Disease <60  Kidney Failure <15      Magnesium [[id number]]  (Normal) Collected: 06/02/21 0609    Specimen: Blood Updated: 06/02/21 0810     Magnesium 2.2 mg/dL     CBC (No Diff) [[id number]]  (Abnormal) Collected: 06/02/21 0609    Specimen: Blood Updated: 06/02/21 0727     WBC 18.56 10*3/mm3      RBC 4.13 10*6/mm3      Hemoglobin 13.5 g/dL      Hematocrit 39.6 %      MCV 95.9 fL      MCH 32.7 pg      MCHC 34.1 g/dL      RDW 12.4 %      RDW-SD 43.9 fl      MPV 10.5 fL      Platelets 291 10*3/mm3     POC Glucose Once [[id number]]  (Normal) Collected: 06/01/21 2029    Specimen: Blood Updated: 06/01/21 2032     Glucose 101 mg/dL     Potassium [[id number]]  (Normal) Collected: 06/01/21 1749    Specimen: Blood Updated: 06/01/21 1906     Potassium 3.7 mmol/L         XR Chest 1 View    Result Date: 6/2/2021  Worsening of the airspace disease in the perihilar regions and left lung base in the interval with development of a small left pleural effusion.  D:  06/02/2021 E:  06/02/2021       Results for orders placed during the hospital encounter of 05/30/21    Adult Transthoracic Echo Complete W/ Cont if Necessary Per Protocol (With Agitated Saline)    Interpretation Summary  · Saline test results are negative.  · Estimated left ventricular EF = 65% Left ventricular ejection fraction appears to be 61 - 65%. Left ventricular systolic function is normal.  · There is mild mitral valve prolapse of the posterior mitral leaflet.  · Left ventricular wall thickness is consistent with mild basal asymmetric hypertrophy.  · The right atrial cavity is mildly dilated.  · The right ventricular cavity is mildly dilated.  · Estimated right ventricular systolic pressure from tricuspid regurgitation is normal (<35 mmHg). Calculated right ventricular  systolic pressure from tricuspid regurgitation is 27 mmHg.  · Left ventricular diastolic function was normal.            Assessment/Plan     Assessment/Plan:             This 71-year-old with multiple vascular risk factors and medication noncompliance presented with left-sided weakness.      CTA evidenced-supraclinoid right ICA calcification, not flow-limiting. Mild intracranial atherosclerosis   CT perfusion evidenced slow flow in the right basal ganglia  MRI of the brain-evidenced history diffusion in the right basal ganglia, with no much significant flair change. T2 flair evidence chronic microvascular ischemia   TTE- normal      1a. Acute ischemic stroke, right basal ganglia, likely etiology small vessel disease  Ok to slowly normalize BP, I do not see where patient has received mididrone  1b. Stroke secondary prevention- will maintain on DAPT  1c. Stroke recovery- ok to work with PT/OT/SLP  1d. Stroke education- Educated on modifiable stroke risk factors, especially smoking.              #2 Hypertension-will continue midodrine for now and allow bp to autoregulate. Do not start antihypertensives at this time and no further fluid boluses.  #3 Tobacco abuse- counseled on smoking cessation.    #4. Hyperlipidemia- LDL is 83, continue high dose of Lipitor 80.       Stroke neurology will continue to follow. Case discussed with Dr Gomez, patient and nursing.     Mariela Greenwood, CHANG  06/02/21  13:18 EDT

## 2021-06-02 NOTE — THERAPY TREATMENT NOTE
Patient Name: Obed Cotto  : 1950    MRN: [id number]                              Today's Date: 2021       Admit Date: 2021    Visit Dx:     ICD-10-CM ICD-9-CM   1. Oropharyngeal dysphagia  R13.12 787.22   2. Cognitive communication deficit  R41.841 799.52     Patient Active Problem List   Diagnosis   • Tobacco abuse   • Urinary retention   • Prehypertension   • Positive colorectal cancer screening using DNA-based stool test   • Leukocytosis   • Prediabetes   • BPH (benign prostatic hyperplasia)   • BPH with elevated PSA   • Essential hypertension   • Suspected cerebrovascular accident (CVA)   • Stroke (CMS/HCC)     Past Medical History:   Diagnosis Date   • Acute ischemic stroke (CMS/HCC) 2021     posterior limb of the right internal capsule   • BPH with elevated PSA    • Chronic bronchitis (CMS/HCC)    • Elevated blood pressure    • Hyperlipidemia    • Hypertension    • Lacunar infarction (CMS/HCC)    • Noncompliance with medication regimen     pt states he never took his metoprolol   • Prediabetes      Past Surgical History:   Procedure Laterality Date   • FINGER SURGERY      Right hand 4th finger   • FINGER SURGERY      R ring finger, partial amputation repair     General Information     Row Name 21 1357          OT Time and Intention    Document Type  therapy note (daily note)  -TB     Mode of Treatment  occupational therapy;individual therapy  -TB     Row Name 21 1353          General Information    Patient Profile Reviewed  yes  -TB     Existing Precautions/Restrictions  fall;other (see comments) Acute L side weakness, hypertonicity LLE>LUE, ataxia  -TB     Barriers to Rehab  medically complex  -TB     Row Name 21 1350          Cognition    Orientation Status (Cognition)  oriented x 4  -TB     Row Name 21 1354          Safety Issues, Functional Mobility    Safety Issues Affecting Function (Mobility)  safety precautions follow-through/compliance;safety precaution  awareness;insight into deficits/self-awareness;awareness of need for assistance;judgment;sequencing abilities  -TB     Impairments Affecting Function (Mobility)  balance;coordination;endurance/activity tolerance;motor control;muscle tone abnormal;strength;range of motion (ROM);postural/trunk control;grasp  -TB     Comment, Safety Issues/Impairments (Mobility)  Pt transfers with Max Ax2. Strong L side lean. Ataxic  -TB       User Key  (r) = Recorded By, (t) = Taken By, (c) = Cosigned By    Initials Name Provider Type    TB Emily Gil, OT Occupational Therapist          Mobility/ADL's     Row Name 06/02/21 1400          Bed Mobility    Bed Mobility  supine-sit;scooting/bridging  -TB     Scooting/Bridging [place] (Bed Mobility)  maximum assist (25% patient effort);verbal cues  -TB     Supine-Sit [place] (Bed Mobility)  moderate assist (50% patient effort);2 person assist;verbal cues  -TB     Bed Mobility, Safety Issues  decreased use of arms for pushing/pulling;decreased use of legs for bridging/pushing;impaired trunk control for bed mobility  -TB     Assistive Device (Bed Mobility)  draw sheet;bed rails  -TB     Comment (Bed Mobility)  Encouragement, cues and assist to sequence transition to EOB. L side/posterior lean in static sit EOB.  -TB     Row Name 06/02/21 1400          Transfers    Transfers  sit-stand transfer;bed-chair transfer  -TB     Comment (Transfers)  Education, cues and assist. Requires input through LLE with STS and L knee block with stand pivot to EOB  -TB     Bed-Chair [place] (Transfers)  maximum assist (25% patient effort);2 person assist;verbal cues  -TB     Assistive Device (Bed-Chair Transfers)  -- BUE support  -TB     Sit-Stand [place] (Transfers)  moderate assist (50% patient effort);2 person assist;verbal cues  -TB     Row Name 06/02/21 1400          Sit-Stand Transfer    Assistive Device (Sit-Stand Transfers)  -- BUE support  -TB     Row Name 06/02/21 1400           Functional Mobility    Functional Mobility- Comment  Defer to PT  -TB     Row Name 06/02/21 1400          Activities of Daily Living    BADL Assessment/Intervention  upper body dressing;grooming  -TB     Row Name 06/02/21 1400          Upper Body Dressing Assessment/Training    [place] Level (Upper Body Dressing)  don;pajama/robe;verbal cues;moderate assist (50% patient effort)  -TB     Position (Upper Body Dressing)  supported sitting  -TB     Comment (Upper Body Dressing)  Education reinforced for LUE gayathri dressing strategies. Son and DIL present for teaching. Follow up teaching recommended.  -TB     Row Name 06/02/21 1400          Grooming Assessment/Training    [place] Level (Grooming)  set up;wash face, hands;hair care, combing/brushing  -TB     Position (Grooming)  supported sitting  -TB       User Key  (r) = Recorded By, (t) = Taken By, (c) = Cosigned By    Initials Name Provider Type     Emily Gil, OT Occupational Therapist        Obj/Interventions     Row Name 06/02/21 1405          Vision Assessment/Intervention    Visual Impairment/Limitations  corrective lenses full-time  -TB     Vision Assessment Comment  Family brought in pt's glasses (bifocal). Report prescription is 23 y/o.  -TB     Row Name 06/02/21 1405          Shoulder (Therapeutic Exercise)    Shoulder PROM (Therapeutic Exercise)  left;flexion;extension;aBduction;aDduction;10 repetitions;15 second hold  -     Row Name 06/02/21 1405          Elbow/Forearm (Therapeutic Exercise)    Elbow/Forearm (Therapeutic Exercise)  AAROM (active assistive range of motion);PROM (passive range of motion)  -     Elbow/Forearm AAROM (Therapeutic Exercise)  left;flexion;extension;10 repetitions  -     Elbow/Forearm PROM (Therapeutic Exercise)  left;supination;pronation;10 repetitions;15 second hold  -     Row Name 06/02/21 1405          Wrist (Therapeutic Exercise)    Wrist (Therapeutic Exercise)  PROM (passive range of  motion)  -     Wrist PROM (Therapeutic Exercise)  left;flexion;extension;10 repetitions;15 second hold  -     Row Name 06/02/21 1405          Hand (Therapeutic Exercise)    Hand (Therapeutic Exercise)  PROM (passive range of motion)  -     Hand PROM (Therapeutic Exercise)  left;finger flexion;finger extension;10 repetitions;15 second hold  -     Row Name 06/02/21 1405          Motor Skills    Motor Skills  neuro-muscular function;motor control/coordination interventions  -     Motor Control/Coordination Interventions  neuro-muscular re-education;occupation/activity based treatment  -     Row Name 06/02/21 1405          Balance    Balance Assessment  sitting dynamic balance;standing dynamic balance  -     Dynamic Sitting Balance  moderate impairment;supported;sitting, edge of bed;sitting in chair  -     Dynamic Standing Balance  severe impairment;supported;standing  -     Balance Interventions  sitting;standing;sit to stand;supported;dynamic;occupation based/functional task;UE activity with balance activity  -     Comment, Balance  Pt presents with L side posterior lean in sit and stand. Pt requires assist to achieve midline orientation. Able to hold midline sitting in chair with pressure through LLE to keep feet grounded.  -     Row Name 06/02/21 1405          Therapeutic Exercise    Therapeutic Exercise  hand;wrist;elbow/forearm;shoulder Education reinforced for LUE SROM/PROM/AAROM HEP. Son and DIL present for teaching.  -       User Key  (r) = Recorded By, (t) = Taken By, (c) = Cosigned By    Initials Name Provider Type    TB Emily Gil OT Occupational Therapist        Goals/Plan    No documentation.       Clinical Impression     Row Name 06/02/21 1412          Pain Assessment    Additional Documentation  Pain Scale: Numbers Pre/Post-Treatment (Group)  -     Row Name 06/02/21 1412          Pain Scale: Numbers Pre/Post-Treatment    Pretreatment Pain Rating  0/10 - no pain  -      Posttreatment Pain Rating  0/10 - no pain  -TB     Pre/Posttreatment Pain Comment  Pt denies pain with EOB/OOB activity and LUE Neuro-muscular re-education.  -TB     Pain Intervention(s)  Ambulation/increased activity;Repositioned  -TB     Row Name 06/02/21 1412          Plan of Care Review    Plan of Care Reviewed With  patient;son  -TB     Outcome Summary  Pt is alert and eager to get OOB. Mod Ax2 supine to sit. Presents with L side posterior lean in sitting EOB. Assist to achieve and hold midline orientation. Max Ax2 Std Pvt EOB to C. Education reinforced for LUE: ADL retraining, SROM/PROM HEP and positioning/jt protection at rest. Family present for teaching. OT will continue to follow IP. Recommend IRF for best outcome.  -TB     Row Name 06/02/21 1412          Therapy Plan Review/Discharge Plan (OT)    Anticipated Discharge Disposition (OT)  inpatient rehabilitation facility  -TB     Row Name 06/02/21 1412          Vital Signs    Pre Systolic BP Rehab  -- RN cleared OT  -TB     O2 Delivery Pre Treatment  room air  -TB     O2 Delivery Intra Treatment  room air  -TB     O2 Delivery Post Treatment  room air  -TB     Pre Patient Position  Supine  -TB     Intra Patient Position  Standing  -TB     Post Patient Position  Sitting  -TB     Row Name 06/02/21 1412          Positioning and Restraints    Pre-Treatment Position  in bed  -TB     Post Treatment Position  chair  -TB     In Chair  notified nsg;reclined;call light within reach;encouraged to call for assist;exit alarm on;legs elevated;with family/caregiver  -TB       User Key  (r) = Recorded By, (t) = Taken By, (c) = Cosigned By    Initials Name Provider Type    TB Emily Gil OT Occupational Therapist        Outcome Measures     Row Name 06/02/21 1419          How much help from another is currently needed...    Putting on and taking off regular lower body clothing?  2  -TB     Bathing (including washing, rinsing, and drying)  2  -TB      Toileting (which includes using toilet bed pan or urinal)  1  -TB     Putting on and taking off regular upper body clothing  2  -TB     Taking care of personal grooming (such as brushing teeth)  3  -TB     Eating meals  3  -TB     AM-PAC 6 Clicks Score (OT)  13  -TB     Row Name 06/02/21 1419          Functional Assessment    Outcome Measure Options  AM-PAC 6 Clicks Daily Activity (OT)  -TB       User Key  (r) = Recorded By, (t) = Taken By, (c) = Cosigned By    Initials Name Provider Type    TB Emily Gil, OT Occupational Therapist        Occupational Therapy Education                 Title: PT OT SLP Therapies (Done)     Topic: Occupational Therapy (Done)     Point: ADL training (Done)     Description:   Instruct learner(s) on proper safety adaptation and remediation techniques during self care or transfers.   Instruct in proper use of assistive devices.              Learning Progress Summary           Patient Acceptance, E,D, VU,NR by TB at 6/2/2021 1420    Acceptance, E,D,TB, VU,DU,NR by TB at 6/1/2021 0902    Comment: Education initiated for LUE: SROM HEP, positioning to prevent contracture, and ADL retraining   Family Acceptance, E,D, VU,NR by TB at 6/2/2021 1420                   Point: Home exercise program (Done)     Description:   Instruct learner(s) on appropriate technique for monitoring, assisting and/or progressing therapeutic exercises/activities.              Learning Progress Summary           Patient Acceptance, E,D, VU,NR by TB at 6/2/2021 1420    Acceptance, E,D,TB, VU,DU,NR by TB at 6/1/2021 0902    Comment: Education initiated for LUE: SROM HEP, positioning to prevent contracture, and ADL retraining   Family Acceptance, E,D, VU,NR by TB at 6/2/2021 1420                   Point: Precautions (Done)     Description:   Instruct learner(s) on prescribed precautions during self-care and functional transfers.              Learning Progress Summary           Patient Acceptance, E,D, VU,NR by  TB at 6/2/2021 1420    Acceptance, E,D,TB, VU,DU,NR by TB at 6/1/2021 0902    Comment: Education initiated for LUE: SROM HEP, positioning to prevent contracture, and ADL retraining   Family Acceptance, E,D, VU,NR by TB at 6/2/2021 1420                               User Key     Initials Effective Dates Name Provider Type Discipline     06/08/18 -  Emily Gil OT Occupational Therapist OT              OT Recommendation and Plan  Therapy Frequency (OT): daily  Plan of Care Review  Plan of Care Reviewed With: patient, son  Outcome Summary: Pt is alert and eager to get OOB. Mod Ax2 supine to sit. Presents with L side posterior lean in sitting EOB. Assist to achieve and hold midline orientation. Max Ax2 Std Pvt EOB to [facility]. Education reinforced for LUE: ADL retraining, SROM/PROM HEP and positioning/jt protection at rest. Family present for teaching. OT will continue to follow IP. Recommend IRF for best outcome.     Time Calculation:   Time Calculation- OT     Row Name 06/02/21 1045             Time Calculation- OT    OT Start Time  1045  -TB      OT Received On  06/02/21  -TB      OT Goal Re-Cert Due Date  06/11/21  -TB         Timed Charges    07124 - OT Therapeutic Activity Minutes  28  -TB         Total Minutes    Timed Charges Total Minutes  28  -TB       Total Minutes  28  -TB        User Key  (r) = Recorded By, (t) = Taken By, (c) = Cosigned By    Initials Name Provider Type    TB Emily Gil, OT Occupational Therapist        Therapy Charges for Today     Code Description Service Date Service Provider Modifiers Qty    79650370330 HC OT EVAL MOD COMPLEXITY 4 6/1/2021 Emily Gil, OT GO 1    12608295141 HC OT THERAPEUTIC ACT EA 15 MIN 6/2/2021 Emily Gil, OT GO 2    97245094104 HC OT THER SUPP EA 15 MIN 6/2/2021 Emily Gil, OT GO 1               Emily Gil OT  6/2/2021

## 2021-06-02 NOTE — PROGRESS NOTES
[facility] Medicine Services  PROGRESS NOTE    Patient Name: Obed Cotto  : 1950  MRN: [id number]    Date of Admission: 2021  Primary Care Physician: Lorraine Morfin MD    Subjective   Subjective     CC:  Left sided weakness    HPI:  Feels ok.  Still unable to move LUE but no worse.  In much better spirits today as son and daughter in-law our visiting.      ROS:  Gen- No fevers, chills  CV- No chest pain, palpitations  Resp- No cough, dyspnea  GI- No N/V/D, abd pain        Objective   Objective     Vital Signs:   Temp:  [98 °F (36.7 °C)-98.7 °F (37.1 °C)] 98.4 °F (36.9 °C)  Heart Rate:  [61-80] 61  Resp:  [16-18] 16  BP: (147-158)/(72-90) 156/75  Total (NIH Stroke Scale): 8     Physical Exam:  Constitutional: No acute distress, awake, alert, sitting up in chair  HENT: NCAT, mucous membranes moist,dysarthria  Respiratory: Clear to auscultation bilaterally, respiratory effort normal   Cardiovascular: RRR, no murmurs, rubs, or gallops  Gastrointestinal: Positive bowel sounds, soft, nontender, nondistended  Musculoskeletal: No bilateral ankle edema  Psychiatric: Appropriate affect, cooperative  Neurologic: Oriented x 3, unable to move LUE, decreased strength in LLE 3/5, Cranial Nerves grossly intact to confrontation, dysarthria but understandable  Skin: No rashes      Results Reviewed:  Results from last 7 days   Lab Units 21  0609 21  0800 21  0650 21  0429 21  0426   WBC 10*3/mm3 18.56* 16.69* 17.15* 14.79*  --    HEMOGLOBIN g/dL 13.5 13.9 13.3 14.9  --    HEMATOCRIT % 39.6 40.6 39.8 44.9  --    PLATELETS 10*3/mm3 291 297 283 326  --    INR   --   --   --   --  1.0   PROCALCITONIN ng/mL  --   --   --  0.04  --      Results from last 7 days   Lab Units 21  0609 21  1749 21  0800 21  0650 21  0429   SODIUM mmol/L 138  --  138 139 137   POTASSIUM mmol/L 3.7 3.7 3.3* 3.8 3.8   CHLORIDE mmol/L 106  --  106 110* 103    CO2 mmol/L 24.0  --  25.0 21.0* 24.0   BUN mg/dL 7*  --  8 10 18   CREATININE mg/dL 0.52*  --  0.56* 0.55* 0.72*   GLUCOSE mg/dL 98  --  105* 106* 128*   CALCIUM mg/dL 9.0  --  9.2 8.8 10.2   ALT (SGPT) U/L  --   --   --   --  19  19   AST (SGOT) U/L  --   --   --   --  17  17   TROPONIN T ng/mL  --   --   --   --  <0.010   PROBNP pg/mL  --   --   --   --  80.4     Estimated Creatinine Clearance: 78.2 mL/min (A) (by C-G formula based on SCr of 0.52 mg/dL (L)).    Microbiology Results Abnormal     Procedure Component Value - Date/Time    COVID PRE-OP / PRE-PROCEDURE SCREENING ORDER (NO ISOLATION) - Swab, Nasopharynx [[id number]]  (Normal) Collected: 05/30/21 0611    Lab Status: Final result Specimen: Swab from Nasopharynx Updated: 05/30/21 0655    Narrative:      The following orders were created for panel order COVID PRE-OP / PRE-PROCEDURE SCREENING ORDER (NO ISOLATION) - Swab, Nasopharynx.  Procedure                               Abnormality         Status                     ---------                               -----------         ------                     COVID-19 and FLU A/B PCR...[[id number]]  Normal              Final result                 Please view results for these tests on the individual orders.    COVID-19 and FLU A/B PCR - Swab, Nasopharynx [[id number]]  (Normal) Collected: 05/30/21 0611    Lab Status: Final result Specimen: Swab from Nasopharynx Updated: 05/30/21 0655     COVID19 Not Detected     Influenza A PCR Not Detected     Influenza B PCR Not Detected    Narrative:      Fact sheet for providers: https://www.fda.gov/media/746153/download    Fact sheet for patients: https://www.fda.gov/media/339923/download    Test performed by PCR.          Imaging Results (Last 24 Hours)     ** No results found for the last 24 hours. **          Results for orders placed during the hospital encounter of 05/30/21    Adult Transthoracic Echo Complete W/ Cont if Necessary Per Protocol (With Agitated  Saline)    Interpretation Summary  · Saline test results are negative.  · Estimated left ventricular EF = 65% Left ventricular ejection fraction appears to be 61 - 65%. Left ventricular systolic function is normal.  · There is mild mitral valve prolapse of the posterior mitral leaflet.  · Left ventricular wall thickness is consistent with mild basal asymmetric hypertrophy.  · The right atrial cavity is mildly dilated.  · The right ventricular cavity is mildly dilated.  · Estimated right ventricular systolic pressure from tricuspid regurgitation is normal (<35 mmHg). Calculated right ventricular systolic pressure from tricuspid regurgitation is 27 mmHg.  · Left ventricular diastolic function was normal.      I have reviewed the medications:  Scheduled Meds:aspirin, 325 mg, Oral, Daily   Or  aspirin, 300 mg, Rectal, Daily  atorvastatin, 80 mg, Oral, Nightly  clopidogrel, 75 mg, Oral, Daily  LORazepam, 0.5 mg, Intravenous, Once  sodium chloride, 10 mL, Intravenous, Q12H      Continuous Infusions:sodium chloride, 100 mL/hr, Last Rate: 100 mL/hr (06/01/21 1158)      PRN Meds:.•  acetaminophen **OR** acetaminophen **OR** acetaminophen  •  ondansetron **OR** ondansetron  •  potassium chloride **OR** potassium chloride **OR** potassium chloride  •  sodium chloride  •  sodium chloride  •  sodium chloride    Assessment/Plan   Assessment & Plan     Active Hospital Problems    Diagnosis  POA   • **Suspected cerebrovascular accident (CVA) [R09.89]  Yes   • Stroke (CMS/HCC) [I63.9]  Yes   • Essential hypertension [I10]  Yes   • Leukocytosis [D72.829]  Yes   • Tobacco abuse [Z72.0]  Yes      Resolved Hospital Problems   No resolved problems to display.        Brief Hospital Course to date:  Obed Cotto is a 71 y.o. male w/ a hx of HTN, HLD, chronic bronchitis, ongoing tobacco use, BPH who presented to the ED w/ c/o left sided weakness.      **Acute CVA posterior right internal capsule  **Trace subdural hemorrhage R>L along  "tentorium (per CT head)  -CT head, (partner discussed case with Dr. Horne-impression is likely over read, no real subdural hematoma, patient denies any complaint of head trauma.)  -repeat CT head today shows \"continued hyperdense asymmetry along right tentorium concerning for SDH with stable to solid decreased appearance from prior.\"  D/w neuro/Dr. Varela who agrees that this is likely artifact.  With His LUE worsened he is concerned that expanding stroke, so per his rec gave a one time low dose midodrine.  Stopped flomax and proscar and now BP maintaining -150's without midodrine  -CTA head/neck shows minimal atherosclerotic disease at the left carotid birfucation, kinking of mid left subclavian artery, some component of associated stenosis.   -CT cerebral perfusion normal  -MRI Brain-shows small area of acute infarct in posterior limb of right internal capsule.  Aspirin 300 mg ordered.  -ECHO shows EF 65%, mild mitral valve prolapse, NO PFO,   - neuro recs DAPT, and keep -200.  BP better  holding flomax and proscar  -pt, ot, speech and case mgt consults  -high dose statin  -neuro checks and stroke scale per protocol   -bedside dysphagia screen failed with thins and straws, s/p FEEs 6/1 and recommended pudding thick  -tsh wnl,tot chol 143 HDL 40 LDL 83, hem a1c 5.7  -advised patient absolutely must quit smoking     **Leukocytosis (appears to be chronically elevated)   -WBC appears chronically elevated evidence as far back as 2018  -afebrile  -CXR shows no acute.  A little worse today so repeated CXR today shows worsening airspace disease in the perihilar regions and left lung base with small left pleural effusion.  He looks good though and sats good on RA.  Likely d/t all the IVF that he was given a couple days ago to improve his BP  -UA unremarkable  -lactic and procal both wnl  -monitor      **HTN  **HLD  -not on routine statin at home, started lipitor 80mg  -routine Toprol held for now- " allowing permissive HTN     Hypokalemia  --replacement protocol     **Chronic bronchitis/smoker  -Smoking cessation advise given.    **Dysphagia  --s/p FEES yesterday and SLP recs pudding thick        **BPH  -holding both Proscar and Flomax for now to allow permissive HTN        DVT prophylaxis:  Mechanical     Disposition: I expect the patient to be discharged possibly to [facility] in AM if improved by insurance/ok with neurology    CODE STATUS:   Code Status and Medical Interventions:   Ordered at: 05/30/21 0854     Level Of Support Discussed With:    Patient     Code Status:    CPR     Medical Interventions (Level of Support Prior to Arrest):    Full       Fredis Schmitz MD  06/02/21

## 2021-06-03 VITALS
WEIGHT: 146 LBS | DIASTOLIC BLOOD PRESSURE: 66 MMHG | BODY MASS INDEX: 19.77 KG/M2 | HEART RATE: 68 BPM | SYSTOLIC BLOOD PRESSURE: 139 MMHG | RESPIRATION RATE: 16 BRPM | OXYGEN SATURATION: 94 % | HEIGHT: 72 IN | TEMPERATURE: 98.4 F

## 2021-06-03 PROCEDURE — 92507 TX SP LANG VOICE COMM INDIV: CPT

## 2021-06-03 PROCEDURE — 99239 HOSP IP/OBS DSCHRG MGMT >30: CPT | Performed by: INTERNAL MEDICINE

## 2021-06-03 PROCEDURE — 92526 ORAL FUNCTION THERAPY: CPT

## 2021-06-03 PROCEDURE — 99231 SBSQ HOSP IP/OBS SF/LOW 25: CPT | Performed by: NURSE PRACTITIONER

## 2021-06-03 RX ORDER — ASPIRIN 81 MG/1
81 TABLET ORAL DAILY
Start: 2021-06-03 | End: 2021-07-23 | Stop reason: SDUPTHER

## 2021-06-03 RX ORDER — CLOPIDOGREL BISULFATE 75 MG/1
75 TABLET ORAL DAILY
Qty: 90 TABLET
Start: 2021-06-04 | End: 2021-07-23 | Stop reason: SDUPTHER

## 2021-06-03 RX ORDER — ATORVASTATIN CALCIUM 80 MG/1
80 TABLET, FILM COATED ORAL NIGHTLY
Start: 2021-06-03 | End: 2021-07-23 | Stop reason: SDUPTHER

## 2021-06-03 RX ORDER — LISINOPRIL 20 MG/1
20 TABLET ORAL
Status: DISCONTINUED | OUTPATIENT
Start: 2021-06-03 | End: 2021-06-03 | Stop reason: HOSPADM

## 2021-06-03 RX ORDER — METOPROLOL SUCCINATE 25 MG/1
25 TABLET, EXTENDED RELEASE ORAL DAILY
Status: DISCONTINUED | OUTPATIENT
Start: 2021-06-03 | End: 2021-06-03 | Stop reason: HOSPADM

## 2021-06-03 RX ORDER — LISINOPRIL 20 MG/1
20 TABLET ORAL
Start: 2021-06-03 | End: 2021-07-08

## 2021-06-03 RX ORDER — LISINOPRIL 40 MG/1
40 TABLET ORAL
Status: DISCONTINUED | OUTPATIENT
Start: 2021-06-03 | End: 2021-06-03

## 2021-06-03 RX ADMIN — ASPIRIN 325 MG ORAL TABLET 325 MG: 325 PILL ORAL at 08:07

## 2021-06-03 RX ADMIN — LISINOPRIL 20 MG: 20 TABLET ORAL at 13:09

## 2021-06-03 RX ADMIN — METOPROLOL SUCCINATE 25 MG: 25 TABLET, EXTENDED RELEASE ORAL at 09:38

## 2021-06-03 RX ADMIN — SODIUM CHLORIDE, PRESERVATIVE FREE 10 ML: 5 INJECTION INTRAVENOUS at 08:08

## 2021-06-03 RX ADMIN — CLOPIDOGREL BISULFATE 75 MG: 75 TABLET ORAL at 08:07

## 2021-06-03 NOTE — PLAN OF CARE
Goal Outcome Evaluation:  Plan of Care Reviewed With: patient, friend    SLP treatment completed. Will continue to address cognitive-communication and dysphagia in tx. Please see note for further details and recommendations.

## 2021-06-03 NOTE — PROGRESS NOTES
Stroke Progress Note       Chief Complaint: Left-sided weakness    Subjective    Subjective     Subjective:  Patient is resting in bed comfortably. Reports he slept well. No new complaints or questions. Is hopeful for rehab today     Review of Systems   Constitutional: No fatigue  HENT: Negative for nosebleeds and rhinorrhea.    Eyes: Negative for redness.   Respiratory: Negative for cough.    Gastrointestinal: Negative for anal bleeding. + diarrhea  Endocrine: Negative for polydipsia.   Genitourinary: Negative for enuresis and urgency.   Musculoskeletal: Negative for joint swelling.   Neurological: Negative for tremors.   Psychiatric/Behavioral: Negative for hallucinations.     Objective      Temp:  [97.7 °F (36.5 °C)-98.5 °F (36.9 °C)] 97.7 °F (36.5 °C)  Heart Rate:  [61-74] 74  Resp:  [16] 16  BP: (145-156)/(68-87) 145/68        NEURO    MENTAL STATUS: AAOx3, memory intact, fund of knowledge appropriate    LANG/SPEECH: Naming and repetition intact, fluent, follows 3-step commands    CRANIAL NERVES:      II: Pupils equal and reactive        III, IV, VI: EOM intact, no gaze preference or deviation, no nystagmus.     Lig lag on the left eye      V: normal sensation bilaterally      VII: left facial droop.      VIII: normal hearing to speech      IX, X: normal palatal elevation, no uvular deviation      XI: not done       XII: midline tongue protrusion    MOTOR:  Normal tone throughout  Right side 5/5  Left upper ext 2/5  Left lower ext 4-/5    SENSORY:normal to light touch    COORDINATION: unable to do due to patient condition    GAIT: Not assessed due to patient condition      Results Review:    I reviewed the patient's new clinical results.    Lab Results (last 24 hours)     Procedure Component Value Units Date/Time    POC Glucose Once [[id number]]  (Normal) Collected: 06/02/21 2058    Specimen: Blood Updated: 06/02/21 2106     Glucose 107 mg/dL         XR Chest 1 View    Result Date: 6/2/2021  Worsening of the  airspace disease in the perihilar regions and left lung base in the interval with development of a small left pleural effusion.  D:  06/02/2021 E:  06/02/2021       Results for orders placed during the hospital encounter of 05/30/21    Adult Transthoracic Echo Complete W/ Cont if Necessary Per Protocol (With Agitated Saline)    Interpretation Summary  · Saline test results are negative.  · Estimated left ventricular EF = 65% Left ventricular ejection fraction appears to be 61 - 65%. Left ventricular systolic function is normal.  · There is mild mitral valve prolapse of the posterior mitral leaflet.  · Left ventricular wall thickness is consistent with mild basal asymmetric hypertrophy.  · The right atrial cavity is mildly dilated.  · The right ventricular cavity is mildly dilated.  · Estimated right ventricular systolic pressure from tricuspid regurgitation is normal (<35 mmHg). Calculated right ventricular systolic pressure from tricuspid regurgitation is 27 mmHg.  · Left ventricular diastolic function was normal.            Assessment/Plan     Assessment/Plan:             This 71-year-old with multiple vascular risk factors and medication noncompliance presented with left-sided weakness.      CTA evidenced-supraclinoid right ICA calcification, not flow-limiting. Mild intracranial atherosclerosis   CT perfusion evidenced slow flow in the right basal ganglia  MRI of the brain-evidenced history diffusion in the right basal ganglia, with no much significant flair change. T2 flair evidence chronic microvascular ischemia   TTE- normal      1a. Acute ischemic stroke, right basal ganglia, likely etiology small vessel disease.  1b. Stroke secondary prevention- will maintain on DAPT and high intensity statin   1c. Stroke recovery- ok to work with PT/OT/SLP. Recommending inpatient rehab for best outcomes. Will likely leave today    1d. Stroke education- Educated on modifiable stroke risk factors, especially smoking.               2. Hypertension-ok to normalize BP goals     3. Tobacco abuse- counseled on smoking cessation.      4. Hyperlipidemia- LDL is 83, continue high dose of Lipitor 80.       Case discussed with Dr Gomez, patient and nursing. Stroke neurology will sign off. Patient will need to follow up with neurology outpatient in 4-6 weeks. Please do not hesitate to call with questions or concerns.     CHANG Charles  06/03/21  08:07 EDT

## 2021-06-03 NOTE — DISCHARGE SUMMARY
"    [facility] Medicine Services  DISCHARGE SUMMARY    Patient Name: Obed Cotto  : 1950  MRN: [id number]    Date of Admission: 2021  4:14 AM  Date of Discharge: 6/3/2021  Primary Care Physician: Lorraine Morfin MD    Consults     Date and Time Order Name Status Description    2021  4:15 AM Inpatient Neurology Consult Stroke Completed           Hospital Course     Presenting Problem:   Stroke (CMS/HCC) [I63.9]    Active Hospital Problems    Diagnosis  POA   • **Suspected cerebrovascular accident (CVA) [R09.89]  Yes   • Stroke (CMS/HCC) [I63.9]  Yes   • Essential hypertension [I10]  Yes   • Leukocytosis [D72.829]  Yes   • Tobacco abuse [Z72.0]  Yes      Resolved Hospital Problems   No resolved problems to display.          Hospital Course:  Obed Cotto is a 71 y.o. male w/ a hx of HTN, HLD, chronic bronchitis, ongoing tobacco use, BPH who presented to the ED w/ c/o left sided weakness.      Acute CVA posterior right internal capsule  Trace subdural hemorrhage R>L along tentorium (per CT head)  -CT head was performed and evaluated by neurosurgery, Dr. Horne, who felt the impression was likely an over read with no subdural hematoma. Repeat CT head today shows \"continued hyperdense asymmetry along right tentorium concerning for SDH with stable to solid decreased appearance from prior.\"    -CTA head/neck shows minimal atherosclerotic disease at the left carotid birfucation, kinking of mid left subclavian artery, some component of associated stenosis.   -CT cerebral perfusion normal  -MRI Brain-shows small area of acute infarct in posterior limb of right internal capsule.  -ECHO shows EF 65%, mild mitral valve prolapse, NO PFO,   -bedside dysphagia screen failed with thins and straws, s/p FEEs  and recommended pudding thick  -Neurology evaluated patient.  Recommends DAPT and high intensity statin.  Patient will need follow-up with neurology in 4 to 6 " weeks.     Leukocytosis (appears to be chronically elevated)   -WBC appears chronically elevated evidence as far back as 2018.  No evidence of infection on work-up.     HTN  HLD  -Discharged on atorvastatin  -Resumed home metoprolol.  -We will add lisinopril 40 mg daily.  He can follow-up with his PCP for additional blood pressure management.     Hypokalemia  --replacement protocol     Chronic bronchitis/smoker  -Smoking cessation advise given.     Dysphagia  --s/p FEES yesterday and SLP recs pudding thick        BPH  -holding both Proscar and Flomax for now to allow permissive HTN         Discharge Follow Up Recommendations for outpatient labs/diagnostics:  Follow-up with neurology in 4 to 6 weeks  Follow-up with PCP in 2 to 4 weeks        Day of Discharge     HPI:   Patient reports that he is doing well.  Ready to go to rehab today.  Still unable to move his left upper extremity.  Improved lower extremity strength.    Review of Systems  General: denies fevers or chills  CV: denies chest pain  Resp: denies shortness of breath  Abd: denies abd pain, nausea      Vital Signs:   Temp:  [97.7 °F (36.5 °C)-98.4 °F (36.9 °C)] 98.4 °F (36.9 °C)  Heart Rate:  [61-74] 65  Resp:  [16] 16  BP: (145-159)/(68-87) 159/84     Physical Exam:  Constitutional: No acute distress, awake, alert  Respiratory: Clear to auscultation bilaterally, respiratory effort normal   Cardiovascular: RRR, no murmurs, rubs, or gallops  Gastrointestinal: Positive bowel sounds, soft, nontender, nondistended  Musculoskeletal: No bilateral ankle edema  Psychiatric: Appropriate affect, cooperative  Neurologic: Oriented x 3, no slurred speech, unable to move left upper extremity, 4 out of 5 strength in left lower extremity, 5 out of 5 strength in right lower extremity.,  No facial droop  Skin: No rashes      Pertinent  and/or Most Recent Results     LAB RESULTS:      Lab 06/02/21  0609 06/01/21  0800 05/31/21  0650 05/30/21  0709 05/30/21  0429  05/30/21  0426 05/30/21  0423   WBC 18.56* 16.69* 17.15*  --  14.79*  --   --    HEMOGLOBIN 13.5 13.9 13.3  --  14.9  --   --    HEMOGLOBIN, POC  --   --   --   --   --   --  14.6   HEMATOCRIT 39.6 40.6 39.8  --  44.9  --   --    HEMATOCRIT POC  --   --   --   --   --   --  43   PLATELETS 291 297 283  --  326  --   --    NEUTROS ABS  --   --   --   --  8.15*  --   --    IMMATURE GRANS (ABS)  --   --   --   --  0.08*  --   --    LYMPHS ABS  --   --   --   --  5.06*  --   --    MONOS ABS  --   --   --   --  0.98*  --   --    EOS ABS  --   --   --   --  0.40  --   --    MCV 95.9 95.5 96.8  --  96.8  --   --    PROCALCITONIN  --   --   --   --  0.04  --   --    LACTATE  --   --   --  1.2  --   --   --    PROTIME  --   --   --   --   --  11.5*  --    APTT  --   --   --   --  27.7  --   --          Lab 06/02/21  0609 06/01/21  1749 06/01/21  0800 05/31/21  0650 05/30/21  0429 05/30/21  0423   SODIUM 138  --  138 139 137  --    POTASSIUM 3.7 3.7 3.3* 3.8 3.8  --    CHLORIDE 106  --  106 110* 103  --    CO2 24.0  --  25.0 21.0* 24.0  --    ANION GAP 8.0  --  7.0 8.0 10.0  --    BUN 7*  --  8 10 18  --    CREATININE 0.52*  --  0.56* 0.55* 0.72* 0.70   GLUCOSE 98  --  105* 106* 128*  --    CALCIUM 9.0  --  9.2 8.8 10.2  --    MAGNESIUM 2.2  --   --   --   --   --    HEMOGLOBIN A1C  --   --   --   --  5.70*  --          Lab 05/30/21  0429   TOTAL PROTEIN 6.8   ALBUMIN 4.20   GLOBULIN 2.6   ALT (SGPT) 19  19   AST (SGOT) 17  17   BILIRUBIN 0.4   ALK PHOS 105         Lab 05/30/21  0429 05/30/21  0426   PROBNP 80.4  --    TROPONIN T <0.010  --    PROTIME  --  11.5*   INR  --  1.0         Lab 05/30/21  0429   CHOLESTEROL 143   LDL CHOL 83   HDL CHOL 40   TRIGLYCERIDES 107             Lab 05/30/21  0423   PH, ARTERIAL 7.37     Brief Urine Lab Results  (Last result in the past 365 days)      Color   Clarity   Blood   Leuk Est   Nitrite   Protein   CREAT   Urine HCG        05/30/21 0610 Yellow Clear Negative Negative Negative  Negative             Microbiology Results (last 10 days)     Procedure Component Value - Date/Time    COVID PRE-OP / PRE-PROCEDURE SCREENING ORDER (NO ISOLATION) - Swab, Nasopharynx [[id number]]  (Normal) Collected: 05/30/21 0611    Lab Status: Final result Specimen: Swab from Nasopharynx Updated: 05/30/21 0655    Narrative:      The following orders were created for panel order COVID PRE-OP / PRE-PROCEDURE SCREENING ORDER (NO ISOLATION) - Swab, Nasopharynx.  Procedure                               Abnormality         Status                     ---------                               -----------         ------                     COVID-19 and FLU A/B PCR...[[id number]]  Normal              Final result                 Please view results for these tests on the individual orders.    COVID-19 and FLU A/B PCR - Swab, Nasopharynx [[id number]]  (Normal) Collected: 05/30/21 0611    Lab Status: Final result Specimen: Swab from Nasopharynx Updated: 05/30/21 0655     COVID19 Not Detected     Influenza A PCR Not Detected     Influenza B PCR Not Detected    Narrative:      Fact sheet for providers: https://www.fda.gov/media/212141/download    Fact sheet for patients: https://www.fda.gov/media/358089/download    Test performed by PCR.          Adult Transthoracic Echo Complete W/ Cont if Necessary Per Protocol (With Agitated Saline)    Result Date: 5/31/2021  · Saline test results are negative. · Estimated left ventricular EF = 65% Left ventricular ejection fraction appears to be 61 - 65%. Left ventricular systolic function is normal. · There is mild mitral valve prolapse of the posterior mitral leaflet. · Left ventricular wall thickness is consistent with mild basal asymmetric hypertrophy. · The right atrial cavity is mildly dilated. · The right ventricular cavity is mildly dilated. · Estimated right ventricular systolic pressure from tricuspid regurgitation is normal (<35 mmHg). Calculated right ventricular systolic pressure  from tricuspid regurgitation is 27 mmHg. · Left ventricular diastolic function was normal.      CT Head Without Contrast    Result Date: 6/1/2021  EXAMINATION: CT HEAD WO CONTRAST - 05/31/2021  INDICATION: R13.10-Dysphagia, unspecified; R41.841-Cognitive communication deficit. Follow up stroke.  TECHNIQUE: CT head without intravenous contrast administration.  The radiation dose reduction device was turned on for each scan per the ALARA (As Low as Reasonably Achievable) protocol.  COMPARISON: CT head perfusion and angiogram one-day prior 05/30/2021.  FINDINGS: Midline structures are symmetric without evidence of mass, mass effect or midline shift with continued hyperdense asymmetry along the right tentorium concerning for subdural hemorrhage with stable to slightly decreased appearance or prominence from prior. No new intraparenchymal findings or hemorrhage and no intraventricular hemorrhage or hydrocephalus. Globes and orbits unremarkable. Paranasal sinuses and mastoid air cells are grossly clear. Calvarium intact.      Continued hyperdense asymmetry along the right tentorium concerning for subdural hemorrhage with stable to slightly decreased appearance or prominence from prior. No new intraparenchymal findings or hemorrhage and no intraventricular hemorrhage or hydrocephalus.  DICTATED:   05/31/2021 EDITED/ls :   05/31/2021   This report was finalized on 6/1/2021 12:08 PM by Dr. Jose De Jesus Hutchinson.      CT Angiogram Neck    Result Date: 5/30/2021  CTA  HEAD W AI ANALYSIS FOR LVO, CTA Neck INDICATION: Left-sided weakness. Abnormal gait. Strokelike symptoms. TECHNIQUE: CT angiogram of the head and neck with and without IV contrast. 3-D reconstructions were obtained and reviewed.  Evaluation for a significant carotid arterial stenosis is based on the NASCET criteria. Radiation dose reduction techniques included automated exposure control or exposure modulation based on body size. Count of known CT and cardiac nuc med  studies performed in previous 12 months: 0. AI analysis of LVO was utilized COMPARISON: Head CT same day FINDINGS: CTA neck:  Visualized lung apices are clear. Visualized aortic arch is normal. Great vessel origins are widely patent. Vertebral arteries are widely patent and codominant. There is some minimal plaque at the left carotid bifurcation with no plaque at the right carotid bifurcation. There is no carotid stenosis on either side of the neck. There is no carotid or vertebral arterial dissection. Partially seen is some kinking of the mid left subclavian artery. Associated stenosis is likely present. The vessel is otherwise widely patent. CTA head:  There is a tiny atherosclerotic plaque in the distal left vertebral artery with no associated stenosis. The vertebrobasilar system is otherwise normal. There is some plaque in both carotid siphons, but no stenosis is identified. The anterior, middle, and posterior cerebral arteries are widely patent. No flow-limiting stenosis or vessel cut off is identified. There is no aneurysm. Major dural venous sinuses are patent.     1. Minimal atherosclerotic disease at the left carotid bifurcation. No carotid or vertebral stenosis in the neck. No dissection. 2. No intracranial flow-limiting stenosis or vessel cut off. No aneurysm. 3. Please see head CT report dictated separately. 4. Kinking of the mid left subclavian artery. There does appear to be some component of associated stenosis. The vessel distal to this is normal. Signer Name: Bismark Hurst MD  Signed: 5/30/2021 5:27 AM  Workstation [facility]: [email or web address]  [facility]    MRI Brain Without Contrast    Result Date: 5/30/2021  MRI Brain WO INDICATION: Left side weakness with abnormal gait. Strokelike symptoms. Abnormal head CT showing subdural hemorrhage along the tentorium. TECHNIQUE: MRI of the brain without IV contrast. COMPARISON:  Head CT same day FINDINGS: Diffusion images reveal a small linear  focus of restricted diffusion in the posterior limb of the right internal capsule adjacent to the thalamus concerning for a small acute infarct. No associated hemorrhage is seen. Diffusion series is otherwise negative. There is mild atrophy with chronic small vessel ischemic disease in the white matter. Old lacunar infarcts noted in the right putamen and left posterior limb of the internal capsule. No acute hemorrhage is identified. Findings on the earlier head CT are felt to be artifactual. Craniovertebral junction is normal. No masses are identified. Major intracranial flow voids are maintained. There is mild chronic mucosal thickening in the paranasal sinuses.     1. Small area of acute infarct in the posterior limb of the right internal capsule. 2. Atrophy with chronic small vessel ischemic disease in the white matter. Old lacunar infarcts as above. 3. No acute hemorrhage is identified. Findings on the earlier head CT are felt to be secondary to artifact. Signer Name: Bismark Hurst MD  Signed: 5/30/2021 6:13 AM  Workstation Name: [facility]  [facility] FEES - Fiberoptic Endo Eval Swallow    Result Date: 6/1/2021  This procedure was auto-finalized with no dictation required.    XR Chest 1 View    Result Date: 6/2/2021  EXAMINATION: XR CHEST 1 VW- 06/02/2021  INDICATION: Leukocytosis; R13.12-Dysphagia, oropharyngeal phase; R41.841-Cognitive communication deficit  COMPARISON: 05/30/2021  FINDINGS: Portable chest reveals cardiac silhouette to be borderline enlarged. Some increased markings seen in the perihilar regions bilaterally with bronchial cuffing. Increasing perihilar infiltrate is of concern. There is some mild increased markings at the left lung base with small left pleural effusion.        Worsening of the airspace disease in the perihilar regions and left lung base in the interval with development of a small left pleural effusion.  D:  06/02/2021 E:  06/02/2021       XR  Chest 1 View    Result Date: 5/30/2021  CR Chest 1 Vw INDICATION: Acute stroke. COMPARISON:  None available. FINDINGS: Single portable AP view(s) of the chest.  The heart and mediastinal contours are normal. The lungs are clear. No pneumothorax or pleural effusion. Background COPD is suspected. Correlate with smoking history.     No acute cardiopulmonary findings. Signer Name: Bismark Hurst MD  Signed: 5/30/2021 5:29 AM  Workstation Name: [facility]  [facility]    CT Head Without Contrast Stroke Protocol    Result Date: 5/30/2021  CT Head WO Code Stroke HISTORY: Left side weakness with abnormal gait for one day. TECHNIQUE: Axial unenhanced head CT with multiplanar reformats. Radiation dose reduction techniques included automated exposure control or exposure modulation based on body size. Count of known CT and cardiac nuc med studies performed in previous 12 months: 0. COMPARISON: None. FINDINGS: Ventricular size and configuration are normal. There is mild atrophy with chronic small vessel ischemic changes in the white matter. No acute infarct is seen. There is no skull fracture. Atherosclerotic calcifications are present in the carotid siphons and left distal vertebral artery. There is some chronic mucosal thickening in the ethmoid air cells and maxillary sinuses. The tentorium appears hyperdense, and findings are suspicious for a trace amount of subdural hemorrhage along the tentorium, right greater than left.  Hemorrhage volume > 30 mL          NO Intraventricular hemorrhage         NO Infratentorial origin of hemorrhage   NO     1. Chronic senescent changes in the brain with no evidence of acute infarct. 2. Hyperdensity along the tentorium concerning for a trace amount of subdural hemorrhage, right greater than left. The examination was performed at 0421 hours and results were called by telephone at 5/30/2021 4:26 AM to April, CT technologist who verbally acknowledged these results.  Signer Name: Bismark Hurst MD  Signed: 5/30/2021 4:29 AM  Workstation Name: [email or web address]  [facility]    CT Angiogram Head w AI Analysis of LVO    Result Date: 5/30/2021  CTA  HEAD W AI ANALYSIS FOR LVO, CTA Neck INDICATION: Left-sided weakness. Abnormal gait. Strokelike symptoms. TECHNIQUE: CT angiogram of the head and neck with and without IV contrast. 3-D reconstructions were obtained and reviewed.  Evaluation for a significant carotid arterial stenosis is based on the NASCET criteria. Radiation dose reduction techniques included automated exposure control or exposure modulation based on body size. Count of known CT and cardiac nuc med studies performed in previous 12 months: 0. AI analysis of LVO was utilized COMPARISON: Head CT same day FINDINGS: CTA neck:  Visualized lung apices are clear. Visualized aortic arch is normal. Great vessel origins are widely patent. Vertebral arteries are widely patent and codominant. There is some minimal plaque at the left carotid bifurcation with no plaque at the right carotid bifurcation. There is no carotid stenosis on either side of the neck. There is no carotid or vertebral arterial dissection. Partially seen is some kinking of the mid left subclavian artery. Associated stenosis is likely present. The vessel is otherwise widely patent. CTA head:  There is a tiny atherosclerotic plaque in the distal left vertebral artery with no associated stenosis. The vertebrobasilar system is otherwise normal. There is some plaque in both carotid siphons, but no stenosis is identified. The anterior, middle, and posterior cerebral arteries are widely patent. No flow-limiting stenosis or vessel cut off is identified. There is no aneurysm. Major dural venous sinuses are patent.     1. Minimal atherosclerotic disease at the left carotid bifurcation. No carotid or vertebral stenosis in the neck. No dissection. 2. No intracranial flow-limiting stenosis or vessel cut off.  No aneurysm. 3. Please see head CT report dictated separately. 4. Kinking of the mid left subclavian artery. There does appear to be some component of associated stenosis. The vessel distal to this is normal. Signer Name: Bismark Hurst MD  Signed: 5/30/2021 5:27 AM  Workstation Name: [facility]  Radiology [facility]    CT CEREBRAL PERFUSION WITH & WITHOUT CONTRAST    Result Date: 5/30/2021  CT CEREBRAL PERFUSION W WO CONTRAST HISTORY: Left-sided weakness and abnormal gait. Strokelike symptoms. TECHNIQUE: Axial CT images of the brain without and with intravenous contrast using cerebral perfusion protocol. Post-processing parametric maps were created using RAPID software and reviewed. Radiation dose reduction techniques included automated exposure control or exposure modulation based on body size. CT and nuclear cardiology exams in the last 12 months: 0. COMPARISON: Head CT same day FINDINGS: Arterial input function is optimal. Perfusion maps are symmetric without evidence of cerebral ischemia or core infarction.     Normal brain perfusion CT. Signer Name: Bismark Hurst MD  Signed: 5/30/2021 5:09 AM  Workstation Name: [facility]              Results for orders placed during the hospital encounter of 05/30/21    Adult Transthoracic Echo Complete W/ Cont if Necessary Per Protocol (With Agitated Saline)    Interpretation Summary  · Saline test results are negative.  · Estimated left ventricular EF = 65% Left ventricular ejection fraction appears to be 61 - 65%. Left ventricular systolic function is normal.  · There is mild mitral valve prolapse of the posterior mitral leaflet.  · Left ventricular wall thickness is consistent with mild basal asymmetric hypertrophy.  · The right atrial cavity is mildly dilated.  · The right ventricular cavity is mildly dilated.  · Estimated right ventricular systolic pressure from tricuspid regurgitation is normal (<35 mmHg).  Calculated right ventricular systolic pressure from tricuspid regurgitation is 27 mmHg.  · Left ventricular diastolic function was normal.      Plan for Follow-up of Pending Labs/Results:     Discharge Details        Discharge Medications      ASK your doctor about these medications      Instructions Start Date   finasteride 5 MG tablet  Commonly known as: PROSCAR   5 mg, Oral, Daily      metoprolol succinate XL 25 MG 24 hr tablet  Commonly known as: TOPROL-XL   25 mg, Oral, Daily      tamsulosin 0.4 MG capsule 24 hr capsule  Commonly known as: FLOMAX   1 capsule, Oral, Nightly             Allergies   Allergen Reactions   • Penicillins Hives         Discharge Disposition:      Diet:  Hospital:  Diet Order   Procedures   • Diet Dysphagia; IV - Mechanical Soft No Mixed Consistencies; Pudding Thick; No Straws; Cardiac       Activity:      Restrictions or Other Recommendations:         CODE STATUS:    Code Status and Medical Interventions:   Ordered at: 05/30/21 0854     Level Of Support Discussed With:    Patient     Code Status:    CPR     Medical Interventions (Level of Support Prior to Arrest):    Full       Future Appointments   Date Time Provider Department Center   3/9/2022  9:45 AM Lorraine Morfin MD [facility] Kathryn L Seward, MD  06/03/21      Time Spent on Discharge:  I spent  35  minutes on this discharge activity which included: face-to-face encounter with the patient, reviewing the data in the system, coordination of the care with the nursing staff as well as consultants, documentation, and entering orders.

## 2021-06-03 NOTE — PLAN OF CARE
Problem: Adult Inpatient Plan of Care  Goal: Plan of Care Review  Outcome: Ongoing, Progressing  Flowsheets (Taken 6/3/2021 0506)  Progress: improving  Plan of Care Reviewed With: patient  Goal: Patient-Specific Goal (Individualized)  Outcome: Ongoing, Progressing  Goal: Absence of Hospital-Acquired Illness or Injury  Outcome: Ongoing, Progressing  Intervention: Identify and Manage Fall Risk  Recent Flowsheet Documentation  Taken 6/3/2021 0400 by Veronica Ag RN  Safety Promotion/Fall Prevention:   activity supervised   assistive device/personal items within reach   safety round/check completed  Taken 6/3/2021 0200 by Veronica Ag RN  Safety Promotion/Fall Prevention:   activity supervised   assistive device/personal items within reach   safety round/check completed  Taken 6/3/2021 0000 by Veronica Ag RN  Safety Promotion/Fall Prevention:   activity supervised   assistive device/personal items within reach   safety round/check completed  Taken 6/2/2021 2200 by Veronica Ag RN  Safety Promotion/Fall Prevention:   activity supervised   assistive device/personal items within reach   safety round/check completed  Taken 6/2/2021 2000 by Veronica Ag RN  Safety Promotion/Fall Prevention:   activity supervised   assistive device/personal items within reach   clutter free environment maintained   fall prevention program maintained   lighting adjusted   muscle strengthening facilitated   nonskid shoes/slippers when out of bed   room organization consistent   safety round/check completed  Intervention: Prevent Skin Injury  Recent Flowsheet Documentation  Taken 6/3/2021 0400 by Veronica Ag RN  Body Position: position changed independently  Taken 6/3/2021 0200 by Veronica Ag RN  Body Position: supine, legs elevated  Taken 6/3/2021 0000 by Veronica Ag RN  Body Position:   supine   position changed independently  Taken 6/2/2021 2200 by Veronica Ag RN  Body Position:   supine, legs  elevated   position changed independently  Taken 6/2/2021 2000 by Veronica Ag RN  Body Position: supine  Skin Protection: incontinence pads utilized  Intervention: Prevent and Manage VTE (venous thromboembolism) Risk  Recent Flowsheet Documentation  Taken 6/3/2021 0400 by Veronica Ag RN  VTE Prevention/Management:   bilateral   sequential compression devices off   patient refused intervention  Taken 6/3/2021 0200 by Veronica Ag RN  VTE Prevention/Management:   bilateral   sequential compression devices off  Taken 6/3/2021 0000 by Veronica Ag RN  VTE Prevention/Management:   bilateral   sequential compression devices off  Taken 6/2/2021 2200 by Veronica Ag RN  VTE Prevention/Management:   bilateral   sequential compression devices off  Taken 6/2/2021 2000 by Veronica Ag RN  VTE Prevention/Management:   bilateral   sequential compression devices off  Intervention: Prevent Infection  Recent Flowsheet Documentation  Taken 6/3/2021 0400 by Veroinca Ag RN  Infection Prevention:   hand hygiene promoted   rest/sleep promoted  Taken 6/3/2021 0200 by Veronica Ag RN  Infection Prevention:   hand hygiene promoted   rest/sleep promoted  Taken 6/3/2021 0000 by Veronica Ag RN  Infection Prevention:   hand hygiene promoted   rest/sleep promoted  Taken 6/2/2021 2200 by Veronica Ag RN  Infection Prevention:   hand hygiene promoted   rest/sleep promoted  Taken 6/2/2021 2000 by Veronica Ag RN  Infection Prevention:   hand hygiene promoted   rest/sleep promoted  Goal: Optimal Comfort and Wellbeing  Outcome: Ongoing, Progressing  Intervention: Provide Person-Centered Care  Recent Flowsheet Documentation  Taken 6/3/2021 0200 by Veronica Ag RN  Trust Relationship/Rapport: care explained  Taken 6/3/2021 0000 by Veronica Ag RN  Trust Relationship/Rapport: care explained  Taken 6/2/2021 2200 by Veronica Ag RN  Trust Relationship/Rapport: care explained  Taken 6/2/2021 2000  by Veronica Ag RN  Trust Relationship/Rapport:   care explained   choices provided   questions answered   questions encouraged   reassurance provided  Goal: Readiness for Transition of Care  Outcome: Ongoing, Progressing     Problem: Skin Injury Risk Increased  Goal: Skin Health and Integrity  Outcome: Ongoing, Progressing  Intervention: Optimize Skin Protection  Recent Flowsheet Documentation  Taken 6/3/2021 0400 by Veronica Ag RN  Head of Bed (HOB): HOB at 15 degrees  Taken 6/3/2021 0200 by Veronica Ag RN  Head of Bed (HOB): HOB at 20-30 degrees  Taken 6/3/2021 0000 by Veronica Ag RN  Head of Bed (HOB): HOB at 20-30 degrees  Taken 6/2/2021 2200 by Veronica Ag RN  Head of Bed (HOB): HOB at 20-30 degrees  Taken 6/2/2021 2000 by Veronica Ag RN  Pressure Reduction Techniques: weight shift assistance provided  Head of Bed (HOB): HOB at 30-45 degrees  Pressure Reduction Devices: pressure-redistributing mattress utilized  Skin Protection: incontinence pads utilized     Problem: Fall Injury Risk  Goal: Absence of Fall and Fall-Related Injury  Outcome: Ongoing, Progressing  Intervention: Identify and Manage Contributors to Fall Injury Risk  Recent Flowsheet Documentation  Taken 6/3/2021 0400 by Veronica Ag RN  Self-Care Promotion: independence encouraged  Taken 6/3/2021 0200 by Veronica Ag RN  Self-Care Promotion: independence encouraged  Taken 6/3/2021 0000 by Veronica Ag RN  Self-Care Promotion: independence encouraged  Taken 6/2/2021 2200 by Veronica Ag RN  Self-Care Promotion: independence encouraged  Taken 6/2/2021 2000 by Veronica Ag RN  Medication Review/Management: medications reviewed  Self-Care Promotion: independence encouraged  Intervention: Promote Injury-Free Environment  Recent Flowsheet Documentation  Taken 6/3/2021 0400 by Veronica Ag RN  Safety Promotion/Fall Prevention:   activity supervised   assistive device/personal items within reach    safety round/check completed  Taken 6/3/2021 0200 by Veronica Ag RN  Safety Promotion/Fall Prevention:   activity supervised   assistive device/personal items within reach   safety round/check completed  Taken 6/3/2021 0000 by Veronica Ag RN  Safety Promotion/Fall Prevention:   activity supervised   assistive device/personal items within reach   safety round/check completed  Taken 6/2/2021 2200 by Veronica Ag RN  Safety Promotion/Fall Prevention:   activity supervised   assistive device/personal items within reach   safety round/check completed  Taken 6/2/2021 2000 by Veronica Ag RN  Safety Promotion/Fall Prevention:   activity supervised   assistive device/personal items within reach   clutter free environment maintained   fall prevention program maintained   lighting adjusted   muscle strengthening facilitated   nonskid shoes/slippers when out of bed   room organization consistent   safety round/check completed     Problem: Adjustment to Illness (Stroke, Ischemic/Transient Ischemic Attack)  Goal: Optimal Coping  Outcome: Ongoing, Progressing  Intervention: Support Patient/Family Psychosocial Response to Stroke  Recent Flowsheet Documentation  Taken 6/2/2021 2000 by Veronica Ag RN  Family/Support System Care: support provided     Problem: Bowel Elimination Impaired (Stroke, Ischemic/Transient Ischemic Attack)  Goal: Effective Bowel Elimination  Outcome: Ongoing, Progressing     Problem: Cerebral Tissue Perfusion Risk (Stroke, Ischemic/Transient Ischemic Attack)  Goal: Optimal Cerebral Tissue Perfusion  Outcome: Ongoing, Progressing  Intervention: Optimize Oxygenation and Ventilation  Recent Flowsheet Documentation  Taken 6/3/2021 0400 by Veronica Ag RN  Head of Bed (HOB): HOB at 15 degrees  Taken 6/3/2021 0200 by Veronica Ag RN  Head of Bed (HOB): HOB at 20-30 degrees  Taken 6/3/2021 0000 by Veronica Ag RN  Head of Bed (HOB): HOB at 20-30 degrees  Taken 6/2/2021 2200 by  Veronica Ag RN  Head of Bed (HOB): HOB at 20-30 degrees  Taken 6/2/2021 2000 by Veronica Ag RN  Head of Bed (HOB): HOB at 30-45 degrees     Problem: Communication Impairment (Stroke, Ischemic/Transient Ischemic Attack)  Goal: Improved Communication Skills  Outcome: Ongoing, Progressing  Intervention: Optimize Cognitive and Communication Skills  Recent Flowsheet Documentation  Taken 6/3/2021 0400 by Veronica Ag RN  Communication Enhancement Strategies: call light answered in person  Taken 6/3/2021 0200 by Veronica Ag RN  Communication Enhancement Strategies: call light answered in person  Taken 6/3/2021 0000 by Veronica Ag RN  Communication Enhancement Strategies: call light answered in person  Taken 6/2/2021 2200 by Veronica Ag RN  Communication Enhancement Strategies: call light answered in person  Taken 6/2/2021 2000 by Veronica Ag RN  Communication Enhancement Strategies: call light answered in person     Problem: Eating/Swallowing Impairment (Stroke, Ischemic/Transient Ischemic Attack)  Goal: Oral Intake without Aspiration  Outcome: Ongoing, Progressing  Intervention: Optimize Eating and Swallowing  Recent Flowsheet Documentation  Taken 6/2/2021 2000 by Veronica Ag RN  Aspiration Precautions: awake/alert before oral intake     Problem: Functional Ability Impaired (Stroke, Ischemic/Transient Ischemic Attack)  Goal: Optimal Functional Ability  Outcome: Ongoing, Progressing  Intervention: Optimize Functional Ability  Recent Flowsheet Documentation  Taken 6/3/2021 0400 by Veronica Ag RN  Activity Management: activity adjusted per tolerance  Self-Care Promotion: independence encouraged  Taken 6/3/2021 0200 by Veronica Ag RN  Activity Management: activity adjusted per tolerance  Self-Care Promotion: independence encouraged  Taken 6/3/2021 0000 by Veronica Ag RN  Activity Management: activity adjusted per tolerance  Self-Care Promotion: independence  encouraged  Taken 6/2/2021 2200 by Veronica Ag RN  Activity Management: activity adjusted per tolerance  Self-Care Promotion: independence encouraged  Taken 6/2/2021 2000 by Veronica Ag RN  Activity Management: activity adjusted per tolerance  Self-Care Promotion: independence encouraged     Problem: Hemodynamic Instability (Stroke, Ischemic/Transient Ischemic Attack)  Goal: Vital Signs Remain in Desired Range  Outcome: Ongoing, Progressing  Intervention: Optimize Blood Flow  Recent Flowsheet Documentation  Taken 6/2/2021 2000 by Veronica Ag RN  Stabilization Measures: airway opened     Problem: Pain (Stroke, Ischemic/Transient Ischemic Attack)  Goal: Acceptable Pain Control  Outcome: Ongoing, Progressing     Problem: Sensorimotor Impairment (Stroke, Ischemic/Transient Ischemic Attack)  Goal: Improved Sensorimotor Function  Outcome: Ongoing, Progressing  Intervention: Optimize Range of Motion, Motor Control and Function  Recent Flowsheet Documentation  Taken 6/3/2021 0400 by Veronica Ag RN  Positioning/Transfer Devices:   pillows   in use  Taken 6/3/2021 0200 by Veronica Ag RN  Positioning/Transfer Devices:   pillows   in use  Taken 6/3/2021 0000 by Veronica Ag RN  Positioning/Transfer Devices:   pillows   in use  Taken 6/2/2021 2200 by Veronica Ag RN  Positioning/Transfer Devices:   pillows   in use  Taken 6/2/2021 2000 by Veronica Ag RN  Positioning/Transfer Devices:   pillows   in use  Intervention: Optimize Sensory and Perceptual Abilities  Recent Flowsheet Documentation  Taken 6/2/2021 2000 by Veronica Ag RN  Pressure Reduction Techniques: weight shift assistance provided  Pressure Reduction Devices: pressure-redistributing mattress utilized     Problem: Urinary Elimination Impaired (Stroke, Ischemic/Transient Ischemic Attack)  Goal: Effective Urinary Elimination  Outcome: Ongoing, Progressing   Goal Outcome Evaluation:  Plan of Care Reviewed With: patient  Progress:  improving    Pt currently in bed resting quietly. No complaints of pain or discomfort at this time. NIH 7 related to left sided weakness. Pt incontinent of bowel. Stool is dark and liquid. Vitals WNL on room air. No other observations at this time. Will continue to monitor, call bell in reach.

## 2021-06-03 NOTE — THERAPY TREATMENT NOTE
Acute Care - Speech Language Pathology Treatment Note  [facility]     Patient Name: Obed Cotto  : 1950  MRN: [id number]  Today's Date: 6/3/2021               Admit Date: 2021     Visit Dx:    ICD-10-CM ICD-9-CM   1. Oropharyngeal dysphagia  R13.12 787.22   2. Cognitive communication deficit  R41.841 799.52   3. Acute ischemic stroke (CMS/HCC)  I63.9 434.91   4. Dizziness  R42 780.4   5. Acute left-sided weakness  R53.1 728.87     Patient Active Problem List   Diagnosis   • Tobacco abuse   • Urinary retention   • Prehypertension   • Positive colorectal cancer screening using DNA-based stool test   • Leukocytosis   • Prediabetes   • BPH (benign prostatic hyperplasia)   • BPH with elevated PSA   • Essential hypertension   • Stroke (CMS/HCC)     Past Medical History:   Diagnosis Date   • Acute ischemic stroke (CMS/HCC) 2021     posterior limb of the right internal capsule   • BPH with elevated PSA    • Chronic bronchitis (CMS/HCC)    • Elevated blood pressure    • Hyperlipidemia    • Hypertension    • Lacunar infarction (CMS/HCC)    • Noncompliance with medication regimen     pt states he never took his metoprolol   • Prediabetes      Past Surgical History:   Procedure Laterality Date   • FINGER SURGERY      Right hand 4th finger   • FINGER SURGERY      R ring finger, partial amputation repair        SLP EVALUATION (last 72 hours)      SLP SLC Evaluation     Row Name 21 1030                   Communication Assessment/Intervention    Document Type  therapy note (daily note)  -        Subjective Information  no complaints  -        Patient Observations  alert;cooperative;agree to therapy  -        Patient/Family/Caregiver Comments/Observations  Visitor present  -        Care Plan Review  evaluation/treatment results reviewed;care plan/treatment goals reviewed;risks/benefits reviewed;current/potential barriers reviewed;patient/other agree to care plan  -        Patient Effort  good  -            General Information    Patient Profile Reviewed  yes  -           Pain    Additional Documentation  Pain Scale: FACES Pre/Post-Treatment (Group);Pain Scale: Numbers Pre/Post-Treatment (Group)  -           Pain Scale: Numbers Pre/Post-Treatment    Pretreatment Pain Rating  0/10 - no pain  -CH        Posttreatment Pain Rating  0/10 - no pain  -CH           Pain Scale: FACES Pre/Post-Treatment    Pain: FACES Scale, Pretreatment  0-->no hurt  -CH        Posttreatment Pain Rating  0-->no hurt  -           SLP Clinical Impressions    Daily Summary of Progress (SLP)  progress toward functional goals as expected  -        Plan for Continued Treatment (SLP)  Continue to follow to address cognitive communication and dysphagia in tx  -           Recommendations    Therapy Frequency (SLP SLC)  5 days per week  -        Predicted Duration Therapy Intervention (Days)  until discharge  -        Anticipated Discharge Disposition (SLP)  inpatient rehabilitation facility;anticipate therapy at next level of care  -          User Key  (r) = Recorded By, (t) = Taken By, (c) = Cosigned By    Initials Name Effective Dates     Miesha Conti, MS CCC-SLP 12/04/20 -              EDUCATION  The patient has been educated in the following areas:     Cognitive Impairment Communication Impairment Home Exercise Program (HEP) Dysphagia (Swallowing Impairment) Oral Care/Hydration Modified Diet Instruction.    SLP Recommendation and Plan              Anticipated Discharge Disposition (SLP): inpatient rehabilitation facility, anticipate therapy at next level of care        Predicted Duration Therapy Intervention (Days): until discharge  Daily Summary of Progress (SLP): progress toward functional goals as expected  Plan for Continued Treatment (SLP): Continue to follow to address cognitive communication and dysphagia in tx                    SLP GOALS     Row Name 06/03/21 1030 06/01/21 1100          Oral  Nutrition/Hydration Goal 1 (SLP)    Oral Nutrition/Hydration Goal 1, SLP  LTG: Pt will demonstrate functional swallow for return to baseline diet of soft solids and thin liquids with no s/sx aspiration with use of compensatory strategies as indicated.  -  LTG: Pt will demonstrate functional swallow for return to baseline diet of soft solids and thin liquids with no s/sx aspiration with use of compensatory strategies as indicated.  -DV     Time Frame (Oral Nutrition/Hydration Goal 1, SLP)  by discharge  -CH  by discharge  -DV     Progress/Outcomes (Oral Nutrition/Hydration Goal 1, SLP)  continuing progress toward goal  -CH  --        Oral Nutrition/Hydration Goal 2 (SLP)    Oral Nutrition/Hydration Goal 2, SLP  Pt will tolerate current diet of mechanical soft solids and pudding-thick liquids with no s/sx aspiration with 100% accuracy.  -  Pt will tolerate current diet of mechanical soft solids and pudding-thick liquids with no s/sx aspiration with 100% accuracy.  -DV     Time Frame (Oral Nutrition/Hydration Goal 2, SLP)  short term goal (STG)  -CH  short term goal (STG)  -DV     Barriers (Oral Nutrition/Hydration Goal 2, SLP)  No s/s of aspiratoin with soft and regular solid trials and pudding thick liquids via sm cup sips.   -CH  --     Progress/Outcomes (Oral Nutrition/Hydration Goal 2, SLP)  continuing progress toward goal  -CH  --        Oral Nutrition/Hydration Goal (SLP)    Oral Nutrition/Hydration Goal, SLP  Pt will accept trials of ice chips and thin liquids with no s/sx aspiration with 80% accuracy to determine readiness for repeat swallow evaluation.  -  Pt will accept trials of ice chips and thin liquids with no s/sx aspiration with 80% accuracy to determine readiness for repeat swallow evaluation.  -DV     Time Frame (Oral Nutrition/Hydration Goal, SLP)  short term goal (STG)  -CH  short term goal (STG)  -DV     Barriers (Oral Nutrition/Hydration Goal, SLP)  Cough x 2/5 trials with ice chips  -   --     Progress/Outcomes (Oral Nutrition/Hydration Goal, SLP)  continuing progress toward goal  -CH  --        Labial Strengthening Goal 1 (SLP)    Activity (Labial Strengthening Goal 1, SLP)  increase labial tone  -CH  increase labial tone  -DV     Increase Labial Tone  labial resistance exercises;swallow trials  -CH  labial resistance exercises;swallow trials  -DV     [place]/Accuracy (Labial Strengthening Goal 1, SLP)  with minimal cues (75-90% accuracy)  -CH  with minimal cues (75-90% accuracy)  -DV     Time Frame (Labial Strengthening Goal 1, SLP)  short term goal (STG)  -CH  short term goal (STG)  -DV     Barriers (Labial Strengthening Goal 1, SLP)  Patient completed labial tone/resistence exercises with good effort x 5 each.   -CH  --     Progress/Outcomes (Labial Strengthening Goal 1, SLP)  continuing progress toward goal  -CH  --        Pharyngeal Strengthening Exercise Goal 1 (SLP)    Activity (Pharyngeal Strengthening Goal 1, SLP)  increase superior movement of the hyolaryngeal complex;increase epiglottic inversion and retroflexion;increase closure at entrance to airway/closure of airway at glottis  -CH  increase superior movement of the hyolaryngeal complex;increase epiglottic inversion and retroflexion;increase closure at entrance to airway/closure of airway at glottis  -DV     Increase Superior Movement of the Hyolaryngeal Complex  falsetto;Mendelsohn  -CH  falsetto;Mendelsohn  -DV     Increase Epiglottic Inversion and Retroflexion  shaker;Mendelsohn  -CH  shaker;Mendelsohn  -DV     Increase Closure at Entrance to Airway/Closure of Airway at Glottis  super-supraglottic swallow  -CH  super-supraglottic swallow  -DV     [place]/Accuracy (Pharyngeal Strengthening Goal 1, SLP)  with minimal cues (75-90% accuracy)  -CH  with minimal cues (75-90% accuracy)  -DV     Time Frame (Pharyngeal Strengthening Goal 1, SLP)  short term goal (STG)  -CH  short term goal (STG)  -DV     Barriers (Pharyngeal  Strengthening Goal 1, SLP)  completed all exercises x 5 with min cues.   -CH  --     Progress/Outcomes (Pharyngeal Strengthening Goal 1, SLP)  continuing progress toward goal  -CH  --        Articulation Goal 1 (SLP)    Improve Articulation Goal 1 (SLP)  by over-articulating in connected speech;90%;independently (over 90% accuracy)  -CH  --     Time Frame (Articulation Goal 1, SLP)  short term goal (STG)  -CH  --     Progress (Articulation Goal 1, SLP)  60%;with minimal cues (75-90%)  -CH  --     Progress/Outcomes (Articulation Goal 1, SLP)  continuing progress toward goal  -CH  --        [place] Goal 1 (SLP)    Improve [place] by Goal 1 (SLP)  increasing rate;90%;independently (over 90% accuracy)  -CH  --     Time Frame ([place] Goal 1, SLP)  short term goal (STG)  -CH  --     Progress ([place] Goal 1, SLP)  70%;with minimal cues (75-90%)  -CH  --     Progress/Outcomes ([place] Goal 1, SLP)  continuing progress toward goal  -CH  --        Attention Goal 1 (SLP)    Improve Attention by Goal 1 (SLP)  looking at speaker;complete sustained attention task;90%;with minimal cues (75-90%)  -CH  --     Time Frame (Attention Goal 1, SLP)  short term goal (STG)  -CH  --     Progress (Attention Goal 1, SLP)  80%;with minimal cues (75-90%)  -CH  --     Progress/Outcomes (Attention Goal 1, SLP)  continuing progress toward goal  -CH  --        Orientation Goal 1 (SLP)    Improve Orientation Through Goal 1 (SLP)  demonstrating orientation to day;demonstrating orientation to month;demonstrating orientation to year;90%;independently (over 90% accuracy)  -CH  --     Time Frame (Orientation Goal 1, SLP)  short term goal (STG)  -CH  --     Progress (Orientation Goal 1, SLP)  60%;with minimal cues (75-90%)  -CH  --     Progress/Outcomes (Orientation Goal 1, SLP)  continuing progress toward goal  -CH  --     Comment (Orientation Goal 1, SLP)  oriented to place and year, not to date, day  -CH  --        Additional Goal 1 (SLP)     Additional Goal 1, SLP  LTG: Pt will improve cognitive-communication skills to actively participate in care w/ 100% accuracy w/o cues  -CH  --     Time Frame (Additional Goal 1, SLP)  by discharge  -CH  --     Progress/Outcomes (Additional Goal 1, SLP)  continuing progress toward goal  -CH  --       User Key  (r) = Recorded By, (t) = Taken By, (c) = Cosigned By    Initials Name Provider Type    Miesha Gardner MS CCC-SLP Speech and Language Pathologist    Cynthia Wakefield MS CCC-SLP Speech and Language Pathologist                  Time Calculation:     Time Calculation- SLP     Row Name 06/03/21 1344             Time Calculation- SLP    SLP Start Time  1030  -CH      SLP Received On  06/03/21  -CH         Untimed Charges    01830-FO Treatment/ST Modification Prosth Aug Alter   45  -CH      90978-XA Treatment Swallow Minutes  48  -CH         Total Minutes    Untimed Charges Total Minutes  93  -CH       Total Minutes  93  -CH        User Key  (r) = Recorded By, (t) = Taken By, (c) = Cosigned By    Initials Name Provider Type    Miesha Gardner MS CCC-SLP Speech and Language Pathologist          Therapy Charges for Today     Code Description Service Date Service Provider Modifiers Qty    85305847120 HC ST TREATMENT SWALLOW 3 6/3/2021 Miesha Conti MS CCC-SLP GN 1    [id number] HC ST TREATMENT SPEECH 3 6/3/2021 Miehsa Conti MS CCC-SLP GN 1                     Miesha Conti MS CCC-SLP  6/3/2021     Patient was not wearing a face mask and did not exhibit coughing during this therapy encounter.  Procedure performed was aerosolizing, involved close contact (within 6 feet for at least 15 minutes or longer), and did not involve contact with infectious secretions or specimens.  Therapist used appropriate personal protective equipment including gloves, standard procedure mask and eye protection.  Appropriate PPE was worn during the entire therapy session.  Hand hygiene was completed before and after  therapy session.

## 2021-06-03 NOTE — CASE MANAGEMENT/SOCIAL WORK
Case Management Discharge Note      Final Note: Mr. Cotto has an inpatient rehab bed at [facility] today.  Insurance approval received from [employer] per Molly at [facility].  [facility] ambulance scheduled for today at 3:30pm.  PCS form on the chart.  Please call report to [facility] at ph [phone] and have a copy of the transfer summary and AVS in the DC packet.  Thank you.    Provided Post Acute Provider List?: Yes  Post Acute Provider List: Nursing Home  Provided Post Acute Provider Quality & Resource List?: Yes  Post Acute Provider Quality and Resource List: Nursing Home  Delivered To: Patient    Selected Continued Care - Admitted Since 5/30/2021     Destination Coordination complete    Service Provider Selected Services Address Phone Fax Patient Preferred    [facility]  Inpatient Rehabilitation [address] [address] [phone] --          Durable Medical Equipment    No services have been selected for the patient.              Dialysis/Infusion    No services have been selected for the patient.              Home Medical Care    No services have been selected for the patient.              Therapy    No services have been selected for the patient.              Community Resources    No services have been selected for the patient.                  Transportation Services  Ambulance: [facility]    Final Discharge Disposition Code: 62 - inpatient rehab facility

## 2021-06-07 LAB
QT INTERVAL: 338 MS
QTC INTERVAL: 417 MS

## 2021-06-27 ENCOUNTER — READMISSION MANAGEMENT (OUTPATIENT)
Dept: CALL CENTER | Facility: HOSPITAL | Age: 71
End: 2021-06-27

## 2021-06-27 NOTE — OUTREACH NOTE
Prep Survey      Responses   [facility] discharged [facility]?  Non-BH   Is LACE score < 7 ?  Non-BH Discharge   Emergency Room discharge w/ pulse ox?  No   Eligibility  [facility]   Date of Discharge  06/26/21   Discharge Disposition  Home-Health Care Sv   Discharge diagnosis  Acute CVA posterior right internal capsule   Does the patient have one of the following disease processes/diagnoses(primary or secondary)?  Stroke (TIA)   Does the patient have Home health ordered?  Yes   Prep survey completed?  Yes          Keke Diallo RN

## 2021-06-28 ENCOUNTER — TRANSITIONAL CARE MANAGEMENT TELEPHONE ENCOUNTER (OUTPATIENT)
Dept: CALL CENTER | Facility: HOSPITAL | Age: 71
End: 2021-06-28

## 2021-06-28 ENCOUNTER — TELEPHONE (OUTPATIENT)
Dept: FAMILY MEDICINE CLINIC | Facility: CLINIC | Age: 71
End: 2021-06-28

## 2021-06-28 NOTE — OUTREACH NOTE
Call Center TCM Note      Responses   [facility] patient discharged from?  Non-BH   Does the patient have one of the following disease processes/diagnoses(primary or secondary)?  Stroke (TIA)   TCM attempt successful?  No [Verbal release is over a year old however according to case management notes from hospital admission on 6/3/21 son lives with Tip and his wife.]   Unsuccessful attempts  Attempt 1   Call Status  Left message [on patients voicemail.]   Comments regarding PCP  ([facility] d/c f/u appt is on 7/15/21 at 11:00 am however that does not satisfy TCM guidelines.)          Roxy Lucas RN    6/28/2021, 11:49 EDT

## 2021-06-28 NOTE — TELEPHONE ENCOUNTER
He needs to be seen for hospital follow-up since discharge from [facility].  Please find a 30-minute time slot to schedule him for a hospital follow-up.  He needs to be seen within 14 days of discharge 6/26/2021.    Okay for hub to relay the message.

## 2021-06-28 NOTE — TELEPHONE ENCOUNTER
Caller: BRITTANY WITH [facility]     Relationship: [facility]    Best call back number: [phone]    What orders are you requesting (i.e. lab or imaging): SKILLED PHYSICAL THERAPY , OCCUPATIONAL THERAPY, AND SPEECH THERAPY    In what timeframe would the patient need to come in: ASAP    Where will you receive your lab/imaging services: HOME HEALTH    Additional notes:

## 2021-06-28 NOTE — OUTREACH NOTE
Call Center TCM Note      Responses   [facility] patient discharged from?  Non-   Does the patient have one of the following disease processes/diagnoses(primary or secondary)?  Stroke (TIA)   TCM attempt successful?  Yes   Call start time  1318   Call end time  1323   Discharge diagnosis  Acute CVA posterior right internal capsule   Person spoke with today (if not patient) and relationship  Tip-tk   Meds reviewed with patient/caregiver?  Yes   Is the patient having any side effects they believe may be caused by any medication additions or changes?  No   Does the patient have all medications ordered at discharge?  Yes   Is the patient taking all medications as directed (includes completed medication regime)?  Yes   Does the patient have a primary care provider?   Yes   Comments regarding PCP  Hospital d/c f/u is on 7/8/21 at 2:15 pm    Has the patient kept scheduled appointments due by today?  N/A   What is the Home health agency?   Home health   Has home health visited the patient within 72 hours of discharge?  Yes   Home health comments  Present at time of call   Psychosocial issues?  No   Did the patient receive a copy of their discharge instructions?  Yes   Nursing interventions  Reviewed instructions with patient   What is the patient's perception of their health status since discharge?  Improving   Is the patient/caregiver able to teach back signs and symptoms related to disease process for when to call PCP?  Yes   Is the patient/caregiver able to teach back signs and symptoms related to disease process for when to call 911?  Yes   Is the patient/caregiver able to teach back the hierarchy of who to call/visit for symptoms/problems? PCP, Specialist, Home health nurse, Urgent Care, ED, 911  Yes   If the patient is a current smoker, are they able to teach back resources for cessation?  Not a smoker [Former smoker. He quit with hospital admission on 5/30/21.]   TCM call completed?  Yes          Roxy SWIFT  RITA Lucas    6/28/2021, 13:26 EDT

## 2021-07-01 ENCOUNTER — TELEPHONE (OUTPATIENT)
Dept: FAMILY MEDICINE CLINIC | Facility: CLINIC | Age: 71
End: 2021-07-01

## 2021-07-01 NOTE — TELEPHONE ENCOUNTER
[facility] called and wanted verbal consent for continuation of OT and PT.    Phone number: [phone]

## 2021-07-01 NOTE — TELEPHONE ENCOUNTER
Called and left message for Natalie at [facility]. There was no answer. I left  to call office.

## 2021-07-02 NOTE — TELEPHONE ENCOUNTER
Second attempt to contact Natalie at [facility]. Left another voicemail for call back.

## 2021-07-07 ENCOUNTER — TELEPHONE (OUTPATIENT)
Dept: FAMILY MEDICINE CLINIC | Facility: CLINIC | Age: 71
End: 2021-07-07

## 2021-07-07 NOTE — TELEPHONE ENCOUNTER
Caller: TEOFILO WITH [facility]    Relationship: [facility]    Best call back number: [phone]    What was the call regarding: TEOFILO IS NEEDING A VERBAL ORDER FOR SPEECH THERAPY FOR ONCE A WEEK FOR SEVEN WEEKS.    Do you require a callback: YES

## 2021-07-07 NOTE — TELEPHONE ENCOUNTER
Called and spoke to Domitila with [facility] and gave verbal okay per [facility] for speech therapy, once a week for seven weeks. Domitila verbalized understanding and has no further questions at this time.

## 2021-07-08 ENCOUNTER — OFFICE VISIT (OUTPATIENT)
Dept: FAMILY MEDICINE CLINIC | Facility: CLINIC | Age: 71
End: 2021-07-08

## 2021-07-08 VITALS
HEART RATE: 72 BPM | OXYGEN SATURATION: 98 % | SYSTOLIC BLOOD PRESSURE: 120 MMHG | TEMPERATURE: 97.8 F | DIASTOLIC BLOOD PRESSURE: 52 MMHG

## 2021-07-08 DIAGNOSIS — R73.03 PREDIABETES: ICD-10-CM

## 2021-07-08 DIAGNOSIS — R25.2 LEG CRAMPS: ICD-10-CM

## 2021-07-08 DIAGNOSIS — Z87.891 HISTORY OF TOBACCO ABUSE: ICD-10-CM

## 2021-07-08 DIAGNOSIS — I10 ESSENTIAL HYPERTENSION: ICD-10-CM

## 2021-07-08 DIAGNOSIS — B37.0 THRUSH: ICD-10-CM

## 2021-07-08 DIAGNOSIS — R82.998 DARK URINE: ICD-10-CM

## 2021-07-08 DIAGNOSIS — R13.10 DYSPHAGIA, UNSPECIFIED TYPE: ICD-10-CM

## 2021-07-08 DIAGNOSIS — I63.9 CEREBROVASCULAR ACCIDENT (CVA), UNSPECIFIED MECHANISM (HCC): Primary | ICD-10-CM

## 2021-07-08 DIAGNOSIS — G81.94 LEFT HEMIPLEGIA (HCC): ICD-10-CM

## 2021-07-08 PROBLEM — R41.3 MEMORY IMPAIRMENT: Status: ACTIVE | Noted: 2021-07-08

## 2021-07-08 PROBLEM — Z74.09 IMPAIRED MOBILITY: Status: ACTIVE | Noted: 2021-07-08

## 2021-07-08 PROBLEM — R47.1 DYSARTHRIA: Status: ACTIVE | Noted: 2021-07-08

## 2021-07-08 PROCEDURE — 1111F DSCHRG MED/CURRENT MED MERGE: CPT | Performed by: NURSE PRACTITIONER

## 2021-07-08 PROCEDURE — 99495 TRANSJ CARE MGMT MOD F2F 14D: CPT | Performed by: NURSE PRACTITIONER

## 2021-07-08 RX ORDER — LISINOPRIL 10 MG/1
10 TABLET ORAL DAILY
COMMUNITY
End: 2022-08-02 | Stop reason: SDUPTHER

## 2021-07-08 RX ORDER — POTASSIUM CHLORIDE 750 MG/1
10 TABLET, FILM COATED, EXTENDED RELEASE ORAL 2 TIMES DAILY
COMMUNITY
End: 2021-07-23 | Stop reason: SDUPTHER

## 2021-07-08 RX ORDER — METOPROLOL SUCCINATE 25 MG/1
25 TABLET, EXTENDED RELEASE ORAL DAILY
COMMUNITY
End: 2021-07-23 | Stop reason: SDUPTHER

## 2021-07-08 RX ORDER — FAMOTIDINE 20 MG/1
TABLET, FILM COATED ORAL
COMMUNITY
Start: 2021-06-21 | End: 2021-07-23 | Stop reason: SDUPTHER

## 2021-07-08 RX ORDER — BACLOFEN 10 MG/1
TABLET ORAL
COMMUNITY
Start: 2021-06-21 | End: 2021-07-23 | Stop reason: SDUPTHER

## 2021-07-08 NOTE — PROGRESS NOTES
"Transitional Care Follow Up Visit  Subjective     Obed Cotto is a 71 y.o. male who presents for a transitional care management visit.    Within 48 business hours after discharge our office contacted him via telephone to coordinate his care and needs.      I reviewed and discussed the details of that call along with the discharge summary, hospital problems, inpatient lab results, inpatient diagnostic studies, and consultation reports with Obed.     Current outpatient and discharge medications have been reconciled for the patient.  Reviewed by: Yadira Fam, CHANG      Date of TCM Phone Call 6/27/2021   [facility]   Date of Discharge 6/26/2021   Discharge Disposition Home-Health Care Svc     Risk for Readmission (LACE) No data recorded    History of Present Illness   Course During Hospital Stay:  5/30-6/3/21    Presenting Problem:   Stroke (CMS/HCC) [I63.9]           Active Hospital Problems     Diagnosis   POA   • **Suspected cerebrovascular accident (CVA) [R09.89]   Yes   • Stroke (CMS/HCC) [I63.9]   Yes   • Essential hypertension [I10]   Yes   • Leukocytosis [D72.829]   Yes   • Tobacco abuse [Z72.0]   Yes       Resolved Hospital Problems   No resolved problems to display.            Hospital Course:  Obed Cotto is a 71 y.o. male w/ a hx of HTN, HLD, chronic bronchitis, ongoing tobacco use, BPH who presented to the ED w/ c/o left sided weakness.      Acute CVA posterior right internal capsule  Trace subdural hemorrhage R>L along tentorium (per CT head)  -CT head was performed and evaluated by neurosurgery, Dr. Horne, who felt the impression was likely an over read with no subdural hematoma. Repeat CT head today shows \"continued hyperdense asymmetry along right tentorium concerning for SDH with stable to solid decreased appearance from prior.\"    -CTA head/neck shows minimal atherosclerotic disease at the left carotid birfucation, kinking of mid left subclavian " artery, some component of associated stenosis.   -CT cerebral perfusion normal  -MRI Brain-shows small area of acute infarct in posterior limb of right internal capsule.  -ECHO shows EF 65%, mild mitral valve prolapse, NO PFO,   -bedside dysphagia screen failed with thins and straws, s/p FEEs 6/1 and recommended pudding thick  -Neurology evaluated patient.  Recommends DAPT and high intensity statin.  Patient will need follow-up with neurology in 4 to 6 weeks.     Leukocytosis (appears to be chronically elevated)   -WBC appears chronically elevated evidence as far back as 2018.  No evidence of infection on work-up.     HTN  HLD  -Discharged on atorvastatin  -Resumed home metoprolol.  -We will add lisinopril 40 mg daily.  He can follow-up with his PCP for additional blood pressure management.     Hypokalemia  --replacement protocol     Chronic bronchitis/smoker  -Smoking cessation advise given.     Dysphagia  --s/p FEES yesterday and SLP recs pudding thick        BPH  -holding both Proscar and Flomax for now to allow permissive HTN           Discharge Follow Up Recommendations for outpatient labs/diagnostics:  Follow-up with neurology in 4 to 6 weeks  Follow-up with PCP in 2 to 4 weeks     CT Head Without Contrast    Result Date: 6/1/2021  Continued hyperdense asymmetry along the right tentorium concerning for subdural hemorrhage with stable to slightly decreased appearance or prominence from prior. No new intraparenchymal findings or hemorrhage and no intraventricular hemorrhage or hydrocephalus.  DICTATED:   05/31/2021 EDITED/ls :   05/31/2021   This report was finalized on 6/1/2021 12:08 PM by Dr. Jose De Jesus Hutchinson.      CT Angiogram Neck    Result Date: 5/30/2021  1. Minimal atherosclerotic disease at the left carotid bifurcation. No carotid or vertebral stenosis in the neck. No dissection. 2. No intracranial flow-limiting stenosis or vessel cut off. No aneurysm. 3. Please see head CT report dictated separately. 4.  Kinking of the mid left subclavian artery. There does appear to be some component of associated stenosis. The vessel distal to this is normal. Signer Name: Bismark Hurst MD  Signed: 5/30/2021 5:27 AM  Workstation Name: [facility]    MRI Brain Without Contrast    Result Date: 5/30/2021  1. Small area of acute infarct in the posterior limb of the right internal capsule. 2. Atrophy with chronic small vessel ischemic disease in the white matter. Old lacunar infarcts as above. 3. No acute hemorrhage is identified. Findings on the earlier head CT are felt to be secondary to artifact. Signer Name: Bismark Hurst MD  Signed: 5/30/2021 6:13 AM  Workstation Name: [facility]    XR Chest 1 View    Result Date: 6/3/2021  Worsening of the airspace disease in the perihilar regions and left lung base in the interval with development of a small left pleural effusion.  D:  06/02/2021 E:  06/02/2021  This report was finalized on 6/3/2021 5:02 PM by Dr. Lori Parrish MD.      XR Chest 1 View    Result Date: 5/30/2021  No acute cardiopulmonary findings. Signer Name: Bismark Hurst MD  Signed: 5/30/2021 5:29 AM  Workstation Name: [facility]    CT Head Without Contrast Stroke Protocol    Result Date: 5/30/2021  1. Chronic senescent changes in the brain with no evidence of acute infarct. 2. Hyperdensity along the tentorium concerning for a trace amount of subdural hemorrhage, right greater than left. The examination was performed at 0421 hours and results were called by telephone at 5/30/2021 4:26 AM to April, CT technologist who verbally acknowledged these results. Signer Name: Bismark Hurst MD  Signed: 5/30/2021 4:29 AM  Workstation Name: [facility]    CT Angiogram Head w AI Analysis of LVO    Result Date: 5/30/2021  1. Minimal atherosclerotic disease at the left carotid  bifurcation. No carotid or vertebral stenosis in the neck. No dissection. 2. No intracranial flow-limiting stenosis or vessel cut off. No aneurysm. 3. Please see head CT report dictated separately. 4. Kinking of the mid left subclavian artery. There does appear to be some component of associated stenosis. The vessel distal to this is normal. Signer Name: Bismark Hurst MD  Signed: 5/30/2021 5:27 AM  Workstation Name: [facility]  Radiology [facility]    CT CEREBRAL PERFUSION WITH & WITHOUT CONTRAST    Result Date: 5/30/2021  Normal brain perfusion CT. Signer Name: Bismark Hurst MD  Signed: 5/30/2021 5:09 AM  Workstation Name: [facility]  Radiology [facility]    7/8/21:  Pt was in [facility] from 5/30-6/3.  He was discharged to [facility] and was discharged from there on 6/26.  Since he was discharged, [facility] is his home health provider.    He lives with his son and daughter-in-law.  He had gone to [employer] the day he presented to the ED and couldn't feel the left side of his body.  1 week before that it had happened also at the mall.  He spaced out and had to walk very slow.  He felt dizzy and off balance.  The episode at [employer] lasted for about 7-8 minutes.  It got better and then he drove home.  He woke up to go to the bathroom later that night.  When he stood up, he fell over on his dresser.  He managed to make it to the bathroom.  He then called his son who lives in the same house and let them know what was going on.  They called 911 and he was sent to the ED.  He had an MRI that showed small area of acute infarct in posterior limb of right internal capsule.  He had left sided paralysis.  He failed his dysphagia screen and was started on pudding thick liquids.    He is having nectar thick liquids now.  Moist and minced diet.  Speech therapy has come out to the house once and didn't make any changes to his regimen.  The look of the food doesn't look appetizing and it is hard  for him to eat it.  They think he has lost weight, but doesn't know how much.    Pt was able to quit smoking after his stroke.    He does feel like his left sided weakness has been improving.  He has a gayathri-walker.  His son will put a gait belt on him and help him to walk around.  His balance still isn't great. He feels like he isn't getting enough PT since his [facility] discharge.  He would like to see about getting more therapy.      He has a urinal and bedside commode.  He has a shower chair also.  He was having trouble with constipation.  His family gave him Miralax yesterday and he was able to go.  He is worried about taking a shower, because he feels like his shower chair is not very stable.    His [facility] nurse was concerned about his urine as it was very dark.  Pt hasn't been drinking as much fluid as he was previously.       The following portions of the patient's history were reviewed and updated as appropriate: allergies, current medications, past family history, past medical history, past social history, past surgical history and problem list.        Vitals:    07/08/21 1412   BP: 120/52   Pulse: 72   Temp: 97.8 °F (36.6 °C)   SpO2: 98%   Weight: Comment: Pt is unable to step on the scale   PainSc: 0-No pain       Objective   Physical Exam  Vitals reviewed.   Constitutional:       Appearance: Normal appearance.   HENT:      Head: Normocephalic and atraumatic.      Mouth/Throat:      Comments: Speech is slightly slurred.  White coating noted to posterior tongue.  Cardiovascular:      Rate and Rhythm: Normal rate and regular rhythm.      Heart sounds: Normal heart sounds.   Pulmonary:      Effort: Pulmonary effort is normal.      Breath sounds: Normal breath sounds.   Musculoskeletal:      Comments: In wheelchair.  Left hemiplegia.  Pt can raise up left arm and leg with great effort.  It is difficult to bend left knee after it is straightened.  Muscles are very tight.     Skin:     General: Skin  is warm and dry.   Neurological:      General: No focal deficit present.      Mental Status: He is alert and oriented to person, place, and time.   Psychiatric:         Mood and Affect: Mood normal.         Behavior: Behavior normal.         Thought Content: Thought content normal.         Judgment: Judgment normal.         Assessment/Plan   Diagnoses and all orders for this visit:    1. Cerebrovascular accident (CVA), unspecified mechanism (CMS/HCC) (Primary) -MRI showed small area of infarct in the posterior limb of the right internal capsule and atrophy with chronic small vessel ischemic disease in the white matter.  He does have left hemiplegia and dysphagia.  B/p is well controlled.  --Continue Atorvastatin 80mg daily.  --Continue ASA 81mg daily.  --Continue Plavix 75mg daily.  --Keep neuro appt.  --Continue with PT, OT, SLP, and home health.    2. Thrush -likely the cause of the white film on his tongue.  --Start Nystatin 5mL QID x 7 days.  -     nystatin (MYCOSTATIN) 191920 UNIT/ML suspension; Swish and swallow 5 mL 4 (Four) Times a Day.  Dispense: 140 mL; Refill: 0    3. Leg cramps -this started prior to his stroke.  Pt has not had any improvement with muscle relaxers.    --Will check labs for electrolyte abnormalities.  He had hypokalemia in the hospital and was started on supplementation, but it hasn't been rechecked.  -     CBC & Differential  -     Comprehensive Metabolic Panel  -     Magnesium    4. Dark urine -pt unable to urinate during visit.  Provided pt with cup to take home to obtain urine sample.    -     POCT urinalysis dipstick, automated    5. Essential hypertension -under excellent control.  --Continue Lisinopril 10mg daily.  --Continue Metoprolol XL 25mg daily.    6. Prediabetes -last a1c was 5.7%.    --Discussed healthy eating.    7. Dysphagia, unspecified type -continues to be an issue.  He has trouble swallowing the nectar thick liquids.  He has been evaluated by SLP since he has been  home and no changes were made to his regimen.  It is possible that the thrush is causing him issues with swallowing as he feels like there is a coating on his tongue causing his issues with swallowing also.  --Start Nystatin 5mL QID x 7 days.  --Encouraged pt to make sure that he does brush his teeth twice a day also.    --Continue with recommended thin liquid consistency and moist minced food as instructed by SLP.  It is important to keep SLP notified of changes in swallowing.    8. Left hemiplegia (CMS/HCC) -pt has seen an improvement in his strength since going to [facility].  Pt is not happy with the therapy that [facility] has been providing.  Will check with [facility] to see if insurance will cover more frequent PT/OT/SLP.    --Perform exercises as recommended at home.  --Continue to use wheelchair, cane, gait belt as directed by PT.    --Daughter-in-law will check with [facility] to see if he can do an outpatient program as this was something that they had recommended.      7. History of tobacco abuse -pt quit smoking after his stroke.  He has been doing well with this.  Congratulated pt on his success.    Addendum 7/19/21:  8. Stage 2 Decubitus Ulcer on coccyx -this was reported by pt's home health company.  This is currently worsening and pt would benefit from a gel overlay mattress to help keep pressure off of the area and to allow for healing.    Return in about 4 weeks (around 8/5/2021) for Follow-up.    Yadira Fam, CHANG

## 2021-07-09 ENCOUNTER — TELEPHONE (OUTPATIENT)
Dept: FAMILY MEDICINE CLINIC | Facility: CLINIC | Age: 71
End: 2021-07-09

## 2021-07-09 DIAGNOSIS — I63.9 CEREBROVASCULAR ACCIDENT (CVA), UNSPECIFIED MECHANISM (HCC): Primary | ICD-10-CM

## 2021-07-09 PROBLEM — R25.2 LEG CRAMPS: Status: ACTIVE | Noted: 2021-07-09

## 2021-07-09 PROBLEM — Z87.891 HISTORY OF TOBACCO ABUSE: Status: ACTIVE | Noted: 2021-07-09

## 2021-07-09 PROBLEM — G81.94 LEFT HEMIPLEGIA (HCC): Status: ACTIVE | Noted: 2021-07-09

## 2021-07-09 NOTE — TELEPHONE ENCOUNTER
Contacted nurse at [facility] and informed her that the patient's PCP would like for him to have visits twice a week if possible. Amparo states they can definitely  make that happen but would need an order faxed to [phone] stating which services she would like for Obed to have twice a week (PT/OT/Speech/Skilled nursing etc/) & for each specialty to have a specific reason attached so they can get twice a week covered. If any additional questions RITA Christensen can be contacted at [phone].

## 2021-07-12 ENCOUNTER — TELEPHONE (OUTPATIENT)
Dept: FAMILY MEDICINE CLINIC | Facility: CLINIC | Age: 71
End: 2021-07-12

## 2021-07-12 DIAGNOSIS — L89.151 PRESSURE INJURY OF SACRAL REGION, STAGE 1: ICD-10-CM

## 2021-07-12 DIAGNOSIS — L89.301 PRESSURE INJURY OF BUTTOCK, STAGE 1, UNSPECIFIED LATERALITY: ICD-10-CM

## 2021-07-12 DIAGNOSIS — L89.91 DIABETIC PRESSURE ULCER, STAGE 1 (HCC): Primary | ICD-10-CM

## 2021-07-12 DIAGNOSIS — E11.622 DIABETIC PRESSURE ULCER, STAGE 1 (HCC): Primary | ICD-10-CM

## 2021-07-12 NOTE — TELEPHONE ENCOUNTER
Caller: ANISA    Relationship: Other    Best call back number:     What is the best time to reach you: ANYTIME    Who are you requesting to speak with (clinical staff, provider,  specific staff member): DR LAUREN OR CLINICAL STAFF    Do you know the name of the person who called: ANISA    What was the call regarding: SHE ADVISED THAT SHE NEEDS TWO VERBALS: ONE FROM DR LAUREN STATING THAT B CLICK IS SIGNING OFF ON HOME HEALTH ORDERS AND ONE FROM ANIA LIN TO SAY SHE WILL FOLLOW PATIENT ON HOME HEALTH    Do you require a callback: YES

## 2021-07-12 NOTE — TELEPHONE ENCOUNTER
Caller: ANISA    Relationship: [facility]    Best call back number: [phone]    Medication needed:    GEL OVERLAY MATTRESS      When do you need the refill by:TODAY    What additional details did the patient provide when requesting the medication:   HOME HEALTH NURSE STATES THAT PATIENT HAS A STAGE 1 COCCYX AND THE PATIENT WILL NEED A PRESCRIPTION FOR A GEL OVERLAY MATTRESS.    Does the patient have less than a 3 day supply:  [x] Yes  [] No    What is the patient's preferred pharmacy:    [facility]  [address] [place] [address]  PHONE: [phone]  FAX: [phone]    PATIENT HAS BEEN ADVISED THAT THIS REQUEST HAS BEEN MARKED AS A HIGH PRIORITY TO ALLOW 48 HOURS FOR THE CLINICAL TEAM TO FOLLOW UP ON THIS REQUEST,  IF SYMPTOMS WORSENS TO SEEK OUT EMERGENT CARE. PATIENT  FULLY UNDERSTANDS.

## 2021-07-12 NOTE — TELEPHONE ENCOUNTER
Called and spoke to Amparo with [facility]. Amparo stated he is taking all medications in the morning. Amparo verified pt is taking metoprolol succinate 25 mg daily and lisinopril 10 mg daily.    Mattress order has been faxed to [facility] as requested.

## 2021-07-12 NOTE — TELEPHONE ENCOUNTER
[facility] called regarding this patient's referral they received. Pt is currently active with [facility], they will go ahead disregard the referral.

## 2021-07-12 NOTE — TELEPHONE ENCOUNTER
I would like him to stop the lisinopril for now.  Please have home health see the patient again this week.

## 2021-07-12 NOTE — TELEPHONE ENCOUNTER
ANISA CALLED TO INFORM US THAT EVERY TIME PT STANDS HIS BP IS 80 OVER 50. SHE IS INCREASING HIS WATER INTAKE. SHE SAID HE SEEMS FINE SITTING BUT EVERY TIME HE STANDS THE BP DROPS AND HE BECOMES DIZZY. SHE WILL CONTINUE TO MONITOR IT TODAY.

## 2021-07-12 NOTE — TELEPHONE ENCOUNTER
Called and spoke to Amparo with [facility]. Amparo stated it was okay to get both orders from [facility]. Is it okay to give verbal order?

## 2021-07-12 NOTE — TELEPHONE ENCOUNTER
Caller: ANISA    Relationship: [facility]    Best call back number: [phone]    What orders are you requesting (i.e. lab or imaging):  INCREASE FOR OCCUPATIONAL THERAPY/ PHYSICAL THERAPY 2-3 TIMES A WEEK, AND NURSING 2 TIMES A WEEK    In what timeframe would the patient need to come in: AS SOON AS POSSIBLE    Where will you receive your lab/imaging services: IN PATIENT HOME    Additional notes: ANISA STATES THAT SHE HAD RECEIVED A CALL FROM ANIA LIN'S NURSE TO INCREASE THE OCCUPATIONAL/ PHYSICAL THERAPY FOR THE PATIENT THIS WEEK. PATIENT HAS A STAGE 1 COCCYX AND THE PATIENT WILL NEED A PRESCRIPTION FOR A GEL OVERLAY MATTRESS.    PATIENT HAS BEEN ADVISED TO ALLOW 48 HOURS FOR THE CLINICAL TEAM TO FOLLOW UP ON THIS REQUEST,  IF SYMPTOMS WORSENS TO SEEK OUT EMERGENT CARE. PATIENT  FULLY UNDERSTANDS.

## 2021-07-12 NOTE — TELEPHONE ENCOUNTER
Please verify medications.  Is he taking metoprolol succinate 25 daily in the morning or evening.  Is he taking lisinopril 10 mg morning or evening.    Please also fax prescription in the chart for mattress.

## 2021-07-13 NOTE — TELEPHONE ENCOUNTER
Called and spoke with Amparo at home health, relayed information sent through by EDS. Amparo needed nothing else at this time.

## 2021-07-13 NOTE — TELEPHONE ENCOUNTER
Called and spoke with Amparo with home health. I clarified the information sent through by EDS. Amparo verbally understood and needed nothing else at this time.

## 2021-07-13 NOTE — TELEPHONE ENCOUNTER
Yadira corralesun able to signas attending physician but I am supervising physician.  I will sign home health orders.  Please also clarify yesterday's messages to hold the lisinopril and recheck blood pressure.

## 2021-07-15 ENCOUNTER — LAB (OUTPATIENT)
Dept: LAB | Facility: HOSPITAL | Age: 71
End: 2021-07-15

## 2021-07-15 DIAGNOSIS — R82.998 DARK URINE: ICD-10-CM

## 2021-07-15 DIAGNOSIS — R97.20 BPH WITH ELEVATED PSA: ICD-10-CM

## 2021-07-15 DIAGNOSIS — D72.829 LEUKOCYTOSIS, UNSPECIFIED TYPE: Primary | ICD-10-CM

## 2021-07-15 DIAGNOSIS — N40.0 BPH WITH ELEVATED PSA: ICD-10-CM

## 2021-07-16 ENCOUNTER — TELEPHONE (OUTPATIENT)
Dept: FAMILY MEDICINE CLINIC | Facility: CLINIC | Age: 71
End: 2021-07-16

## 2021-07-16 NOTE — TELEPHONE ENCOUNTER
Nurse clinical manager called regarding the request for the patient to be seen more often by [facility]. She states he's already being seen 3-4 times a week between each discipline(OT, speech, and nursing). She states he is not getting any physical therapy because based upon the evaulation there was not a need. She states this is a lot of visits for this patient and wants to know if the provider is wanting to increase that. She states if the PCP would like visits to be increased there has to be a separate order for each discipline and there has to be a diagnosis/need attached to each one individually. I did inform her that the patient's PCP is out of the office this week. Heidi is requesting a callback from clinical staff regarding the matter once PCP returns to office.     Heidi Mcgraw- Clinical Manager [phone] with [facility]

## 2021-07-16 NOTE — TELEPHONE ENCOUNTER
PTS SON CALLED SAYING THAT PATIENT AIDS NEEDS A RECENT OFFICE VISIT NOTE DISCUSSING THE GEL OVERLAY MATRESS

## 2021-07-16 NOTE — TELEPHONE ENCOUNTER
Contacted Amparo with [facility], she states they do need the gel overlay mattress for the patient because his wound is at stage 2. She states she just noticed it recently and it is progressively getting worse. I did inform her that I would send the message over to the pt's PCP but she is out of the office so the office note will have to be update when she returns. Amparo verbalized understanding and did not have any additional questions at this time.

## 2021-07-19 NOTE — TELEPHONE ENCOUNTER
I addended the note to include need for gel mattress.  Please fax to [employer] [facility] [employer].

## 2021-07-19 NOTE — TELEPHONE ENCOUNTER
He is not eating much d/t his continued trouble swallowing and the food not being appetizing.  He doesn't feel like they are working much with him on walking which was very important to him.  I think that if we could work more on this, it would help the patient.  How has his blood pressure been doing since holding the medication?

## 2021-07-20 DIAGNOSIS — N30.00 ACUTE CYSTITIS WITHOUT HEMATURIA: Primary | ICD-10-CM

## 2021-07-20 LAB
APPEARANCE UR: CLEAR
BACTERIA #/AREA URNS HPF: ABNORMAL /[HPF]
BACTERIA UR CULT: ABNORMAL
BILIRUB UR QL STRIP: NEGATIVE
CASTS URNS QL MICRO: ABNORMAL /LPF
COLOR UR: YELLOW
CRYSTALS URNS MICRO: ABNORMAL
EPI CELLS #/AREA URNS HPF: ABNORMAL /HPF (ref 0–10)
GLUCOSE UR QL: NEGATIVE
HGB UR QL STRIP: NEGATIVE
KETONES UR QL STRIP: NEGATIVE
LEUKOCYTE ESTERASE UR QL STRIP: ABNORMAL
MICRO URNS: ABNORMAL
NITRITE UR QL STRIP: NEGATIVE
OTHER ANTIBIOTIC SUSC ISLT: ABNORMAL
PH UR STRIP: 7 [PH] (ref 5–7.5)
PROT UR QL STRIP: ABNORMAL
RBC #/AREA URNS HPF: ABNORMAL /HPF (ref 0–2)
SP GR UR: 1.02 (ref 1–1.03)
UNIDENT CRYS URNS QL MICRO: PRESENT
URINALYSIS REFLEX: ABNORMAL
UROBILINOGEN UR STRIP-MCNC: 1 MG/DL (ref 0.2–1)
WBC #/AREA URNS HPF: ABNORMAL /HPF (ref 0–5)

## 2021-07-20 RX ORDER — NITROFURANTOIN 25; 75 MG/1; MG/1
100 CAPSULE ORAL EVERY 12 HOURS SCHEDULED
Qty: 10 CAPSULE | Refills: 0 | Status: SHIPPED | OUTPATIENT
Start: 2021-07-20 | End: 2021-07-25

## 2021-07-23 ENCOUNTER — TELEPHONE (OUTPATIENT)
Dept: FAMILY MEDICINE CLINIC | Facility: CLINIC | Age: 71
End: 2021-07-23

## 2021-07-23 DIAGNOSIS — I63.9 CEREBROVASCULAR ACCIDENT (CVA), UNSPECIFIED MECHANISM (HCC): Primary | ICD-10-CM

## 2021-07-23 RX ORDER — CLOPIDOGREL BISULFATE 75 MG/1
75 TABLET ORAL DAILY
Qty: 30 TABLET | Refills: 2 | Status: SHIPPED | OUTPATIENT
Start: 2021-07-23 | End: 2021-10-25

## 2021-07-23 RX ORDER — LISINOPRIL 10 MG/1
10 TABLET ORAL DAILY
OUTPATIENT
Start: 2021-07-23

## 2021-07-23 RX ORDER — METOPROLOL SUCCINATE 25 MG/1
25 TABLET, EXTENDED RELEASE ORAL DAILY
Qty: 30 TABLET | Refills: 2 | Status: SHIPPED | OUTPATIENT
Start: 2021-07-23 | End: 2021-11-23

## 2021-07-23 RX ORDER — BACLOFEN 10 MG/1
10 TABLET ORAL 3 TIMES DAILY
Qty: 90 TABLET | Refills: 2 | Status: SHIPPED | OUTPATIENT
Start: 2021-07-23 | End: 2021-10-05 | Stop reason: SDUPTHER

## 2021-07-23 RX ORDER — POTASSIUM CHLORIDE 750 MG/1
10 TABLET, FILM COATED, EXTENDED RELEASE ORAL 2 TIMES DAILY
Qty: 60 TABLET | Refills: 2 | Status: SHIPPED | OUTPATIENT
Start: 2021-07-23 | End: 2021-11-29

## 2021-07-23 RX ORDER — ATORVASTATIN CALCIUM 80 MG/1
80 TABLET, FILM COATED ORAL NIGHTLY
Qty: 30 TABLET | Refills: 2 | Status: SHIPPED | OUTPATIENT
Start: 2021-07-23 | End: 2021-09-03

## 2021-07-23 RX ORDER — FAMOTIDINE 20 MG/1
20 TABLET, FILM COATED ORAL 2 TIMES DAILY
Qty: 60 TABLET | Refills: 2 | Status: SHIPPED | OUTPATIENT
Start: 2021-07-23 | End: 2021-10-26

## 2021-07-23 RX ORDER — ASPIRIN 81 MG/1
81 TABLET ORAL DAILY
Qty: 30 TABLET | Refills: 2 | Status: SHIPPED | OUTPATIENT
Start: 2021-07-23

## 2021-07-23 NOTE — TELEPHONE ENCOUNTER
Attempted to contact patient's son, daughter in law is not on the verbal release. Spoke with patient and he states he will have his son call the office back because he is unsure of what is going on.     Please transfer son to clinical staff when he calls back.

## 2021-07-23 NOTE — TELEPHONE ENCOUNTER
New ordered faxed via [employer] to [facility].    Fax:[phone]   ATTN:CHANELLE

## 2021-07-23 NOTE — TELEPHONE ENCOUNTER
Caller: FAUSTINA CORTES     Relationship: SON      What was the call regarding: FAUSTINA CALLED BACK AND WAS INFORMED OF THE MESSAGE. HE CONFIRMED UNDERSTANDING, HOWEVER, HE DID NOT KNOW THE PATIENT'S RECENT BLOOD PRESSURE READING.

## 2021-07-23 NOTE — TELEPHONE ENCOUNTER
Heidi with [facility] called asking for a verbal order for physical therapy to reevaluate. She states the family called upset and demanding physical therapy so she needs an official order from PCP to go back to the home an evaluate.

## 2021-07-23 NOTE — TELEPHONE ENCOUNTER
Caller: YVETTE CORTES    Relationship: Emergency Contact    Best call back number: [phone]    Medication needed:   Requested Prescriptions     Pending Prescriptions Disp Refills   • atorvastatin (LIPITOR) 80 MG tablet       Sig: Take 1 tablet by mouth Every Night.   • baclofen (LIORESAL) 10 MG tablet     • clopidogrel (PLAVIX) 75 MG tablet 90 tablet      Sig: Take 1 tablet by mouth Daily.   • famotidine (PEPCID) 20 MG tablet     • lisinopril (PRINIVIL,ZESTRIL) 10 MG tablet       Sig: Take 1 tablet by mouth Daily.   • metoprolol succinate XL (TOPROL-XL) 25 MG 24 hr tablet       Sig: Take 1 tablet by mouth Daily.   • potassium chloride 10 MEQ CR tablet       Sig: Take 1 tablet by mouth.   • aspirin (aspirin) 81 MG EC tablet       Sig: Take 1 tablet by mouth Daily.       When do you need the refill by: TODAY    What additional details did the patient provide when requesting the medication: PATIENT HAS TWO DAYS LEFT.  HE WILL NOT MAKE IT THROUGH THE WEEKEND.    Does the patient have less than a 3 day supply:  [x] Yes  [] No    What is the patient's preferred pharmacy: [facility] [address] [phone] [facility] [phone]

## 2021-07-23 NOTE — TELEPHONE ENCOUNTER
Caller: SHIRA    Relationship: DAUGHTER IN LAW    Best call back number: [phone]    Who are you requesting to speak with (clinical staff, provider,  specific staff member): B. CLICK     What was the call regarding: SHIRA REQUESTED A CALL BACK AND WOULD NOT PROVIDE ANY FURTHER INFORMATION ABOUT HER REQUEST    Do you require a callback: YES

## 2021-07-23 NOTE — TELEPHONE ENCOUNTER
Attempted to contact patient's son(on verbal release), no answer.    Hub may relay message below & document.     Please notify patient's son that the requested medications were sent to the pharmacy except the Lisinopril because it was held due to low blood pressure. Please ask for recent blood pressure readings.

## 2021-07-26 LAB
ALBUMIN SERPL-MCNC: 4.3 G/DL (ref 3.7–4.7)
ALBUMIN/GLOB SERPL: 2.2 {RATIO} (ref 1.2–2.2)
ALP SERPL-CCNC: 192 IU/L (ref 48–121)
ALT SERPL-CCNC: 44 IU/L (ref 0–44)
AST SERPL-CCNC: 29 IU/L (ref 0–40)
BASOPHILS # BLD AUTO: NORMAL 10*3/UL
BILIRUB SERPL-MCNC: 0.6 MG/DL (ref 0–1.2)
BUN SERPL-MCNC: 29 MG/DL (ref 8–27)
BUN/CREAT SERPL: 36 (ref 10–24)
CALCIUM SERPL-MCNC: 10.8 MG/DL (ref 8.6–10.2)
CHLORIDE SERPL-SCNC: 103 MMOL/L (ref 96–106)
CO2 SERPL-SCNC: 25 MMOL/L (ref 20–29)
CREAT SERPL-MCNC: 0.81 MG/DL (ref 0.76–1.27)
EOSINOPHIL # BLD AUTO: NORMAL 10*3/UL
EOSINOPHIL NFR BLD AUTO: NORMAL %
GLOBULIN SER CALC-MCNC: 2 G/DL (ref 1.5–4.5)
GLUCOSE SERPL-MCNC: 103 MG/DL (ref 65–99)
HCT VFR BLD AUTO: NORMAL %
HGB BLD-MCNC: NORMAL G/DL
LYMPHOCYTES # BLD AUTO: NORMAL 10*3/UL
LYMPHOCYTES NFR BLD AUTO: NORMAL %
MAGNESIUM SERPL-MCNC: 2.3 MG/DL (ref 1.6–2.3)
MONOCYTES NFR BLD AUTO: NORMAL %
NEUTROPHILS NFR BLD AUTO: NORMAL %
PLATELET # BLD AUTO: NORMAL 10*3/UL
POTASSIUM SERPL-SCNC: 4.7 MMOL/L (ref 3.5–5.2)
PROT SERPL-MCNC: 6.3 G/DL (ref 6–8.5)
RBC # BLD AUTO: NORMAL 10*6/UL
SODIUM SERPL-SCNC: 140 MMOL/L (ref 134–144)
SPECIMEN STATUS: NORMAL
WBC # BLD AUTO: NORMAL X10E3/UL

## 2021-07-26 RX ORDER — ATORVASTATIN CALCIUM 80 MG/1
80 TABLET, FILM COATED ORAL NIGHTLY
Qty: 30 TABLET | Refills: 2 | OUTPATIENT
Start: 2021-07-26

## 2021-07-26 NOTE — TELEPHONE ENCOUNTER
Caller: YVETTE CORTES    Relationship: Emergency Contact    Best call back number: [phone]    Medication needed:   Requested Prescriptions     Pending Prescriptions Disp Refills   • atorvastatin (LIPITOR) 80 MG tablet 30 tablet 2     Sig: Take 1 tablet by mouth Every Night.       What additional details did the patient provide when requesting the medication: PATIENT TOTALLY OUT OF MEDICATION.     Does the patient have less than a 3 day supply:  [x] Yes  [] No    What is the patient's preferred pharmacy: [facility] [address] [phone] [facility] [phone]

## 2021-07-26 NOTE — TELEPHONE ENCOUNTER
Last seen 7/8/21 Next appointment 8/5/21    Spoke with patient to let him know prescription was sent 7/23/21 and to call his pharmacy. Patient understands

## 2021-08-05 ENCOUNTER — OFFICE VISIT (OUTPATIENT)
Dept: FAMILY MEDICINE CLINIC | Facility: CLINIC | Age: 71
End: 2021-08-05

## 2021-08-05 VITALS
SYSTOLIC BLOOD PRESSURE: 122 MMHG | DIASTOLIC BLOOD PRESSURE: 60 MMHG | HEART RATE: 62 BPM | OXYGEN SATURATION: 98 % | HEIGHT: 72 IN | BODY MASS INDEX: 16.15 KG/M2 | WEIGHT: 119.2 LBS

## 2021-08-05 DIAGNOSIS — R13.10 DYSPHAGIA, UNSPECIFIED TYPE: ICD-10-CM

## 2021-08-05 DIAGNOSIS — F32.2 CURRENT SEVERE EPISODE OF MAJOR DEPRESSIVE DISORDER WITHOUT PSYCHOTIC FEATURES WITHOUT PRIOR EPISODE (HCC): ICD-10-CM

## 2021-08-05 DIAGNOSIS — I63.9 CEREBROVASCULAR ACCIDENT (CVA), UNSPECIFIED MECHANISM (HCC): ICD-10-CM

## 2021-08-05 DIAGNOSIS — G81.94 LEFT HEMIPLEGIA (HCC): ICD-10-CM

## 2021-08-05 DIAGNOSIS — N40.1 BENIGN PROSTATIC HYPERPLASIA WITH LOWER URINARY TRACT SYMPTOMS, SYMPTOM DETAILS UNSPECIFIED: Primary | ICD-10-CM

## 2021-08-05 DIAGNOSIS — R63.0 POOR APPETITE: ICD-10-CM

## 2021-08-05 PROCEDURE — 99215 OFFICE O/P EST HI 40 MIN: CPT | Performed by: NURSE PRACTITIONER

## 2021-08-05 RX ORDER — ESCITALOPRAM OXALATE 5 MG/1
5 TABLET ORAL DAILY
Qty: 30 TABLET | Refills: 1 | Status: SHIPPED | OUTPATIENT
Start: 2021-08-05 | End: 2021-09-02 | Stop reason: SDUPTHER

## 2021-08-05 RX ORDER — FINASTERIDE 5 MG/1
5 TABLET, FILM COATED ORAL DAILY
Qty: 90 TABLET | Refills: 1 | Status: SHIPPED | OUTPATIENT
Start: 2021-08-05 | End: 2022-09-02 | Stop reason: SDUPTHER

## 2021-08-05 RX ORDER — TAMSULOSIN HYDROCHLORIDE 0.4 MG/1
1 CAPSULE ORAL NIGHTLY
Qty: 90 CAPSULE | Refills: 1 | Status: SHIPPED | OUTPATIENT
Start: 2021-08-05 | End: 2022-12-19 | Stop reason: SDUPTHER

## 2021-08-05 NOTE — PROGRESS NOTES
"Chief Complaint  Cerebrovascular Accident (Pt and family statest that they have not had any physical therapy. ), Difficulty Swallowing (Pt statest that the diffculty swallowing has not changed since he has been in the hospital. ), Anorexia (Pt family states that he does not want to eat. Pt family states that he is only wanting to drink Ensure just not wanting to eat whole foods. ), Insomnia (Pt family states that the pt has not been able to sleep at all other than when he is in his wheelchair. ), and Shoulder Pain (Pt family states that the pt has had a lot of problems with his left shoulder. )    Subjective          Obed Cotto presents to [facility]   History of Present Illness  When his DIL called and complained about him not getting therapy and they told them that they didn't need it.  They sent someone to re-evaluate him.  Then daughter requested him to have outpatient physical therapy rather than in home.  He is scheduled for next Wednesday.  They think he will be getting SLP and OT.      He is still not eating well.  Food is not appetizing.      He is still doing the exercises that SLP.  He won't do his speech exercises.    He is having left shoulder issues.  His left arm has been hanging down.  The nurse told them to put a pillow between the wheelchair and him to help keep his left arm propped up.      He has a hard time getting comfortable in the bed.  He sleeps with his bed propped up.  He has also been sleeping in his wheelchair in the driveway where it is warm.  He has a lot of thoughts going through his head.  He doesn't nap during the day.  He does keep the TV running lightly at night.  He also keeps a light on beside of his bed.  He had a hard time sleeping at [facility] also.      His urine has cleared up.    Objective   Vital Signs:   /60   Pulse 62   Ht 182.9 cm (72.01\")   Wt 54.1 kg (119 lb 3.2 oz)   SpO2 98%   BMI 16.16 kg/m²       Physical Exam  Vitals " reviewed.   Constitutional:       Appearance: Normal appearance.   HENT:      Head: Normocephalic and atraumatic.   Cardiovascular:      Rate and Rhythm: Normal rate and regular rhythm.      Heart sounds: Normal heart sounds.   Pulmonary:      Effort: Pulmonary effort is normal.      Breath sounds: Normal breath sounds.   Musculoskeletal:      Comments: In wheelchair.  Left hemiplegia.   Skin:     General: Skin is warm and dry.      Comments: No redness noted to coccyx as had been reported by home health nurse.   Neurological:      General: No focal deficit present.      Mental Status: He is alert and oriented to person, place, and time.   Psychiatric:         Mood and Affect: Mood normal.         Behavior: Behavior normal.         Thought Content: Thought content normal.         Judgment: Judgment normal.            PHQ-9 Depression Screening  Little interest or pleasure in doing things? 3   Feeling down, depressed, or hopeless? 3   Trouble falling or staying asleep, or sleeping too much? 3   Feeling tired or having little energy? 3   Poor appetite or overeating? 3   Feeling bad about yourself - or that you are a failure or have let yourself or your family down? 3   Trouble concentrating on things, such as reading the newspaper or watching television? 3   Moving or speaking so slowly that other people could have noticed? Or the opposite - being so fidgety or restless that you have been moving around a lot more than usual? 3   Thoughts that you would be better off dead, or of hurting yourself in some way? 0   PHQ-9 Total Score 24   If you checked off any problems, how difficult have these problems made it for you to do your work, take care of things at home, or get along with other people? Very difficult         Result Review :                 Assessment and Plan    Diagnoses and all orders for this visit:    1. Benign prostatic hyperplasia with lower urinary tract symptoms, symptom details unspecified (Primary)  -stable.  --Continue tamsulosin 0.4 mg daily.  --Continue finasteride 5 mg daily.  -     tamsulosin (FLOMAX) 0.4 MG capsule 24 hr capsule; Take 1 capsule by mouth Every Night.  Dispense: 90 capsule; Refill: 1  -     finasteride (PROSCAR) 5 MG tablet; Take 1 tablet by mouth Daily.  Dispense: 90 tablet; Refill: 1    2. Dysphagia, unspecified type -still a significant issue for pt. he is using the thickener and making nectar thick liquids, but it is still difficult for him to swallow.    --He will be seen by therapy next week and hopefully some changes can be made.  --Will order another barium swallow for further evaluation.  --Encourage patient to continue to use his thickener until he is told by a speech therapist that this can be changed.  -     FL Esophagram Complete Single Contrast; Future    3. Current severe episode of major depressive disorder without psychotic features without prior episode (CMS/HCC) -PHQ-9 Total Score: 24.  Positive.  --Start Lexapro 5 mg daily.  Discussed side effects and agrees to use.  --If symptoms worsen or do not improve, return to clinic.  --With improvement of his mood, hopefully his appetite will increase.  -     escitalopram (Lexapro) 5 MG tablet; Take 1 tablet by mouth Daily.  Dispense: 30 tablet; Refill: 1    4. Poor appetite -this has been an ongoing issue.  He finds that the nectar thick liquids and the mashed food is not appetizing.  His depression is also affecting this.  --We will start Lexapro 5 mg daily.  --We will follow him closely on his weight loss to ensure that he does not lose any more weight.    5. Cerebrovascular accident (CVA), unspecified mechanism (CMS/HCC) -patient does have issues with his left shoulder being difficult to move.  He would benefit from having an arm trough to put on his wheelchair to keep his arm up and flexed.  The physical therapist had recommended that he have pressure on his shoulder to keep it from moving out of place.  --Will order arm  trough.    Will repeat labs at next visit.  Pt did not want to have today.    I spent 54 minutes caring for Obed on this date of service. This time includes time spent by me in the following activities:preparing for the visit, reviewing tests, obtaining and/or reviewing a separately obtained history, performing a medically appropriate examination and/or evaluation , counseling and educating the patient/family/caregiver, ordering medications, tests, or procedures and documenting information in the medical record  Follow Up   Return in about 4 weeks (around 9/2/2021) for Follow-up -30 minutes.  Patient was given instructions and counseling regarding his condition or for health maintenance advice. Please see specific information pulled into the AVS if appropriate.

## 2021-08-11 ENCOUNTER — TREATMENT (OUTPATIENT)
Dept: PHYSICAL THERAPY | Facility: CLINIC | Age: 71
End: 2021-08-11

## 2021-08-11 ENCOUNTER — TRANSCRIBE ORDERS (OUTPATIENT)
Dept: PHYSICAL THERAPY | Facility: CLINIC | Age: 71
End: 2021-08-11

## 2021-08-11 DIAGNOSIS — G81.94 LEFT HEMIPARESIS (HCC): Primary | ICD-10-CM

## 2021-08-11 DIAGNOSIS — I63.9 CEREBROVASCULAR ACCIDENT (CVA), UNSPECIFIED MECHANISM (HCC): Primary | ICD-10-CM

## 2021-08-11 DIAGNOSIS — Z74.09 IMPAIRED FUNCTIONAL MOBILITY, BALANCE, GAIT, AND ENDURANCE: ICD-10-CM

## 2021-08-11 PROCEDURE — 97163 PT EVAL HIGH COMPLEX 45 MIN: CPT | Performed by: PHYSICAL THERAPIST

## 2021-08-11 NOTE — PROGRESS NOTES
Physical Therapy Initial Evaluation and Plan of Care      Patient: Obed Cotto   : 1950  Diagnosis/ICD-10 Code:  Cerebrovascular accident (CVA), unspecified mechanism (CMS/[facility]) [I63.9]  Referring practitioner: CHANG Mathew  Date of Initial Visit: 2021  Today's Date: 2021  Patient seen for 1 sessions      Subjective:     Subjective Questionnaire: KC       Subjective Evaluation    History of Present Illness  Mechanism of injury: Patient reports he had a stroke on May 30. Pt was discharged from [facility] on . Patients son helps him walk occasionally. Patient states he is having issues with his left arm and trouble chewing. Pt reports a swallow test in the near future. Pt reports he uses a gayathri walker as he walks but his son holds onto him. Pt reports he walks about 50 or 60 feet at a time due to not having much room. Pt reports his strength in the left leg is weaker and he has N/T all over occasionally. Patient reports he has a small elevation between the garage and house that his son or grandson push his wheelchair over so he can sit outside on nice days. Pt reports he sleeps in a hospital bed and has some difficulty rolling in bed. He states he spends most of his time in his wheelchair, recliner, or bed. Pt's son reports he helps him getting up from any surface. Pt states he is taking Baclofen but he does not sleep much.     Pain  No pain reported    Social Support  Lives in: one-story house  Lives with: alone (Someone is always with him)    Hand dominance: right             Objective          Static Posture     Head  Forward.    Shoulders  Rounded.    Comments  Patient sits with LUE flexed and held against body    Neurological Testing     Additional Neurological Details  Extensor tone throughout entire LLE    Strength/Myotome Testing     Left Hip   Planes of Motion   Flexion: 1    Right Hip   Planes of Motion   Flexion: 4-    Right Knee   Flexion: 4-  Extension:  4-    Right Ankle/Foot   Dorsiflexion: 4        PT Neuro         Assessment & Plan     Assessment  Impairments: abnormal coordination, abnormal gait, abnormal muscle tone, abnormal or restricted ROM, activity intolerance, impaired balance, impaired physical strength, lacks appropriate home exercise program and safety issue  Assessment details: Patient is a 71 Y.O. male presenting to PT post-CVA with complaints of left sided weakness, difficulty performing ADL's, and requiring assistance to transfer and stand. Pt demonstrated impairments of bed mobility, transferring, and static balance. Patient required mod/max A with transferring from wheelchair to bed and sit to stand and min A with bed mobility. Patient also required mod A in standing to maintain his balance. Patient will require skilled physical therapy for strengthening, balance, transfers, gait and stair training with AD, and endurance to improve his overall functional mobility and QOL. Patient and son were educated on HEP and demonstrated understanding.   Prognosis: fair  Functional Limitations: carrying objects, lifting, walking, moving in bed, standing and stooping  Goals  Plan Goals: ST Goals (6 wks):  1. Pt will demonstrate compliance with initial HEP.  2. Pt will improve KC to >/= to 12/56 for decreased risk of falls.  3. Pt will demonstrate sit to stand with Ad from PT for increased independence.   4. Pt will perform squat pivot transfer from wheelchair to bed with SBA to improve his independence.    LT Goals (12wks):  1. Pt will demonstrate independence with HEP.  2. Pt will improve KC to >/= to 20/56 for decreased risk of falls.  3. Pt will demonstrate sit to stand with SBA from PT for increased independence.  4. Pt will perform stand pivot transfer from chair to bed with SBA with appropriate AD to improve his independence.      Plan  Therapy options: will be seen for skilled physical therapy services  Planned modality interventions: cryotherapy  and TENS  Planned therapy interventions: abdominal trunk stabilization, balance/weight-bearing training, flexibility, gait training, home exercise program, manual therapy, motor coordination training, neuromuscular re-education, strengthening, stretching, therapeutic activities and transfer training  Frequency: 1x week  Duration in weeks: 12  Treatment plan discussed with: patient and family  Plan details: Patient will be seen 1x/wk for 12 wks with treatment to include strengthening, balance, endurance, coordination, manual therapy, AD training, gait and stair training, neuromuscular re-education, and stretching.         Timed:  Manual Therapy:    0     mins  97520;  Therapeutic Exercise:    0     mins  63989;     Neuromuscular Lulu:    0    mins  46850;    Therapeutic Activity:     0     mins  88616;     Gait Trainin     mins  13909;     Electrical Stimulation:    0     mins  78786 ( );    Untimed:  Canalith Repositioning    0     mins 47786    Timed Treatment:   0   mins   Total Treatment:     40   mins    Patrick Chaparro, Physical Therapist Student completed treatment under my direct supervision.     2021    PT SIGNATURE: Fabiana Latham, PT, DPT, MSCS, CDP  [place] License #[id number]  DATE TREATMENT INITIATED: 2021    Medicare Initial Certification Certification Period: 2021  I certify that the therapy services are furnished while this patient is under my care.  The services outlined above are required by this patient, and will be reviewed every 90 days.     PHYSICIAN: Yadira Fam APRN      DATE:     Please sign and return via fax to [phone].   Thank you,   [facility].

## 2021-08-16 ENCOUNTER — TREATMENT (OUTPATIENT)
Dept: PHYSICAL THERAPY | Facility: CLINIC | Age: 71
End: 2021-08-16

## 2021-08-16 DIAGNOSIS — Z74.09 IMPAIRED FUNCTIONAL MOBILITY, BALANCE, GAIT, AND ENDURANCE: ICD-10-CM

## 2021-08-16 DIAGNOSIS — I63.9 CEREBROVASCULAR ACCIDENT (CVA), UNSPECIFIED MECHANISM (HCC): Primary | ICD-10-CM

## 2021-08-16 PROCEDURE — 97530 THERAPEUTIC ACTIVITIES: CPT | Performed by: PHYSICAL THERAPIST

## 2021-08-16 PROCEDURE — 97116 GAIT TRAINING THERAPY: CPT | Performed by: PHYSICAL THERAPIST

## 2021-08-16 NOTE — PROGRESS NOTES
Physical Therapy Daily Progress Note  Visit: 2  Date of Initial Visit: Type: THERAPY  Noted: 2021    Patient: Obed Cotto   : 1950  Diagnosis/ICD-10 Code:  Cerebrovascular accident (CVA), unspecified mechanism (CMS/HCC) [I63.9]  Referring practitioner: CHANG Mathew  Date of Initial Visit: Type: THERAPY  Noted: 2021  Today's Date: 2021  Patient seen for 2 sessions      Subjective:   Patient reports: he feels okay this morning  Pain: 0/10  Clinical Progress: unchanged  Home Program Compliance: Yes  Treatment has included: therapeutic activity and gait training    Objective   See Exercise, Manual, and Modality Logs for complete treatment.    PT Neuro          Assessment & Plan     Assessment  Assessment details: Patient had good tolerance to gait training with min A and required VC's for gait pattern and to step through for a more functional gait. Patient also required min/mod A due to extensor tone for transfers toward the affected side from bed to WC and WC to chair. With a quad cane, patient was able to transfer with min A from therapist. Patient was educated to scoot toward the edge of the chair and angle himself in the direction he would transfer toward. Patient was able to transfer toward the non-affected side with SBA only.     Plan  Plan details: Patient will continue with PT services for transfers, gait mechanics, strengthening, balance, endurance, and coordination.         Timed:  Manual Therapy:            0     mins  52750;  Therapeutic Exercise:    0    mins  61858;     Neuromuscular Lulu:    0    mins  04190;    Therapeutic Activity:      32     mins  59887;     Gait Trainin    mins  67886;     Electrical Stimulation:    0    mins  39312 ( );     Untimed:  Canalith Repositioning techniques _[id number]      Timed Treatment:   40   mins   Total Treatment:     40   mins    Patrick Chaparro, Physical Therapist Student completed treatment under my direct  supervision.     08/16/2021    Fabiana Latham, PT, DPT, MSCS, CDP  [place] License #: [id number]  Physical Therapist

## 2021-08-17 ENCOUNTER — APPOINTMENT (OUTPATIENT)
Dept: PREADMISSION TESTING | Facility: HOSPITAL | Age: 71
End: 2021-08-17

## 2021-08-17 DIAGNOSIS — Z11.52 ENCOUNTER FOR SCREENING FOR COVID-19: Primary | ICD-10-CM

## 2021-08-18 ENCOUNTER — TREATMENT (OUTPATIENT)
Dept: PHYSICAL THERAPY | Facility: CLINIC | Age: 71
End: 2021-08-18

## 2021-08-18 DIAGNOSIS — Z78.9 IMPAIRED MOBILITY AND ADLS: ICD-10-CM

## 2021-08-18 DIAGNOSIS — Z74.09 IMPAIRED MOBILITY AND ADLS: ICD-10-CM

## 2021-08-18 DIAGNOSIS — R29.898 DECREASED GRIP STRENGTH OF LEFT HAND: Primary | ICD-10-CM

## 2021-08-18 DIAGNOSIS — M25.60 LIMITED JOINT RANGE OF MOTION (ROM): ICD-10-CM

## 2021-08-18 DIAGNOSIS — G81.94 LEFT HEMIPARESIS (HCC): ICD-10-CM

## 2021-08-18 PROCEDURE — 97166 OT EVAL MOD COMPLEX 45 MIN: CPT | Performed by: OCCUPATIONAL THERAPIST

## 2021-08-18 NOTE — PROGRESS NOTES
Occupational Therapy Initial Evaluation and Plan of Care      Patient: Obed Cotto   : 1950  Diagnosis/ICD-10 Code:  Decreased  strength of left hand [R29.898]  Referring practitioner: CHANG Mathew  Date of Initial Visit: Type: THERAPY  Noted: 2021  Today's Date: 2021  Patient seen for 1 sessions      Subjective:     Subjective Questionnaire: see functional ROM measurements as not currently able to perform grasp for FMC testing       Subjective Evaluation    History of Present Illness  Mechanism of injury: Pt notes in the past couple of days swelling in left hand along with aching pain in fingers. He has not had this previously. Pt sustained CVA May 30th, 202. Pt states he has considerable tone in the hand and has been constant since CVA occurred. He does not have a splint. Pt reports no sensory deficits in L vs R UE. Denies hypersensitivity to hot/cold.   Pt sleeps in hospital bed w/half rails w/adjustable HOB. Can perform rolling and scooting up in bed using bed rails and feet/RUE. Pt uses urinal on his own but needs assist from [facility]. To transfer from bed pt uses either his w/c or gayathri walker with help from son. Pt has a reacher and uses to assist himself with gayathri dressing. Pt needs assist with UBD and socks/shoes.   Tub shower with tub bench, hand held shower. Pt currently on modified diet but is having f/u MBS this wk.        Objective          Active Range of Motion     Left Elbow   Flexion: 90 degrees   Extension: 45 degrees     Left Wrist   Wrist flexion: 0 degrees   Wrist extension: 10 degrees     Additional Active Range of Motion Details  Able to perform gross grasp in digit flexion but unable to actively perform any digit extension; pt with pain in all digits when OT completing PROM digit extension L digits.     Passive Range of Motion   Left Shoulder   Flexion: 110 degrees     Strength/Myotome Testing     Left Wrist/Hand      (2nd hand position)     Trial 1: 5 lbs    Trial  2: 5 lbs    Trial 3: 5 lbs    Average: 5 lbs      OT Neuro        Assessment & Plan     Assessment  Impairments: abnormal coordination, abnormal gait, activity intolerance, impaired balance, impaired physical strength, lacks appropriate home exercise program and safety issue  Assessment details: Pt presents with deficits related to his stroke that affects daily participation in ADLs/IADLs and transfers, mobility. Pt with very limited use of LUE with trace to no active movement in digits/wrist. Pt to benefit from skilled OT services to address LUE movement along with independence and engagement in ADL tasks. Recommend Laura Splint and Botox.   Prognosis: fair  Functional Limitations: walking, moving in bed and standing  Goals  Plan Goals:     Pt will increase L  strength by 3 # by 8 wks to demonstrate improved strength and function for daily tasks.     Pt will be independent with SROM UE HEP to increase performance in ADL/IADL tasks by 8 wks.     Pt will be independent assist with UB dressing tasks to promote independence and efficiency by 4 wks.     Pt will be independent assist with LBD dressing tasks to promote independence and efficiency by 4 wks.     Pt will be independent assist with UE stretching HEP to promote increased function and improved comfort level during ADL/IADL tasks by 8 wks.       Plan  Planned therapy interventions: ADL retraining, balance/weight-bearing training, fine motor coordination training, flexibility, functional ROM exercises, home exercise program, motor coordination training, neuromuscular re-education, postural training, strengthening, stretching, therapeutic activities and transfer training  Frequency: 1x week  Duration in visits: 12  Treatment plan discussed with: patient and family  Plan details: Est OT POC and goals to reflect above needs and promote improved independence, safety and engagement in daily tasks.         Timed:  Manual Therapy:    0     mins  58005;  Therapeutic  Exercise:    0     mins  74504;     Neuromuscular Lulu:    0    mins  73167;    Therapeutic Activity:     0     mins  48172;     Self-Care/ADL     0     mins  29933;   Sensory Int. Tech      0     mins 73727;  Ultrasound:     0     mins  82748;    Electrical Stimulation:    0     mins  37522 ( );    Untimed:  Electrical Stimulation:    0     mins  09218 ( );    Timed Treatment:   0   mins   Total Treatment:     45   mins    OT Signature: Keara Avila MS, OTR/L, CORINNE  [place] License #: [id number]  DATE TREATMENT INITIATED: 8/18/2021    Initial Certification Certification Period: 11/16/2021  I certify that the therapy services are furnished while this patient is under my care. The services outlined above are required by this patient and will be reviewed every 90 days.     Physician Signature: __________________________________      Please sign and return via fax to [phone]  Thank you,   [facility] Occupational Therapy

## 2021-08-21 ENCOUNTER — HOSPITAL ENCOUNTER (EMERGENCY)
Facility: HOSPITAL | Age: 71
Discharge: HOME | End: 2021-08-21
Attending: EMERGENCY MEDICINE | Admitting: EMERGENCY MEDICINE

## 2021-08-21 ENCOUNTER — APPOINTMENT (OUTPATIENT)
Dept: GENERAL RADIOLOGY | Facility: HOSPITAL | Age: 71
End: 2021-08-21

## 2021-08-21 VITALS
OXYGEN SATURATION: 97 % | BODY MASS INDEX: 16.12 KG/M2 | TEMPERATURE: 98 F | WEIGHT: 119 LBS | SYSTOLIC BLOOD PRESSURE: 136 MMHG | RESPIRATION RATE: 16 BRPM | HEART RATE: 80 BPM | HEIGHT: 72 IN | DIASTOLIC BLOOD PRESSURE: 73 MMHG

## 2021-08-21 DIAGNOSIS — N40.1 BENIGN PROSTATIC HYPERPLASIA WITH URINARY HESITANCY: ICD-10-CM

## 2021-08-21 DIAGNOSIS — Z86.73 HISTORY OF STROKE: ICD-10-CM

## 2021-08-21 DIAGNOSIS — K59.09 CHRONIC CONSTIPATION: ICD-10-CM

## 2021-08-21 DIAGNOSIS — R39.11 BENIGN PROSTATIC HYPERPLASIA WITH URINARY HESITANCY: ICD-10-CM

## 2021-08-21 DIAGNOSIS — K56.41 FECAL IMPACTION (HCC): Primary | ICD-10-CM

## 2021-08-21 LAB
ALBUMIN SERPL-MCNC: 3.9 G/DL (ref 3.5–5.2)
ALBUMIN/GLOB SERPL: 1.5 G/DL
ALP SERPL-CCNC: 131 U/L (ref 39–117)
ALT SERPL W P-5'-P-CCNC: 23 U/L (ref 1–41)
ANION GAP SERPL CALCULATED.3IONS-SCNC: 11 MMOL/L (ref 5–15)
AST SERPL-CCNC: 22 U/L (ref 1–40)
BACTERIA UR QL AUTO: ABNORMAL /HPF
BASOPHILS # BLD AUTO: 0.07 10*3/MM3 (ref 0–0.2)
BASOPHILS NFR BLD AUTO: 0.4 % (ref 0–1.5)
BILIRUB SERPL-MCNC: 0.7 MG/DL (ref 0–1.2)
BILIRUB UR QL STRIP: NEGATIVE
BUN SERPL-MCNC: 20 MG/DL (ref 8–23)
BUN/CREAT SERPL: 33.3 (ref 7–25)
CALCIUM SPEC-SCNC: 10.4 MG/DL (ref 8.6–10.5)
CHLORIDE SERPL-SCNC: 102 MMOL/L (ref 98–107)
CLARITY UR: ABNORMAL
CO2 SERPL-SCNC: 26 MMOL/L (ref 22–29)
COD CRY URNS QL: ABNORMAL /HPF
COLOR UR: ABNORMAL
CREAT SERPL-MCNC: 0.6 MG/DL (ref 0.76–1.27)
D-LACTATE SERPL-SCNC: 1.8 MMOL/L (ref 0.5–2)
DEPRECATED RDW RBC AUTO: 51.3 FL (ref 37–54)
EOSINOPHIL # BLD AUTO: 0.05 10*3/MM3 (ref 0–0.4)
EOSINOPHIL NFR BLD AUTO: 0.3 % (ref 0.3–6.2)
ERYTHROCYTE [DISTWIDTH] IN BLOOD BY AUTOMATED COUNT: 14.3 % (ref 12.3–15.4)
GFR SERPL CREATININE-BSD FRML MDRD: 132 ML/MIN/1.73
GLOBULIN UR ELPH-MCNC: 2.6 GM/DL
GLUCOSE SERPL-MCNC: 124 MG/DL (ref 65–99)
GLUCOSE UR STRIP-MCNC: NEGATIVE MG/DL
HCT VFR BLD AUTO: 34.4 % (ref 37.5–51)
HGB BLD-MCNC: 11.6 G/DL (ref 13–17.7)
HGB UR QL STRIP.AUTO: NEGATIVE
HYALINE CASTS UR QL AUTO: ABNORMAL /LPF
IMM GRANULOCYTES # BLD AUTO: 0.08 10*3/MM3 (ref 0–0.05)
IMM GRANULOCYTES NFR BLD AUTO: 0.4 % (ref 0–0.5)
KETONES UR QL STRIP: ABNORMAL
LEUKOCYTE ESTERASE UR QL STRIP.AUTO: ABNORMAL
LIPASE SERPL-CCNC: 21 U/L (ref 13–60)
LYMPHOCYTES # BLD AUTO: 2.57 10*3/MM3 (ref 0.7–3.1)
LYMPHOCYTES NFR BLD AUTO: 13.7 % (ref 19.6–45.3)
MCH RBC QN AUTO: 32.8 PG (ref 26.6–33)
MCHC RBC AUTO-ENTMCNC: 33.7 G/DL (ref 31.5–35.7)
MCV RBC AUTO: 97.2 FL (ref 79–97)
MONOCYTES # BLD AUTO: 1.32 10*3/MM3 (ref 0.1–0.9)
MONOCYTES NFR BLD AUTO: 7.1 % (ref 5–12)
NEUTROPHILS NFR BLD AUTO: 14.63 10*3/MM3 (ref 1.7–7)
NEUTROPHILS NFR BLD AUTO: 78.1 % (ref 42.7–76)
NITRITE UR QL STRIP: NEGATIVE
NRBC BLD AUTO-RTO: 0 /100 WBC (ref 0–0.2)
PH UR STRIP.AUTO: 5.5 [PH] (ref 5–8)
PLATELET # BLD AUTO: 329 10*3/MM3 (ref 140–450)
PMV BLD AUTO: 10.4 FL (ref 6–12)
POTASSIUM SERPL-SCNC: 4 MMOL/L (ref 3.5–5.2)
PROCALCITONIN SERPL-MCNC: 0.06 NG/ML (ref 0–0.25)
PROT SERPL-MCNC: 6.5 G/DL (ref 6–8.5)
PROT UR QL STRIP: ABNORMAL
RBC # BLD AUTO: 3.54 10*6/MM3 (ref 4.14–5.8)
RBC # UR: ABNORMAL /HPF
REF LAB TEST METHOD: ABNORMAL
SODIUM SERPL-SCNC: 139 MMOL/L (ref 136–145)
SP GR UR STRIP: 1.03 (ref 1–1.03)
SQUAMOUS #/AREA URNS HPF: ABNORMAL /HPF
UROBILINOGEN UR QL STRIP: ABNORMAL
WBC # BLD AUTO: 18.72 10*3/MM3 (ref 3.4–10.8)
WBC UR QL AUTO: ABNORMAL /HPF

## 2021-08-21 PROCEDURE — 83690 ASSAY OF LIPASE: CPT | Performed by: EMERGENCY MEDICINE

## 2021-08-21 PROCEDURE — 84145 PROCALCITONIN (PCT): CPT | Performed by: EMERGENCY MEDICINE

## 2021-08-21 PROCEDURE — 81001 URINALYSIS AUTO W/SCOPE: CPT | Performed by: EMERGENCY MEDICINE

## 2021-08-21 PROCEDURE — 87040 BLOOD CULTURE FOR BACTERIA: CPT | Performed by: EMERGENCY MEDICINE

## 2021-08-21 PROCEDURE — 85025 COMPLETE CBC W/AUTO DIFF WBC: CPT | Performed by: EMERGENCY MEDICINE

## 2021-08-21 PROCEDURE — 51702 INSERT TEMP BLADDER CATH: CPT

## 2021-08-21 PROCEDURE — 99284 EMERGENCY DEPT VISIT MOD MDM: CPT

## 2021-08-21 PROCEDURE — 83605 ASSAY OF LACTIC ACID: CPT | Performed by: EMERGENCY MEDICINE

## 2021-08-21 PROCEDURE — 74022 RADEX COMPL AQT ABD SERIES: CPT

## 2021-08-21 PROCEDURE — 80053 COMPREHEN METABOLIC PANEL: CPT | Performed by: EMERGENCY MEDICINE

## 2021-08-21 RX ORDER — SODIUM CHLORIDE 0.9 % (FLUSH) 0.9 %
10 SYRINGE (ML) INJECTION AS NEEDED
Status: DISCONTINUED | OUTPATIENT
Start: 2021-08-21 | End: 2021-08-22 | Stop reason: HOSPADM

## 2021-08-21 RX ORDER — MINERAL OIL 100 G/100G
1 OIL RECTAL ONCE
Status: COMPLETED | OUTPATIENT
Start: 2021-08-21 | End: 2021-08-21

## 2021-08-21 RX ADMIN — MINERAL OIL 1 ENEMA: 100 ENEMA RECTAL at 20:34

## 2021-08-21 RX ADMIN — SODIUM CHLORIDE, POTASSIUM CHLORIDE, SODIUM LACTATE AND CALCIUM CHLORIDE 1000 ML: 600; 310; 30; 20 INJECTION, SOLUTION INTRAVENOUS at 18:14

## 2021-08-21 NOTE — ED PROVIDER NOTES
Subjective   The patient is a 71-year-old male who presents to the ED complaining of constipation.  The patient notes that he had a stroke in May and since then has been having this issue on and off.  He states that his last bowel movement was yesterday but that he passed only very small amount of hard stool and felt that he did not finish defecating.  He has not had any anal pain, bleeding, or diarrhea however he states that he is having some discomfort related to his inability to defecate.  Patient states that his primary care physician has previously treated him for this with Metamucil however he has stopped prescribing this to him and his symptoms returned.  The patient also reports a history of benign prostatic hyperplasia and states that he is supposed to have seen someone for this this year however due to his stroke that appointment was canceled and he has not gone to see them.  He has not had any issues with urination.  Patient denies any other complaints with exception of the constipation.      History provided by:  Patient      Review of Systems   Constitutional: Negative for appetite change.   Respiratory: Negative for shortness of breath.    Cardiovascular: Negative for chest pain.   Gastrointestinal: Positive for constipation. Negative for abdominal pain, anal bleeding, blood in stool, nausea and vomiting.   Endocrine: Negative for polyuria.   Genitourinary: Negative for difficulty urinating.   Neurological: Negative for weakness and light-headedness.   All other systems reviewed and are negative.      Past Medical History:   Diagnosis Date   • Acute ischemic stroke (CMS/HCC) 05/30/2021     posterior limb of the right internal capsule   • BPH with elevated PSA    • Chronic bronchitis (CMS/HCC)    • Elevated blood pressure    • Hyperlipidemia    • Hypertension    • Lacunar infarction (CMS/HCC)    • Noncompliance with medication regimen     pt states he never took his metoprolol   • Prediabetes         Allergies   Allergen Reactions   • Penicillins Hives       Past Surgical History:   Procedure Laterality Date   • FINGER SURGERY      Right hand 4th finger   • FINGER SURGERY      R ring finger, partial amputation repair       Family History   Problem Relation Age of Onset   • Lymphoma Mother        Social History     Socioeconomic History   • Marital status:      Spouse name: Not on file   • Number of children: Not on file   • Years of education: Not on file   • Highest education level: Not on file   Tobacco Use   • Smoking status: Current Every Day Smoker     Packs/day: 1.00     Years: 48.00     Pack years: 48.00     Types: Cigarettes   • Smokeless tobacco: Never Used   Substance and Sexual Activity   • Alcohol use: No   • Drug use: No   • Sexual activity: Defer           Objective   Physical Exam  Vitals and nursing note reviewed.   HENT:      Head: Normocephalic and atraumatic.   Eyes:      Extraocular Movements: Extraocular movements intact.      Conjunctiva/sclera: Conjunctivae normal.      Pupils: Pupils are equal, round, and reactive to light.   Cardiovascular:      Rate and Rhythm: Normal rate and regular rhythm.      Pulses: Normal pulses.      Heart sounds: Normal heart sounds.   Pulmonary:      Effort: Pulmonary effort is normal.      Breath sounds: Normal breath sounds.   Abdominal:      General: Abdomen is flat.      Palpations: Abdomen is soft.   Genitourinary:     Prostate: Enlarged. Not tender.      Comments: Large stool burden.  Musculoskeletal:      Cervical back: Normal range of motion and neck supple.      Comments: Chronic contracture of left upper extremity secondary to stroke   Skin:     General: Skin is warm and dry.   Neurological:      General: No focal deficit present.      Mental Status: He is alert and oriented to person, place, and time.   Psychiatric:         Mood and Affect: Mood normal.         Behavior: Behavior normal.         Procedures           ED Course  ED Course  as of Aug 22 0020   Sat Aug 21, 2021   1826 Personally reviewed the 3 views of the acute abdominal series demonstrating a moderate to large fecal burden within the rectum.  No other emergent findings.  See report radiology for details.   XR Abdomen 2+ VW with Chest 1 VW [RS]   1851 There was one documented abnormal blood pressure which was recheck shortly thereafter and does not appear to be accurate.  Blood pressures have been within high normal or mild hypertension range.    [RS]   2113 Patient manually disimpacted here in the emergency department.  Good results.    [RS]   2113 We will plan to discharge the patient home with symptomatic management to follow-up with his doctor for ongoing care with return to the ER for any concerns. I had a discussion with the patient/family regarding diagnosis, diagnostic results, treatment plan, and medications.  The patient/family indicated understanding of these instructions.  I spent adequate time at the bedside proceeding discharge necessary to personally discuss the aftercare instructions, giving patient education, providing explanations of the results of our evaluations/findings, and my decision making to assure that the patient/family understand the plan of care.  Time was allotted to answer questions at that time and throughout the ED course.  Emphasis was placed on timely follow-up after discharge.  I also discussed the potential for the development of an acute emergent condition requiring further evaluation, admission, or even surgical intervention. I discussed that we found nothing during the visit today indicating the need for further workup, admission, or the presence of an unstable medical condition.  I encouraged the patient to return to the emergency department immediately for ANY concerns, worsening, new complaints, or if symptoms persist and unable to seek follow-up in a timely fashion.  The patient/family expressed understanding and agreement with this plan.      [RS]      ED Course User Index  [RS] Jp Tidwell MD                                           MDM  Number of Diagnoses or Management Options  Benign prostatic hyperplasia with urinary hesitancy  Chronic constipation  Fecal impaction (CMS/HCC)  History of stroke  Diagnosis management comments: Recent Results (from the past 24 hour(s))  -Urinalysis With Microscopic If Indicated (No Culture) - Urine, Clean Catch  Collection Time: 08/21/21  6:02 PM  Specimen: Urine, Clean Catch       Result                      Value             Ref Range           Color, UA                                     Yellow, Straw   Dark Yellow (A)       Appearance, UA              Cloudy (A)        Clear               pH, UA                      5.5               5.0 - 8.0           Specific [place], UA        1.027             1.001 - 1.030       Glucose, UA                 Negative          Negative            Ketones, UA                 Trace (A)         Negative            Bilirubin, UA               Negative          Negative            Blood, UA                   Negative          Negative            Protein, UA                                   Negative        30 mg/dL (1+) (A)       Leuk Esterase, UA           Trace (A)         Negative            Nitrite, UA                 Negative          Negative            Urobilinogen, UA            0.2 E.U./dL       0.2 - 1.0 E.*  -Urinalysis, Microscopic Only - Urine, Clean Catch  Collection Time: 08/21/21  6:02 PM  Specimen: Urine, Clean Catch       Result                      Value             Ref Range           RBC, UA                     7-12 (A)          None Seen, 0*       WBC, UA                     3-5 (A)           None Seen, 0*       Bacteria, UA                1+ (A)            None Seen, T*       Squamous Epithelial Ce*     3-6 (A)           None Seen, 0*       Hyaline Casts, UA           7-12              0 - 6 /LPF          Calcium Oxalate Mar*     Small/1+           None Seen /H*       Methodology                                                   Manual Light Microscopy  -Comprehensive Metabolic Panel  Collection Time: 08/21/21  6:14 PM  Specimen: Blood       Result                      Value             Ref Range           Glucose                     124 (H)           65 - 99 mg/dL       BUN                         20                8 - 23 mg/dL        Creatinine                  0.60 (L)          0.76 - 1.27 *       Sodium                      139               136 - 145 mm*       Potassium                   4.0               3.5 - 5.2 mm*       Chloride                    102               98 - 107 mmo*       CO2                         26.0              22.0 - 29.0 *       Calcium                     10.4              8.6 - 10.5 m*       Total Protein               6.5               6.0 - 8.5 g/*       Albumin                     3.90              3.50 - 5.20 *       ALT (SGPT)                  23                1 - 41 U/L          AST (SGOT)                  22                1 - 40 U/L          Alkaline Phosphatase        131 (H)           39 - 117 U/L        Total Bilirubin             0.7               0.0 - 1.2 mg*       eGFR Non African Amer       132               >60 mL/min/1*       Globulin                    2.6               gm/dL               A/G Ratio                   1.5               g/dL                BUN/Creatinine Ratio        33.3 (H)          7.0 - 25.0          Anion Gap                   11.0              5.0 - 15.0 m*  -Lipase  Collection Time: 08/21/21  6:14 PM  Specimen: Blood       Result                      Value             Ref Range           Lipase                      21                13 - 60 U/L    -Lactic Acid, Plasma  Collection Time: 08/21/21  6:14 PM  Specimen: Blood       Result                      Value             Ref Range           Lactate                     1.8               0.5 - 2.0 mm*  -Procalcitonin  Collection Time:  08/21/21  6:14 PM  Specimen: Blood       Result                      Value             Ref Range           Procalcitonin               0.06              0.00 - 0.25 *  -CBC Auto Differential  Collection Time: 08/21/21  6:14 PM  Specimen: Blood       Result                      Value             Ref Range           WBC                         18.72 (H)         3.40 - 10.80*       RBC                         3.54 (L)          4.14 - 5.80 *       Hemoglobin                  11.6 (L)          13.0 - 17.7 *       Hematocrit                  34.4 (L)          37.5 - 51.0 %       MCV                         97.2 (H)          79.0 - 97.0 *       MCH                         32.8              26.6 - 33.0 *       MCHC                        33.7              31.5 - 35.7 *       RDW                         14.3              12.3 - 15.4 %       RDW-SD                      51.3              37.0 - 54.0 *       MPV                         10.4              6.0 - 12.0 fL       Platelets                   329               140 - 450 10*       Neutrophil %                78.1 (H)          42.7 - 76.0 %       Lymphocyte %                13.7 (L)          19.6 - 45.3 %       Monocyte %                  7.1               5.0 - 12.0 %        Eosinophil %                0.3               0.3 - 6.2 %         Basophil %                  0.4               0.0 - 1.5 %         Immature Grans %            0.4               0.0 - 0.5 %         Neutrophils, Absolute       14.63 (H)         1.70 - 7.00 *       Lymphocytes, Absolute       2.57              0.70 - 3.10 *       Monocytes, Absolute         1.32 (H)          0.10 - 0.90 *       Eosinophils, Absolute       0.05              0.00 - 0.40 *       Basophils, Absolute         0.07              0.00 - 0.20 *       Immature Grans, Absolu*     0.08 (H)          0.00 - 0.05 *       nRBC                        0.0               0.0 - 0.2 /1*  Note: In addition to lab results from this visit, the  labs listed above may include labs taken at another facility or during a different encounter within the last 24 hours. Please correlate lab times with ED admission and discharge times for further clarification of the services performed during this visit.    XR Abdomen 2+ VW with Chest 1 VW   Final Result    The lung fields are clear. There is no effusion or    pneumothorax. Unremarkable heart and mediastinal contours. There is    moderate to large fecal loading of the colon with bowel gas pattern    otherwise nonobstructive. No pneumoperitoneum noted on decubitus    positioning.         This report was finalized on 8/21/2021 6:16 PM by Laith Vidal.          -----------------------------------------------------            08/21/21 08/21/21 08/21/21 08/21/21               2300      2315      2320      2328     -----------------------------------------------------   BP:       136/73                        136/73     BP Location:                              Right arm    Patient Position:                               Sitting     Pulse:                                    80       Resp:                                     16       Temp:                                              TempSrc:                                           SpO2:      93%       94%       94%       97%       Weight:                                            Height:                                           -----------------------------------------------------  Medications  sodium chloride 0.9 % flush 10 mL (has no administration in time range)  lactated ringers bolus 1,000 mL (0 mL Intravenous Stopped 8/21/21 1916)  mineral oil enema 1 enema (1 enema Rectal Given 8/21/21 2034)  ECG/EMG Results (last 24 hours)     ** No results found for the last 24 hours. **      No orders to display         Amount and/or Complexity of Data Reviewed  Clinical lab tests:  reviewed  Tests in the radiology section of CPT®: reviewed  Independent visualization of images, tracings, or specimens: yes        Final diagnoses:   Fecal impaction (CMS/HCC)   Chronic constipation   History of stroke   Benign prostatic hyperplasia with urinary hesitancy       ED Disposition  ED Disposition     ED Disposition Condition Comment    Discharge Stable           Yadira Fam, APRN  [address] [address]  [phone]    Schedule an appointment as soon as possible for a visit       [facility] Emergency Department  [address] [address] [phone]    As needed, If symptoms worsen or ANY concerns.         Medication List      New Prescriptions    psyllium 58.6 % powder  Commonly known as: METAMUCIL  Take  by mouth Daily.           Where to Get Your Medications      These medications were sent to [facility] - [address] [place] [address] - [phone]  - [phone]   [address] [place] [id number]    Phone: [phone]   · psyllium 58.6 % powder          Jp Tidwell MD  08/22/21 0020

## 2021-08-23 ENCOUNTER — TREATMENT (OUTPATIENT)
Dept: PHYSICAL THERAPY | Facility: CLINIC | Age: 71
End: 2021-08-23

## 2021-08-23 ENCOUNTER — HOSPITAL ENCOUNTER (EMERGENCY)
Facility: HOSPITAL | Age: 71
Discharge: HOME | End: 2021-08-23
Attending: EMERGENCY MEDICINE | Admitting: EMERGENCY MEDICINE

## 2021-08-23 VITALS
HEART RATE: 81 BPM | DIASTOLIC BLOOD PRESSURE: 72 MMHG | HEIGHT: 72 IN | BODY MASS INDEX: 16.12 KG/M2 | WEIGHT: 119 LBS | OXYGEN SATURATION: 94 % | RESPIRATION RATE: 16 BRPM | TEMPERATURE: 98.8 F | SYSTOLIC BLOOD PRESSURE: 131 MMHG

## 2021-08-23 DIAGNOSIS — I63.9 CEREBROVASCULAR ACCIDENT (CVA), UNSPECIFIED MECHANISM (HCC): ICD-10-CM

## 2021-08-23 DIAGNOSIS — Z78.9 IMPAIRED MOBILITY AND ADLS: ICD-10-CM

## 2021-08-23 DIAGNOSIS — M25.60 LIMITED JOINT RANGE OF MOTION (ROM): ICD-10-CM

## 2021-08-23 DIAGNOSIS — T83.9XXA FOLEY CATHETER PROBLEM, INITIAL ENCOUNTER (HCC): Primary | ICD-10-CM

## 2021-08-23 DIAGNOSIS — R29.898 DECREASED GRIP STRENGTH OF LEFT HAND: Primary | ICD-10-CM

## 2021-08-23 DIAGNOSIS — Z74.09 IMPAIRED MOBILITY AND ADLS: ICD-10-CM

## 2021-08-23 DIAGNOSIS — G81.94 LEFT HEMIPARESIS (HCC): Primary | ICD-10-CM

## 2021-08-23 DIAGNOSIS — Z74.09 IMPAIRED FUNCTIONAL MOBILITY, BALANCE, GAIT, AND ENDURANCE: ICD-10-CM

## 2021-08-23 PROCEDURE — 99282 EMERGENCY DEPT VISIT SF MDM: CPT

## 2021-08-23 PROCEDURE — 97116 GAIT TRAINING THERAPY: CPT | Performed by: PHYSICAL THERAPIST

## 2021-08-23 PROCEDURE — 97530 THERAPEUTIC ACTIVITIES: CPT | Performed by: OCCUPATIONAL THERAPIST

## 2021-08-23 PROCEDURE — 97112 NEUROMUSCULAR REEDUCATION: CPT | Performed by: PHYSICAL THERAPIST

## 2021-08-23 PROCEDURE — 97110 THERAPEUTIC EXERCISES: CPT | Performed by: OCCUPATIONAL THERAPIST

## 2021-08-23 NOTE — PROGRESS NOTES
"Occupational Therapy Daily Progress Note  Visit: 2  Date of Initial Visit: Type: THERAPY  Noted: 2021      Patient: Obed Cotto   : 1950  Diagnosis/ICD-10 Code:  Decreased  strength of left hand [R29.898]  Referring practitioner: No ref. provider found  Date of Initial Visit: Type: THERAPY  Noted: 2021  Today's Date: 2021  Patient seen for 2 sessions      Subjective:   Patient reports: \"I had to go to the ER this weekend because I was packed up. They placed a urinary catheter and gave me an enema. I think they catheter is leaking though.\"   Pain: 0/10  Subjective Questionnaire: n/a  Clinical Progress: unchanged  Home Program Compliance: Yes  Treatment has included: therapeutic exercise and therapeutic activity    Subjective   Objective     OT Neuro         OT Exercises     Row Name 21 0802             Exercise 1    Exercise Name 1  PROM stretching at elbow  -ST         Exercise 2    Exercise Name 2  PROM stretching at wrist  -ST         Exercise 3    Exercise Name 3  PROM stretching at digits, f/e   -ST        User Key  (r) = Recorded By, (t) = Taken By, (c) = Cosigned By    Initials Name Provider Type    Keara Enriquez OTR Occupational Therapist           Assessment & Plan     Assessment  Assessment details: Pt very distraught and concerned about this urinary catheter. OT placed phone call to pt's PCP d/t leaking however PCP noted that pt must return to ER in order to have it adjusted/addressed. Pt educated on importance of correct catheter placement and prevention of back flow of urine to prevent infection. Pt with increased tone this date d/t stress and pain from catheter. Pt however tolerated stretching well at elbow, wrist and digits.     Plan  Plan details: Continue w/POC and goals as est.         Visit Diagnoses:    ICD-10-CM ICD-9-CM   1. Decreased  strength of left hand  R29.898 729.89   2. Impaired mobility and ADLs  Z74.09 V49.89    Z78.9    3. Limited joint range " of motion (ROM)  M25.60 719.50             Timed:  Manual Therapy:    0     mins  48400;  Therapeutic Exercise:    18     mins  39121;     Neuromuscular Lulu:    0    mins  89475;    Therapeutic Activity:     8     mins  44762;     Self-Care/ADL     0     mins  40955;   Sensory Int. Tech      0     mins 97021;  Ultrasound:     0     mins  17624;    Electrical Stimulation:    0     mins  93117 ( );    Untimed:  Electrical Stimulation:    0     mins  78050 ( );    Timed Treatment:   26   mins   Total Treatment:     26   mins    OT Signature: Keara Avila MS, OTR/L, CDP  [place] License #: [id number]

## 2021-08-23 NOTE — ED PROVIDER NOTES
"Subjective   Mr. De Jesus is a 71-year-old male who presents to the emergency department with problems with his Cee catheter leaking.  The patient states that he was seen in our emergency department last Friday for urinary retention and constipation.  He was disimpacted in the emergency department and had a Cee catheter placed and was discharged home to follow-up with urology.  The patient states that he has had normal bowel movements since his discharge.  This morning, he woke up and there was urine on his briefs and on his bed sheets.  He feels like he is leaking urine around his catheter.  The patient denies any pain.  No fever.  No nausea or vomiting.  The patient requests that the Cee catheter be removed as he now believes he can void since his constipation has resolved.  He notes that he has a history of benign prostatic hypertrophy and is on tamsulosin and finasteride.  No fever or chills.  No back pain.  No abdominal pain, nausea or vomiting.          Review of Systems   Constitutional: Negative for chills and fever.   HENT: Negative for sore throat.    Respiratory: Negative for cough and shortness of breath.    Cardiovascular: Negative for chest pain.   Gastrointestinal: Negative for abdominal pain, diarrhea, nausea and vomiting.   Genitourinary: Negative for flank pain, penile pain and testicular pain.        \"Leaking around urinary catheter\"   Musculoskeletal: Negative for back pain.   Skin: Negative for pallor and rash.   Neurological: Negative for light-headedness and headaches.   Hematological: Negative.    Psychiatric/Behavioral: Negative.        Past Medical History:   Diagnosis Date   • Acute ischemic stroke (CMS/HCC) 05/30/2021     posterior limb of the right internal capsule   • BPH with elevated PSA    • Chronic bronchitis (CMS/HCC)    • Elevated blood pressure    • Hyperlipidemia    • Hypertension    • Lacunar infarction (CMS/HCC)    • Noncompliance with medication regimen     pt states he " never took his metoprolol   • Prediabetes        Allergies   Allergen Reactions   • Penicillins Hives       Past Surgical History:   Procedure Laterality Date   • FINGER SURGERY      Right hand 4th finger   • FINGER SURGERY      R ring finger, partial amputation repair       Family History   Problem Relation Age of Onset   • Lymphoma Mother        Social History     Socioeconomic History   • Marital status:      Spouse name: Not on file   • Number of children: Not on file   • Years of education: Not on file   • Highest education level: Not on file   Tobacco Use   • Smoking status: Current Every Day Smoker     Packs/day: 1.00     Years: 48.00     Pack years: 48.00     Types: Cigarettes   • Smokeless tobacco: Never Used   Substance and Sexual Activity   • Alcohol use: No   • Drug use: No   • Sexual activity: Defer           Objective   Physical Exam  Constitutional:       General: He is not in acute distress.     Appearance: Normal appearance.   HENT:      Nose: Nose normal.      Mouth/Throat:      Mouth: Mucous membranes are moist.   Eyes:      General: No scleral icterus.     Conjunctiva/sclera: Conjunctivae normal.      Pupils: Pupils are equal, round, and reactive to light.   Cardiovascular:      Rate and Rhythm: Normal rate and regular rhythm.      Pulses: Normal pulses.   Pulmonary:      Effort: Pulmonary effort is normal.   Abdominal:      General: Bowel sounds are normal.      Tenderness: There is no abdominal tenderness. There is no guarding.   Genitourinary:     Penis: Normal.       Comments: Cee catheter in place.  No apparent leakage.  About 300 cc of urine in the Cee catheter bag.  Musculoskeletal:         General: Normal range of motion.      Cervical back: Normal range of motion and neck supple.   Skin:     General: Skin is warm and dry.   Neurological:      General: No focal deficit present.      Mental Status: He is alert.   Psychiatric:         Mood and Affect: Mood normal.          Procedures           ED Course      The patient appears in no distress.  He wants his Cee catheter removed and feels that he can void now.  He does not want to wait in the emergency department to see if he can void.  He states that he will return if he has any difficulties.                                     MDM    Final diagnoses:   Cee catheter problem, initial encounter (CMS/[facility])       ED Disposition  ED Disposition     ED Disposition Condition Comment    Discharge Stable           Dieudonne Hartman MD  [address]  [place] [address]  [phone]      call tomorrow for follow up appointment    [facility] Emergency Department  [address] [address] [phone]    If symptoms worsen         Medication List      No changes were made to your prescriptions during this visit.          William Antonio PA  08/23/21 1216

## 2021-08-23 NOTE — DISCHARGE INSTRUCTIONS
Rest.  Plenty of fluids.  Follow-up with Dr. Hartman if you have any ongoing urological concerns.  Return to the emergency department if you have urinary retention or other acute concerns.

## 2021-08-23 NOTE — PROGRESS NOTES
Physical Therapy Daily Progress Note  Visit: 3  Date of Initial Visit: Type: THERAPY  Noted: 2021    Patient: Obed Cotto   : 1950  Diagnosis/ICD-10 Code:  Left hemiparesis (CMS/[facility]) [G81.94]  Referring practitioner: CHANG Mathew  Date of Initial Visit: Type: THERAPY  Noted: 2021  Today's Date: 2021  Patient seen for 3 sessions      Subjective:   Patient reports: he went to the ER this past weekend because he had a urinary blockage. He now has an indwelling catheter, however it is leaking. He would like to have this fixed or looked at but his PCP states he has to return to the ER.   Pain: 0/10  Clinical Progress: unchanged  Home Program Compliance: Yes  Treatment has included: neuromuscular re-education and gait training    Objective   See Exercise, Manual, and Modality Logs for complete treatment.    PT Neuro          Assessment & Plan     Assessment  Assessment details: Patient demonstrates an increase in LLE tone today due to urinary retention issues this weekend with indwelling catheterization. Patient does seem to have catheter issues this morning, as it is leaking and not draining into his bag. Patient's PCP was contacted regarding his catheter. He was instructed to return to ER.     Plan  Plan details: Patient to continue with PT services to improve gait, balance, strength, transfers and overall functional mobility.          Timed:  Manual Therapy:            0     mins  58679;  Therapeutic Exercise:    0    mins  16246;     Neuromuscular Lulu:    30    mins  23388;    Therapeutic Activity:      0     mins  85848;     Gait Training:                 10    mins  68415;     Electrical Stimulation:    0    mins  26118 ( );     Untimed:  Canalith Repositioning techniques _0_ [id number]      Timed Treatment:   40   mins   Total Treatment:     40   mins      Fabiana Latham, PT, DPT, MSCS, CDP  [place] License #: [id number]  Physical Therapist

## 2021-08-26 LAB
BACTERIA SPEC AEROBE CULT: NORMAL
BACTERIA SPEC AEROBE CULT: NORMAL

## 2021-08-30 ENCOUNTER — TREATMENT (OUTPATIENT)
Dept: PHYSICAL THERAPY | Facility: CLINIC | Age: 71
End: 2021-08-30

## 2021-08-30 ENCOUNTER — PATIENT OUTREACH (OUTPATIENT)
Dept: CASE MANAGEMENT | Facility: OTHER | Age: 71
End: 2021-08-30

## 2021-08-30 DIAGNOSIS — Z74.09 IMPAIRED MOBILITY AND ADLS: ICD-10-CM

## 2021-08-30 DIAGNOSIS — I63.9 CEREBROVASCULAR ACCIDENT (CVA), UNSPECIFIED MECHANISM (HCC): Primary | ICD-10-CM

## 2021-08-30 DIAGNOSIS — R29.898 DECREASED GRIP STRENGTH OF LEFT HAND: Primary | ICD-10-CM

## 2021-08-30 DIAGNOSIS — Z74.09 IMPAIRED FUNCTIONAL MOBILITY, BALANCE, GAIT, AND ENDURANCE: ICD-10-CM

## 2021-08-30 DIAGNOSIS — G81.94 LEFT HEMIPARESIS (HCC): ICD-10-CM

## 2021-08-30 DIAGNOSIS — M25.60 LIMITED JOINT RANGE OF MOTION (ROM): ICD-10-CM

## 2021-08-30 DIAGNOSIS — Z78.9 IMPAIRED MOBILITY AND ADLS: ICD-10-CM

## 2021-08-30 PROCEDURE — 97110 THERAPEUTIC EXERCISES: CPT | Performed by: PHYSICAL THERAPIST

## 2021-08-30 PROCEDURE — 97116 GAIT TRAINING THERAPY: CPT | Performed by: PHYSICAL THERAPIST

## 2021-08-30 PROCEDURE — 97530 THERAPEUTIC ACTIVITIES: CPT | Performed by: PHYSICAL THERAPIST

## 2021-08-30 PROCEDURE — 97110 THERAPEUTIC EXERCISES: CPT | Performed by: OCCUPATIONAL THERAPIST

## 2021-08-30 NOTE — OUTREACH NOTE
"Ambulatory Case Management Note    Patient Outreach    Called patient in follow up to ED visits last week, 8/21 and 8/23/21, as patient had fecal impaction and urinary retention, a F/C was placed, then he came back to the ED with the F/C leaking and F/C was removed.  Explained role of Ambulatory  and contact information.  Patient said \"everything is back to normal now\"; has had 2 BM's since the ED visit; is urinating okay, feels like he is emptying his bladder.  Patient said he is eating and drinking fluids (has to thicken fluids) okay.  Said his son assists and makes sure he takes his medications every day.  Said his son and daughter in law live with him, and they drive him to any appts.  Patient said he is going to Out-patient PT and OT at [facility] on [facility], once a week on Mondays.  Reported his left side, arm, hand, and leg, are weaker; he is using a Quad Cane for ambulation.  Patient said his son or dtr-n-law walk beside him for safety, when he is up.  Reminded patient of the ED recommendations.  Advised on drinking plenty of water every day, enough to keep his urine a pale yellow color.  Advised on other measures to help with constipation, like eating plenty of fiber in diet, fresh fruits and vegetables, exercise as tolerated.  Discussed importance of close PCP follow up, as well as Urology f/u.  Patient said he has an appt this week, 9/2, with his PCP, and plans to be there.  He said he has not called his urologist, Dr. RADHA Hartman, yet, but he will call and get a f/u appt.  Patient denied any other needs or concerns for RN-ACM to address.        Amy Alexandra RN  Ambulatory Case Management    8/30/2021, 15:00 EDT    "

## 2021-08-30 NOTE — PROGRESS NOTES
Occupational Therapy Daily Progress Note  Visit: 3  Date of Initial Visit: Type: THERAPY  Noted: 2021      Patient: Obed Cotto Jr.   : 1950  Diagnosis/ICD-10 Code:  Decreased  strength of left hand [R29.898]  Referring practitioner: CHANG Mathew  Date of Initial Visit: Type: THERAPY  Noted: 2021  Today's Date: 2021  Patient seen for 3 sessions      Subjective:   Patient reports: pt had his urinary cath removed in ED after last OT/PT appts-feels better overall.   Pain: 0/10  Subjective Questionnaire: n/a  Clinical Progress: improved  Home Program Compliance: Yes  Treatment has included: therapeutic exercise    Subjective   Objective     OT Neuro         OT Exercises     Row Name 21 0803             Exercise 1    Exercise Name 1  PROM stretching at elbow, full end range into extension, AAROM/AROM into flexion  -ST         Exercise 2    Exercise Name 2  PROM shoulder flexion  -ST         Exercise 3    Exercise Name 3  PROM wrist f/e   -ST         Exercise 4    Exercise Name 4  AAROM/AROM digit adduction, slightly flexed digits  -ST         Exercise 5    Exercise Name 5  PROM extension-digits  -ST         Exercise 6    Exercise Name 6  AROM digit flexion  -ST         Exercise 7    Exercise Name 7  PROM pronation/supination  -ST         Exercise 8    Exercise Name 8  scapular retraction/protraction  -ST        User Key  (r) = Recorded By, (t) = Taken By, (c) = Cosigned By    Initials Name Provider Type    Keara Enriquez OTR Occupational Therapist           Assessment & Plan     Assessment  Assessment details: Pt demonstrates good carryover of stretching with elbow and shoulder f/e and AROM of scapular retractions and protraction/retraction. Pt with difficulty completing abduction of any digits but able to perform adduction with AAROM with each digit w/slightly flexed digits.     Plan  Plan details: Continue POC/goals as est.         Visit Diagnoses:    ICD-10-CM ICD-9-CM    1. Decreased  strength of left hand  R29.898 729.89   2. Impaired mobility and ADLs  Z74.09 V49.89    Z78.9    3. Limited joint range of motion (ROM)  M25.60 719.50             Timed:  Manual Therapy:    0     mins  84886;  Therapeutic Exercise:    53     mins  43271;     Neuromuscular Lulu:    0    mins  29205;    Therapeutic Activity:     0     mins  29946;     Self-Care/ADL     0     mins  88746;   Sensory Int. Tech      0     mins 17819;  Ultrasound:     0     mins  22658;    Electrical Stimulation:    0     mins  61493 ( );    Untimed:  Electrical Stimulation:    0     mins  16098 ( );    Timed Treatment:   53   mins   Total Treatment:     53   mins    OT Signature: Keara Avila MS, OTR/L, CDP  [place] License #: [id number]

## 2021-08-30 NOTE — PROGRESS NOTES
Physical Therapy Daily Progress Note  Visit: 4  Date of Initial Visit: Type: THERAPY  Noted: 2021    Patient: Obed Cotto Jr.   : 1950  Diagnosis/ICD-10 Code:  Cerebrovascular accident (CVA), unspecified mechanism (CMS/HCC) [I63.9]  Referring practitioner: CHANG Mathew  Date of Initial Visit: Type: THERAPY  Noted: 2021  Today's Date: 2021  Patient seen for 4 sessions      Subjective:   Patient reports: pt had his catheter removed last week in the ER.   Pain: 0/10  Clinical Progress: unchanged  Home Program Compliance: Yes  Treatment has included: gait training and there act and there ex     Objective   See Exercise, Manual, and Modality Logs for complete treatment.    PT Neuro   Exercise 1  Exercise Name 1: Nu-step  Equipment/Resistance 1: L5  Time: 6 min   Exercise 2  Exercise Name 2: Gait training with AD  Equipment/Resistance 2: VC for gait pattern and stepping through  Sets/Reps 2: 50 ft total through clinic w/ QC   Exercise 3  Exercise Name 3: 90 deg turns while walking to L and R   Equipment/Resistance 3: min A and mod VC's for correctness   Sets/Reps 3: 3 ea   Exercise 4  Exercise Name 4: sit to stands   Equipment/Resistance 4: VC's for correctness   Sets/Reps 4: 5 x 2 from WC and x 3 from mat table   Exercise 5  Exercise Name 5: mini squats   Sets/Reps 5: 10 w/ UE assist   Exercise 6  Exercise Name 6: weight shift progression   Sets/Reps 6: > 20 ea per exs                    Assessment & Plan     Assessment  Assessment details: Patient with difficulty performing sit to stands and pivots. Pt cued for increased forward leaning to assist with standing. He is, however, fearful of falling forward. He will continue to practice at home with son present. Reviewed with patient again about tone requiring botox for improvement.     Plan  Plan details: Patient to continue with PT services to improve gait, balance, strength, transfers and overall functional mobility.          Timed:  Manual  Therapy:            0     mins  66996;  Therapeutic Exercise:    8    mins  48343;     Neuromuscular Lulu:    0    mins  17467;    Therapeutic Activity:      20     mins  35691;     Gait Training:                 10    mins  52230;     Electrical Stimulation:    0    mins  57203 ( );     Untimed:  Canalith Repositioning techniques _0_ 13155      Timed Treatment:   38   mins   Total Treatment:     38   mins      Fabiana Latham PT, DPT, MSCS, CDP  [place] License #: [id number]  Physical Therapist

## 2021-09-02 ENCOUNTER — LAB (OUTPATIENT)
Dept: LAB | Facility: HOSPITAL | Age: 71
End: 2021-09-02

## 2021-09-02 ENCOUNTER — OFFICE VISIT (OUTPATIENT)
Dept: FAMILY MEDICINE CLINIC | Facility: CLINIC | Age: 71
End: 2021-09-02

## 2021-09-02 VITALS
TEMPERATURE: 97.7 F | BODY MASS INDEX: 16.25 KG/M2 | HEART RATE: 55 BPM | DIASTOLIC BLOOD PRESSURE: 76 MMHG | SYSTOLIC BLOOD PRESSURE: 126 MMHG | HEIGHT: 72 IN | WEIGHT: 120 LBS | OXYGEN SATURATION: 96 %

## 2021-09-02 DIAGNOSIS — F32.2 CURRENT SEVERE EPISODE OF MAJOR DEPRESSIVE DISORDER WITHOUT PSYCHOTIC FEATURES WITHOUT PRIOR EPISODE (HCC): ICD-10-CM

## 2021-09-02 DIAGNOSIS — D72.829 LEUKOCYTOSIS, UNSPECIFIED TYPE: ICD-10-CM

## 2021-09-02 DIAGNOSIS — G81.94 LEFT HEMIPLEGIA (HCC): ICD-10-CM

## 2021-09-02 DIAGNOSIS — R19.5 POSITIVE COLORECTAL CANCER SCREENING USING DNA-BASED STOOL TEST: ICD-10-CM

## 2021-09-02 DIAGNOSIS — I10 ESSENTIAL HYPERTENSION: Primary | ICD-10-CM

## 2021-09-02 DIAGNOSIS — G81.94 LEFT HEMIPLEGIA (HCC): Primary | ICD-10-CM

## 2021-09-02 LAB
ANION GAP SERPL CALCULATED.3IONS-SCNC: 7.5 MMOL/L (ref 5–15)
BUN SERPL-MCNC: 13 MG/DL (ref 8–23)
BUN/CREAT SERPL: 23.2 (ref 7–25)
CALCIUM SPEC-SCNC: 10.5 MG/DL (ref 8.6–10.5)
CHLORIDE SERPL-SCNC: 101 MMOL/L (ref 98–107)
CO2 SERPL-SCNC: 29.5 MMOL/L (ref 22–29)
CREAT SERPL-MCNC: 0.56 MG/DL (ref 0.76–1.27)
GFR SERPL CREATININE-BSD FRML MDRD: 143 ML/MIN/1.73
GLUCOSE SERPL-MCNC: 91 MG/DL (ref 65–99)
POTASSIUM SERPL-SCNC: 4.5 MMOL/L (ref 3.5–5.2)
SODIUM SERPL-SCNC: 138 MMOL/L (ref 136–145)

## 2021-09-02 PROCEDURE — 99214 OFFICE O/P EST MOD 30 MIN: CPT | Performed by: NURSE PRACTITIONER

## 2021-09-02 PROCEDURE — 80048 BASIC METABOLIC PNL TOTAL CA: CPT | Performed by: NURSE PRACTITIONER

## 2021-09-02 RX ORDER — ESCITALOPRAM OXALATE 5 MG/1
5 TABLET ORAL DAILY
Qty: 30 TABLET | Refills: 1 | Status: SHIPPED | OUTPATIENT
Start: 2021-09-02 | End: 2021-10-05 | Stop reason: SDUPTHER

## 2021-09-02 NOTE — PROGRESS NOTES
"Chief Complaint  Cerebrovascular Accident, Depression, and Difficulty Swallowing    Subjective          Obed Cotto Jr. presents to [facility]     History of Present Illness  He has been going to PT/OT outpatient.  They recommended doing Botox injections with Dr. Moreau at [facility] to help with loosening up of his left arm and leg.      His DIL feels like his swallowing is getting better.      The anti-depressants has helped with his eating.  He has been sleeping more in his bed.  His mood is 25% better compared to last time.  He has been eating more than he was previously.      He is pooping once daily.  His stool is still hard.  He has to strain hard to have a BM.  He had to go to the ED and he a fecal impaction.  They gave him an enema and had to dig the stool out.  He is taking Miralax daily.  He is not taking fiber daily.      He had a hard time urinating when he was constipated.  He didn't take any of his pills that day or the next.  The day after the catheter was placed he was able to get it removed and he has been voiding fine.    Objective   Vital Signs:   /76   Pulse 55   Temp 97.7 °F (36.5 °C)   Ht 182.9 cm (72\")   Wt 54.4 kg (120 lb)   SpO2 96%   BMI 16.27 kg/m²       Physical Exam  Vitals reviewed.   Constitutional:       Appearance: Normal appearance.   HENT:      Head: Normocephalic and atraumatic.   Cardiovascular:      Rate and Rhythm: Normal rate and regular rhythm.      Heart sounds: Normal heart sounds.   Pulmonary:      Breath sounds: Normal breath sounds.   Musculoskeletal:      Comments: In wheelchair; left hemiplegia   Skin:     General: Skin is warm and dry.   Neurological:      Mental Status: He is alert and oriented to person, place, and time.   Psychiatric:         Mood and Affect: Mood normal.         Behavior: Behavior normal.         Thought Content: Thought content normal.         Judgment: Judgment normal.            Result Review : "                 Assessment and Plan    Diagnoses and all orders for this visit:    1. Left hemiplegia (CMS/HCC) (Primary) -s/p CVA.  He has noticed an improvement with his left arm.  The fingers of his left hand are not as controlled as they were previously.  One of his therapist has recommended that he receive Botox injections to help loosen the muscles in his left arm and left leg.  Patient is interested in this.  --Referral placed to physical medicine rehab.  --Continue with PT and OT.  -     Ambulatory Referral to Physical Medicine Rehab    2. Leukocytosis, unspecified type -the last 2 checks have showed that his white blood cell count is elevated at 18.  He had blood cultures performed while he was in the hospital and they were negative for growth.  --Will evaluate CBC today.  If white blood cell count continues to be elevated, will refer to hematology.  -     CBC & Differential  -     Cancel: Basic metabolic panel    3. Current severe episode of major depressive disorder without psychotic features without prior episode (CMS/HCC) -depression has been improving.  Daughter-in-law feels like he would benefit from his dose being increased, but patient feels like he is doing well.  Encouraged them to talk about this at home with patient's son and determine what would be the next best step.  They are agreeable to this.  If they decide the dose needs to be increased they will notify provider.  --For now continue Lexapro 5 mg daily.  -     escitalopram (Lexapro) 5 MG tablet; Take 1 tablet by mouth Daily.  Dispense: 30 tablet; Refill: 1    4.  Constipation -likely due to poor oral intake.  He has been taking MiraLAX and his stool has become softer, but is still an issue.  --Continue MiraLAX 1 capful daily.  --Start fiber supplement daily.  --Increase fiber and fluid intake.      Follow Up   Return in about 4 weeks (around 9/30/2021) for Follow-up.  Patient was given instructions and counseling regarding his condition or  for health maintenance advice. Please see specific information pulled into the AVS if appropriate.

## 2021-09-03 ENCOUNTER — OFFICE VISIT (OUTPATIENT)
Dept: NEUROLOGY | Facility: CLINIC | Age: 71
End: 2021-09-03

## 2021-09-03 VITALS — HEART RATE: 55 BPM | OXYGEN SATURATION: 97 % | HEIGHT: 72 IN | BODY MASS INDEX: 16.27 KG/M2

## 2021-09-03 DIAGNOSIS — I63.81 LACUNAR STROKE (HCC): Primary | ICD-10-CM

## 2021-09-03 PROCEDURE — 99214 OFFICE O/P EST MOD 30 MIN: CPT | Performed by: PSYCHIATRY & NEUROLOGY

## 2021-09-03 RX ORDER — ATORVASTATIN CALCIUM 40 MG/1
40 TABLET, FILM COATED ORAL DAILY
Qty: 30 TABLET | Refills: 11 | Status: SHIPPED | OUTPATIENT
Start: 2021-09-03 | End: 2022-08-12

## 2021-09-03 NOTE — PROGRESS NOTES
Subjective:    CC: Obed Cotto Jr. is seen today in consultation at the request of Dr. Rodríguez for Stroke     HPI:  Patient is a 71-year-old male with past medical history of hypertension, hyperlipidemia, history of smoking had sudden onset of left upper and lower extremity weakness in May 2021.  He had onset of symptoms in the afternoon and since symptoms did not improve, he decided to come to the hospital next day at noon.  He was admitted to the hospital and underwent detailed neurology stroke work-up.  MRI brain was done which I reviewed personally which showed acute right basal ganglia stroke.  CT angiogram of brain and neck were reviewed personally as well.  It showed Minimal atherosclerotic disease at the left carotid bifurcation otherwise there was no areas of flow-limiting stenosis or other vascular abnormalities noted.  2D echocardiogram showed normal ejection fraction and valvular function.  He was discharged to inpatient rehab at [facility] and then received home physical therapy.  He reports that home physical therapy did not help much so now he is receiving physical therapy at outpatient [facility] physical therapy.  He has developed spasticity of left upper and lower extremities.  He is able to walk only few steps with the help of a walker.  He is currently on dual antiplatelet treatment.  Lipitor was stopped and [facility] because it dropped his blood pressure significantly.  Lipid panel was within normal limits when he was admitted to the hospital.    The following portions of the patient's history were reviewed today and updated as of 09/03/2021  : allergies, social history and problem list.  This document will be scanned to patient's chart.      Current Outpatient Medications:   •  aspirin (aspirin) 81 MG EC tablet, Take 1 tablet by mouth Daily., Disp: 30 tablet, Rfl: 2  •  baclofen (LIORESAL) 10 MG tablet, Take 1 tablet by mouth 3 (Three) Times a Day., Disp: 90 tablet, Rfl: 2  •   clopidogrel (PLAVIX) 75 MG tablet, Take 1 tablet by mouth Daily., Disp: 30 tablet, Rfl: 2  •  escitalopram (Lexapro) 5 MG tablet, Take 1 tablet by mouth Daily., Disp: 30 tablet, Rfl: 1  •  famotidine (PEPCID) 20 MG tablet, Take 1 tablet by mouth 2 (Two) Times a Day., Disp: 60 tablet, Rfl: 2  •  finasteride (PROSCAR) 5 MG tablet, Take 1 tablet by mouth Daily., Disp: 90 tablet, Rfl: 1  •  lisinopril (PRINIVIL,ZESTRIL) 10 MG tablet, Take 10 mg by mouth Daily., Disp: , Rfl:   •  metoprolol succinate XL (TOPROL-XL) 25 MG 24 hr tablet, Take 1 tablet by mouth Daily., Disp: 30 tablet, Rfl: 2  •  nystatin (MYCOSTATIN) 616271 UNIT/ML suspension, Swish and swallow 5 mL 4 (Four) Times a Day., Disp: 140 mL, Rfl: 0  •  potassium chloride 10 MEQ CR tablet, Take 1 tablet by mouth 2 (Two) Times a Day., Disp: 60 tablet, Rfl: 2  •  psyllium (METAMUCIL) 58.6 % powder, Take  by mouth Daily., Disp: 425 g, Rfl: 0  •  tamsulosin (FLOMAX) 0.4 MG capsule 24 hr capsule, Take 1 capsule by mouth Every Night., Disp: 90 capsule, Rfl: 1  •  atorvastatin (Lipitor) 40 MG tablet, Take 1 tablet by mouth Daily., Disp: 30 tablet, Rfl: 11   Past Medical History:   Diagnosis Date   • Acute ischemic stroke (CMS/HCC) 05/30/2021     posterior limb of the right internal capsule   • BPH with elevated PSA    • Chronic bronchitis (CMS/HCC)    • Elevated blood pressure    • Hyperlipidemia    • Hypertension    • Lacunar infarction (CMS/HCC)    • Noncompliance with medication regimen     pt states he never took his metoprolol   • Prediabetes       Past Surgical History:   Procedure Laterality Date   • FINGER SURGERY      Right hand 4th finger   • FINGER SURGERY      R ring finger, partial amputation repair      Family History   Problem Relation Age of Onset   • Lymphoma Mother    • Dementia Maternal Grandfather       Review of Systems   Constitutional: Negative.    HENT: Negative.    Eyes: Negative.    Respiratory: Negative.    Cardiovascular: Negative.   "  Gastrointestinal: Negative.    Endocrine: Negative.    Genitourinary: Negative.    Musculoskeletal: Positive for gait problem.   Skin: Negative.    Allergic/Immunologic: Negative.    Neurological: Positive for memory problem.   Hematological: Negative.    Psychiatric/Behavioral: Negative.        All other systems reviewed and are negative     Objective:    Pulse 55   Ht 182.9 cm (72\")   SpO2 97%   BMI 16.27 kg/m²     Neurology Exam:    General apperance: NAD.     Mental status: Alert, awake and oriented to time place and person.    Recent and Remote memory: Can recall 3/3 objects at 5 minutes. Can recall historical events.     Attention span and Concentration: Serial 7s: Normal.     Fund of knowledge:  Normal.     Language and Speech: No aphasia or dysarthria.    Naming , Repitition and Comprehension:  Can name objects, repeat a sentence and follow commands. Speech is clear and fluent with good repetition, comprehension, and naming.    Cranial Nerves:   CN II: Visual fields are full. Intact. Fundi - Normal, No papillederma, Pupils - MATHIEU  CN III, IV and VI: Extraocular movements are intact. Normal saccades.   CN V: Facial sensation is intact.   CN VII: Muscles of facial expression reveal no asymmetry. Intact.   CN VIII: Hearing is intact. Whispered voice intact.   CN IX and X: Palate elevates symmetrically. Intact  CN XI: Shoulder shrug is intact.   CN XII: Tongue is midline without evidence of atrophy or fasciculation.     Motor:  Right UE muscle strength 5/5. Normal tone.     Left UE muscle strength 3/5.  Moderate to severe increase in tone noted.     Right LE muscle strength5/5. Normal tone.     Left LE muscle strength 3/5.  Moderate to severe increased tone noted.    Sensory: Normal light touch, vibration and pinprick sensation bilaterally.    DTRs: 2+ bilaterally in upper and lower extremities.    Babinski: Negative bilaterally.    Co-ordination: Normal finger-to-nose, heel to shin B/L.    Rhomberg: " Negative.    Gait: Normal.    Cardiovascular: Regular rate and rhythm without murmur, gallop or rub.    Ophthalmoscopic exam: Normal fundi, no papilledema.    Assessment and Plan:  1. Lacunar stroke (CMS/HCC)  -Patient with history of right basal ganglia lacunar stroke in May 2021 with residual left upper and lower extremity weakness as well as spasticity.  I will be scheduling him for Botox for post stroke spasticity.  I have explained to him that because there is already moderate to severe increase in tone, Botox may help only a little in reducing the tone.  Continue with physical therapy, dual antiplatelet treatment for secondary stroke prevention.  Since Lipitor 80 mg caused him to have side effects, it will be changed to 40 mg daily dose.  I will plan to see him back in 5 weeks for first Botox injection.       Return in about 5 weeks (around 10/8/2021) for Botox.     Spencer Bloom MD

## 2021-09-07 ENCOUNTER — TELEPHONE (OUTPATIENT)
Dept: FAMILY MEDICINE CLINIC | Facility: CLINIC | Age: 71
End: 2021-09-07

## 2021-09-07 DIAGNOSIS — D72.829 LEUKOCYTOSIS, UNSPECIFIED TYPE: Primary | ICD-10-CM

## 2021-09-07 NOTE — TELEPHONE ENCOUNTER
Contacted patient and relayed PCP's message. Pt was upset that he would have to come back in but verbalized understanding and states he will have it done. Pt did not have any additional questions at this time.

## 2021-09-07 NOTE — TELEPHONE ENCOUNTER
Please call patient and let him know that there was an error with his labs.  At their convenience, he will need to come back in and get his lab drawn.

## 2021-09-08 ENCOUNTER — TREATMENT (OUTPATIENT)
Dept: PHYSICAL THERAPY | Facility: CLINIC | Age: 71
End: 2021-09-08

## 2021-09-08 DIAGNOSIS — M25.60 LIMITED JOINT RANGE OF MOTION (ROM): ICD-10-CM

## 2021-09-08 DIAGNOSIS — Z74.09 IMPAIRED FUNCTIONAL MOBILITY, BALANCE, GAIT, AND ENDURANCE: Primary | ICD-10-CM

## 2021-09-08 DIAGNOSIS — R29.898 DECREASED GRIP STRENGTH OF LEFT HAND: Primary | ICD-10-CM

## 2021-09-08 DIAGNOSIS — Z78.9 IMPAIRED MOBILITY AND ADLS: ICD-10-CM

## 2021-09-08 DIAGNOSIS — Z74.09 IMPAIRED MOBILITY AND ADLS: ICD-10-CM

## 2021-09-08 PROCEDURE — 97116 GAIT TRAINING THERAPY: CPT | Performed by: PHYSICAL THERAPIST

## 2021-09-08 PROCEDURE — 97112 NEUROMUSCULAR REEDUCATION: CPT | Performed by: OCCUPATIONAL THERAPIST

## 2021-09-08 PROCEDURE — 97110 THERAPEUTIC EXERCISES: CPT | Performed by: OCCUPATIONAL THERAPIST

## 2021-09-08 PROCEDURE — 97110 THERAPEUTIC EXERCISES: CPT | Performed by: PHYSICAL THERAPIST

## 2021-09-08 PROCEDURE — 97112 NEUROMUSCULAR REEDUCATION: CPT | Performed by: PHYSICAL THERAPIST

## 2021-09-08 NOTE — PROGRESS NOTES
"Occupational Therapy Daily Progress Note  Visit: 4  Date of Initial Visit: Type: THERAPY  Noted: 2021      Patient: Obed Cotto Jr.   : 1950  Diagnosis/ICD-10 Code:  Decreased  strength of left hand [R29.898]  Referring practitioner: CHANG Mathew  Date of Initial Visit: Type: THERAPY  Noted: 2021  Today's Date: 2021  Patient seen for 4 sessions      Subjective:   Patient reports: \"I've been wearing that splint for up to 4 hours a day. It's heavy but I can tell a different in it. Also I'm getting that Botox by Dr. Bloom. It's scheduled.\"  Pain: 0/10  Subjective Questionnaire: n/a  Clinical Progress: improved  Home Program Compliance: Yes  Treatment has included: therapeutic exercise and neuromuscular re-education    Subjective   Objective     OT Neuro         OT Exercises     Row Name 21 0800             Exercise 1    Exercise Name 1  WB'ing onto extended digits mat table with progressive bending of elbow to improve control of elbow f/e and wrist flexion control   -ST         Exercise 2    Exercise Name 2  extended elbows, hands clasped performing shoulder horizontal abd/adduction with tapping mat table to R/L, progression to holding at ML X5 seconds  -ST         Exercise 3    Exercise Name 3  stretching in shoulder FE range using elevated surface to self-guide stretch   -ST        User Key  (r) = Recorded By, (t) = Taken By, (c) = Cosigned By    Initials Name Provider Type    Keara Enriquez OTR Occupational Therapist           Assessment & Plan     Assessment  Assessment details: Pt able to tolerate WB'ing onto LUE w/o pain this date and progressed to pushing self up at varying distances to increase strength and awareness. Trialed varying methods to stretch shoulder into FE. Cuing for UB posture to open chest, increase scapular retraction and hold head/neck erect.     Plan  Plan details: Continue POC/goals as est.         Visit Diagnoses:    ICD-10-CM ICD-9-CM   1. " Decreased  strength of left hand  R29.898 729.89   2. Impaired mobility and ADLs  Z74.09 V49.89    Z78.9    3. Limited joint range of motion (ROM)  M25.60 719.50             Timed:  Manual Therapy:    0     mins  42787;  Therapeutic Exercise:    25     mins  33879;     Neuromuscular Lulu:    15    mins  89012;    Therapeutic Activity:     0     mins  43703;     Self-Care/ADL     0     mins  93199;   Sensory Int. Tech      0     mins 57914;  Ultrasound:     0     mins  40484;    Electrical Stimulation:    0     mins  55073 ( );    Untimed:  Electrical Stimulation:    0     mins  16561 ( );    Timed Treatment:   40   mins   Total Treatment:     40   mins    OT Signature: Keara Avila MS, OTR/L, CDP  [place] License #: [id number]

## 2021-09-08 NOTE — PROGRESS NOTES
"PT Re-Assessment / Re-Certification        Patient: Obed Cotto Jr.   : 1950  Diagnosis/ICD-10 Code:  Impaired functional mobility, balance, gait, and endurance [Z74.09]  Referring practitioner: CHANG Mathew  Date of Initial Visit: Type: THERAPY  Noted: 2021  Today's Date: 2021  Patient seen for 5 sessions      Subjective:   Obed Cotto reports: \"I got my arm brace.\"  Subjective Questionnaire: n/a  Clinical Progress: improved  Home Program Compliance: Yes  Treatment has included: therapeutic exercise, neuromuscular re-education, therapeutic activity, gait training and electrical stimulation    Subjective Evaluation    Pain  No pain reported         Objective     PT Neuro         Assessment & Plan     Assessment  Impairments: abnormal coordination, abnormal gait, abnormal muscle tone, abnormal or restricted ROM, activity intolerance, impaired balance, impaired physical strength, lacks appropriate home exercise program and safety issue  Assessment details: Patient met STG for KC today.  Pt progressing well towards goals but is limited due to L LE tone.  Pt reports being scheduled for a Botox appointment.  Pt requires min to max A with standing dynamic balance activities with L LE WB.  Pt to benefit from skilled PT services to improve gait, balance, strength and overall functional mobility.  Prognosis: fair  Functional Limitations: carrying objects, lifting, walking, moving in bed, standing and stooping  Goals  Plan Goals: ST Goals (6 wks):  1. Pt will demonstrate compliance with initial HEP. ONGOING  2. Pt will improve KC to >/= to 12/56 for decreased risk of falls. MET  3. Pt will demonstrate sit to stand with Ad from PT for increased independence. ONGOING  4. Pt will perform squat pivot transfer from wheelchair to bed with SBA to improve his independence. ONGOING    LT Goals (12wks):  1. Pt will demonstrate independence with HEP. ONGOING  2. Pt will improve KC to >/= to 20/56 for " decreased risk of falls. ONGOING  3. Pt will demonstrate sit to stand with SBA from PT for increased independence. ONGOING  4. Pt will perform stand pivot transfer from chair to bed with SBA with appropriate AD to improve his independence.  ONGOING    Plan  Therapy options: will be seen for skilled physical therapy services  Planned modality interventions: cryotherapy and TENS  Planned therapy interventions: abdominal trunk stabilization, balance/weight-bearing training, flexibility, gait training, home exercise program, manual therapy, motor coordination training, neuromuscular re-education, strengthening, stretching, therapeutic activities and transfer training  Frequency: 1x week  Duration in weeks: 8  Treatment plan discussed with: patient and family  Plan details: Patient will be seen 1x/wk for 8 wks with treatment to include strengthening, balance, endurance, coordination, manual therapy, AD training, gait and stair training, neuromuscular re-education, and stretching.         Visit Diagnoses:    ICD-10-CM ICD-9-CM   1. Impaired functional mobility, balance, gait, and endurance  Z74.09 V49.89       Progress toward previous goals: Partially Met    New Goals  Short-term goals (STG): n/a  Long-term goals (LTG): n/a      Recommendations: Continue as planned  Timeframe: 8 visits  Prognosis to achieve goals: fair    PT Signature: Laura Montanez, PT  [place] License #: [id number]    Based upon review of the patient's progress and continued therapy plan, it is my medical opinion that Obed Cotto should continue physical therapy treatment at [facility] PHYSICAL THERAPY  [facility] [address] [facility] [id number] [phone].    Signature: __________________________________  Yadira Fam APRN    Timed:  Manual Therapy:    0     mins  21098;  Therapeutic Exercise:    15     mins  16219;     Neuromuscular Lulu:    15    mins  48234;    Therapeutic Activity:     0     mins  88030;     Gait  Training:      15     mins  77497;     Ultrasound:     0     mins  13283;    Electrical Stimulation:    0     mins  12602 ( );    Untimed:  Electrical Stimulation:    0     mins  27818 ( );  Mechanical Traction:    0     mins  20204;     Timed Treatment:   45   mins   Total Treatment:     45   mins

## 2021-09-13 ENCOUNTER — TREATMENT (OUTPATIENT)
Dept: PHYSICAL THERAPY | Facility: CLINIC | Age: 71
End: 2021-09-13

## 2021-09-13 DIAGNOSIS — G81.94 LEFT HEMIPARESIS (HCC): ICD-10-CM

## 2021-09-13 DIAGNOSIS — I63.9 CEREBROVASCULAR ACCIDENT (CVA), UNSPECIFIED MECHANISM (HCC): ICD-10-CM

## 2021-09-13 DIAGNOSIS — Z74.09 IMPAIRED FUNCTIONAL MOBILITY, BALANCE, GAIT, AND ENDURANCE: Primary | ICD-10-CM

## 2021-09-13 PROCEDURE — 97116 GAIT TRAINING THERAPY: CPT | Performed by: PHYSICAL THERAPIST

## 2021-09-13 PROCEDURE — 97530 THERAPEUTIC ACTIVITIES: CPT | Performed by: PHYSICAL THERAPIST

## 2021-09-13 PROCEDURE — 97112 NEUROMUSCULAR REEDUCATION: CPT | Performed by: PHYSICAL THERAPIST

## 2021-09-13 NOTE — PROGRESS NOTES
Physical Therapy Daily Progress Note  Visit: 6  Date of Initial Visit: Type: THERAPY  Noted: 2021    Patient: Obed Cotto Jr.   : 1950  Diagnosis/ICD-10 Code:  Impaired functional mobility, balance, gait, and endurance [Z74.09]  Referring practitioner: CHANG Mathew  Date of Initial Visit: Type: THERAPY  Noted: 2021  Today's Date: 2021  Patient seen for 6 sessions      Subjective:   Patient reports: he has botox injections on  with his neurologist.   Pain: 0/10  Clinical Progress: improved  Home Program Compliance: Yes  Treatment has included: neuromuscular re-education and gait training    Objective   See Exercise, Manual, and Modality Logs for complete treatment.    PT Neuro   Exercise 1  Exercise Name 1: Nu-step  Equipment/Resistance 1: L5  Time: 6 min   Exercise 2  Exercise Name 2: standing balance   Time 2: 1 min   Exercise 3  Exercise Name 3: weight shifting -  Exercise 4  Exercise Name 4: R foot on step with weight shift progressing to R foot lift and all weight on L   Equipment/Resistance 4: mod A for balance and assist weight shifting   Sets/Reps 4: > 20  Exercise 5  Exercise Name 5: straddle feet on step with weight shifting from side to side, along with R foot step tap   Equipment/Resistance 5: mod assist for balance and weight shift   Sets/Reps 5: > 20  Exercise 6  Exercise Name 6: sit to stands   Equipment/Resistance 6: VC's for feet apart to keep balance q  Sets/Reps 6: 5  Exercise 7  Exercise Name 7: standing weight shift with cone tap using R foot   Sets/Reps 7: 10  Exercise 8  Exercise Name 8: ambulating 70 ft through clinic with min A     Assessment & Plan     Assessment  Assessment details: Patient continues to report he does not walk enough at home and has difficulty with sit to stands, particularly out of his wheelchair. Patient was educated and cued to keep feet apart in order to maintain balance and increase independence. Patient's gait will improve  with botox injections.     Plan  Plan details: Patient to continue with PT services to improve gait, balance, strength, transfers and overall functional mobility.          Timed:  Manual Therapy:            0     mins  89108;  Therapeutic Exercise:    0    mins  26077;     Neuromuscular Lulu:    29    mins  62312;    Therapeutic Activity:      8     mins  23873;     Gait Trainin    mins  54251;     Electrical Stimulation:    0    mins  77102 ( );     Untimed:  Canalith Repositioning techniques _0_ [id number]      Timed Treatment:   45   mins   Total Treatment:     45   mins      Fabiana Latham, PT, DPT, MSCS, CDP  [place] License #: [id number]  Physical Therapist

## 2021-09-20 ENCOUNTER — TREATMENT (OUTPATIENT)
Dept: PHYSICAL THERAPY | Facility: CLINIC | Age: 71
End: 2021-09-20

## 2021-09-20 DIAGNOSIS — Z74.09 IMPAIRED MOBILITY AND ADLS: ICD-10-CM

## 2021-09-20 DIAGNOSIS — R29.898 DECREASED GRIP STRENGTH OF LEFT HAND: Primary | ICD-10-CM

## 2021-09-20 DIAGNOSIS — Z78.9 IMPAIRED MOBILITY AND ADLS: ICD-10-CM

## 2021-09-20 DIAGNOSIS — M25.60 LIMITED JOINT RANGE OF MOTION (ROM): ICD-10-CM

## 2021-09-20 PROCEDURE — 97530 THERAPEUTIC ACTIVITIES: CPT | Performed by: OCCUPATIONAL THERAPIST

## 2021-09-20 PROCEDURE — 97110 THERAPEUTIC EXERCISES: CPT | Performed by: OCCUPATIONAL THERAPIST

## 2021-09-20 NOTE — PROGRESS NOTES
Occupational Therapy Daily Progress Note  Visit: 5  Date of Initial Visit: Type: THERAPY  Noted: 2021      Patient: Obed Cotto Jr.   : 1950  Diagnosis/ICD-10 Code:  Decreased  strength of left hand [R29.898]  Referring practitioner: CHANG Mathew  Date of Initial Visit: Type: THERAPY  Noted: 2021  Today's Date: 2021  Patient seen for 5 sessions      Subjective:   Patient reports: no complaints; states he has been wearing Laura Splint regularly up to 4 hours at a time and working on hand/arm stretching and exercises  Pain: 0/10  Subjective Questionnaire: n/a  Clinical Progress: improved  Home Program Compliance: Yes  Treatment has included: therapeutic exercise and therapeutic activity    Subjective   Objective     OT Neuro         OT Exercises     Row Name 21 0804             Exercise 1    Exercise Name 1  isolated shoulder FE on table w/cloth LUE, progression to abd/adduction and circles clockwise/counter clockwise   -ST         Exercise 2    Exercise Name 2  stacking cones (min/mod A) LUE from mat to table and back, progression to semi-supine  -ST         Exercise 3    Exercise Name 3  stacking cones (min/mod A) LUE from mat to table and back, progression to semi-supine, progression to ER rotation   -ST         Exercise 4    Exercise Name 4  elbow f/e holding cone L hand simulating cup to mouth   -ST        User Key  (r) = Recorded By, (t) = Taken By, (c) = Cosigned By    Initials Name Provider Type    Keara Enriquez OTR Occupational Therapist           Assessment & Plan     Assessment  Assessment details: Pt improving with controlling supination to neutral while holding cone and with isolating shoulder FE while on table to simulate fxnl daily tasks. Pt continues to have significant difficulty with release of grasp d/t poor digit extension.     Plan  Plan details: Continue POC/goals as est.         Visit Diagnoses:    ICD-10-CM ICD-9-CM   1. Decreased  strength of  left hand  R29.898 729.89   2. Impaired mobility and ADLs  Z74.09 V49.89    Z78.9    3. Limited joint range of motion (ROM)  M25.60 719.50             Timed:  Manual Therapy:    0     mins  45742;  Therapeutic Exercise:    15     mins  04144;     Neuromuscular Lulu:    0    mins  73080;    Therapeutic Activity:     26     mins  83880;     Self-Care/ADL     0     mins  72764;   Sensory Int. Tech      0     mins 82720;  Ultrasound:     0     mins  37967;    Electrical Stimulation:    0     mins  32036 ( );    Untimed:  Electrical Stimulation:    0     mins  73251 ( );    Timed Treatment:   41   mins   Total Treatment:     41   mins    OT Signature: Keara Avila MS, OTR/L, CORINNE  [place] License #: [id number]

## 2021-09-27 ENCOUNTER — TREATMENT (OUTPATIENT)
Dept: PHYSICAL THERAPY | Facility: CLINIC | Age: 71
End: 2021-09-27

## 2021-09-27 DIAGNOSIS — Z74.09 IMPAIRED FUNCTIONAL MOBILITY, BALANCE, GAIT, AND ENDURANCE: Primary | ICD-10-CM

## 2021-09-27 DIAGNOSIS — G81.94 LEFT HEMIPARESIS (HCC): ICD-10-CM

## 2021-09-27 DIAGNOSIS — M25.60 LIMITED JOINT RANGE OF MOTION (ROM): ICD-10-CM

## 2021-09-27 DIAGNOSIS — Z74.09 IMPAIRED MOBILITY AND ADLS: ICD-10-CM

## 2021-09-27 DIAGNOSIS — Z78.9 IMPAIRED MOBILITY AND ADLS: ICD-10-CM

## 2021-09-27 DIAGNOSIS — R29.898 DECREASED GRIP STRENGTH OF LEFT HAND: Primary | ICD-10-CM

## 2021-09-27 DIAGNOSIS — I63.9 CEREBROVASCULAR ACCIDENT (CVA), UNSPECIFIED MECHANISM (HCC): ICD-10-CM

## 2021-09-27 PROCEDURE — 97112 NEUROMUSCULAR REEDUCATION: CPT | Performed by: PHYSICAL THERAPIST

## 2021-09-27 PROCEDURE — 97110 THERAPEUTIC EXERCISES: CPT | Performed by: OCCUPATIONAL THERAPIST

## 2021-09-27 PROCEDURE — 97110 THERAPEUTIC EXERCISES: CPT | Performed by: PHYSICAL THERAPIST

## 2021-09-27 PROCEDURE — 97530 THERAPEUTIC ACTIVITIES: CPT | Performed by: OCCUPATIONAL THERAPIST

## 2021-09-27 NOTE — PROGRESS NOTES
"OT Re-Assessment / Re-Certification        Patient: Obed Cotto Jr.   : 1950  Diagnosis/ICD-10 Code:  Decreased  strength of left hand [R29.898]  Referring practitioner: CHANG Mathew  Date of Initial Visit: Type: THERAPY  Noted: 2021  Today's Date: 2021  Patient seen for 6 sessions      Subjective:   Patient reports: \"I get my Botox the week after next\"  Pain: 0/10  Subjective Questionnaire: see fxnl ROM measurements for details  Clinical Progress: improved  Home Program Compliance: Yes  Treatment has included: therapeutic exercise and therapeutic activity    Subjective   Objective          Active Range of Motion     Left Elbow   Flexion: 115 degrees   Extension: 20 degrees     Left Wrist   Wrist flexion: 40 degrees   Wrist extension: 35 degrees     Passive Range of Motion   Left Shoulder   Flexion: 115 degrees         OT Neuro         Assessment & Plan     Assessment  Impairments: abnormal coordination, abnormal gait, activity intolerance, impaired balance, impaired physical strength, lacks appropriate home exercise program and safety issue  Assessment details: OT re-assessment completed per POC. Pt demonstrates improvement in all L UE ROM measurements and is progressing slowly with strength and control. Pt continues to be challenged by tone, pain and overall weakness from CVA. Pt has been compliant with DynaSplint wear schedule and will receive first round of Botox in 2 wks. Recommend continued skilled OT services to address LUE movement along with independence and engagement in ADL tasks.   Prognosis: fair  Functional Limitations: walking, moving in bed and standing  Goals  Plan Goals:     Pt will increase L  strength by 3 # by 8 wks to demonstrate improved strength and function for daily tasks. Ongoing    Pt will be independent with SROM UE HEP to increase performance in ADL/IADL tasks by 8 wks. Progressing    Pt will be independent assist with UB dressing tasks to promote " independence and efficiency by 4 wks. Progressing    Pt will be independent assist with LBD dressing tasks to promote independence and efficiency by 4 wks. PARTIALLY MET    Pt will be independent assist with UE stretching HEP to promote increased function and improved comfort level during ADL/IADL tasks by 8 wks. PARTIALLY MET      Plan  Planned therapy interventions: ADL retraining, balance/weight-bearing training, fine motor coordination training, flexibility, functional ROM exercises, home exercise program, motor coordination training, neuromuscular re-education, postural training, strengthening, stretching, therapeutic activities and transfer training  Frequency: 1x week  Duration in visits: 8  Treatment plan discussed with: patient and family  Plan details: Continue w/OT POC and goals to reflect above needs and promote improved independence, safety and engagement in daily tasks.       OT Exercises     Row Name 09/27/21 0800             Exercise 1    Exercise Name 1  OT re-assessment per POC  -ST         Exercise 2    Exercise Name 2  grasp and release and cylindrical object L hand; unable to reach full extension however improving    -ST         Exercise 3    Exercise Name 3  grasping object while performing pronation/supination, min A for increasing supination   -ST      Sets 3  2  -ST      Reps 3  10  -ST         Exercise 4    Exercise Name 4  AROM pronation/supination with addition of ER for fxnl reaching LUE  -ST      Sets 4  2  -ST      Reps 4  10  -ST         Exercise 5    Exercise Name 5  AROM shoulder flexion with min A to reach full end range  -ST      Sets 5  2  -ST      Reps 5  10  -ST         Exercise 6    Exercise Name 6  AROM-AAROM-PROM wrist f/e L  -ST      Sets 6  2  -ST      Reps 6  10  -ST         Exercise 7    Sets 7  2  -ST      Reps 7  10  -ST        User Key  (r) = Recorded By, (t) = Taken By, (c) = Cosigned By    Initials Name Provider Type    Keara Enriquez, OTR Occupational  Therapist          Visit Diagnoses:    ICD-10-CM ICD-9-CM   1. Decreased  strength of left hand  R29.898 729.89   2. Impaired mobility and ADLs  Z74.09 V49.89    Z78.9    3. Limited joint range of motion (ROM)  M25.60 719.50       Progress toward previous goals: Partially Met      Recommendations: Continue as planned  Timeframe: 2 months  Prognosis to achieve goals: fair    OT Signature: Keara Avila MS, OTR/L, CDP  [place] License #: [id number]    Based upon review of the patient's progress and continued therapy plan, it is my medical opinion that Obed Cotto should continue occupational therapy treatment at [facility] PHYSICAL THERAPY  [facility] [address]   [facility] [id number] [phone].    Signature: __________________________________  Yadira Fam APRN    Timed:  Manual Therapy:    0     mins  84856;  Therapeutic Exercise:    15     mins  26833;     Neuromuscular Lulu:    0    mins  63610;    Therapeutic Activity:     25     mins  76004;     Self-Care/ADL     0     mins  45148;   Sensory Int. Tech      0     mins 73567;  Ultrasound:     0     mins  76346;    Electrical Stimulation:    0     mins  46925 ( );    Untimed:  Electrical Stimulation:    0     mins  49832 ( );    Timed Treatment:   40   mins   Total Treatment:     40   mins

## 2021-09-27 NOTE — PROGRESS NOTES
Physical Therapy Daily Progress Note  Visit: 7  Date of Initial Visit: Type: THERAPY  Noted: 2021    Patient: Obed Cotto Jr.   : 1950  Diagnosis/ICD-10 Code:  Impaired functional mobility, balance, gait, and endurance [Z74.09]  Referring practitioner: CHANG Mathew  Date of Initial Visit: Type: THERAPY  Noted: 2021  Today's Date: 2021  Patient seen for 7 sessions      Subjective:   Patient reports: he is ready for his botox in 2 weeks.   Pain: 0/10  Clinical Progress: improved  Home Program Compliance: Yes-partially  Treatment has included: therapeutic exercise and neuromuscular re-education    Objective   See Exercise, Manual, and Modality Logs for complete treatment.    PT Neuro          Assessment & Plan     Assessment  Assessment details: Patient demonstrates improved transfers, particularly from sit to stand. Therapist only CGA for sit to stand and stand to sit. Ambulating with transfers today with VC to start turning early prior to sit in order to decrease the amount of steps needed due to patients feet wanting to be close together which causes a decline in balance.     Plan  Plan details: Patient to continue with PT services to improve gait, balance, strength, transfers and overall functional mobility.          Timed:  Manual Therapy:            0     mins  84925;  Therapeutic Exercise:    10    mins  90594;     Neuromuscular Lulu:    20    mins  15224;    Therapeutic Activity:      0     mins  53677;     Gait Trainin    mins  79310;     Electrical Stimulation:    0    mins  31095 ( );     Untimed:  Canalith Repositioning techniques _[id number]_ [id number]      Timed Treatment:   30   mins   Total Treatment:     30   mins      Fabiana Latham, PT, DPT, MSCS, CDP  [place] License #: [id number]  Physical Therapist

## 2021-09-28 ENCOUNTER — APPOINTMENT (OUTPATIENT)
Dept: PREADMISSION TESTING | Facility: HOSPITAL | Age: 71
End: 2021-09-28

## 2021-09-28 LAB — SARS-COV-2 RNA PNL SPEC NAA+PROBE: NOT DETECTED

## 2021-09-28 PROCEDURE — U0004 COV-19 TEST NON-CDC HGH THRU: HCPCS

## 2021-09-28 PROCEDURE — C9803 HOPD COVID-19 SPEC COLLECT: HCPCS

## 2021-10-01 ENCOUNTER — HOSPITAL ENCOUNTER (OUTPATIENT)
Dept: GENERAL RADIOLOGY | Facility: HOSPITAL | Age: 71
Discharge: HOME | End: 2021-10-01

## 2021-10-01 ENCOUNTER — TELEPHONE (OUTPATIENT)
Dept: FAMILY MEDICINE CLINIC | Facility: CLINIC | Age: 71
End: 2021-10-01

## 2021-10-01 DIAGNOSIS — R13.10 DYSPHAGIA, UNSPECIFIED TYPE: Primary | ICD-10-CM

## 2021-10-01 DIAGNOSIS — R13.10 DYSPHAGIA, UNSPECIFIED TYPE: ICD-10-CM

## 2021-10-01 PROCEDURE — 92611 MOTION FLUOROSCOPY/SWALLOW: CPT

## 2021-10-01 PROCEDURE — 74230 X-RAY XM SWLNG FUNCJ C+: CPT

## 2021-10-01 RX ADMIN — BARIUM SULFATE 20 ML: 400 PASTE ORAL at 11:27

## 2021-10-01 RX ADMIN — BARIUM SULFATE 100 ML: 0.81 POWDER, FOR SUSPENSION ORAL at 11:27

## 2021-10-01 NOTE — TELEPHONE ENCOUNTER
Veronica with x-ray called regarding Esophagram, she needs a new order due to his modified diet.     New order needs to be: fl video swallow with speech

## 2021-10-01 NOTE — MBS/VFSS/FEES
Outpatient Speech Language Pathology   Adult Swallow Initial Evaluation   Modified Barium Swallow Study (MBS)   Sandra     Patient Name: Obed Cotto Jr.  : 1950  MRN: [id number]  Today's Date: 10/1/2021         Visit Date: 10/01/2021   Patient Active Problem List   Diagnosis   • Urinary retention   • Positive colorectal cancer screening using DNA-based stool test   • Leukocytosis   • Prediabetes   • BPH (benign prostatic hyperplasia)   • BPH with elevated PSA   • Essential hypertension   • Stroke (HCC)   • Dysarthria   • Dysphagia   • Impaired mobility   • Memory impairment   • Leg cramps   • Left hemiplegia (HCC)   • History of tobacco abuse        Past Medical History:   Diagnosis Date   • Acute ischemic stroke (CMS/HCC) 2021     posterior limb of the right internal capsule   • BPH with elevated PSA    • Chronic bronchitis (CMS/HCC)    • Elevated blood pressure    • Hyperlipidemia    • Hypertension    • Lacunar infarction (CMS/HCC)    • Noncompliance with medication regimen     pt states he never took his metoprolol   • Prediabetes         Past Surgical History:   Procedure Laterality Date   • FINGER SURGERY      Right hand 4th finger   • FINGER SURGERY      R ring finger, partial amputation repair         Visit Dx:     ICD-10-CM ICD-9-CM   1. Dysphagia, unspecified type  R13.10 787.20               SLP Adult Swallow Evaluation     Row Name 10/01/21 1050       General Information    Pertinent History Of Current Problem  Hx R CVA in  of this year. FEES here 21 revealed silent aspiration of liquids and recommended [facility]-soft diet and pudding-thick liquids. Pt reports was advanced to nectar-thick liquids at [facility]. Pt reports still receiving PT/OT though no further SLP services. MBS today for ? safe return to thin liquids.  -SM    Current Method of Nutrition  regular textures;nectar/syrup-thick liquids  -SM       MBS/VFSS    Utensils Used  spoon;cup;straw  -SM    Consistencies Trialed  thin  "liquids;pureed;regular textures  -SM       MBS/VFSS Interpretation    Oral Phase  WFL  -SM    Pharyngeal Phase  impaired pharyngeal phase of swallowing  -SM    Aspiration During the Swallow  thin liquids;secondary to delayed swallow initiation or mistiming;secondary to reduced laryngeal (VF) closure  -SM    Response to Aspiration  no response, silent aspiration;response with cue only;cough;cleared subglottic material  -SM    Rosenbek's Scale  thin:;8--->level 8  -SM    Attempted Compensatory Maneuvers  bolus size;bolus presentation style  -SM    Response to Attempted Compensatory Maneuvers  prevented aspiration  -SM    Pharyngeal Phase, Comment  When cued for small single sip and to focus on bolus in mouth before swallowing, transit from oral to pharyngeal swallow initiation much improved (bolus to valllecuale vs over epiglottis before swallow initiation) and prevented aspiration. Pt stated he could detect difference in swallow when he was \"concentrating on it more\".   -SM       Clinical Impression    SLP Swallowing Diagnosis  mild;pharyngeal dysphagia  -SM    Functional Impact  risk of aspiration/pneumonia  -SM       Recommendations    Therapy Frequency (Swallow)  evaluation only;other (see comments) repeat MBS if any concerns or wishes to d/c compensations  -SM    SLP Diet Recommendation  regular textures;thin liquids  -SM    Recommended Precautions and Strategies  upright posture during/after eating;small bites of food and sips of liquid;other (see comments) small sip and focus on bolus before/as swallowing. Ok straws  -    Oral Care Recommendations  Oral Care BID/PRN  -SM    SLP Rec. for Method of Medication Administration  meds whole;with pudding or applesauce  -SM      User Key  (r) = Recorded By, (t) = Taken By, (c) = Cosigned By    Initials Name Provider Type    Marla Garcia MS CCC-SLP Speech and Language Pathologist            OP SLP Education     Row Name 10/01/21 1214       Education    " Barriers to Learning  No barriers identified  -    Assessed  Learning needs;Learning motivation;Learning preferences;Learning readiness  -    Learning Motivation  Strong  -SM    Learning Method  Explanation;Demonstration;Teach back  -    Teaching Response  Verbalized understanding  -      User Key  (r) = Recorded By, (t) = Taken By, (c) = Cosigned By    Initials Name Effective Dates    Marla Garcia MS CCC-SLP 06/16/21 -             Time Calculation:   SLP Start Time: 1050  Untimed Charges  06755-BC Motion Fluoro Eval Swallow Minutes: 60  Total Minutes  Untimed Charges Total Minutes: 60   Total Minutes: 60    Therapy Charges for Today     Code Description Service Date Service Provider Modifiers Qty    [id number] HC ST MOTION FLUORO EVAL SWALLOW 4 10/1/2021 Marla Vázquez MS CCC-SLP GN 1            Patient was not wearing a face mask and did exhibit coughing during this therapy encounter.  Procedure performed was aerosolizing, involved close contact (within 6 feet for at least 15 minutes or longer), and did not involve contact with infectious secretions or specimens.  Therapist used appropriate personal protective equipment including gloves, standard procedure mask and eye protection.  Appropriate PPE was worn during the entire therapy session.  Hand hygiene was completed before and after therapy session.           Marla Vázquez MS CCC-SLP  10/1/2021

## 2021-10-04 ENCOUNTER — TELEPHONE (OUTPATIENT)
Dept: NEUROLOGY | Facility: CLINIC | Age: 71
End: 2021-10-04

## 2021-10-04 ENCOUNTER — TREATMENT (OUTPATIENT)
Dept: PHYSICAL THERAPY | Facility: CLINIC | Age: 71
End: 2021-10-04

## 2021-10-04 DIAGNOSIS — G81.94 LEFT HEMIPARESIS (HCC): ICD-10-CM

## 2021-10-04 DIAGNOSIS — Z74.09 IMPAIRED MOBILITY AND ADLS: ICD-10-CM

## 2021-10-04 DIAGNOSIS — I63.9 CEREBROVASCULAR ACCIDENT (CVA), UNSPECIFIED MECHANISM (HCC): ICD-10-CM

## 2021-10-04 DIAGNOSIS — Z78.9 IMPAIRED MOBILITY AND ADLS: ICD-10-CM

## 2021-10-04 DIAGNOSIS — Z74.09 IMPAIRED FUNCTIONAL MOBILITY, BALANCE, GAIT, AND ENDURANCE: Primary | ICD-10-CM

## 2021-10-04 DIAGNOSIS — R29.898 DECREASED GRIP STRENGTH OF LEFT HAND: Primary | ICD-10-CM

## 2021-10-04 DIAGNOSIS — M25.60 LIMITED JOINT RANGE OF MOTION (ROM): ICD-10-CM

## 2021-10-04 PROCEDURE — 97110 THERAPEUTIC EXERCISES: CPT | Performed by: OCCUPATIONAL THERAPIST

## 2021-10-04 PROCEDURE — 97110 THERAPEUTIC EXERCISES: CPT | Performed by: PHYSICAL THERAPIST

## 2021-10-04 PROCEDURE — 97112 NEUROMUSCULAR REEDUCATION: CPT | Performed by: PHYSICAL THERAPIST

## 2021-10-04 PROCEDURE — 97530 THERAPEUTIC ACTIVITIES: CPT | Performed by: OCCUPATIONAL THERAPIST

## 2021-10-04 NOTE — PROGRESS NOTES
"Occupational Therapy Daily Progress Note  Visit: 7  Date of Initial Visit: Type: THERAPY  Noted: 2021      Patient: Obed Cotto Jr.   : 1950  Diagnosis/ICD-10 Code:  Decreased  strength of left hand [R29.898]  Referring practitioner: CHANG Mathew  Date of Initial Visit: Type: THERAPY  Noted: 2021  Today's Date: 10/4/2021  Patient seen for 7 sessions      Subjective:   Patient reports: \"The change in weather always makes my hand tighter\"  Pain: 0/10  Subjective Questionnaire: n/a  Clinical Progress: improved  Home Program Compliance: Yes  Treatment has included: therapeutic exercise and therapeutic activity    Subjective   Objective     OT Neuro         OT Exercises     Row Name 10/04/21 0801             Exercise 1    Exercise Name 1  holding cone LUE reaching varying plns, OT assisting with adding supination to neutral then progression to supination as tolerated  -ST         Exercise 2    Exercise Name 2  holding 1# wt performing shoulder abd/adduction L, AAROM-AROM  -ST      Equipment 2  Dumbell  -ST      Weights/Plates 2  1  -ST      Sets 2  2  -ST      Reps 2  10  -ST         Exercise 3    Exercise Name 3  ER LUE AAROM/AROM  -ST      Sets 3  1  -ST      Reps 3  10  -ST         Exercise 4    Exercise Name 4  reaching in shoulder f/e with scap squeeze  -ST      Sets 4  2  -ST      Reps 4  10  -ST         Exercise 5    Exercise Name 5  isolated digit flexion L hand AROM  -ST         Exercise 6    Exercise Name 6  isolated digit abd/adduction AROM-AAROM  -ST         Exercise 7    Exercise Name 7  digit extension PROM  -ST        User Key  (r) = Recorded By, (t) = Taken By, (c) = Cosigned By    Initials Name Provider Type    Keara Enriquez, OTR Occupational Therapist           Assessment & Plan     Assessment  Assessment details: Pt demonstrates improving strength of LUE with holding 1# dumb bell to complete fxnl reaching patterns. Pt also able to actively abd/add and complete " flexion of L thumb and progressing with abd/add of digit II and III and V. Pt having most difficulty with digit IV. 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         Visit Diagnoses:    ICD-10-CM ICD-9-CM   1. Decreased  strength of left hand  R29.898 729.89   2. Impaired mobility and ADLs  Z74.09 V49.89    Z78.9    3. Limited joint range of motion (ROM)  M25.60 719.50             Timed:  Manual Therapy:    0     mins  03813;  Therapeutic Exercise:    25     mins  25636;     Neuromuscular Lulu:    0    mins  30518;    Therapeutic Activity:     20     mins  74099;     Self-Care/ADL     0     mins  69935;   Sensory Int. Tech      0     mins 82131;  Ultrasound:     0     mins  58322;    Electrical Stimulation:    0     mins  05934 ( );    Untimed:  Electrical Stimulation:    0     mins  93967 ( );    Timed Treatment:   45   mins   Total Treatment:     45   mins    OT Signature: Keara Avila MS, OTR/L, CDP  [place] License #: [id number]

## 2021-10-05 ENCOUNTER — OFFICE VISIT (OUTPATIENT)
Dept: FAMILY MEDICINE CLINIC | Facility: CLINIC | Age: 71
End: 2021-10-05

## 2021-10-05 VITALS
WEIGHT: 122.6 LBS | HEIGHT: 72 IN | HEART RATE: 69 BPM | OXYGEN SATURATION: 97 % | DIASTOLIC BLOOD PRESSURE: 66 MMHG | SYSTOLIC BLOOD PRESSURE: 118 MMHG | BODY MASS INDEX: 16.61 KG/M2

## 2021-10-05 DIAGNOSIS — F32.2 CURRENT SEVERE EPISODE OF MAJOR DEPRESSIVE DISORDER WITHOUT PSYCHOTIC FEATURES WITHOUT PRIOR EPISODE (HCC): ICD-10-CM

## 2021-10-05 DIAGNOSIS — R25.2 LEG CRAMPS: ICD-10-CM

## 2021-10-05 DIAGNOSIS — G81.94 LEFT HEMIPLEGIA (HCC): Primary | ICD-10-CM

## 2021-10-05 DIAGNOSIS — R13.10 DYSPHAGIA, UNSPECIFIED TYPE: ICD-10-CM

## 2021-10-05 DIAGNOSIS — K59.00 CONSTIPATION, UNSPECIFIED CONSTIPATION TYPE: ICD-10-CM

## 2021-10-05 DIAGNOSIS — Z23 IMMUNIZATION DUE: ICD-10-CM

## 2021-10-05 PROCEDURE — G0008 ADMIN INFLUENZA VIRUS VAC: HCPCS | Performed by: NURSE PRACTITIONER

## 2021-10-05 PROCEDURE — 99214 OFFICE O/P EST MOD 30 MIN: CPT | Performed by: NURSE PRACTITIONER

## 2021-10-05 PROCEDURE — 90662 IIV NO PRSV INCREASED AG IM: CPT | Performed by: NURSE PRACTITIONER

## 2021-10-05 RX ORDER — DOCUSATE SODIUM 100 MG/1
100 CAPSULE, LIQUID FILLED ORAL 2 TIMES DAILY
Qty: 60 CAPSULE | Refills: 5 | Status: SHIPPED | OUTPATIENT
Start: 2021-10-05

## 2021-10-05 RX ORDER — ESCITALOPRAM OXALATE 5 MG/1
5 TABLET ORAL DAILY
Qty: 30 TABLET | Refills: 1 | Status: SHIPPED | OUTPATIENT
Start: 2021-10-05 | End: 2022-07-22

## 2021-10-05 RX ORDER — BACLOFEN 10 MG/1
10 TABLET ORAL 3 TIMES DAILY
Qty: 90 TABLET | Refills: 2 | Status: SHIPPED | OUTPATIENT
Start: 2021-10-05 | End: 2022-11-18 | Stop reason: SDUPTHER

## 2021-10-05 RX ORDER — ATORVASTATIN CALCIUM 80 MG/1
TABLET, FILM COATED ORAL
COMMUNITY
Start: 2021-09-21 | End: 2021-10-28

## 2021-10-05 NOTE — PROGRESS NOTES
"Chief Complaint  Cerebrovascular Accident (Pt is here for a 4 week followup. ) and Difficulty Swallowing    Subjective          Obed Cotto Jr. presents to [facility]     History of Present Illness  He was scheduled for Friday for Botox injection, but he received a call to reschedule it.      His PT is going well.  They were excited to see what the Botox would do for his muscles.  He has been doing his     His left hand has still been swollen.  He has not been wearing his hand brace, because he feels like it could have been causing the swelling.  His hand also felt sore when he touched it with his other hand.  The soreness has improved.  The swelling in the left ankle improved with compression socks.      He likes the Lexapro and feels like the dose is effective.      He is still having trouble with his BMs.  He is taking fiber and Miralax.  When he gets to the toilet, he still has to strain to have a BM.  He has painful BMs.      Objective   Vital Signs:   /66   Pulse 69   Ht 182.9 cm (72.01\")   Wt 55.6 kg (122 lb 9.6 oz)   SpO2 97%   BMI 16.62 kg/m²       Physical Exam  Vitals reviewed.   Constitutional:       Appearance: Normal appearance.   HENT:      Head: Normocephalic and atraumatic.   Cardiovascular:      Rate and Rhythm: Normal rate and regular rhythm.      Heart sounds: Normal heart sounds.   Pulmonary:      Effort: Pulmonary effort is normal.      Breath sounds: Normal breath sounds.   Abdominal:      General: Bowel sounds are normal.      Palpations: Abdomen is soft.      Tenderness: There is no abdominal tenderness. There is no guarding or rebound.   Musculoskeletal:      Comments: In wheelchair.  Left hemiparesis.  Trace edema to left hand.   Skin:     General: Skin is warm and dry.   Neurological:      General: No focal deficit present.      Mental Status: He is alert and oriented to person, place, and time.   Psychiatric:         Mood and Affect: Mood " normal.         Behavior: Behavior normal.         Thought Content: Thought content normal.         Judgment: Judgment normal.        Result Review :                 Assessment and Plan    Diagnoses and all orders for this visit:    1. Left hemiplegia (HCC) (Primary) -patient has been receiving both PT and OT.  He has been able to tell a difference since starting these.  He will be receiving Botox in his left arm and leg per neurology.  He was scheduled for Friday, but they had to reschedule him.  --Continue with PT and OT.  --Continue with treatment plan per neurology.    2. Current severe episode of major depressive disorder without psychotic features without prior episode (HCC) -improved.  Patient likes the current dose.  Son believes that this is greatly helped with his appetite.  --Refill Lexapro 5 mg daily.  -     escitalopram (Lexapro) 5 MG tablet; Take 1 tablet by mouth Daily.  Dispense: 30 tablet; Refill: 1    3. Leg cramps -needs refill on baclofen.  --Refill baclofen 10 mg 3 times per day.  -     baclofen (LIORESAL) 10 MG tablet; Take 1 tablet by mouth 3 (Three) Times a Day.  Dispense: 90 tablet; Refill: 2    4. Constipation, unspecified constipation type -has been using both Metamucil and MiraLAX with poor results.  --Add Colace 100 mg twice a day.  --Increase fluid intake.  --Continue with Metamucil and MiraLAX.  If develops diarrhea, he is to hold stool medication until it stops and then he will restart Metamucil and Colace.  -     docusate sodium (Colace) 100 MG capsule; Take 1 capsule by mouth 2 (Two) Times a Day.  Dispense: 60 capsule; Refill: 5    5. Immunization due -patient would like to receive his flu shot today.  -     Fluzone High-Dose 65+yrs (2425-7270)    6.  Dysphagia -patient had swallowing study last week.  Per report he can have regular textures, thin liquids.  He needs to obtain an upright posture during and after eating.  Small bites of food and sips of liquid.  Small sip and focus on  bolus before and as swallowing.  Straws are okay.  Meds whole with applesauce and pudding.  --Patient has already adjusted his diet and it has greatly helped his appetite being able to eat regular foods.      Follow Up   Return in about 2 months (around 12/5/2021) for Follow-up- 30 minutes.  Patient was given instructions and counseling regarding his condition or for health maintenance advice. Please see specific information pulled into the AVS if appropriate.

## 2021-10-05 NOTE — PATIENT INSTRUCTIONS
Chronic Constipation  Chronic constipation is a condition in which a person has three or fewer bowel movements a week, for 3 months or longer. This condition is especially common in older adults.  What are the causes?  Causes of chronic constipation may include:  · Not drinking enough fluid, eating enough food or fiber, or getting enough physical activity.  · Pregnancy.  · A tear in the anus (anal fissure).  · Blockage in the bowel (bowel obstruction).  · Narrowing of the bowel (bowel stricture).  · Having a long-term medical condition, such as:  ? Diabetes, hypothyroidism, or iron-deficiency anemia.  ? Stroke or spinal cord injury.  ? Multiple sclerosis or Parkinson's disease.  ? Colon cancer.  ? Dementia.  ? Inflammatory bowel disease (IBD), outward collapse of the rectum (rectal prolapse), or hemorrhoids.  · Taking certain medicines, including:  ? Narcotics. These are a certain type of prescription pain medicine.  ? Antacids or iron supplements.  ? Water pills (diuretics).  ? Certain blood pressure medicines.  ? Anti-seizure medicines.  ? Antidepressants.  ? Medicines for Parkinson's disease.  Other causes of this condition may include:  · Stress.  · Problems in the nerves and muscles that control the movement of stool.  · Weak or impaired pelvic floor muscles.  What increases the risk?  You may be at higher risk for chronic constipation if:  · You are older than age 70.  · You are female.  · You live in a long-term care facility.  · You have a long-term disease.  · You have a mental health disorder or eating disorder.  What are the signs or symptoms?  The main symptom of chronic constipation is having three or fewer bowel movements a week for several weeks. Other signs and symptoms may vary from person to person. These include:  · Pushing hard (straining) to pass stool, or having hard or lumpy stools.  · Painful bowel movements.  · Having lower abdominal discomfort, such as cramps or bloating.  · Being unable to  have a bowel movement when you feel the urge, or feeling like you still need to pass stool after a bowel movement.  · Feeling that you have something in your rectum that is blocking or preventing bowel movements.  · Seeing blood on the toilet paper or in your stool.  · Worsening confusion (in older adults).  How is this diagnosed?  This condition may be diagnosed based on:  · Your symptoms and medical history. You will be asked about your symptoms, lifestyle, diet, and any medicines that you are taking.  · A physical exam.  ? Your abdomen will be examined.  ? A digital rectal exam may be done. For this exam, a health care provider places a lubricated, gloved finger into the rectum.  · Tests to check for any underlying causes of your constipation. These may be ordered if you have bleeding in your rectum, weight loss, or a family history of colon cancer. In these cases, you may have:  ? Imaging studies of the colon. These may include X-ray, ultrasound, or a CT scan.  ? Blood tests.  ? A procedure to examine the inside of your colon (colonoscopy).  ? More specialized tests to check:  § Whether your anal sphincter works well. This is a ring-shaped muscle that controls the closing of the anus.  § How well food moves through your colon.  ? Tests to measure the nerve signal in your pelvic floor muscles (electromyography).  How is this treated?  Treatment for chronic constipation depends on the cause. Most often, treatment starts with:  · Being more active and getting regular exercise.  · Drinking more fluids.  · Adding fiber to your diet. Sources of fiber include fruits, vegetables, whole grains, and fiber supplements.  · Using medicines such as stool softeners or medicines that increase contractions in your digestive system (pro-motility agents).  · Training your pelvic muscles with biofeedback.  · Surgery, if there is obstruction.  Treatment may also include:  · Stopping or changing some medicines if they cause  constipation.  · Using a fiber supplement (bulk laxative) or stool softener.  · Using a prescription laxative. This works by absorbing water into your colon (osmotic laxative).  You may also need to see a specialist who treats conditions of the digestive system (gastroenterologist).  Follow these instructions at home:  Medicines  · Take over-the-counter and prescription medicines only as told by your health care provider.  · If you are taking a laxative, take it as told by your health care provider.  Eating and drinking    · Eat a balanced diet that includes enough fiber. Ask your health care provider to recommend a diet that is right for you.  · Drink clear fluids, especially water. Avoid drinking alcohol, caffeine, and soda. These can make constipation worse.  · Drink enough fluid to keep your urine pale yellow.    General instructions  · Get some physical activity every day. Ask your health care provider what activities are safe for you.  · Get colon cancer screenings as told by your health care provider.  · Keep all follow-up visits as told by your health care provider. This is important.  Contact a health care provider if you have:  · Three or fewer bowel movements a week.  · Stools that are hard or lumpy.  · Blood on the toilet paper or in your stool after you have a bowel movement.  · Unexplained weight loss.  · Rectum (rectal) pain.  · Stool leakage.  · Nausea or vomiting.  Get help right away if you have:  · Rectal bleeding or you pass blood clots.  · Severe rectal pain.  · Body tissue that pushes out (protrudes) from your anus.  · Severe pain or bloating (distension) in your abdomen.  · Vomiting that you cannot control.  Summary  · Chronic constipation is a condition in which a person has three or fewer bowel movements a week, for 3 months or longer.  · You may have a higher risk for this condition if you are an older adult, you are female, or you have a long-term disease.  · Treatment for this condition  depends on the cause. Most treatments for chronic constipation include adding fiber to your diet, drinking more fluids, and getting more physical activity. You may also need to treat any underlying medical conditions or stop or change certain medicines if they cause constipation.  · If lifestyle changes do not relieve constipation, your health care provider may recommend taking a laxative.  This information is not intended to replace advice given to you by your health care provider. Make sure you discuss any questions you have with your health care provider.  Document Revised: 11/04/2020 Document Reviewed: 11/04/2020  Elsevier Patient Education © 2021 [employer]    Constipation, Adult  Constipation is when a person has trouble pooping (having a bowel movement). When you have this condition, you may poop fewer than 3 times a week. Your poop (stool) may also be dry, hard, or bigger than normal.  Follow these instructions at home:  Eating and drinking    · Eat foods that have a lot of fiber, such as:  ? Fresh fruits and vegetables.  ? Whole grains.  ? Beans.  · Eat less of foods that are low in fiber and high in fat and sugar, such as:  ? French fries.  ? Hamburgers.  ? Cookies.  ? Candy.  ? Soda.  · Drink enough fluid to keep your pee (urine) pale yellow.    General instructions  · Exercise regularly or as told by your doctor. Try to do 150 minutes of exercise each week.  · Go to the restroom when you feel like you need to poop. Do not hold it in.  · Take over-the-counter and prescription medicines only as told by your doctor. These include any fiber supplements.  · When you poop:  ? Do deep breathing while relaxing your lower belly (abdomen).  ? Relax your pelvic floor. The pelvic floor is a group of muscles that support the rectum, bladder, and intestines (as well as the uterus in women).  · Watch your condition for any changes. Tell your doctor if you notice any.  · Keep all follow-up visits as told by your  doctor. This is important.  Contact a doctor if:  · You have pain that gets worse.  · You have a fever.  · You have not pooped for 4 days.  · You vomit.  · You are not hungry.  · You lose weight.  · You are bleeding from the opening of the butt (anus).  · You have thin, pencil-like poop.  Get help right away if:  · You have a fever, and your symptoms suddenly get worse.  · You leak poop or have blood in your poop.  · Your belly feels hard or bigger than normal (bloated).  · You have very bad belly pain.  · You feel dizzy or you faint.  Summary  · Constipation is when a person poops fewer than 3 times a week, has trouble pooping, or has poop that is dry, hard, or bigger than normal.  · Eat foods that have a lot of fiber.  · Drink enough fluid to keep your pee (urine) pale yellow.  · Take over-the-counter and prescription medicines only as told by your doctor. These include any fiber supplements.  This information is not intended to replace advice given to you by your health care provider. Make sure you discuss any questions you have with your health care provider.  Document Revised: 11/04/2020 Document Reviewed: 11/04/2020  Elsevier Patient Education © 2021 [employer]

## 2021-10-14 ENCOUNTER — TREATMENT (OUTPATIENT)
Dept: PHYSICAL THERAPY | Facility: CLINIC | Age: 71
End: 2021-10-14

## 2021-10-14 DIAGNOSIS — I63.9 CEREBROVASCULAR ACCIDENT (CVA), UNSPECIFIED MECHANISM (HCC): ICD-10-CM

## 2021-10-14 DIAGNOSIS — Z74.09 IMPAIRED FUNCTIONAL MOBILITY, BALANCE, GAIT, AND ENDURANCE: Primary | ICD-10-CM

## 2021-10-14 DIAGNOSIS — G81.94 LEFT HEMIPARESIS (HCC): ICD-10-CM

## 2021-10-14 PROCEDURE — 97110 THERAPEUTIC EXERCISES: CPT | Performed by: PHYSICAL THERAPIST

## 2021-10-14 PROCEDURE — 97112 NEUROMUSCULAR REEDUCATION: CPT | Performed by: PHYSICAL THERAPIST

## 2021-10-14 NOTE — TELEPHONE ENCOUNTER
Called PT with below message, he has already scheduled an appt.   
PATIENT CALLED AFTER RECEIVING A CALL FROM  REGARDING A REFERRAL THEY HAD RECEIVED FOR HIM FROM ANIA LIN. THEY WERE NOT ABLE TO TELL HIM WHAT THIS IS FOR SO HE WANTED TO HAVE ANIA CALL HIM.    Obed Cotto    [phone]   
Please advise. See in chart is is for P.T  
This was for the doctor who could do Botox in his arm and leg.  
ambulatory

## 2021-10-15 NOTE — PROGRESS NOTES
PT Re-Assessment / Re-Certification  Visit: 9  Date of Initial Visit: Type: THERAPY  Noted: 2021    Patient: Obed Cotto Jr.   : 1950  Diagnosis/ICD-10 Code:  Impaired functional mobility, balance, gait, and endurance [Z74.09]  Referring practitioner: CHANG Mathew  Date of Initial Visit: Type: THERAPY  Noted: 2021  Today's Date: 10/15/2021  Patient seen for 9 sessions      Subjective:   Patient reports: he is feeling pretty good.   Pain: 0/10  Clinical Progress: improved  Home Program Compliance: Yes  Treatment has included: therapeutic exercise and neuromuscular re-education    Objective   See Exercise, Manual, and Modality Logs for complete treatment.    PT Neuro         Assessment & Plan     Assessment  Impairments: abnormal coordination, abnormal gait, abnormal muscle tone, abnormal or restricted ROM, activity intolerance, impaired balance, impaired physical strength, lacks appropriate home exercise program and safety issue  Assessment details: Patient is progressing towards goals but is limited due to L LE tone.  Pt reports being scheduled for a Botox appointment that keeps getting moved and rescheduled by the physicians office.  Pt requires min to max A with standing dynamic balance activities with L LE WB.  Pt to benefit from skilled PT services to improve gait, balance, strength and overall functional mobility.  Prognosis: fair  Functional Limitations: carrying objects, lifting, walking, moving in bed, standing and stooping  Goals  Plan Goals: ST Goals (6 wks):  1. Pt will demonstrate compliance with initial HEP. ONGOING  2. Pt will improve KC to >/= to 12/56 for decreased risk of falls. MET  3. Pt will demonstrate sit to stand with Ad from PT for increased independence. ONGOING  4. Pt will perform squat pivot transfer from wheelchair to bed with SBA to improve his independence. ONGOING    LT Goals (12wks):  1. Pt will demonstrate independence with HEP. ONGOING  2. Pt will  improve KC to >/= to 20/56 for decreased risk of falls. ONGOING  3. Pt will demonstrate sit to stand with SBA from PT for increased independence. ONGOING  4. Pt will perform stand pivot transfer from chair to bed with SBA with appropriate AD to improve his independence.  ONGOING    Plan  Therapy options: will be seen for skilled physical therapy services  Planned modality interventions: cryotherapy and TENS  Planned therapy interventions: abdominal trunk stabilization, balance/weight-bearing training, flexibility, gait training, home exercise program, manual therapy, motor coordination training, neuromuscular re-education, strengthening, stretching, therapeutic activities and transfer training  Frequency: 1x week  Duration in weeks: 4  Treatment plan discussed with: patient  Plan details: Patient will continue to be seen 1x/wk for 4 wks with treatment to include strengthening, stretching, balance, endurance, coordination, manual therapy, AD training, gait and stair training and neuromuscular re-education.        Visit Diagnoses:    ICD-10-CM ICD-9-CM   1. Impaired functional mobility, balance, gait, and endurance  Z74.09 V49.89   2. Left hemiparesis (HCC)  G81.94 342.90   3. Cerebrovascular accident (CVA), unspecified mechanism (HCC)  I63.9 434.91       Progress toward previous goals: Partially Met      Recommendations: Continue as planned  Timeframe: 1 month    PT Signature: Fabiana Latham, PT, DPT, MSCS, CDP  [place] License #: [id number]    Based upon review of the patient's progress and continued therapy plan, it is my medical opinion that Obed Cotto should continue physical therapy treatment at [facility] PHYSICAL THERAPY  [facility] [address]   [facility] [id number] [phone].    Signature: __________________________________  Yadira Fam APRN    Timed:  Manual Therapy:    0     mins  43189;  Therapeutic Exercise:    8     mins  95031;     Neuromuscular Lulu:    32    mins  75289;     Therapeutic Activity:     0     mins  22473;     Gait Trainin     mins  71482;     Electrical Stimulation:    0     mins  10751 ( );    Untimed:  Canalith Repositioning   0 mins  86781    Timed Treatment:   40   mins   Total Treatment:     40   mins

## 2021-10-18 ENCOUNTER — TREATMENT (OUTPATIENT)
Dept: PHYSICAL THERAPY | Facility: CLINIC | Age: 71
End: 2021-10-18

## 2021-10-18 DIAGNOSIS — Z78.9 IMPAIRED MOBILITY AND ADLS: Primary | ICD-10-CM

## 2021-10-18 DIAGNOSIS — M25.60 LIMITED JOINT RANGE OF MOTION (ROM): ICD-10-CM

## 2021-10-18 DIAGNOSIS — R29.898 DECREASED GRIP STRENGTH OF LEFT HAND: ICD-10-CM

## 2021-10-18 DIAGNOSIS — Z74.09 IMPAIRED MOBILITY AND ADLS: Primary | ICD-10-CM

## 2021-10-18 PROCEDURE — 97110 THERAPEUTIC EXERCISES: CPT | Performed by: OCCUPATIONAL THERAPIST

## 2021-10-18 PROCEDURE — 97530 THERAPEUTIC ACTIVITIES: CPT | Performed by: OCCUPATIONAL THERAPIST

## 2021-10-18 NOTE — PROGRESS NOTES
"Occupational Therapy Daily Progress Note  Visit: 8  Date of Initial Visit: Type: THERAPY  Noted: 2021      Patient: Obed Cotto Jr.   : 1950  Diagnosis/ICD-10 Code:  Impaired mobility and ADLs [Z74.09, Z78.9]  Referring practitioner: CHANG Mathew  Date of Initial Visit: Type: THERAPY  Noted: 2021  Today's Date: 10/22/2021  Patient seen for 8 sessions      Subjective:   Patient reports: \"If they move my Botox appt on me again I'm going to find a different provider. My PCP didn't find any reason for me to have the swelling in my hand.\"  Pain: 0/10  Subjective Questionnaire: n/a  Clinical Progress: improved  Home Program Compliance: Yes  Treatment has included: therapeutic exercise and therapeutic activity    Subjective   Objective     OT Neuro          SEE FLOW SHEET AND ACTIVITY LOG FOR DETAILS    Assessment & Plan     Assessment  Assessment details: Pt gained active digit extension, digit II and active abd/adduction of digit V however seemed more weak in shoulder this date. He does state working hard on all movements and HEPs at home. The colder weather does impact his tone and makes digits more painful and more difficult to relax prior to fxnl grasp/release.    Plan  Plan details: Continue OT POC/goals as est.         Visit Diagnoses:    ICD-10-CM ICD-9-CM   1. Impaired mobility and ADLs  Z74.09 V49.89    Z78.9    2. Limited joint range of motion (ROM)  M25.60 719.50   3. Decreased  strength of left hand  R29.898 729.89             Timed:  Manual Therapy:    0     mins  95894;  Therapeutic Exercise:    30     mins  01483;     Neuromuscular Lulu:    0    mins  05157;    Therapeutic Activity:     10     mins  26629;     Self-Care/ADL     0     mins  34743;   Sensory Int. Tech      0     mins 59818;  Ultrasound:     0     mins  39224;    Electrical Stimulation:    0     mins  62474 ( );    Untimed:  Electrical Stimulation:    0     mins  17245 ( );    Timed Treatment:   40   " mins   Total Treatment:     40   mins    OT Signature: Keara Avila MS, OTR/L, CDP  [place] License #: [id number]

## 2021-10-20 ENCOUNTER — TELEPHONE (OUTPATIENT)
Dept: FAMILY MEDICINE CLINIC | Facility: CLINIC | Age: 71
End: 2021-10-20

## 2021-10-22 ENCOUNTER — TELEPHONE (OUTPATIENT)
Dept: NEUROLOGY | Facility: CLINIC | Age: 71
End: 2021-10-22

## 2021-10-25 RX ORDER — CLOPIDOGREL BISULFATE 75 MG/1
TABLET ORAL
Qty: 90 TABLET | Refills: 1 | Status: SHIPPED | OUTPATIENT
Start: 2021-10-25 | End: 2022-08-23 | Stop reason: SDUPTHER

## 2021-10-25 NOTE — TELEPHONE ENCOUNTER
Rx Refill Note  Requested Prescriptions     Pending Prescriptions Disp Refills   • clopidogrel (PLAVIX) 75 MG tablet [Pharmacy Med Name: CLOPIDOGREL 75 MG TABLET] 30 tablet 2     Sig: TAKE ONE TABLET BY MOUTH DAILY      Last office visit with prescribing clinician: 10/5/2021      Next office visit with prescribing clinician: 12/7/2021   23}  Helena Live MA  10/25/21, 12:45 EDT     Last fill: 07/23/2021

## 2021-10-26 RX ORDER — FAMOTIDINE 20 MG/1
TABLET, FILM COATED ORAL
Qty: 60 TABLET | Refills: 2 | Status: SHIPPED | OUTPATIENT
Start: 2021-10-26 | End: 2022-05-26 | Stop reason: SDUPTHER

## 2021-10-26 NOTE — TELEPHONE ENCOUNTER
Rx Refill Note  Requested Prescriptions     Pending Prescriptions Disp Refills   • famotidine (PEPCID) 20 MG tablet [Pharmacy Med Name: FAMOTIDINE 20 MG TABLET] 60 tablet 2     Sig: TAKE ONE TABLET BY MOUTH TWICE A DAY      Last office visit with prescribing clinician: 10/5/2021      Next office visit with prescribing clinician: 12/7/2021            ANIA HICKS MA  10/26/21, 10:46 EDT

## 2021-10-27 ENCOUNTER — TREATMENT (OUTPATIENT)
Dept: PHYSICAL THERAPY | Facility: CLINIC | Age: 71
End: 2021-10-27

## 2021-10-27 DIAGNOSIS — M25.60 LIMITED JOINT RANGE OF MOTION (ROM): ICD-10-CM

## 2021-10-27 DIAGNOSIS — Z74.09 IMPAIRED MOBILITY AND ADLS: Primary | ICD-10-CM

## 2021-10-27 DIAGNOSIS — R29.898 DECREASED GRIP STRENGTH OF LEFT HAND: ICD-10-CM

## 2021-10-27 DIAGNOSIS — I63.9 CEREBROVASCULAR ACCIDENT (CVA), UNSPECIFIED MECHANISM (HCC): ICD-10-CM

## 2021-10-27 DIAGNOSIS — Z74.09 IMPAIRED FUNCTIONAL MOBILITY, BALANCE, GAIT, AND ENDURANCE: ICD-10-CM

## 2021-10-27 DIAGNOSIS — Z78.9 IMPAIRED MOBILITY AND ADLS: Primary | ICD-10-CM

## 2021-10-27 DIAGNOSIS — G81.94 LEFT HEMIPARESIS (HCC): Primary | ICD-10-CM

## 2021-10-27 PROCEDURE — 97110 THERAPEUTIC EXERCISES: CPT | Performed by: PHYSICAL THERAPIST

## 2021-10-27 PROCEDURE — 97530 THERAPEUTIC ACTIVITIES: CPT | Performed by: OCCUPATIONAL THERAPIST

## 2021-10-27 PROCEDURE — 97112 NEUROMUSCULAR REEDUCATION: CPT | Performed by: PHYSICAL THERAPIST

## 2021-10-27 PROCEDURE — 97116 GAIT TRAINING THERAPY: CPT | Performed by: PHYSICAL THERAPIST

## 2021-10-27 NOTE — PROGRESS NOTES
Physical Therapy Daily Progress Note  Visit: 10  Date of Initial Visit: Type: THERAPY  Noted: 2021    Patient: Obed Cotto Jr.   : 1950  Diagnosis/ICD-10 Code:  Left hemiparesis (HCC) [G81.94]  Referring practitioner: CHANG Mathew  Date of Initial Visit: Type: THERAPY  Noted: 2021  Today's Date: 10/27/2021  Patient seen for 10 sessions      Subjective:   Patient reports: his botox got pushed back again to  due to insurance issues.   Pain: 010  Clinical Progress: improved  Home Program Compliance: Yes  Treatment has included: therapeutic exercise and neuromuscular re-education    Objective   See Exercise, Manual, and Modality Logs for complete treatment.    PT Neuro          Assessment & Plan     Assessment  Assessment details: Patient demonstrates great improvement in standing balance. He has been practicing in his home, standing near his furniture. Sit to stands were tweaked slightly for foot position. This made his transition much easier and he was able to replicate independently.     Plan  Plan details: Patient to continue with PT services to improve gait, balance, strength, transfers and overall functional mobility.          Timed:  Manual Therapy:            0     mins  25448;  Therapeutic Exercise:    8    mins  17586;     Neuromuscular Lulu:    22    mins  39114;    Therapeutic Activity:      0     mins  56082;     Gait Trainin    mins  21101;     Electrical Stimulation:    0    mins  65571 ( );     Untimed:  Canalith Repositioning techniques _0_ 00187      Timed Treatment:   38   mins   Total Treatment:     38   mins      Fabiana Latham, PT, DPT, MSCS, CDP  [place] License #: [id number]  Physical Therapist

## 2021-10-27 NOTE — PROGRESS NOTES
"OT Re-Assessment / Re-Certification        Patient: Obed Cotto Jr.   : 1950  Diagnosis/ICD-10 Code:  Impaired mobility and ADLs [Z74.09, Z78.9]  Referring practitioner: CHANG Mathew  Date of Initial Visit: Type: THERAPY  Noted: 2021  Today's Date: 10/28/2021  Patient seen for 9 sessions      Subjective:   Patient reports: \"They had to move my Botox again d/t the insurance\"   Pain: 0/10  Subjective Questionnaire: see ROM measurements   Clinical Progress: improved  Home Program Compliance: Yes  Treatment has included: neuromuscular re-education and therapeutic activity    Subjective   Objective          Passive Range of Motion   Left Shoulder   Flexion: 120 degrees     Left Elbow   Flexion: 130 degrees   Extension: 5 degrees     Left Wrist   Wrist flexion: 60 degrees   Wrist extension: 50 degrees         OT Neuro         Assessment & Plan     Assessment  Impairments: abnormal coordination, abnormal gait, activity intolerance, impaired balance, impaired physical strength, lacks appropriate home exercise program and safety issue  Assessment details: OT re-assessment completed per POC. Pt demonstrates improvement in all L UE ROM measurements and is progressing slowly with strength and control. Pt continues to be challenged by tone, pain and overall weakness from CVA. Pt has been compliant with DynaSplint wear schedule but Botox has been delayed several times d/t office needing to reschedule and insurance company issues.  Recommend continued skilled OT services to address LUE movement along with independence and engagement in ADL tasks.   Prognosis: fair  Functional Limitations: walking, moving in bed and standing  Goals  Plan Goals:     Pt will increase L  strength by 3 # by 8 wks to demonstrate improved strength and function for daily tasks. Ongoing    Pt will be independent with SROM UE HEP to increase performance in ADL/IADL tasks by 8 wks. MET    Pt will be independent assist with UB " dressing tasks to promote independence and efficiency by 4 wks. Progressing    Pt will be independent assist with LBD dressing tasks to promote independence and efficiency by 4 wks. PARTIALLY MET    Pt will be independent assist with UE stretching HEP to promote increased function and improved comfort level during ADL/IADL tasks by 8 wks. MET      Plan  Planned therapy interventions: ADL retraining, balance/weight-bearing training, fine motor coordination training, flexibility, functional ROM exercises, home exercise program, motor coordination training, neuromuscular re-education, postural training, strengthening, stretching, therapeutic activities and transfer training  Frequency: 1x week  Duration in visits: 4  Treatment plan discussed with: patient and family  Plan details: Continue w/OT POC and goals to reflect above needs and promote improved independence, safety and engagement in daily tasks.       SEE FLOW SHEET AND ACTIVITY LOG FOR DETAILS    Visit Diagnoses:    ICD-10-CM ICD-9-CM   1. Impaired mobility and ADLs  Z74.09 V49.89    Z78.9    2. Limited joint range of motion (ROM)  M25.60 719.50   3. Decreased  strength of left hand  R29.898 729.89       Progress toward previous goals: Partially Met      Recommendations: Continue as planned  Timeframe: 1 month  Prognosis to achieve goals: fair    OT Signature: Keara Avila MS, OTR/L, CDP  [place] License #: [id number]    Based upon review of the patient's progress and continued therapy plan, it is my medical opinion that Obed Cotto should continue occupational therapy treatment at [facility] PHYSICAL THERAPY  [facility] [address] [facility] [address] [phone].    Signature: __________________________________  Yadira Fam APRN    Timed:  Manual Therapy:    0     mins  63611;  Therapeutic Exercise:    0     mins  93355;     Neuromuscular Lulu:    0    mins  85960;    Therapeutic Activity:     41     mins  59033;      Self-Care/ADL     0     mins  36621;   Sensory Int. Tech      0     mins 37980;  Ultrasound:     0     mins  20312;    Electrical Stimulation:    0     mins  64918 ( );    Untimed:  Electrical Stimulation:    0     mins  40055 ( );    Timed Treatment:   41   mins   Total Treatment:     41   mins

## 2021-10-28 RX ORDER — ATORVASTATIN CALCIUM 80 MG/1
TABLET, FILM COATED ORAL
Qty: 30 TABLET | Refills: 5 | Status: SHIPPED | OUTPATIENT
Start: 2021-10-28 | End: 2021-12-07 | Stop reason: HOSPADM

## 2021-10-28 NOTE — TELEPHONE ENCOUNTER
Rx Refill Note  Requested Prescriptions     Pending Prescriptions Disp Refills   • atorvastatin (LIPITOR) 80 MG tablet [Pharmacy Med Name: ATORVASTATIN 80 MG TABLET] 30 tablet      Sig: TAKE ONE TABLET BY MOUTH ONCE NIGHTLY      Last office visit with prescribing clinician: 10/5/2021      Next office visit with prescribing clinician: 12/7/2021            Helena Live MA  10/28/21, 13:23 EDT     Last fill: 09/21/2021  *80mg in from historical provider     *40mg prescribed by neurology on 9/3/2021

## 2021-11-02 ENCOUNTER — TREATMENT (OUTPATIENT)
Dept: PHYSICAL THERAPY | Facility: CLINIC | Age: 71
End: 2021-11-02

## 2021-11-02 DIAGNOSIS — Z74.09 IMPAIRED FUNCTIONAL MOBILITY, BALANCE, GAIT, AND ENDURANCE: ICD-10-CM

## 2021-11-02 DIAGNOSIS — I63.9 CEREBROVASCULAR ACCIDENT (CVA), UNSPECIFIED MECHANISM (HCC): Primary | ICD-10-CM

## 2021-11-02 PROCEDURE — 97110 THERAPEUTIC EXERCISES: CPT | Performed by: PHYSICAL THERAPIST

## 2021-11-02 PROCEDURE — 97112 NEUROMUSCULAR REEDUCATION: CPT | Performed by: PHYSICAL THERAPIST

## 2021-11-02 NOTE — PROGRESS NOTES
Physical Therapy Daily Note  Visit: 11  Date of Initial Visit: Type: THERAPY  Noted: 2021    Patient: Obed Cotto Jr.   : 1950  Diagnosis/ICD-10 Code:  Cerebrovascular accident (CVA), unspecified mechanism (HCC) [I63.9]  Referring practitioner: CHANG Mathew  Date of Initial Visit: Type: THERAPY  Noted: 2021  Today's Date: 2021  Patient seen for 11 sessions      Subjective:   Patient reports: he gets botox on Monday.  Pain: 0/10  Clinical Progress: improved  Home Program Compliance: Yes  Treatment has included: therapeutic exercise and neuromuscular re-education    Objective   See Exercise, Manual, and Modality Logs for complete treatment.    PT Neuro     Exercise 1  Exercise Name 1: Nu-step  Equipment/Resistance 1: L5  Time: 7 min - neural priming  Exercise 2  Exercise Name 2: TK with box for balance assist - rocking back and forth  Sets/Reps 2: 20  Exercise 3  Exercise Name 3: TK cone reaches to the L with R hand  Sets/Reps 3: 10  Exercise 4  Exercise Name 4: TK attempting to use L hand to grab cones  Sets/Reps 4: 5  Exercise 5  Exercise Name 5: TK slides into extension  Sets/Reps 5: 10 on R, 5 on L  Exercise 6  Exercise Name 6: EB sitting with knee lift  Sets/Reps 6: 8 ea  Exercise 7  Exercise Name 7: EB sitting with arm lift overhead  Sets/Reps 7: 10  Exercise 8  Exercise Name 8: Walked to mat table to WC x 15 ft  Equipment/Resistance 8: min-mod A              Assessment & Plan     Assessment  Assessment details: Patient demonstrates good weight shifting in tall kneeling position. Patient did fatigue extremely easily in this position, however he was still able to walk to his wheelchair and have a controlled lower.     Plan  Plan details: Patient to continue with PT services to improve gait, balance, strength, transfers and overall functional mobility.          Timed:  Manual Therapy:            0     mins  57758;  Therapeutic Exercise:    8    mins  75677;     Neuromuscular Lulu:     32    mins  84088;    Therapeutic Activity:      0     mins  52071;     Gait Trainin    mins  96383;     Electrical Stimulation:    0    mins  39050 ( );     Untimed:  Canalith Repositioning techniques _0_ 14322      Timed Treatment:   40   mins   Total Treatment:     40   mins      Fabiana Latham PT, DPT, MSCS, CDP  [place] License #: [id number]  Physical Therapist

## 2021-11-03 NOTE — TELEPHONE ENCOUNTER
Caller: [facility] - [place], [place] - [address] - [phone] [facility] [phone] FX    Relationship: Pharmacy    Best call back number: [phone]    What was the call regarding: DANIEL REDDY SENT A P./A FOR BOTOX ON 10/20/21- AND THEY HAVENT HEARD BACK FROM THAT YET.     P/A WAS APPROVED BEFORE BUT THERE WAS A CHANGE IN MEMBERS BENEFITS AND NOW NEEDS TO BE COMPLETED AGAIN.     SHE SAID SHE WILL PLACE ON HOLD FOR A COUPLE DAYS.

## 2021-11-08 ENCOUNTER — PROCEDURE VISIT (OUTPATIENT)
Dept: NEUROLOGY | Facility: CLINIC | Age: 71
End: 2021-11-08

## 2021-11-08 VITALS — HEART RATE: 65 BPM | OXYGEN SATURATION: 98 % | WEIGHT: 123 LBS | BODY MASS INDEX: 16.66 KG/M2 | HEIGHT: 72 IN

## 2021-11-08 DIAGNOSIS — I69.354 HEMIPLEGIA AND HEMIPARESIS FOLLOWING CEREBRAL INFARCTION AFFECTING LEFT NON-DOMINANT SIDE (HCC): ICD-10-CM

## 2021-11-08 DIAGNOSIS — I63.81 LACUNAR STROKE (HCC): Primary | ICD-10-CM

## 2021-11-08 DIAGNOSIS — I69.398 SPASTICITY AS LATE EFFECT OF CEREBROVASCULAR ACCIDENT (CVA): ICD-10-CM

## 2021-11-08 DIAGNOSIS — R25.2 SPASTICITY AS LATE EFFECT OF CEREBROVASCULAR ACCIDENT (CVA): ICD-10-CM

## 2021-11-08 PROCEDURE — 64644 CHEMODENERV 1 EXTREM 5/> MUS: CPT | Performed by: PSYCHIATRY & NEUROLOGY

## 2021-11-08 NOTE — PROGRESS NOTES
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  Patient has developed spasticity of left upper extremity involving DIP, PIP, FDP, biceps.  There is also spasticity of right foot involving inversion of the left foot, plantar flexion of left foot.    Botox Procedure Note    The risks and benefits were discussed with the patient. The patient was given the opportunity to ask questions. Informed consent form was signed.    Onabotulinumtoxin A was reconstituted with 0.9% normal saline. All injections sites were prepped with alcohol swab.    Following muscles were injected:    Bicep -75 units at 3 sites.  FCR/flexor carpi radialis: 25 units  FCU/flexor carpi ulnaris: 25 units  FDP/flexor digitorum profundus-25 units  FDS/flexor digitorum superficialis 25 units    Gastrocnemius medial head -60units at 3 sites  Gastrocnemius lateral head-60 units at 3 sites  Soleus- 75 units at 3 sites  Tibialis posterior-60 units at 3 sites    The patient tolerated the procedure well. There were no immediate complications. The patient will follow up in approximately 6 weeks for regular follow-up in 12 weeks for next injection.    Total amount of onabotulinumtoxin A injected was 400 units with 0  units wasted.    Note: On the next visit, I plan to also do Botox for lumbricals to help with DIP, PIP spasticity.  I plan to see him back in clinic in 12 weeks for follow-up.

## 2021-11-09 ENCOUNTER — TREATMENT (OUTPATIENT)
Dept: PHYSICAL THERAPY | Facility: CLINIC | Age: 71
End: 2021-11-09

## 2021-11-09 DIAGNOSIS — R29.898 DECREASED GRIP STRENGTH OF LEFT HAND: ICD-10-CM

## 2021-11-09 DIAGNOSIS — Z78.9 IMPAIRED MOBILITY AND ADLS: Primary | ICD-10-CM

## 2021-11-09 DIAGNOSIS — I63.9 CEREBROVASCULAR ACCIDENT (CVA), UNSPECIFIED MECHANISM (HCC): Primary | ICD-10-CM

## 2021-11-09 DIAGNOSIS — Z74.09 IMPAIRED MOBILITY AND ADLS: Primary | ICD-10-CM

## 2021-11-09 DIAGNOSIS — G81.94 LEFT HEMIPARESIS (HCC): ICD-10-CM

## 2021-11-09 DIAGNOSIS — Z74.09 IMPAIRED FUNCTIONAL MOBILITY, BALANCE, GAIT, AND ENDURANCE: ICD-10-CM

## 2021-11-09 DIAGNOSIS — M25.60 LIMITED JOINT RANGE OF MOTION (ROM): ICD-10-CM

## 2021-11-09 PROCEDURE — 97110 THERAPEUTIC EXERCISES: CPT | Performed by: OCCUPATIONAL THERAPIST

## 2021-11-09 PROCEDURE — 97530 THERAPEUTIC ACTIVITIES: CPT | Performed by: OCCUPATIONAL THERAPIST

## 2021-11-09 PROCEDURE — 97112 NEUROMUSCULAR REEDUCATION: CPT | Performed by: PHYSICAL THERAPIST

## 2021-11-09 PROCEDURE — 97110 THERAPEUTIC EXERCISES: CPT | Performed by: PHYSICAL THERAPIST

## 2021-11-09 NOTE — PROGRESS NOTES
"Occupational Therapy Daily Progress Note  Visit: 10  Date of Initial Visit: Type: THERAPY  Noted: 2021      Patient: Obed Cotto Jr.   : 1950  Diagnosis/ICD-10 Code:  Impaired mobility and ADLs [Z74.09, Z78.9]  Referring practitioner: CHANG Mathew  Date of Initial Visit: Type: THERAPY  Noted: 2021  Today's Date: 2021  Patient seen for 10 sessions      Subjective:   Patient reports: \"I got my Botox injections. They really hurt but I'm looking forward to seeing what it will do to help me\"  Pain: 0/10  Subjective Questionnaire: n/a  Clinical Progress: improved  Home Program Compliance: Yes  Treatment has included: therapeutic exercise and therapeutic activity    Subjective   Objective     OT Neuro          OT Exercises     Row Name 21 0801             Exercise 1    Exercise Name 1 gross grasp and release of short dowels L hand; intermittent min A for placement in hand, progression of task to include variations of ht of UE in shoulder flexion and abd  -ST              Exercise 2    Exercise Name 2 L thumb active ab/adduction  -ST              Exercise 3    Exercise Name 3 L thumb active f/e  -ST              Exercise 4    Exercise Name 4 L digit II active f/e (requiring assist for extension) and abd/adduction  -ST              Exercise 5    Exercise Name 5 active opposition L digit I/II  -ST              Exercise 6    Exercise Name 6 gross grasp retrieval of pegs with active and controlled release  -ST              Exercise 7    Exercise Name 7 AROM/AAROM stretching all LUE joints  -ST            User Key  (r) = Recorded By, (t) = Taken By, (c) = Cosigned By    Initials Name Provider Type    Keara Enriquez OTR Occupational Therapist                 Assessment & Plan     Assessment  Assessment details: Pt improving with digit control of thumb and digit II; thumb with flex/ext, abd/adduction and digit II with flexion. Pt however continues to have tone in digits that impact " extension and overall control but able to progress toward opposition with excellent accuracy b/t digits I/II today. Pt to progress this exercise at home to continue work toward grasping objects w/pincer grasp. Pt able to release on command with gross grasp.    Plan  Plan details: Continue w/POC and goals as est.         Visit Diagnoses:    ICD-10-CM ICD-9-CM   1. Impaired mobility and ADLs  Z74.09 V49.89    Z78.9    2. Limited joint range of motion (ROM)  M25.60 719.50   3. Decreased  strength of left hand  R29.898 729.89             Timed:  Manual Therapy:    0     mins  38175;  Therapeutic Exercise:    11     mins  29521;     Neuromuscular Lulu:    0    mins  66267;    Therapeutic Activity:     29     mins  84090;     Self-Care/ADL     0     mins  76241;   Sensory Int. Tech      0     mins 54562;  Ultrasound:     0     mins  98992;    Electrical Stimulation:    0     mins  25805 ( );    Untimed:  Electrical Stimulation:    0     mins  30373 ( );    Timed Treatment:   40   mins   Total Treatment:     40   mins    OT Signature: Keara Avila MS, OTR/L, CDP  [place] License #: [id number]

## 2021-11-09 NOTE — PROGRESS NOTES
PT Progress Note  Visit: 12  Date of Initial Visit: Type: THERAPY  Noted: 2021    Patient: Obed Cotto Jr.   : 1950  Diagnosis/ICD-10 Code:  Cerebrovascular accident (CVA), unspecified mechanism (HCC) [I63.9]  Referring practitioner: CHANG Mathew  Date of Initial Visit: Type: THERAPY  Noted: 2021  Today's Date: 2021  Patient seen for 12 sessions      Subjective:   Patient reports: he had his botox.   Pain: 0/10  Clinical Progress: improved  Home Program Compliance: Yes  Treatment has included: therapeutic exercise and neuromuscular re-education    Objective   See Exercise, Manual, and Modality Logs for complete treatment.    PT Neuro  TU min and 31sec  25 ft: 1 min and 12 sec         Assessment & Plan     Assessment  Impairments: abnormal coordination, abnormal gait, abnormal muscle tone, abnormal or restricted ROM, activity intolerance, impaired balance, impaired physical strength, lacks appropriate home exercise program and safety issue  Assessment details: Patient had botox a few days ago. At this current time he has had no improvements with the quad specifically, however this typically takes about 2 weeks to fully improve. Patient will need ongoing therapy to assess function of the LE post botox. Functional movement is improving globally and he is able to be a little more independent at home with less assistance.   Prognosis: fair  Functional Limitations: carrying objects, lifting, walking, moving in bed, standing and stooping  Goals  Plan Goals: ST Goals (6 wks):  1. Pt will demonstrate compliance with initial HEP. MET  2. Pt will improve KC to >/= to 12/56 for decreased risk of falls. MET  3. Pt will demonstrate sit to stand with Ad from PT for increased independence. MET  4. Pt will perform squat pivot transfer from wheelchair to bed with SBA to improve his independence. MET    LT Goals (12wks):  1. Pt will demonstrate independence with HEP. ONGOING  2. Pt will improve  KC to >/= to 20/56 for decreased risk of falls. ONGOING  3. Pt will demonstrate sit to stand with SBA from PT for increased independence. MET  4. Pt will perform stand pivot transfer from chair to bed with SBA with appropriate AD to improve his independence.  Partially met   5. Patient will perform TUG in </= 1 min and 15 sec with use of QC.     Plan  Therapy options: will be seen for skilled physical therapy services  Planned modality interventions: cryotherapy and TENS  Planned therapy interventions: abdominal trunk stabilization, balance/weight-bearing training, flexibility, gait training, home exercise program, manual therapy, motor coordination training, neuromuscular re-education, strengthening, stretching, therapeutic activities and transfer training  Frequency: 1x week  Duration in weeks: 4  Treatment plan discussed with: patient  Plan details: Patient will continue to be seen 1x/wk for 4 wks with treatment to include strengthening, stretching, balance, endurance, coordination, manual therapy, AD training, gait and stair training and neuromuscular re-education.        Visit Diagnoses:    ICD-10-CM ICD-9-CM   1. Cerebrovascular accident (CVA), unspecified mechanism (HCC)  I63.9 434.91   2. Impaired functional mobility, balance, gait, and endurance  Z74.09 V49.89   3. Left hemiparesis (HCC)  G81.94 342.90       Progress toward previous goals: Partially Met      Recommendations: Continue as planned  Timeframe: 1 month    PT Signature: Fabiana Latham PT, DPT, MSCS, CDP  [place] License #: [id number]    Based upon review of the patient's progress and continued therapy plan, it is my medical opinion that Obed Cotto should continue physical therapy treatment at [facility]  [facility] [address] [facility] [id number] [phone].    Signature: __________________________________  Yadira Fam APRN    Timed:  Manual Therapy:    0     mins  70788;  Therapeutic Exercise:     8     mins  30809;     Neuromuscular Lulu:    32    mins  77386;    Therapeutic Activity:     0     mins  51658;     Gait Trainin     mins  85827;     Electrical Stimulation:    0     mins  90534 ( );    Untimed:  Canalith Repositioning   0 mins  33765    Timed Treatment:   42   mins   Total Treatment:     42   mins

## 2021-11-10 ENCOUNTER — TRANSCRIBE ORDERS (OUTPATIENT)
Dept: PHYSICAL THERAPY | Facility: CLINIC | Age: 71
End: 2021-11-10

## 2021-11-10 DIAGNOSIS — I82.90 VENOUS THROMBOSIS AFTER IMMOBILITY: ICD-10-CM

## 2021-11-10 DIAGNOSIS — Z74.09 VENOUS THROMBOSIS AFTER IMMOBILITY: ICD-10-CM

## 2021-11-10 DIAGNOSIS — Z74.09 IMPAIRED MOBILITY AND ADLS: ICD-10-CM

## 2021-11-10 DIAGNOSIS — G81.94 LEFT HEMIPARESIS (HCC): Primary | ICD-10-CM

## 2021-11-10 DIAGNOSIS — Z78.9 IMPAIRED MOBILITY AND ADLS: ICD-10-CM

## 2021-11-10 DIAGNOSIS — I63.9 CEREBROVASCULAR ACCIDENT (CVA), UNSPECIFIED MECHANISM (HCC): Primary | ICD-10-CM

## 2021-11-11 ENCOUNTER — TELEPHONE (OUTPATIENT)
Dept: NEUROLOGY | Facility: CLINIC | Age: 71
End: 2021-11-11

## 2021-11-11 NOTE — TELEPHONE ENCOUNTER
Provider: RYLAN  Caller: PATIENT  Pharmacy: [facility]  Reason for Call: THE PATIENT BENEFITS CHANGED 11/3 AND NOW [facility] NEEDS A NEW P/A FOR THE BOTOX. PLEASE GIVE VERBAL [phone]

## 2021-11-15 NOTE — TELEPHONE ENCOUNTER
RECEIVED LETTER FROM [facility] DATED 11-4 SAYING THAT BOTOX WOULDN'T BE COVERED BY INSURANCE. I'M ASSUMING THIS IS THE SAME THING HE CALLED ABOUT ON 11-11 BUT IT MAY BE DIFFERENT? PLEASE ADVISE. PT IS AWARE CAN TAKE 10 DAYS BEFORE A RESPONSE FROM INSURANCE

## 2021-11-16 ENCOUNTER — TREATMENT (OUTPATIENT)
Dept: PHYSICAL THERAPY | Facility: CLINIC | Age: 71
End: 2021-11-16

## 2021-11-16 DIAGNOSIS — I63.9 CEREBROVASCULAR ACCIDENT (CVA), UNSPECIFIED MECHANISM (HCC): Primary | ICD-10-CM

## 2021-11-16 DIAGNOSIS — Z78.9 IMPAIRED MOBILITY AND ADLS: Primary | ICD-10-CM

## 2021-11-16 DIAGNOSIS — M25.60 LIMITED JOINT RANGE OF MOTION (ROM): ICD-10-CM

## 2021-11-16 DIAGNOSIS — Z74.09 IMPAIRED MOBILITY AND ADLS: Primary | ICD-10-CM

## 2021-11-16 DIAGNOSIS — R29.898 DECREASED GRIP STRENGTH OF LEFT HAND: ICD-10-CM

## 2021-11-16 DIAGNOSIS — Z74.09 IMPAIRED FUNCTIONAL MOBILITY, BALANCE, GAIT, AND ENDURANCE: ICD-10-CM

## 2021-11-16 DIAGNOSIS — G81.94 LEFT HEMIPARESIS (HCC): ICD-10-CM

## 2021-11-16 PROCEDURE — 97112 NEUROMUSCULAR REEDUCATION: CPT | Performed by: PHYSICAL THERAPIST

## 2021-11-16 PROCEDURE — 97116 GAIT TRAINING THERAPY: CPT | Performed by: PHYSICAL THERAPIST

## 2021-11-16 PROCEDURE — 97110 THERAPEUTIC EXERCISES: CPT | Performed by: OCCUPATIONAL THERAPIST

## 2021-11-16 PROCEDURE — 97110 THERAPEUTIC EXERCISES: CPT | Performed by: PHYSICAL THERAPIST

## 2021-11-16 PROCEDURE — 97530 THERAPEUTIC ACTIVITIES: CPT | Performed by: OCCUPATIONAL THERAPIST

## 2021-11-16 NOTE — PROGRESS NOTES
Physical Therapy Daily Note  Visit: 13  Date of Initial Visit: Type: THERAPY  Noted: 2021    Patient: Obed Cotto Jr.   : 1950  Diagnosis/ICD-10 Code:  Cerebrovascular accident (CVA), unspecified mechanism (HCC) [I63.9]  Referring practitioner: CHANG Mathew  Date of Initial Visit: Type: THERAPY  Noted: 2021  Today's Date: 2021  Patient seen for 13 sessions      Subjective:   Patient reports: he thinks the botox has helped slightly. His big toe has been hurting him a lot over the last few weeks.   Pain: 2/40-L big toe   Clinical Progress: improved  Home Program Compliance: Yes  Treatment has included: therapeutic exercise, neuromuscular re-education and gait training    Objective   See Exercise, Manual, and Modality Logs for complete treatment.    PT Neuro          Assessment & Plan     Assessment  Assessment details: Patient demonstrates improved foot clearance during swing phase of LLE with the use of brace. The botox has improved knee flexion slightly. AFO was given to him to try at home over the next week. He reported improved stability.     Plan  Plan details: Patient to continue with PT services to improve gait, balance, strength, transfers and overall functional mobility.          Timed:  Manual Therapy:            0     mins  64045;  Therapeutic Exercise:    10    mins  10823;     Neuromuscular Lulu:    15    mins  55496;    Therapeutic Activity:      0     mins  02003;     Gait Training:                 15    mins  50288;     Electrical Stimulation:    0    mins  50339 ( );     Untimed:  Canalith Repositioning techniques _0_ 13742      Timed Treatment:   40   mins   Total Treatment:     40   mins      Fabiana Latham PT, DPT, MSCS, CDP  [place] License #: [id number]  Physical Therapist

## 2021-11-16 NOTE — PROGRESS NOTES
Occupational Therapy Daily Progress Note  Visit: 11  Date of Initial Visit: Type: THERAPY  Noted: 2021      Patient: Obed Cotto Jr.   : 1950  Diagnosis/ICD-10 Code:  Impaired mobility and ADLs [Z74.09, Z78.9]  Referring practitioner: CHANG Mathew  Date of Initial Visit: Type: THERAPY  Noted: 2021  Today's Date: 2021  Patient seen for 11 sessions      Subjective:   Patient reports: Pt states noticing more movement in LE and slightly more in hand since having Botox.   Pain: 0/10  Subjective Questionnaire: n/a  Clinical Progress: improved  Home Program Compliance: Yes  Treatment has included: therapeutic exercise and therapeutic activity    Subjective   Objective     OT Neuro          OT Exercises     Row Name 21 0801             Exercise 1    Exercise Name 1 isolated pincer grasp using digits I/II L hand grasp/release of varying sized/shaped items, min A for positioning arm with slight shoulder abduction and supination  -ST              Exercise 2    Exercise Name 2 elbow f/e with cuing for achieving more end range motion as possible  -ST      Equipment 2 Theraband  -ST      Resistance 2 Yellow  -ST      Sets 2 2  -ST      Reps 2 10  -ST              Exercise 3    Exercise Name 3 shoulder abd/adduction  -ST      Equipment 3 Theraband  -ST      Resistance 3 Yellow  -ST      Sets 3 2  -ST      Reps 3 10  -ST              Exercise 4    Exercise Name 4 isolated pincer grasp using digits I/II L hand grasp/release of varying sized/shaped items, min A for positioning arm with slight shoulder abduction and supination with progression of retrieval of items from mat table to placing on elevated surface  -ST            User Key  (r) = Recorded By, (t) = Taken By, (c) = Cosigned By    Initials Name Provider Type    Keara Enriquez OTR Occupational Therapist                 Assessment & Plan     Assessment  Assessment details: Pt progressing with pincer grasp L hand with ability to  actively  and release varying sized and shaped items using digits I/II only. Pt fatiguing quickly with elbow f/e and shoulder flexion and requiring assist from OT LUE to stabilize. Pt able to progress toward strengthening with yellow tband in bicep curl and shoulder abd/adduction plsns-issued for home use.    Plan  Plan details: Continue OT POC and goals as est.         Visit Diagnoses:    ICD-10-CM ICD-9-CM   1. Impaired mobility and ADLs  Z74.09 V49.89    Z78.9    2. Limited joint range of motion (ROM)  M25.60 719.50   3. Decreased  strength of left hand  R29.898 729.89             Timed:  Manual Therapy:    0     mins  51761;  Therapeutic Exercise:    10     mins  82688;     Neuromuscular Lulu:    0    mins  43442;    Therapeutic Activity:     32     mins  85946;     Self-Care/ADL     0     mins  90171;   Sensory Int. Tech      0     mins 16738;  Ultrasound:     0     mins  61058;    Electrical Stimulation:    0     mins  26171 ( );    Untimed:  Electrical Stimulation:    0     mins  27035 ( );    Timed Treatment:   42   mins   Total Treatment:     42   mins    OT Signature: Keara Avila MS, OTR/L, CDP  [place] License #: [id number]

## 2021-11-23 ENCOUNTER — TREATMENT (OUTPATIENT)
Dept: PHYSICAL THERAPY | Facility: CLINIC | Age: 71
End: 2021-11-23

## 2021-11-23 DIAGNOSIS — Z74.09 IMPAIRED FUNCTIONAL MOBILITY, BALANCE, GAIT, AND ENDURANCE: ICD-10-CM

## 2021-11-23 DIAGNOSIS — R29.898 DECREASED GRIP STRENGTH OF LEFT HAND: ICD-10-CM

## 2021-11-23 DIAGNOSIS — G81.94 LEFT HEMIPARESIS (HCC): ICD-10-CM

## 2021-11-23 DIAGNOSIS — I63.9 CEREBROVASCULAR ACCIDENT (CVA), UNSPECIFIED MECHANISM (HCC): Primary | ICD-10-CM

## 2021-11-23 DIAGNOSIS — Z78.9 IMPAIRED MOBILITY AND ADLS: Primary | ICD-10-CM

## 2021-11-23 DIAGNOSIS — Z74.09 IMPAIRED MOBILITY AND ADLS: Primary | ICD-10-CM

## 2021-11-23 DIAGNOSIS — M25.60 LIMITED JOINT RANGE OF MOTION (ROM): ICD-10-CM

## 2021-11-23 PROCEDURE — 97530 THERAPEUTIC ACTIVITIES: CPT | Performed by: OCCUPATIONAL THERAPIST

## 2021-11-23 PROCEDURE — 97112 NEUROMUSCULAR REEDUCATION: CPT | Performed by: OCCUPATIONAL THERAPIST

## 2021-11-23 PROCEDURE — 97110 THERAPEUTIC EXERCISES: CPT | Performed by: PHYSICAL THERAPIST

## 2021-11-23 PROCEDURE — 97112 NEUROMUSCULAR REEDUCATION: CPT | Performed by: PHYSICAL THERAPIST

## 2021-11-23 PROCEDURE — 97116 GAIT TRAINING THERAPY: CPT | Performed by: PHYSICAL THERAPIST

## 2021-11-23 RX ORDER — METOPROLOL SUCCINATE 25 MG/1
TABLET, EXTENDED RELEASE ORAL
Qty: 30 TABLET | Refills: 2 | Status: SHIPPED | OUTPATIENT
Start: 2021-11-23 | End: 2022-07-22

## 2021-11-23 NOTE — PROGRESS NOTES
Physical Therapy Daily Note  Visit: 14  Date of Initial Visit: Type: THERAPY  Noted: 2021    Patient: Obed Cotto Jr.   : 1950  Diagnosis/ICD-10 Code:  Cerebrovascular accident (CVA), unspecified mechanism (HCC) [I63.9]  Referring practitioner: CHANG Mathew  Date of Initial Visit: Type: THERAPY  Noted: 2021  Today's Date: 2021  Patient seen for 14 sessions      Subjective:   Patient reports: he doesn't walk at home because no one will help me.   Pain: 0/10  Clinical Progress: improved  Home Program Compliance: Yes  Treatment has included: therapeutic exercise, neuromuscular re-education and gait training    Objective   See Exercise, Manual, and Modality Logs for complete treatment.    PT Neuro          Assessment & Plan     Assessment    Assessment details: Patient demonstrates increased spasticity throughout hip adductors on the L. This is causing increased difficulty with lateral stepping and turning. Patient will benefit from stretching and walking at home with brace.     Plan  Plan details: Patient to continue with PT services to improve gait, balance, strength, transfers and overall functional mobility.          Timed:  Manual Therapy:            0     mins  62043;  Therapeutic Exercise:    15    mins  17042;     Neuromuscular Lulu:    17    mins  43454;    Therapeutic Activity:      0     mins  92880;     Gait Trainin    mins  96479;     Electrical Stimulation:    0    mins  44622 ( );     Untimed:  Canalith Repositioning techniques _0_ 87667      Timed Treatment:   40   mins   Total Treatment:     40   mins      Fabiana Latham PT, DPT, MSCS, CDP  [place] License #: [id number]  Physical Therapist

## 2021-11-23 NOTE — TELEPHONE ENCOUNTER
Rx Refill Note  Requested Prescriptions     Pending Prescriptions Disp Refills   • metoprolol succinate XL (TOPROL-XL) 25 MG 24 hr tablet [Pharmacy Med Name: METOPROLOL SUCC ER 25 MG TAB] 30 tablet 2     Sig: TAKE ONE TABLET BY MOUTH DAILY      Last office visit with prescribing clinician: 10/5/2021      Next office visit with prescribing clinician: 12/7/2021            Trena Mascorro MA  11/23/21, 14:34 EST

## 2021-11-23 NOTE — PROGRESS NOTES
"Occupational Therapy Daily Progress Note  Visit: 12  Date of Initial Visit: Type: THERAPY  Noted: 2021      Patient: Obed Cotto Jr.   : 1950  Diagnosis/ICD-10 Code:  Impaired mobility and ADLs [Z74.09, Z78.9]  Referring practitioner: CHANG Mathew  Date of Initial Visit: Type: THERAPY  Noted: 2021  Today's Date: 2021  Patient seen for 12 sessions      Subjective:   Patient reports: \"Look at the increased movement in my hand! I don't notice much in my arm\"  Pain: 0/10  Subjective Questionnaire: n/a  Clinical Progress: improved  Home Program Compliance: Yes  Treatment has included: neuromuscular re-education and therapeutic activity    Subjective   Objective     OT Neuro          OT Exercises     Row Name 21 0800             Exercise 1    Exercise Name 1 isolated pincer grasp to retreive pegs from pegboard and actively release L hand, only providing assist to LUE d/t weakness at forearm  -ST              Exercise 2    Exercise Name 2 isolated pincer grasp to retrieve pegs from flat surface, requiring min/mod assist with translation to place into peg board, support provided at forearm  -ST              Exercise 3    Exercise Name 3 digit f/e, actively opening and closing with full end range L hand  -ST              Exercise 4    Exercise Name 4 AROM wrist f/e, supported surface L  -ST            User Key  (r) = Recorded By, (t) = Taken By, (c) = Cosigned By    Initials Name Provider Type    Keara Enriquez OTR Occupational Therapist                 Assessment & Plan     Assessment    Assessment details: Pt with active grasp release after botox with ability to progress to smaller items L left hand. Able to isolate to pincer grasp of digits I/II. Pt able to remove all pegs from pegboard and release actively on command. Pt needing intermittent min/mod assist for retrieval of pegs from flat surface and demonstrates significant trouble with translation/in-hand manipulation. Pt to " continue to work on in-hand manipulation and translation at home for carry over into fxnl grasp and .    Plan  Plan details: Continue w/POC and goals as previously est.         Visit Diagnoses:    ICD-10-CM ICD-9-CM   1. Impaired mobility and ADLs  Z74.09 V49.89    Z78.9    2. Limited joint range of motion (ROM)  M25.60 719.50   3. Decreased  strength of left hand  R29.898 729.89             Timed:  Manual Therapy:    0     mins  03205;  Therapeutic Exercise:    0     mins  55768;     Neuromuscular Lulu:    23    mins  11734;    Therapeutic Activity:     18     mins  96845;     Self-Care/ADL     0     mins  28676;   Sensory Int. Tech      0     mins 86363;  Ultrasound:     0     mins  81118;    Electrical Stimulation:    0     mins  99912 ( );    Untimed:  Electrical Stimulation:    0     mins  83723 ( );    Timed Treatment:   41   mins   Total Treatment:     41   mins    OT Signature: Keara Avila MS, OTR/L, CDP  [place] License #: [id number]

## 2021-11-29 RX ORDER — POTASSIUM CHLORIDE 750 MG/1
TABLET, FILM COATED, EXTENDED RELEASE ORAL
Qty: 60 TABLET | Refills: 2 | Status: SHIPPED | OUTPATIENT
Start: 2021-11-29 | End: 2021-12-07 | Stop reason: HOSPADM

## 2021-11-29 NOTE — TELEPHONE ENCOUNTER
Rx Refill Note  Requested Prescriptions     Pending Prescriptions Disp Refills   • potassium chloride 10 MEQ CR tablet [Pharmacy Med Name: POTASSIUM CHLORIDE ER 10MEQ TABLET] 60 tablet 2     Sig: TAKE ONE TABLET BY MOUTH TWICE A DAY      Last office visit with prescribing clinician: 10/5/2021      Next office visit with prescribing clinician: 12/7/2021            Aries Brambila MA  11/29/21, 09:19 EST

## 2021-11-30 ENCOUNTER — TREATMENT (OUTPATIENT)
Dept: PHYSICAL THERAPY | Facility: CLINIC | Age: 71
End: 2021-11-30

## 2021-11-30 ENCOUNTER — APPOINTMENT (OUTPATIENT)
Dept: GENERAL RADIOLOGY | Facility: HOSPITAL | Age: 71
End: 2021-11-30

## 2021-11-30 ENCOUNTER — HOSPITAL ENCOUNTER (INPATIENT)
Facility: HOSPITAL | Age: 71
LOS: 6 days | Discharge: NURSING, REHAB OR LONG-TERM CARE | End: 2021-12-07
Attending: EMERGENCY MEDICINE | Admitting: INTERNAL MEDICINE

## 2021-11-30 DIAGNOSIS — Z86.73 RECENT CEREBROVASCULAR ACCIDENT (CVA): ICD-10-CM

## 2021-11-30 DIAGNOSIS — S72.002D CLOSED FRACTURE OF NECK OF LEFT FEMUR WITH ROUTINE HEALING, SUBSEQUENT ENCOUNTER: ICD-10-CM

## 2021-11-30 DIAGNOSIS — Z74.09 IMPAIRED MOBILITY AND ADLS: Primary | ICD-10-CM

## 2021-11-30 DIAGNOSIS — I63.9 CEREBROVASCULAR ACCIDENT (CVA), UNSPECIFIED MECHANISM (HCC): Primary | ICD-10-CM

## 2021-11-30 DIAGNOSIS — S72.002A CLOSED FRACTURE OF NECK OF LEFT FEMUR, INITIAL ENCOUNTER (HCC): Primary | ICD-10-CM

## 2021-11-30 DIAGNOSIS — M25.60 LIMITED JOINT RANGE OF MOTION (ROM): ICD-10-CM

## 2021-11-30 DIAGNOSIS — Z78.9 IMPAIRED MOBILITY AND ADLS: Primary | ICD-10-CM

## 2021-11-30 DIAGNOSIS — Z74.09 IMPAIRED FUNCTIONAL MOBILITY, BALANCE, GAIT, AND ENDURANCE: ICD-10-CM

## 2021-11-30 DIAGNOSIS — R29.898 DECREASED GRIP STRENGTH OF LEFT HAND: ICD-10-CM
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
## 2021-11-30 PROCEDURE — 97110 THERAPEUTIC EXERCISES: CPT | Performed by: PHYSICAL THERAPIST

## 2021-11-30 PROCEDURE — 97112 NEUROMUSCULAR REEDUCATION: CPT | Performed by: OCCUPATIONAL THERAPIST

## 2021-11-30 PROCEDURE — 73552 X-RAY EXAM OF FEMUR 2/>: CPT

## 2021-11-30 PROCEDURE — 97530 THERAPEUTIC ACTIVITIES: CPT | Performed by: OCCUPATIONAL THERAPIST

## 2021-11-30 PROCEDURE — 97116 GAIT TRAINING THERAPY: CPT | Performed by: PHYSICAL THERAPIST

## 2021-11-30 PROCEDURE — 97112 NEUROMUSCULAR REEDUCATION: CPT | Performed by: PHYSICAL THERAPIST

## 2021-11-30 PROCEDURE — 99284 EMERGENCY DEPT VISIT MOD MDM: CPT

## 2021-11-30 RX ORDER — KETOROLAC TROMETHAMINE 30 MG/ML
15 INJECTION, SOLUTION INTRAMUSCULAR; INTRAVENOUS ONCE
Status: COMPLETED | OUTPATIENT
Start: 2021-11-30 | End: 2021-12-01

## 2021-11-30 RX ORDER — DIAZEPAM 5 MG/ML
5 INJECTION, SOLUTION INTRAMUSCULAR; INTRAVENOUS ONCE
Status: COMPLETED | OUTPATIENT
Start: 2021-11-30 | End: 2021-12-01

## 2021-11-30 RX ORDER — SODIUM CHLORIDE 0.9 % (FLUSH) 0.9 %
10 SYRINGE (ML) INJECTION AS NEEDED
Status: DISCONTINUED | OUTPATIENT
Start: 2021-11-30 | End: 2021-12-07 | Stop reason: HOSPADM

## 2021-11-30 NOTE — PROGRESS NOTES
OT Progress Note        Patient: Obed Cotto Jr.   : 1950  Diagnosis/ICD-10 Code:  Impaired mobility and ADLs [Z74.09, Z78.9]  Referring practitioner: CHANG Mathew  Date of Initial Visit: Type: THERAPY  Noted: 2021  Today's Date: 2021  Patient seen for 13 sessions      Subjective:   Patient reports: So my hand is getting better but my arm feels weaker  Pain: 0/10  Subjective Questionnaire: Box & Blocks L hand (forearm supported to allow pt to perform hand movement) 2  Clinical Progress: improved  Home Program Compliance: Yes  Treatment has included: neuromuscular re-education and therapeutic activity    Subjective   Objective          Passive Range of Motion   Left Shoulder   Flexion: 75 degrees     Left Elbow   Flexion: 130 degrees   Extension: 0 degrees     Left Wrist   Wrist flexion: 65 degrees   Wrist extension: 55 degrees         OT Neuro         Assessment & Plan     Assessment  Impairments: abnormal coordination, abnormal gait, activity intolerance, impaired balance, impaired physical strength, lacks appropriate home exercise program and safety issue  Functional Limitations: walking, moving in bed and standing  Assessment details: OT re-assessment completed per POC. Pt demonstrates improvement in wrist, digit and forearm movement. Shoulder movement has declined however after Botox d/t heaviness. Pt exhibits improved ability to activity grasp entire hand along with complete extension of all digits minimally. Pain has also improved in hand but stiffness continues to be an issues with shoulder ROM. Pt continues to be educated on performing ROM daily/mult times per day in supine.   Recommend continued skilled OT services to address LUE movement along with independence and engagement in ADL tasks.   Prognosis: fair    Goals  Plan Goals:     Pt will increase L  strength by 3 # by 8 wks to demonstrate improved strength and function for daily tasks. Ongoing    Pt will be independent  with SROM UE HEP to increase performance in ADL/IADL tasks by 8 wks. MET    Pt will be independent assist with UB dressing tasks to promote independence and efficiency by 4 wks. Progressing    Pt will be independent assist with LBD dressing tasks to promote independence and efficiency by 4 wks. PARTIALLY MET    Pt will be independent assist with UE stretching HEP to promote increased function and improved comfort level during ADL/IADL tasks by 8 wks. MET      Plan  Planned therapy interventions: ADL retraining, balance/weight-bearing training, fine motor coordination training, flexibility, functional ROM exercises, home exercise program, motor coordination training, neuromuscular re-education, postural training, strengthening, stretching, therapeutic activities and transfer training  Frequency: 1x week  Duration in visits: 4  Treatment plan discussed with: patient and family  Plan details: Continue w/OT POC and goals to reflect above needs and promote improved independence, safety and engagement in daily tasks.        OT Exercises     Row Name 11/30/21 0800             Exercise 1    Exercise Name 1 OT re-assessment competed per POC  -ST              Exercise 2    Exercise Name 2 isolated grasp/release of varying sized items L hand; supported forearm to allow for greater control of digits  -ST              Exercise 3    Exercise Name 3 isolated grasp/release L hand UE supported on table top with performing shoulder f/e along table for improving shoulder control  -ST              Exercise 4    Exercise Name 4 isolated AROM f/e and abd/adduction thumb  -ST              Exercise 5    Exercise Name 5 isolated grasp/release of all digits, min A for full end range  -ST            User Key  (r) = Recorded By, (t) = Taken By, (c) = Cosigned By    Initials Name Provider Type    Keara Enriquez, OTR Occupational Therapist                Visit Diagnoses:    ICD-10-CM ICD-9-CM   1. Impaired mobility and ADLs  Z74.09 V49.89     Z78.9    2. Limited joint range of motion (ROM)  M25.60 719.50   3. Decreased  strength of left hand  R29.898 729.89       Progress toward previous goals: Partially Met      Recommendations: Continue as planned  Timeframe: 1 month  Prognosis to achieve goals: fair    OT Signature: Keara Avila MS, OTR/L, CDP  [place] License #: [id number]    Based upon review of the patient's progress and continued therapy plan, it is my medical opinion that Obed Cotto should continue occupational therapy treatment at [facility]  [address] [id number] [phone].    Signature: __________________________________  Yadira Fam APRN    Timed:  Manual Therapy:    0     mins  10288;  Therapeutic Exercise:    0     mins  81178;     Neuromuscular Lulu:    20    mins  10340;    Therapeutic Activity:     25     mins  15246;     Self-Care/ADL     0     mins  45738;   Sensory Int. Tech      0     mins 13817;  Ultrasound:     0     mins  93774;    Electrical Stimulation:    0     mins  74882 ( );    Untimed:  Electrical Stimulation:    0     mins  82956 ( );    Timed Treatment:   45   mins   Total Treatment:     45   mins

## 2021-12-01 ENCOUNTER — ANESTHESIA EVENT (OUTPATIENT)
Dept: PERIOP | Facility: HOSPITAL | Age: 71
End: 2021-12-01

## 2021-12-01 ENCOUNTER — TRANSCRIBE ORDERS (OUTPATIENT)
Dept: PHYSICAL THERAPY | Facility: CLINIC | Age: 71
End: 2021-12-01

## 2021-12-01 ENCOUNTER — APPOINTMENT (OUTPATIENT)
Dept: GENERAL RADIOLOGY | Facility: HOSPITAL | Age: 71
End: 2021-12-01

## 2021-12-01 ENCOUNTER — APPOINTMENT (OUTPATIENT)
Dept: MRI IMAGING | Facility: HOSPITAL | Age: 71
End: 2021-12-01

## 2021-12-01 DIAGNOSIS — Z78.9 IMPAIRED MOBILITY AND ADLS: Primary | ICD-10-CM

## 2021-12-01 DIAGNOSIS — Z74.09 IMPAIRED MOBILITY AND ADLS: Primary | ICD-10-CM

## 2021-12-01 DIAGNOSIS — Z74.09 IMPAIRED FUNCTIONAL MOBILITY, BALANCE, GAIT, AND ENDURANCE: ICD-10-CM

## 2021-12-01 PROBLEM — S72.002A CLOSED FRACTURE OF NECK OF LEFT FEMUR (HCC): Status: ACTIVE | Noted: 2021-12-01

## 2021-12-01 LAB
ABO GROUP BLD: NORMAL
ABO GROUP BLD: NORMAL
ALBUMIN SERPL-MCNC: 3.4 G/DL (ref 3.5–5.2)
ALBUMIN SERPL-MCNC: 4.1 G/DL (ref 3.5–5.2)
ALBUMIN/GLOB SERPL: 1.5 G/DL
ALBUMIN/GLOB SERPL: 1.9 G/DL
ALP SERPL-CCNC: 125 U/L (ref 39–117)
ALP SERPL-CCNC: 86 U/L (ref 39–117)
ALT SERPL W P-5'-P-CCNC: 30 U/L (ref 1–41)
ALT SERPL W P-5'-P-CCNC: 40 U/L (ref 1–41)
ANION GAP SERPL CALCULATED.3IONS-SCNC: 11 MMOL/L (ref 5–15)
ANION GAP SERPL CALCULATED.3IONS-SCNC: 8 MMOL/L (ref 5–15)
AST SERPL-CCNC: 23 U/L (ref 1–40)
AST SERPL-CCNC: 27 U/L (ref 1–40)
B PARAPERT DNA SPEC QL NAA+PROBE: NOT DETECTED
B PERT DNA SPEC QL NAA+PROBE: NOT DETECTED
BASOPHILS # BLD AUTO: 0.04 10*3/MM3 (ref 0–0.2)
BASOPHILS # BLD AUTO: 0.05 10*3/MM3 (ref 0–0.2)
BASOPHILS NFR BLD AUTO: 0.3 % (ref 0–1.5)
BASOPHILS NFR BLD AUTO: 0.3 % (ref 0–1.5)
BILIRUB SERPL-MCNC: 0.7 MG/DL (ref 0–1.2)
BILIRUB SERPL-MCNC: 0.8 MG/DL (ref 0–1.2)
BILIRUB UR QL STRIP: NEGATIVE
BLD GP AB SCN SERPL QL: NEGATIVE
BUN SERPL-MCNC: 21 MG/DL (ref 8–23)
BUN SERPL-MCNC: 26 MG/DL (ref 8–23)
BUN/CREAT SERPL: 30 (ref 7–25)
BUN/CREAT SERPL: 41.9 (ref 7–25)
C PNEUM DNA NPH QL NAA+NON-PROBE: NOT DETECTED
CALCIUM SPEC-SCNC: 10.3 MG/DL (ref 8.6–10.5)
CALCIUM SPEC-SCNC: 10.6 MG/DL (ref 8.6–10.5)
CHLORIDE SERPL-SCNC: 102 MMOL/L (ref 98–107)
CHLORIDE SERPL-SCNC: 104 MMOL/L (ref 98–107)
CLARITY UR: CLEAR
CO2 SERPL-SCNC: 26 MMOL/L (ref 22–29)
CO2 SERPL-SCNC: 27 MMOL/L (ref 22–29)
COLOR UR: YELLOW
CREAT SERPL-MCNC: 0.62 MG/DL (ref 0.76–1.27)
CREAT SERPL-MCNC: 0.7 MG/DL (ref 0.76–1.27)
DEPRECATED RDW RBC AUTO: 45.3 FL (ref 37–54)
DEPRECATED RDW RBC AUTO: 46.9 FL (ref 37–54)
EOSINOPHIL # BLD AUTO: 0.02 10*3/MM3 (ref 0–0.4)
EOSINOPHIL # BLD AUTO: 0.33 10*3/MM3 (ref 0–0.4)
EOSINOPHIL NFR BLD AUTO: 0.1 % (ref 0.3–6.2)
EOSINOPHIL NFR BLD AUTO: 2.2 % (ref 0.3–6.2)
ERYTHROCYTE [DISTWIDTH] IN BLOOD BY AUTOMATED COUNT: 12.6 % (ref 12.3–15.4)
ERYTHROCYTE [DISTWIDTH] IN BLOOD BY AUTOMATED COUNT: 12.7 % (ref 12.3–15.4)
FLUAV SUBTYP SPEC NAA+PROBE: NOT DETECTED
FLUBV RNA ISLT QL NAA+PROBE: NOT DETECTED
GFR SERPL CREATININE-BSD FRML MDRD: 111 ML/MIN/1.73
GFR SERPL CREATININE-BSD FRML MDRD: 128 ML/MIN/1.73
GLOBULIN UR ELPH-MCNC: 2.2 GM/DL
GLOBULIN UR ELPH-MCNC: 2.2 GM/DL
GLUCOSE SERPL-MCNC: 117 MG/DL (ref 65–99)
GLUCOSE SERPL-MCNC: 122 MG/DL (ref 65–99)
GLUCOSE UR STRIP-MCNC: NEGATIVE MG/DL
HADV DNA SPEC NAA+PROBE: NOT DETECTED
HCOV 229E RNA SPEC QL NAA+PROBE: NOT DETECTED
HCOV HKU1 RNA SPEC QL NAA+PROBE: NOT DETECTED
HCOV NL63 RNA SPEC QL NAA+PROBE: NOT DETECTED
HCOV OC43 RNA SPEC QL NAA+PROBE: NOT DETECTED
HCT VFR BLD AUTO: 33.5 % (ref 37.5–51)
HCT VFR BLD AUTO: 36.8 % (ref 37.5–51)
HGB BLD-MCNC: 11 G/DL (ref 13–17.7)
HGB BLD-MCNC: 12.5 G/DL (ref 13–17.7)
HGB UR QL STRIP.AUTO: NEGATIVE
HMPV RNA NPH QL NAA+NON-PROBE: NOT DETECTED
HPIV1 RNA ISLT QL NAA+PROBE: NOT DETECTED
HPIV2 RNA SPEC QL NAA+PROBE: NOT DETECTED
HPIV3 RNA NPH QL NAA+PROBE: NOT DETECTED
HPIV4 P GENE NPH QL NAA+PROBE: NOT DETECTED
IMM GRANULOCYTES # BLD AUTO: 0.07 10*3/MM3 (ref 0–0.05)
IMM GRANULOCYTES # BLD AUTO: 0.07 10*3/MM3 (ref 0–0.05)
IMM GRANULOCYTES NFR BLD AUTO: 0.4 % (ref 0–0.5)
IMM GRANULOCYTES NFR BLD AUTO: 0.5 % (ref 0–0.5)
INR PPP: 1.12 (ref 0.85–1.16)
KETONES UR QL STRIP: ABNORMAL
L PNEUMO1 AG UR QL IA: NEGATIVE
LEUKOCYTE ESTERASE UR QL STRIP.AUTO: NEGATIVE
LYMPHOCYTES # BLD AUTO: 2.09 10*3/MM3 (ref 0.7–3.1)
LYMPHOCYTES # BLD AUTO: 2.53 10*3/MM3 (ref 0.7–3.1)
LYMPHOCYTES NFR BLD AUTO: 10.6 % (ref 19.6–45.3)
LYMPHOCYTES NFR BLD AUTO: 17.1 % (ref 19.6–45.3)
M PNEUMO IGG SER IA-ACNC: NOT DETECTED
MCH RBC QN AUTO: 32.9 PG (ref 26.6–33)
MCH RBC QN AUTO: 33.1 PG (ref 26.6–33)
MCHC RBC AUTO-ENTMCNC: 32.8 G/DL (ref 31.5–35.7)
MCHC RBC AUTO-ENTMCNC: 34 G/DL (ref 31.5–35.7)
MCV RBC AUTO: 100.9 FL (ref 79–97)
MCV RBC AUTO: 96.8 FL (ref 79–97)
MONOCYTES # BLD AUTO: 1.07 10*3/MM3 (ref 0.1–0.9)
MONOCYTES # BLD AUTO: 1.35 10*3/MM3 (ref 0.1–0.9)
MONOCYTES NFR BLD AUTO: 6.9 % (ref 5–12)
MONOCYTES NFR BLD AUTO: 7.2 % (ref 5–12)
NEUTROPHILS NFR BLD AUTO: 10.76 10*3/MM3 (ref 1.7–7)
NEUTROPHILS NFR BLD AUTO: 16.09 10*3/MM3 (ref 1.7–7)
NEUTROPHILS NFR BLD AUTO: 72.7 % (ref 42.7–76)
NEUTROPHILS NFR BLD AUTO: 81.7 % (ref 42.7–76)
NITRITE UR QL STRIP: NEGATIVE
NRBC BLD AUTO-RTO: 0 /100 WBC (ref 0–0.2)
NRBC BLD AUTO-RTO: 0 /100 WBC (ref 0–0.2)
NT-PROBNP SERPL-MCNC: 219.8 PG/ML (ref 0–900)
PA ADP PRP-ACNC: 268 PRU
PH UR STRIP.AUTO: 6 [PH] (ref 5–8)
PLATELET # BLD AUTO: 210 10*3/MM3 (ref 140–450)
PLATELET # BLD AUTO: 228 10*3/MM3 (ref 140–450)
PMV BLD AUTO: 10.2 FL (ref 6–12)
PMV BLD AUTO: 10.6 FL (ref 6–12)
POTASSIUM SERPL-SCNC: 4.1 MMOL/L (ref 3.5–5.2)
POTASSIUM SERPL-SCNC: 4.3 MMOL/L (ref 3.5–5.2)
PROCALCITONIN SERPL-MCNC: 0.19 NG/ML (ref 0–0.25)
PROT SERPL-MCNC: 5.6 G/DL (ref 6–8.5)
PROT SERPL-MCNC: 6.3 G/DL (ref 6–8.5)
PROT UR QL STRIP: NEGATIVE
PROTHROMBIN TIME: 14.1 SECONDS (ref 11.4–14.4)
RBC # BLD AUTO: 3.32 10*6/MM3 (ref 4.14–5.8)
RBC # BLD AUTO: 3.8 10*6/MM3 (ref 4.14–5.8)
RH BLD: POSITIVE
RH BLD: POSITIVE
RHINOVIRUS RNA SPEC NAA+PROBE: NOT DETECTED
RSV RNA NPH QL NAA+NON-PROBE: NOT DETECTED
S PNEUM AG SPEC QL LA: NEGATIVE
SARS-COV-2 RNA RESP QL NAA+PROBE: NOT DETECTED
SODIUM SERPL-SCNC: 139 MMOL/L (ref 136–145)
SODIUM SERPL-SCNC: 139 MMOL/L (ref 136–145)
SP GR UR STRIP: 1.02 (ref 1–1.03)
T&S EXPIRATION DATE: NORMAL
TROPONIN T SERPL-MCNC: <0.01 NG/ML (ref 0–0.03)
UROBILINOGEN UR QL STRIP: ABNORMAL
WBC NRBC COR # BLD: 14.8 10*3/MM3 (ref 3.4–10.8)
WBC NRBC COR # BLD: 19.67 10*3/MM3 (ref 3.4–10.8)

## 2021-12-01 PROCEDURE — 86901 BLOOD TYPING SEROLOGIC RH(D): CPT

## 2021-12-01 PROCEDURE — 25010000002 KETOROLAC TROMETHAMINE PER 15 MG: Performed by: EMERGENCY MEDICINE

## 2021-12-01 PROCEDURE — 86900 BLOOD TYPING SEROLOGIC ABO: CPT

## 2021-12-01 PROCEDURE — 25010000002 MORPHINE PER 10 MG: Performed by: NURSE PRACTITIONER

## 2021-12-01 PROCEDURE — 81003 URINALYSIS AUTO W/O SCOPE: CPT | Performed by: EMERGENCY MEDICINE

## 2021-12-01 PROCEDURE — 85025 COMPLETE CBC W/AUTO DIFF WBC: CPT | Performed by: EMERGENCY MEDICINE

## 2021-12-01 PROCEDURE — 86901 BLOOD TYPING SEROLOGIC RH(D): CPT | Performed by: EMERGENCY MEDICINE

## 2021-12-01 PROCEDURE — 84484 ASSAY OF TROPONIN QUANT: CPT | Performed by: EMERGENCY MEDICINE

## 2021-12-01 PROCEDURE — 87254 VIRUS INOCULATION SHELL VIA: CPT | Performed by: INTERNAL MEDICINE

## 2021-12-01 PROCEDURE — 80053 COMPREHEN METABOLIC PANEL: CPT | Performed by: NURSE PRACTITIONER

## 2021-12-01 PROCEDURE — 25010000002 ONDANSETRON PER 1 MG: Performed by: EMERGENCY MEDICINE

## 2021-12-01 PROCEDURE — 86850 RBC ANTIBODY SCREEN: CPT | Performed by: EMERGENCY MEDICINE

## 2021-12-01 PROCEDURE — 25010000002 DIAZEPAM PER 5 MG: Performed by: EMERGENCY MEDICINE

## 2021-12-01 PROCEDURE — 86900 BLOOD TYPING SEROLOGIC ABO: CPT | Performed by: EMERGENCY MEDICINE

## 2021-12-01 PROCEDURE — U0003 INFECTIOUS AGENT DETECTION BY NUCLEIC ACID (DNA OR RNA); SEVERE ACUTE RESPIRATORY SYNDROME CORONAVIRUS 2 (SARS-COV-2) (CORONAVIRUS DISEASE [COVID-19]), AMPLIFIED PROBE TECHNIQUE, MAKING USE OF HIGH THROUGHPUT TECHNOLOGIES AS DESCRIBED BY CMS-2020-01-R: HCPCS | Performed by: EMERGENCY MEDICINE

## 2021-12-01 PROCEDURE — 85576 BLOOD PLATELET AGGREGATION: CPT | Performed by: NURSE PRACTITIONER

## 2021-12-01 PROCEDURE — 84145 PROCALCITONIN (PCT): CPT | Performed by: NURSE PRACTITIONER

## 2021-12-01 PROCEDURE — 71045 X-RAY EXAM CHEST 1 VIEW: CPT

## 2021-12-01 PROCEDURE — 80053 COMPREHEN METABOLIC PANEL: CPT | Performed by: EMERGENCY MEDICINE

## 2021-12-01 PROCEDURE — 87899 AGENT NOS ASSAY W/OPTIC: CPT | Performed by: INTERNAL MEDICINE

## 2021-12-01 PROCEDURE — 94640 AIRWAY INHALATION TREATMENT: CPT

## 2021-12-01 PROCEDURE — 73721 MRI JNT OF LWR EXTRE W/O DYE: CPT

## 2021-12-01 PROCEDURE — 99223 1ST HOSP IP/OBS HIGH 75: CPT | Performed by: INTERNAL MEDICINE

## 2021-12-01 PROCEDURE — 87633 RESP VIRUS 12-25 TARGETS: CPT | Performed by: INTERNAL MEDICINE

## 2021-12-01 PROCEDURE — 83880 ASSAY OF NATRIURETIC PEPTIDE: CPT | Performed by: EMERGENCY MEDICINE

## 2021-12-01 PROCEDURE — 85025 COMPLETE CBC W/AUTO DIFF WBC: CPT | Performed by: NURSE PRACTITIONER

## 2021-12-01 PROCEDURE — 99221 1ST HOSP IP/OBS SF/LOW 40: CPT | Performed by: ORTHOPAEDIC SURGERY

## 2021-12-01 PROCEDURE — 85610 PROTHROMBIN TIME: CPT | Performed by: NURSE PRACTITIONER

## 2021-12-01 RX ORDER — METOPROLOL SUCCINATE 25 MG/1
25 TABLET, EXTENDED RELEASE ORAL DAILY
Status: DISCONTINUED | OUTPATIENT
Start: 2021-12-01 | End: 2021-12-07 | Stop reason: HOSPADM

## 2021-12-01 RX ORDER — HYDROMORPHONE HYDROCHLORIDE 1 MG/ML
0.25 INJECTION, SOLUTION INTRAMUSCULAR; INTRAVENOUS; SUBCUTANEOUS ONCE
Status: DISCONTINUED | OUTPATIENT
Start: 2021-12-01 | End: 2021-12-01

## 2021-12-01 RX ORDER — FAMOTIDINE 20 MG/1
20 TABLET, FILM COATED ORAL 2 TIMES DAILY
Status: DISCONTINUED | OUTPATIENT
Start: 2021-12-01 | End: 2021-12-07 | Stop reason: HOSPADM

## 2021-12-01 RX ORDER — FAMOTIDINE 20 MG/1
20 TABLET, FILM COATED ORAL ONCE
Status: CANCELLED | OUTPATIENT
Start: 2021-12-01 | End: 2021-12-01

## 2021-12-01 RX ORDER — CEFAZOLIN SODIUM 2 G/100ML
2 INJECTION, SOLUTION INTRAVENOUS ONCE
Status: COMPLETED | OUTPATIENT
Start: 2021-12-02 | End: 2021-12-02

## 2021-12-01 RX ORDER — TAMSULOSIN HYDROCHLORIDE 0.4 MG/1
0.4 CAPSULE ORAL NIGHTLY
Status: DISCONTINUED | OUTPATIENT
Start: 2021-12-01 | End: 2021-12-02

## 2021-12-01 RX ORDER — MORPHINE SULFATE 2 MG/ML
1 INJECTION, SOLUTION INTRAMUSCULAR; INTRAVENOUS EVERY 4 HOURS PRN
Status: DISCONTINUED | OUTPATIENT
Start: 2021-12-01 | End: 2021-12-02

## 2021-12-01 RX ORDER — SODIUM CHLORIDE 0.9 % (FLUSH) 0.9 %
10 SYRINGE (ML) INJECTION AS NEEDED
Status: DISCONTINUED | OUTPATIENT
Start: 2021-12-01 | End: 2021-12-07 | Stop reason: HOSPADM

## 2021-12-01 RX ORDER — FINASTERIDE 5 MG/1
5 TABLET, FILM COATED ORAL DAILY
Status: DISCONTINUED | OUTPATIENT
Start: 2021-12-01 | End: 2021-12-02

## 2021-12-01 RX ORDER — SODIUM CHLORIDE 0.9 % (FLUSH) 0.9 %
10 SYRINGE (ML) INJECTION EVERY 12 HOURS SCHEDULED
Status: DISCONTINUED | OUTPATIENT
Start: 2021-12-01 | End: 2021-12-07 | Stop reason: HOSPADM

## 2021-12-01 RX ORDER — ATORVASTATIN CALCIUM 40 MG/1
40 TABLET, FILM COATED ORAL NIGHTLY
Status: DISCONTINUED | OUTPATIENT
Start: 2021-12-01 | End: 2021-12-07 | Stop reason: HOSPADM

## 2021-12-01 RX ORDER — IPRATROPIUM BROMIDE AND ALBUTEROL SULFATE 2.5; .5 MG/3ML; MG/3ML
3 SOLUTION RESPIRATORY (INHALATION)
Status: DISCONTINUED | OUTPATIENT
Start: 2021-12-01 | End: 2021-12-07 | Stop reason: HOSPADM

## 2021-12-01 RX ORDER — BACLOFEN 10 MG/1
10 TABLET ORAL 3 TIMES DAILY
Status: DISCONTINUED | OUTPATIENT
Start: 2021-12-01 | End: 2021-12-07 | Stop reason: HOSPADM

## 2021-12-01 RX ORDER — SODIUM CHLORIDE, SODIUM LACTATE, POTASSIUM CHLORIDE, CALCIUM CHLORIDE 600; 310; 30; 20 MG/100ML; MG/100ML; MG/100ML; MG/100ML
75 INJECTION, SOLUTION INTRAVENOUS CONTINUOUS
Status: DISCONTINUED | OUTPATIENT
Start: 2021-12-01 | End: 2021-12-07 | Stop reason: HOSPADM

## 2021-12-01 RX ORDER — ONDANSETRON 2 MG/ML
4 INJECTION INTRAMUSCULAR; INTRAVENOUS ONCE
Status: COMPLETED | OUTPATIENT
Start: 2021-12-01 | End: 2021-12-01

## 2021-12-01 RX ORDER — FAMOTIDINE 10 MG/ML
20 INJECTION, SOLUTION INTRAVENOUS ONCE
Status: CANCELLED | OUTPATIENT
Start: 2021-12-01 | End: 2021-12-01

## 2021-12-01 RX ORDER — LISINOPRIL 10 MG/1
10 TABLET ORAL DAILY
Status: DISCONTINUED | OUTPATIENT
Start: 2021-12-01 | End: 2021-12-07 | Stop reason: HOSPADM

## 2021-12-01 RX ORDER — ESCITALOPRAM OXALATE 10 MG/1
5 TABLET ORAL DAILY
Status: DISCONTINUED | OUTPATIENT
Start: 2021-12-01 | End: 2021-12-07 | Stop reason: HOSPADM

## 2021-12-01 RX ADMIN — SODIUM CHLORIDE, PRESERVATIVE FREE 10 ML: 5 INJECTION INTRAVENOUS at 09:02

## 2021-12-01 RX ADMIN — LISINOPRIL 10 MG: 10 TABLET ORAL at 09:03

## 2021-12-01 RX ADMIN — FAMOTIDINE 20 MG: 20 TABLET, FILM COATED ORAL at 09:03

## 2021-12-01 RX ADMIN — FINASTERIDE 5 MG: 5 TABLET, FILM COATED ORAL at 09:03

## 2021-12-01 RX ADMIN — FAMOTIDINE 20 MG: 20 TABLET, FILM COATED ORAL at 21:34

## 2021-12-01 RX ADMIN — BACLOFEN 10 MG: 10 TABLET ORAL at 17:44

## 2021-12-01 RX ADMIN — TAMSULOSIN HYDROCHLORIDE 0.4 MG: 0.4 CAPSULE ORAL at 21:34

## 2021-12-01 RX ADMIN — SODIUM CHLORIDE, POTASSIUM CHLORIDE, SODIUM LACTATE AND CALCIUM CHLORIDE 75 ML/HR: 600; 310; 30; 20 INJECTION, SOLUTION INTRAVENOUS at 21:38

## 2021-12-01 RX ADMIN — BACLOFEN 10 MG: 10 TABLET ORAL at 09:03

## 2021-12-01 RX ADMIN — ATORVASTATIN CALCIUM 40 MG: 40 TABLET, FILM COATED ORAL at 21:36

## 2021-12-01 RX ADMIN — MORPHINE SULFATE 1 MG: 2 INJECTION, SOLUTION INTRAMUSCULAR; INTRAVENOUS at 03:27

## 2021-12-01 RX ADMIN — IPRATROPIUM BROMIDE AND ALBUTEROL SULFATE 3 ML: 2.5; .5 SOLUTION RESPIRATORY (INHALATION) at 19:56

## 2021-12-01 RX ADMIN — SODIUM CHLORIDE, PRESERVATIVE FREE 10 ML: 5 INJECTION INTRAVENOUS at 21:34

## 2021-12-01 RX ADMIN — DIAZEPAM 5 MG: 5 INJECTION, SOLUTION INTRAMUSCULAR; INTRAVENOUS at 00:46

## 2021-12-01 RX ADMIN — ESCITALOPRAM OXALATE 5 MG: 10 TABLET ORAL at 09:02

## 2021-12-01 RX ADMIN — ONDANSETRON 4 MG: 2 INJECTION INTRAMUSCULAR; INTRAVENOUS at 00:46

## 2021-12-01 RX ADMIN — BACLOFEN 10 MG: 10 TABLET ORAL at 21:34

## 2021-12-01 RX ADMIN — SODIUM CHLORIDE, POTASSIUM CHLORIDE, SODIUM LACTATE AND CALCIUM CHLORIDE 75 ML/HR: 600; 310; 30; 20 INJECTION, SOLUTION INTRAVENOUS at 03:26

## 2021-12-01 RX ADMIN — METOPROLOL SUCCINATE 25 MG: 25 TABLET, EXTENDED RELEASE ORAL at 09:03

## 2021-12-01 RX ADMIN — KETOROLAC TROMETHAMINE 15 MG: 30 INJECTION, SOLUTION INTRAMUSCULAR at 00:46

## 2021-12-01 NOTE — ED PROVIDER NOTES
EMERGENCY DEPARTMENT ENCOUNTER    Pt Name: Obed Cotto Jr.  MRN: [id number]  Pt :   1950  Room Number:  33/33  Date of encounter:  2021  PCP: Yadira Fam APRN  ED Provider: Saul Goldman MD    Historian: Patient      HPI:  Chief Complaint: Left hip and femur pain        Context: Obed Cotto Jr. is a 71 y.o. male who presents to the ED c/o left hip and femur pain status post fall at home. The patient suffered a CVA with left-sided weakness in May.  He has undergone rehab but still has very limited use of his left leg.  Tonight he used the restroom and then attempted to ambulate a short distance to his wheelchair.  He lost his balance and fell to the floor.  Landed mostly on his left hip.  He complains of pain throughout the left thigh which he describes as a muscle cramp/spasm.  He denies striking his head or having loss of consciousness.  He denies neck and back pain in any other injury.  He rates his pain 10 out of 10 on the pain scale.  He has not taken any medications for symptom relief.      PAST MEDICAL HISTORY  Past Medical History:   Diagnosis Date   • Acute ischemic stroke (HCC) 2021     posterior limb of the right internal capsule   • BPH with elevated PSA    • Chronic bronchitis (HCC)    • Elevated blood pressure    • Hyperlipidemia    • Hypertension    • Lacunar infarction (HCC)    • Noncompliance with medication regimen     pt states he never took his metoprolol   • Prediabetes          PAST SURGICAL HISTORY  Past Surgical History:   Procedure Laterality Date   • FINGER SURGERY      Right hand 4th finger   • FINGER SURGERY      R ring finger, partial amputation repair         FAMILY HISTORY  Family History   Problem Relation Age of Onset   • Lymphoma Mother    • Dementia Maternal Grandfather          SOCIAL HISTORY  Social History     Socioeconomic History   • Marital status: Single   Tobacco Use   • Smoking status: Current Every Day Smoker     Packs/day: 1.00     Years:  48.00     Pack years: 48.00     Types: Cigarettes   • Smokeless tobacco: Never Used   Substance and Sexual Activity   • Alcohol use: No   • Drug use: No   • Sexual activity: Defer         ALLERGIES  Penicillins        REVIEW OF SYSTEMS  Review of Systems       All systems reviewed and negative except for those discussed in HPI.       PHYSICAL EXAM    I have reviewed the triage vital signs and nursing notes.    ED Triage Vitals [11/30/21 2218]   Temp Heart Rate Resp BP SpO2   98 °F (36.7 °C) 80 18 154/70 96 %      Temp src Heart Rate Source Patient Position BP Location FiO2 (%)   Oral Monitor Lying Right arm --       Physical Exam  GENERAL:   Appears quite uncomfortable sitting upright holding his left heel off of the bed and massaging his quadriceps muscles reporting spasm.    HENT: Nares patent.  No signs of craniofacial trauma  EYES: No scleral icterus.  CV: Regular rhythm, regular rate.  2+ dorsalis pedis pulses.  No murmurs gallops rubs.  RESPIRATORY: Normal effort.  No audible wheezes, rales or rhonchi.  Clear to auscultation  ABDOMEN: Soft, nontender  MUSCULOSKELETAL: No deformities. Shortening or malrotation or not able to be evaluated given his current positioning.  NEURO: Alert, moves all extremities, follows commands.  Fine touch and motor intact distal to the injury  SKIN: Warm, dry, no rash visualized.  No contusions.        LAB RESULTS  Recent Results (from the past 24 hour(s))   Comprehensive Metabolic Panel    Collection Time: 12/01/21 12:38 AM    Specimen: Blood   Result Value Ref Range    Glucose 122 (H) 65 - 99 mg/dL    BUN 21 8 - 23 mg/dL    Creatinine 0.70 (L) 0.76 - 1.27 mg/dL    Sodium 139 136 - 145 mmol/L    Potassium 4.1 3.5 - 5.2 mmol/L    Chloride 102 98 - 107 mmol/L    CO2 26.0 22.0 - 29.0 mmol/L    Calcium 10.6 (H) 8.6 - 10.5 mg/dL    Total Protein 6.3 6.0 - 8.5 g/dL    Albumin 4.10 3.50 - 5.20 g/dL    ALT (SGPT) 40 1 - 41 U/L    AST (SGOT) 27 1 - 40 U/L    Alkaline Phosphatase 125 (H) 39  - 117 U/L    Total Bilirubin 0.7 0.0 - 1.2 mg/dL    eGFR Non African Amer 111 >60 mL/min/1.73    Globulin 2.2 gm/dL    A/G Ratio 1.9 g/dL    BUN/Creatinine Ratio 30.0 (H) 7.0 - 25.0    Anion Gap 11.0 5.0 - 15.0 mmol/L   Troponin    Collection Time: 12/01/21 12:38 AM    Specimen: Blood   Result Value Ref Range    Troponin T <0.010 0.000 - 0.030 ng/mL   BNP    Collection Time: 12/01/21 12:38 AM    Specimen: Blood   Result Value Ref Range    proBNP 219.8 0.0 - 900.0 pg/mL   CBC Auto Differential    Collection Time: 12/01/21 12:38 AM    Specimen: Blood   Result Value Ref Range    WBC 19.67 (H) 3.40 - 10.80 10*3/mm3    RBC 3.80 (L) 4.14 - 5.80 10*6/mm3    Hemoglobin 12.5 (L) 13.0 - 17.7 g/dL    Hematocrit 36.8 (L) 37.5 - 51.0 %    MCV 96.8 79.0 - 97.0 fL    MCH 32.9 26.6 - 33.0 pg    MCHC 34.0 31.5 - 35.7 g/dL    RDW 12.7 12.3 - 15.4 %    RDW-SD 45.3 37.0 - 54.0 fl    MPV 10.2 6.0 - 12.0 fL    Platelets 228 140 - 450 10*3/mm3    Neutrophil % 81.7 (H) 42.7 - 76.0 %    Lymphocyte % 10.6 (L) 19.6 - 45.3 %    Monocyte % 6.9 5.0 - 12.0 %    Eosinophil % 0.1 (L) 0.3 - 6.2 %    Basophil % 0.3 0.0 - 1.5 %    Immature Grans % 0.4 0.0 - 0.5 %    Neutrophils, Absolute 16.09 (H) 1.70 - 7.00 10*3/mm3    Lymphocytes, Absolute 2.09 0.70 - 3.10 10*3/mm3    Monocytes, Absolute 1.35 (H) 0.10 - 0.90 10*3/mm3    Eosinophils, Absolute 0.02 0.00 - 0.40 10*3/mm3    Basophils, Absolute 0.05 0.00 - 0.20 10*3/mm3    Immature Grans, Absolute 0.07 (H) 0.00 - 0.05 10*3/mm3    nRBC 0.0 0.0 - 0.2 /100 WBC       If labs were ordered, I independently reviewed the results.        RADIOLOGY  XR Femur 2 View Left    Result Date: 11/30/2021  CR Femur 2 Vws LT INDICATION: Leg pain after fall. COMPARISON: None available. FINDINGS: AP and lateral views of the left femur.  No hip dislocation is seen. There is a nondisplaced fracture of the left femoral neck. There is degenerative disease in the left hip and left knee.  No joint effusion is seen within the knee.  Patient is osteopenic. Atherosclerotic calcifications are present in the soft tissues.  No foreign body.     Nondisplaced left femoral neck fracture. Signer Name: Bismark Hurst MD  Signed: 11/30/2021 11:55 PM  Workstation Name: [email or web address]  [facility]      I ordered and reviewed the above noted radiographic studies.      I viewed images of left hip radiographs which showed nondisplaced femoral neck fracture per my independent interpretation.    See radiologist's dictation for official interpretation.        PROCEDURES    Procedures    No orders to display       MEDICATIONS GIVEN IN ER    Medications   sodium chloride 0.9 % flush 10 mL (has no administration in time range)   HYDROmorphone (DILAUDID) injection 0.25 mg (0 mg Intravenous Hold 12/1/21 0107)   diazePAM (VALIUM) injection 5 mg (5 mg Intravenous Given 12/1/21 0046)   ketorolac (TORADOL) injection 15 mg (15 mg Intravenous Given 12/1/21 0046)   ondansetron (ZOFRAN) injection 4 mg (4 mg Intravenous Given 12/1/21 0046)         PROGRESS, DATA ANALYSIS, CONSULTS, AND MEDICAL DECISION MAKING    All labs have been independently reviewed by me.  All radiology studies have been reviewed by me and the radiologist dictating the report.   EKG's have been independently viewed and interpreted by me.      Differential diagnoses: Hip fracture versus soft tissue injury versus muscular spasm, etc.      ED Course as of 12/01/21 0119   Wed Dec 01, 2021   0006 I spoke with Dr. Higuera who will admit.  I updated the patient. [MS]   0025 I have ordered Valium for left quadricep spasms as well as a small dose of Dilaudid and Toradol for pain. [MS]      ED Course User Index  [MS] Saul Goldman MD             AS OF 01:19 EST VITALS:    BP - 125/56  HR - 95  TEMP - 98 °F (36.7 °C) (Oral)  O2 SATS - 94%        DIAGNOSIS  Final diagnoses:   Closed fracture of neck of left femur, initial encounter (HCC)   Fall, initial encounter   Recent cerebrovascular  accident (CVA)         DISPOSITION  Admission           Saul Goldman MD  12/01/21 5124

## 2021-12-01 NOTE — H&P
[facility] Medicine Services  HISTORY AND PHYSICAL    Patient Name: Obed Cotto Jr.  : 1950  MRN: [id number]  Primary Care Physician: Yadira Fam APRN  Date of admission: 2021    Subjective   Subjective     Chief Complaint:  Left hip pain status post fall    HPI:  Obed Cotto Jr. is a 71 y.o. male with history of prediabetes, hypertension, hyperlipidemia, CVA with left weakness residual, BPH, extobacco presents to the ED tonight after a fall at home.  Patient has history of CVA with residual left-sided weakness from May of this year and he is attending rehab once weekly, last visit today.  Patient still has limited strength with the left leg and very limited strength with his left arm.  States he has had a lot of tightness in his left leg for the last 4 days and recently started a muscle relaxer to help that as well as sleep.  Tonight patient was using the bathroom and then ambulating about 2 feet to his wheelchair when he felt his left leg cramp up and he fell onto his left hip.  Pain at the time and at worse with severity 10/10 described as cramping or spasm.  Currently is a 1/10.  No other associated symptoms.    Review of systems also positive for intermittent left ankle edema he states is baseline.      COVID Details:        Symptoms: [] NONE [] Fever []  Cough [] Shortness of breath [] Change in taste or smell  The patient has started, but not completed, their COVID-19 vaccination series.    Review of Systems   Constitutional: Negative for chills, diaphoresis and fever.   HENT: Negative for congestion, ear discharge, ear pain, hearing loss, rhinorrhea, sinus pain and sore throat.    Eyes: Negative for pain, discharge and redness.   Respiratory: Negative for cough, chest tightness, shortness of breath and wheezing.    Cardiovascular: Positive for leg swelling. Negative for chest pain and palpitations.   Gastrointestinal: Negative for abdominal distention, abdominal  pain, constipation, diarrhea, nausea and vomiting.   Genitourinary: Negative for decreased urine volume, difficulty urinating, dysuria and frequency.   Skin: Negative for color change, rash and wound.   Neurological: Positive for weakness. Negative for dizziness, light-headedness, numbness and headaches.          All other systems reviewed and are negative.     Personal History     Past Medical History:   Diagnosis Date   • Acute ischemic stroke (HCC) 05/30/2021     posterior limb of the right internal capsule   • BPH with elevated PSA    • Chronic bronchitis (HCC)    • Elevated blood pressure    • Hyperlipidemia    • Hypertension    • Lacunar infarction (HCC)    • Noncompliance with medication regimen     pt states he never took his metoprolol   • Prediabetes        Past Surgical History:   Procedure Laterality Date   • FINGER SURGERY      Right hand 4th finger   • FINGER SURGERY      R ring finger, partial amputation repair       Family History: family history includes Dementia in his maternal grandfather; Lung disease in his father; Lymphoma in his mother. Otherwise pertinent FHx was reviewed and unremarkable.     Social History:  reports that he quit smoking about 6 months ago. His smoking use included cigarettes. He has a 48.00 pack-year smoking history. He has never used smokeless tobacco. He reports that he does not drink alcohol and does not use drugs.  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        Medications:  OnabotulinumtoxinA, aspirin, atorvastatin, baclofen, clopidogrel, docusate sodium, escitalopram, famotidine, finasteride, lisinopril, metoprolol succinate XL, nystatin, potassium chloride, psyllium, and tamsulosin    Allergies   Allergen Reactions   • Penicillins Hives       Objective   Objective     Vital Signs:   Temp:  [98 °F (36.7 °C)-98.8 °F (37.1 °C)] 98.8 °F (37.1 °C)  Heart Rate:  [] 103  Resp:  [16-18] 16  BP: (125-154)/(56-73) 143/73  Flow  (L/min):  [2] 2    Physical Exam   Constitutional: Awake, alert  Eyes: PERRL, sclerae anicteric, no conjunctival injection  HENT: NCAT, mucous membranes moist  Neck: Supple, no thyromegaly, no lymphadenopathy, trachea midline  Respiratory: Clear to auscultation bilaterally, nonlabored respirations   Cardiovascular: RRR, no murmurs, rubs, or gallops, palpable pedal pulses bilaterally  Gastrointestinal: Positive bowel sounds, soft, nontender, nondistended  Musculoskeletal: No bilateral ankle edema, no clubbing or cyanosis to extremities, ttp L hip, unable to fully extend LLE  Psychiatric: Appropriate affect, cooperative  Neurologic: Oriented x 3, strength symmetric in all extremities, Cranial Nerves grossly intact to confrontation, speech clear  Skin: No rashes    Result Review:  I have personally reviewed the results from the time of this admission to 12/01/21 2:21 AM EST and agree with these findings:  [x]  Laboratory  []  Microbiology  [x]  Radiology  []  EKG/Telemetry   []  Cardiology/Vascular   []  Pathology  [x]  Old records  []  Other:  Most notable findings include: nondisplaced L femur neck fx, leukocytosis    LAB RESULTS:      Lab 12/01/21 0038   WBC 19.67*   HEMOGLOBIN 12.5*   HEMATOCRIT 36.8*   PLATELETS 228   NEUTROS ABS 16.09*   IMMATURE GRANS (ABS) 0.07*   LYMPHS ABS 2.09   MONOS ABS 1.35*   EOS ABS 0.02   MCV 96.8         Lab 12/01/21 0038   SODIUM 139   POTASSIUM 4.1   CHLORIDE 102   CO2 26.0   ANION GAP 11.0   BUN 21   CREATININE 0.70*   GLUCOSE 122*   CALCIUM 10.6*         Lab 12/01/21  0038   TOTAL PROTEIN 6.3   ALBUMIN 4.10   GLOBULIN 2.2   ALT (SGPT) 40   AST (SGOT) 27   BILIRUBIN 0.7   ALK PHOS 125*         Lab 12/01/21  0038   PROBNP 219.8   TROPONIN T <0.010             Lab 12/01/21  0042   ABO TYPING B   RH TYPING Positive   ANTIBODY SCREEN Negative         UA    Urinalysis 7/15/21 7/15/21 8/21/21 8/21/21 12/1/21    1127 1127 1802 1802    Squamous Epithelial Cells, UA    3-6 (A)    Specific  Gravity, UA   1.027  1.025   Ketones, UA   Trace (A)  Trace (A)   Blood, UA Negative  Negative  Negative   Leukocytes, UA Trace (A)  Trace (A)  Negative   Nitrite, UA Negative  Negative  Negative   RBC, UA  0-2  7-12 (A)    WBC, UA    3-5 (A)    Bacteria, UA  Few  1+ (A)    (A) Abnormal value            Microbiology Results (last 10 days)     Procedure Component Value - Date/Time    COVID PRE-OP / PRE-PROCEDURE SCREENING ORDER (NO ISOLATION) - Swab, Nasopharynx [[id number]]  (Normal) Collected: 12/01/21 0053    Lab Status: Final result Specimen: Swab from Nasopharynx Updated: 12/01/21 0212    Narrative:      The following orders were created for panel order COVID PRE-OP / PRE-PROCEDURE SCREENING ORDER (NO ISOLATION) - Swab, Nasopharynx.  Procedure                               Abnormality         Status                     ---------                               -----------         ------                     COVID-19,CEPHEID/HUY,LE...[[id number]]  Normal              Final result                 Please view results for these tests on the individual orders.    COVID-19,CEPHEID/HUY,KEYSHA IN-HOUSE(OR EMERGENT/ADD-ON),NP SWAB IN TRANSPORT MEDIA 3-4 HR TAT - Swab, Nasopharynx [[id number]]  (Normal) Collected: 12/01/21 0053    Lab Status: Final result Specimen: Swab from Nasopharynx Updated: 12/01/21 0212     COVID19 Not Detected    Narrative:      Fact sheet for providers: https://www.fda.gov/media/092920/download     Fact sheet for patients: https://www.fda.gov/media/398540/download  Fact sheet for providers: https://www.fda.gov/media/962345/download     Fact sheet for patients: https://www.fda.gov/media/928387/download          XR Femur 2 View Left    Result Date: 11/30/2021  CR Femur 2 Vws LT INDICATION: Leg pain after fall. COMPARISON: None available. FINDINGS: AP and lateral views of the left femur.  No hip dislocation is seen. There is a nondisplaced fracture of the left femoral neck. There is degenerative disease in  the left hip and left knee.  No joint effusion is seen within the knee. Patient is osteopenic. Atherosclerotic calcifications are present in the soft tissues.  No foreign body.     Impression: Nondisplaced left femoral neck fracture. Signer Name: Bismark Hurst MD  Signed: 11/30/2021 11:55 PM  Workstation Name: [facility]  Radiology [facility]    XR Chest 1 View    Result Date: 12/1/2021  CR Chest 1 Vw INDICATION: Trauma. Femur fracture. COMPARISON:  8/21/2021 FINDINGS: Portable AP view(s) of the chest.  Heart is mildly enlarged. There is some atherosclerotic disease in the aorta. The lungs are clear. Pulmonary vascularity is normal. No pneumothorax is seen.     Impression: No acute cardiopulmonary findings. Signer Name: Bismark Hurst MD  Signed: 12/1/2021 2:06 AM  Workstation Name: [facility]  Radiology [facility]      Results for orders placed during the hospital encounter of 05/30/21    Adult Transthoracic Echo Complete W/ Cont if Necessary Per Protocol (With Agitated Saline)    Interpretation Summary  · Saline test results are negative.  · Estimated left ventricular EF = 65% Left ventricular ejection fraction appears to be 61 - 65%. Left ventricular systolic function is normal.  · There is mild mitral valve prolapse of the posterior mitral leaflet.  · Left ventricular wall thickness is consistent with mild basal asymmetric hypertrophy.  · The right atrial cavity is mildly dilated.  · The right ventricular cavity is mildly dilated.  · Estimated right ventricular systolic pressure from tricuspid regurgitation is normal (<35 mmHg). Calculated right ventricular systolic pressure from tricuspid regurgitation is 27 mmHg.  · Left ventricular diastolic function was normal.      Assessment/Plan   Assessment & Plan       Closed fracture of neck of left femur (HCC)    Leukocytosis    Prediabetes    BPH (benign prostatic hyperplasia)    Essential hypertension    Obed Cotto Jr. is a 71 y.o.  male with history of prediabetes, hypertension, hyperlipidemia, CVA with left weakness residual, BPH, extobacco presents to the ED tonight after a fall at home.  Patient has history of CVA with residual left-sided weakness from May of this year and he is attending rehab once weekly, last visit today.    Admitted to the hospitalist for further eval and treatment.      Closed fracture of neck of left femur (HCC)  -consult ortho  -pain relief- morphine  -muscle relaxer  -n/v checks  -hold blood thinners for now, pt.inr, p2y12        Leukocytosis with hypoxia  -wbc 19.6, inflammatory vs infectious vs stress reaction, newly requiring o2 for 82% sat on ra.  cxr hazy on the left, UA negative  -procal  -cbc am  -IVF  - check viral panel, procal  -check antigens   -consider dysphagia eval  -duonebs prn      Prediabetes  No home meds, 5.7 baseline a1c in may, 122 bg currently  -accucheck q6, will add insulin if needed.      BPH (benign prostatic hyperplasia)  On flomax and proscar, stable per pt  -Continue home meds  -Bladder scan and I/o cath if needed.      Essential hypertension  On ACEI and BB therapy at home  -continue home meds  -hold if bp<110.    Recent stroke  - cont PT.OT      DVT prophylaxis:  Foot pumps, hold heparin until ortho eval    CODE STATUS:  Full- only wishes to have 1 round of cpr         This note has been completed as part of a split-shared workflow.   Signature: Electronically signed by CHANG Bennett, 12/01/21, 2:48 AM EST  Attending   Admission Attestation       I have seen and examined the patient, performing an independent face-to-face diagnostic evaluation with plan of care reviewed and developed with the advanced practice clinician (APC).      Brief Summary Statement:     Obed Cotto Jr. is a 71 y.o. male with history of prediabetes, hypertension, hyperlipidemia, CVA with left weakness residual, BPH, extobacco presents to the ED tonight after a fall at home.  Patient reports that he has  been doing fairly well at home with therapy.  He has continued to have trouble with muscle spasms and he thinks that is what led to his fall tonight.  He thinks spasms jerked his leg up which made him fall and he was unable to protect himself.  With subsequent left hip pain.  He also had new oxygen requirement tonight, some cough, no trouble swallowing.    Remainder of detailed HPI is as noted by APC and has been reviewed and/or edited by me for completeness.    Attending Physical Exam:  Temp:  [98 °F (36.7 °C)-98.8 °F (37.1 °C)] 98.8 °F (37.1 °C)  Heart Rate:  [] 103  Resp:  [16-18] 16  BP: (125-154)/(56-73) 143/73  Flow (L/min):  [2] 2  82% RA  Patient is alert and talkative in no distress at rest, thin!  Neck is without mass or JVD  Heart is Reg with a systolic murmur  Lungs are distant, unlabored, congested  Abdomen is soft without HSM or mass, not tender or distended  Spastic left arm, pain with rotation of his left leg  Skin is without rash  Neurologic exam is nonfocal   Mood is appropriate    Brief Assessment/Plan :  See detailed assessment and plan developed with APC which I have reviewed and/or edited for completeness.    I believe this patient meets INPATIENT status due to acute hip fracture.  I feel patient’s risk for adverse outcomes and need for care warrant INPATIENT evaluation and I predict the patient’s care encounter to likely last beyond 2 midnights.      Electronically signed by Mabel Lawrence MD, 12/01/21, 3:20 AM EST.

## 2021-12-01 NOTE — PLAN OF CARE
Goal Outcome Evaluation:  Plan of Care Reviewed With: Mila&LUCRETIA. VSS. ST on tele.Left hip pain due to fall. Cap refill 3 secs. + pedal pulses.Pain controlled with IV meds. NPO since midnight for possible surgery today.

## 2021-12-01 NOTE — CASE MANAGEMENT/SOCIAL WORK
Continued Stay Note  HARDEEP Flores     Patient Name: Obed Cotto Jr.  MRN: [id number]  Today's Date: 12/1/2021    Admit Date: 11/30/2021     Discharge Plan     Row Name 12/01/21 1451       Plan    Plan To be determined    Patient/Family in Agreement with Plan yes    Plan Comments Chart reviewed. I met with Mr Cotto to initiate d/c planning. He lives with his son and daughter in law who are his primary caregivers. He states prior to admission, he was ambulatory in his home short distances, he uses a gayathri walker or quad cane and wheelchair prn. He went to [facility] inpt rehab after his CVA 6/21). We discussed that he will likely need inpt rehab again. Case mgt will follow progress               Discharge Codes    No documentation.               Expected Discharge Date and Time     Expected Discharge Date Expected Discharge Time    Dec 6, 2021             Sonja C Kellerman, RN

## 2021-12-01 NOTE — PROGRESS NOTES
"                  Clinical Nutrition     Reason for Visit: Identified at risk by screening criteria, MST score 2+    Patient Name: Obed Cotto Jr.  YOB: 1950  MRN: [id number]  Date of Encounter: 12/01/21 10:36 EST  Admission date: 11/30/2021    Nutrition Assessment   Admission Problem List:    Closed fracture of neck of left femur (HCC)    Applicable PMH:    Leukocytosis    Prediabetes    BPH (benign prostatic hyperplasia)    Essential hypertension    CVA (May 2021)     Applicable Nutrition-Related Information:      Reported/Observed/Food/Nutrition Related History:   12/2) patient in OR      12/1) patient not in room at time of attempted visit     Anthropometrics   Height: Height: 182.9 cm (72\")  Weight: Weight: 57.7 kg (127 lb 1.6 oz) (12/01/21 0146)  BMI: BMI (Calculated): 17.2  BMI classification: Underweight:<18.5kg/m2   IBW: 178    UBW:   Weight change:     Weight Weight (kg) Weight (lbs) Weight Method VISIT REPORT   12/1/2021 57.652 kg 127 lb 1.6 oz Bed scale -   11/30/2021 58.968 kg 130 lb Estimated;Stated -   11/8/2021 55.792 kg 123 lb - -   10/5/2021 55.611 kg 122 lb 9.6 oz - Report   9/2/2021 54.432 kg 120 lb - Report   8/23/2021 53.978 kg 119 lb Stated -   8/21/2021 53.978 kg 119 lb Stated -   8/5/2021 54.069 kg 119 lb 3.2 oz - Report   7/8/2021 (No Data)  (No Data)  - Report   5/30/2021 66.225 kg 146 lb - -   5/30/2021 66.225 kg 146 lb Stated -   3/5/2021 67.495 kg 148 lb 12.8 oz - Report   3/2/2020 65.59 kg 144 lb 9.6 oz - Report   5/7/2019 66.225 kg 146 lb - Report   3/1/2019 69.4 kg 153 lb - Report       Labs reviewed   Results from last 7 days   Lab Units 12/01/21  0038   SODIUM mmol/L 139   POTASSIUM mmol/L 4.1   CHLORIDE mmol/L 102   CO2 mmol/L 26.0   BUN mg/dL 21   CREATININE mg/dL 0.70*   CALCIUM mg/dL 10.6*   BILIRUBIN mg/dL 0.7   ALK PHOS U/L 125*   ALT (SGPT) U/L 40   AST (SGOT) U/L 27   GLUCOSE mg/dL 122*     Medications reviewed   Pertinent: yes     Intake/Ouptut 24 hrs " (7:00AM - 6:59 AM)     Intake & Output (last day)     None        Current Nutrition Prescription     PO: NPO Diet  Oral Nutrition Supplement:     Evaluation of Received Nutrient/Fluid Intake:  Insufficient data     Nutrition Diagnosis   Date: 12/2 Updated:   Problem Predicted suboptimal energy intake   Etiology Clinical condition; diet order    Signs/Symptoms NPO; reported poor intake prior to admission    Status:     Nutrition Intervention   1.  Follow treatment progress, Care plan reviewed      Goal:   General: Nutrition support treatment  PO: Establish PO    Additional goals:    Monitoring/Evaluation:   Per protocol, PO intake, Pertinent labs    Will Continue to follow per protocol      Jaimee Cheatham RD  Time Spent: 20min

## 2021-12-01 NOTE — CONSULTS
[facility] [place] [facility] Consultation Note    Subjective     Patient Care Team:  Patient Care Team:  Yadira Fam APRN as PCP - General (Nurse Practitioner)  Dieudonne Hartman MD as Consulting Physician (Urology)    Referring Provider: Dr. Goldman  Reason for Consultation: Left hip pain    Chief Complaint   Patient presents with   •  Left hip pain        HPI    Obed Cotto Jr. is a 71 y.o. male who presented to the emergency room last night with left hip pain following a fall.  He has had a stroke with residual left-sided weakness in May 2021.  He is on Plavix.  He is a minimal household ambulator with the aid of a walker and cane, and it sounds like he transfers from wheelchair to toilet or chair/bed.  He had x-rays obtained in the emergency room, that were concerning for possible femoral neck fracture.  He fell at home.  Pain is minimal at the current time, and he sits in the bed with his hip flexed up with no significant pain.  He mainly describes some spasms in his thigh.      Patient Active Problem List   Diagnosis   • Urinary retention   • Positive colorectal cancer screening using DNA-based stool test   • Leukocytosis   • Prediabetes   • BPH (benign prostatic hyperplasia)   • BPH with elevated PSA   • Essential hypertension   • Stroke (HCC)   • Dysarthria   • Dysphagia   • Impaired mobility   • Memory impairment   • Leg cramps   • Left hemiplegia (HCC)   • History of tobacco abuse   • Closed fracture of neck of left femur (HCC)     Past Medical History:   Diagnosis Date   • Acute ischemic stroke (HCC) 05/30/2021     posterior limb of the right internal capsule   • BPH with elevated PSA    • Chronic bronchitis (HCC)    • Elevated blood pressure    • Hyperlipidemia    • Hypertension    • Lacunar infarction (HCC)    • Noncompliance with medication regimen     pt states he never took his metoprolol   • Prediabetes       Past Surgical History:   Procedure Laterality Date   • FINGER SURGERY      Right  hand 4th finger   • FINGER SURGERY      R ring finger, partial amputation repair      Family History   Problem Relation Age of Onset   • Lymphoma Mother    • Lung disease Father    • Dementia Maternal Grandfather      Social History     Socioeconomic History   • Marital status: Single   Tobacco Use   • Smoking status: Former Smoker     Packs/day: 1.00     Years: 48.00     Pack years: 48.00     Types: Cigarettes     Quit date: 2021     Years since quittin.5   • Smokeless tobacco: Never Used   Substance and Sexual Activity   • Alcohol use: No   • Drug use: No   • Sexual activity: Defer      Medications Prior to Admission   Medication Sig Dispense Refill Last Dose   • aspirin (aspirin) 81 MG EC tablet Take 1 tablet by mouth Daily. 30 tablet 2    • atorvastatin (Lipitor) 40 MG tablet Take 1 tablet by mouth Daily. 30 tablet 11    • atorvastatin (LIPITOR) 80 MG tablet TAKE ONE TABLET BY MOUTH ONCE NIGHTLY 30 tablet 5    • baclofen (LIORESAL) 10 MG tablet Take 1 tablet by mouth 3 (Three) Times a Day. 90 tablet 2    • clopidogrel (PLAVIX) 75 MG tablet TAKE ONE TABLET BY MOUTH DAILY 90 tablet 1    • docusate sodium (Colace) 100 MG capsule Take 1 capsule by mouth 2 (Two) Times a Day. 60 capsule 5    • escitalopram (Lexapro) 5 MG tablet Take 1 tablet by mouth Daily. 30 tablet 1    • famotidine (PEPCID) 20 MG tablet TAKE ONE TABLET BY MOUTH TWICE A DAY 60 tablet 2    • finasteride (PROSCAR) 5 MG tablet Take 1 tablet by mouth Daily. 90 tablet 1    • lisinopril (PRINIVIL,ZESTRIL) 10 MG tablet Take 10 mg by mouth Daily.      • metoprolol succinate XL (TOPROL-XL) 25 MG 24 hr tablet TAKE ONE TABLET BY MOUTH DAILY 30 tablet 2    • nystatin (MYCOSTATIN) 892021 UNIT/ML suspension Swish and swallow 5 mL 4 (Four) Times a Day. 140 mL 0    • OnabotulinumtoxinA 200 units reconstituted solution Inject 400 units. 2 each 3    • potassium chloride 10 MEQ CR tablet TAKE ONE TABLET BY MOUTH TWICE A DAY 60 tablet 2    • psyllium  "(METAMUCIL) 58.6 % powder Take  by mouth Daily. 425 g 0    • tamsulosin (FLOMAX) 0.4 MG capsule 24 hr capsule Take 1 capsule by mouth Every Night. 90 capsule 1      Allergies   Allergen Reactions   • Penicillins Hives        Review of Systems   Constitutional: Negative for chills, diaphoresis and fever.   HENT: Negative for congestion, ear discharge, ear pain, hearing loss, rhinorrhea, sinus pain and sore throat.    Eyes: Negative for pain, discharge and redness.   Respiratory: Negative for cough, chest tightness, shortness of breath and wheezing.    Cardiovascular: Positive for leg swelling. Negative for chest pain and palpitations.   Gastrointestinal: Negative for abdominal distention, abdominal pain, constipation, diarrhea, nausea and vomiting.   Genitourinary: Negative for decreased urine volume, difficulty urinating, dysuria and frequency.   Skin: Negative for color change, rash and wound.   Neurological: Positive for weakness. Negative for dizziness, light-headedness, numbness and headaches.    Objective      Physical Exam  /54 (BP Location: Right arm, Patient Position: Lying)   Pulse 74   Temp 97.7 °F (36.5 °C) (Oral)   Resp 16   Ht 182.9 cm (72\")   Wt 57.7 kg (127 lb 1.6 oz)   SpO2 98%   BMI 17.24 kg/m²     Body mass index is 17.24 kg/m².    General:   Mental Status:  Alert   Appearance: Cooperative, in no acute distress   Build and Nutrition: Thin, deconditioned male   Orientation: Alert and oriented to person, place and time   Posture: Laying in a hospital bed    Integument:   Left hip: No skin lesions, no rash, no ecchymosis    Neurologic:   Sensation:    Left foot: Intact to light touch on the dorsal and plantar aspect   Motor:  Left lower extremity: Intact quadriceps, hamstrings, ankle dorsiflexors, and ankle plantar flexors, although weak compared to the contralateral side secondary to his stroke  Vascular:   Left lower extremity: Prompt capillary refill    Lower Extremity:   Left " Hip:    Tenderness:  None    Swelling:  None    Crepitus:  None    Atrophy:  None    Range of motion:  External Rotation: 30°       Internal Rotation: 30°       Flexion:  100°       Extension:  0°   Instability:  None  Deformities:  None  Functional testing: Negative Stinchfield    No leg length discrepancy      Imaging/Studies  Imaging Results (Last 24 Hours)     Procedure Component Value Units Date/Time    MRI Hip Left Without Contrast [[id number]] Collected: 12/01/21 1127     Updated: 12/01/21 1138    Narrative:      EXAMINATION: MRI HIP LEFT WO CONTRAST-      INDICATION: Rule out hip fracture, left hip; S72.002A-Fracture of  unspecified part of neck of left femur, initial encounter for closed  fracture; W19.XXXA-Unspecified fall, initial encounter; Z86.73-Personal  history of transient ischemic attack (TIA), and cerebral infarction  without residual deficits     TECHNIQUE: MRI of the left hip was obtained using small field-of-view  images of the hip in the oblique axial and sagittal planes. Large  field-of-view axial and coronal images of the entire pelvis were  obtained. No intravenous or intra-articular contrast was administered.     COMPARISON: Left femur radiographs 11/30/2021     FINDINGS:      There is a minimally displaced subcapital femoral neck fracture with  associated irregular T1 hypointense fracture line and adjacent marrow  edema. No additional acute fractures are identified. There is associated  soft tissue edema about the fracture most conspicuously along the  posterior margin of the proximal femur. There is no large/discrete fluid  collection or hematoma. There is no evidence of hip dislocation. No  evidence of femoral head avascular necrosis. No acute findings in the  partially imaged pelvis. Note is made of enlarged prostate and colonic  diverticulosis. The gluteal tendons, hamstring tendons, and anterior  flexor tendons appear grossly intact. Mild edema in the left adductor  musculature is  favored reflect reactive edema from adjacent fracture  rather than muscle strain although this could appear similarly.          Impression:         Minimally displaced subcapital femoral neck fracture as seen on prior  radiographs.     This report was finalized on 12/1/2021 11:35 AM by Jp Abraham MD.       XR Chest 1 View [[id number]] Collected: 12/01/21 0206     Updated: 12/01/21 0208    Narrative:      CR Chest 1 Vw    INDICATION:   Trauma. Femur fracture.     COMPARISON:    8/21/2021    FINDINGS:  Portable AP view(s) of the chest.  Heart is mildly enlarged. There is some atherosclerotic disease in the aorta. The lungs are clear. Pulmonary vascularity is normal. No pneumothorax is seen.      Impression:      No acute cardiopulmonary findings.    Signer Name: Bismark Hurst MD   Signed: 12/1/2021 2:06 AM   Workstation Name: [facility]    Radiology [facility]    XR Femur 2 View Left [[id number]] Collected: 11/30/21 2355     Updated: 11/30/21 2357    Narrative:      CR Femur 2 Vws LT    INDICATION:   Leg pain after fall.    COMPARISON:   None available.    FINDINGS:   AP and lateral views of the left femur.  No hip dislocation is seen. There is a nondisplaced fracture of the left femoral neck. There is degenerative disease in the left hip and left knee.  No joint effusion is seen within the knee. Patient is  osteopenic. Atherosclerotic calcifications are present in the soft tissues.  No foreign body.      Impression:      Nondisplaced left femoral neck fracture.    Signer Name: Bismark Hurst MD   Signed: 11/30/2021 11:55 PM   Workstation Name: [facility]    Radiology [facility]          Results from last 7 days   Lab Units 12/01/21  1240 12/01/21  0038   WBC 10*3/mm3 14.80* 19.67*   HEMOGLOBIN g/dL 11.0* 12.5*   HEMATOCRIT % 33.5* 36.8*   MCV fL 100.9* 96.8   PLATELETS 10*3/mm3 210 228         Results Review:   I reviewed the patient's new clinical lab test results. and I reviewed  the patient's new imaging test results.    Assessment and Plan     Assessment:    Nondisplaced left femoral neck fracture    Closed fracture of neck of left femur (HCC)    Closed fracture of neck of left femur (HCC)    Leukocytosis    Prediabetes    BPH (benign prostatic hyperplasia)    Essential hypertension      Plan:    I reviewed my findings with the patient.  He does have a nondisplaced left femoral neck fracture, confirmed by the MRI.  We discussed treatment options, and I think it would be best treated with surgical stabilization of the fracture.  Risk, benefits, and alternatives to surgery been discussed.  He understands, consents, and his questions were answered.  Please see my counseling for details.  We are planning for surgery tomorrow.    Surgical Counseling     After examining the patient and reviewing the diagnostic studies, I discussed the operative and non-operative approaches regarding the hip fracture with the patient. The informed decision has been made to proceed with operative intervention knowing the risks, benefits, and alternatives to the surgical procedure. Risks discussed included, but are not limited to: bleeding, infection, damage to blood vessels and nerves, need for further surgery, malunion, nonunion, avascular necrosis, posttraumatic arthritis, deep venous thrombosis, pulmonary embolus, death, and anesthetic complications. The patient understands, consents, and questions were answered. Plans have been made for femoral neck system fixation as the primary option for surgical stabilization of the fracture    I discussed the patients findings and my recommendations with patient    Medical Decision Making  Management Options : major surgery with risk factors  Data/Risk: independent visualization of imaging, lab tests, or EMG/NCV      Aditya Ruano MD  12/01/21  16:56 EST

## 2021-12-01 NOTE — PROGRESS NOTES
Brief note: (Full consultation note to follow later)    Patient seen and evaluated.  No pain with internal and external rotation of the hip.  He is laying in bed with his hip flexed to 70 degrees, but can extend with some pain in the thigh.  Radiographs reviewed, which are inconclusive for fracture, although I see the irregularity this noted by the radiologist.  To confirm if he has a fracture or not, I recommended an MRI today, hopefully this morning so we can clarify further treatment recommendations.    Aditya Ruano MD  12/01/21  08:06 EST

## 2021-12-02 ENCOUNTER — ANESTHESIA (OUTPATIENT)
Dept: PERIOP | Facility: HOSPITAL | Age: 71
End: 2021-12-02

## 2021-12-02 ENCOUNTER — ANESTHESIA EVENT CONVERTED (OUTPATIENT)
Dept: ANESTHESIOLOGY | Facility: HOSPITAL | Age: 71
End: 2021-12-02

## 2021-12-02 ENCOUNTER — APPOINTMENT (OUTPATIENT)
Dept: GENERAL RADIOLOGY | Facility: HOSPITAL | Age: 71
End: 2021-12-02

## 2021-12-02 LAB
BASOPHILS # BLD AUTO: 0.07 10*3/MM3 (ref 0–0.2)
BASOPHILS NFR BLD AUTO: 0.5 % (ref 0–1.5)
DEPRECATED RDW RBC AUTO: 47.3 FL (ref 37–54)
EOSINOPHIL # BLD AUTO: 0.79 10*3/MM3 (ref 0–0.4)
EOSINOPHIL NFR BLD AUTO: 6.1 % (ref 0.3–6.2)
ERYTHROCYTE [DISTWIDTH] IN BLOOD BY AUTOMATED COUNT: 12.9 % (ref 12.3–15.4)
HCT VFR BLD AUTO: 33.1 % (ref 37.5–51)
HGB BLD-MCNC: 11 G/DL (ref 13–17.7)
IMM GRANULOCYTES # BLD AUTO: 0.06 10*3/MM3 (ref 0–0.05)
IMM GRANULOCYTES NFR BLD AUTO: 0.5 % (ref 0–0.5)
LYMPHOCYTES # BLD AUTO: 2.44 10*3/MM3 (ref 0.7–3.1)
LYMPHOCYTES NFR BLD AUTO: 18.9 % (ref 19.6–45.3)
MCH RBC QN AUTO: 33.1 PG (ref 26.6–33)
MCHC RBC AUTO-ENTMCNC: 33.2 G/DL (ref 31.5–35.7)
MCV RBC AUTO: 99.7 FL (ref 79–97)
MONOCYTES # BLD AUTO: 1.04 10*3/MM3 (ref 0.1–0.9)
MONOCYTES NFR BLD AUTO: 8.1 % (ref 5–12)
NEUTROPHILS NFR BLD AUTO: 65.9 % (ref 42.7–76)
NEUTROPHILS NFR BLD AUTO: 8.49 10*3/MM3 (ref 1.7–7)
NRBC BLD AUTO-RTO: 0 /100 WBC (ref 0–0.2)
PLATELET # BLD AUTO: 201 10*3/MM3 (ref 140–450)
PMV BLD AUTO: 10.6 FL (ref 6–12)
RBC # BLD AUTO: 3.32 10*6/MM3 (ref 4.14–5.8)
WBC NRBC COR # BLD: 12.89 10*3/MM3 (ref 3.4–10.8)

## 2021-12-02 PROCEDURE — C1713 ANCHOR/SCREW BN/BN,TIS/BN: HCPCS | Performed by: ORTHOPAEDIC SURGERY

## 2021-12-02 PROCEDURE — 25010000002 FENTANYL CITRATE (PF) 50 MCG/ML SOLUTION: Performed by: NURSE ANESTHETIST, CERTIFIED REGISTERED

## 2021-12-02 PROCEDURE — 94799 UNLISTED PULMONARY SVC/PX: CPT

## 2021-12-02 PROCEDURE — 99232 SBSQ HOSP IP/OBS MODERATE 35: CPT | Performed by: NURSE PRACTITIONER

## 2021-12-02 PROCEDURE — 25010000002 ROPIVACAINE PER 1 MG: Performed by: NURSE ANESTHETIST, CERTIFIED REGISTERED

## 2021-12-02 PROCEDURE — 0 CEFAZOLIN IN DEXTROSE 2-4 GM/100ML-% SOLUTION: Performed by: ORTHOPAEDIC SURGERY

## 2021-12-02 PROCEDURE — 25010000002 PROPOFOL 10 MG/ML EMULSION: Performed by: NURSE ANESTHETIST, CERTIFIED REGISTERED

## 2021-12-02 PROCEDURE — C1769 GUIDE WIRE: HCPCS | Performed by: ORTHOPAEDIC SURGERY

## 2021-12-02 PROCEDURE — 27236 TREAT THIGH FRACTURE: CPT | Performed by: ORTHOPAEDIC SURGERY

## 2021-12-02 PROCEDURE — 0QS736Z REPOSITION LEFT UPPER FEMUR WITH INTRAMEDULLARY INTERNAL FIXATION DEVICE, PERCUTANEOUS APPROACH: ICD-10-PCS | Performed by: ORTHOPAEDIC SURGERY

## 2021-12-02 PROCEDURE — 85025 COMPLETE CBC W/AUTO DIFF WBC: CPT | Performed by: ORTHOPAEDIC SURGERY

## 2021-12-02 PROCEDURE — 25010000002 DEXAMETHASONE PER 1 MG: Performed by: NURSE ANESTHETIST, CERTIFIED REGISTERED

## 2021-12-02 PROCEDURE — C1889 IMPLANT/INSERT DEVICE, NOC: HCPCS | Performed by: ORTHOPAEDIC SURGERY

## 2021-12-02 PROCEDURE — 76000 FLUOROSCOPY <1 HR PHYS/QHP: CPT

## 2021-12-02 PROCEDURE — 73502 X-RAY EXAM HIP UNI 2-3 VIEWS: CPT

## 2021-12-02 PROCEDURE — 25010000002 ROPIVACAINE PER 1 MG

## 2021-12-02 PROCEDURE — 25010000002 ONDANSETRON PER 1 MG: Performed by: NURSE ANESTHETIST, CERTIFIED REGISTERED

## 2021-12-02 DEVICE — BOLT FEM NK SYS TI ALLOY 85MM STRL: Type: IMPLANTABLE DEVICE | Site: HIP | Status: FUNCTIONAL

## 2021-12-02 DEVICE — PLT FEM NK SYS TI ALLOY 1H STRL: Type: IMPLANTABLE DEVICE | Site: HIP | Status: FUNCTIONAL

## 2021-12-02 DEVICE — SCRW LK S/TAP STRDRV TI 5X40MM STRL: Type: IMPLANTABLE DEVICE | Site: HIP | Status: FUNCTIONAL

## 2021-12-02 DEVICE — SCRW ANTIROTATION FEM NK SYS TI ALLOY 90MM STRL: Type: IMPLANTABLE DEVICE | Site: HIP | Status: FUNCTIONAL

## 2021-12-02 DEVICE — DEV CONTRL TISS STRATAFIX SPIRAL MNCRYL UD 3/0 PLS 60CM: Type: IMPLANTABLE DEVICE | Site: HIP | Status: FUNCTIONAL

## 2021-12-02 RX ORDER — SODIUM CHLORIDE 9 MG/ML
INJECTION, SOLUTION INTRAVENOUS CONTINUOUS PRN
Status: COMPLETED | OUTPATIENT
Start: 2021-12-02 | End: 2021-12-02

## 2021-12-02 RX ORDER — ROPIVACAINE HYDROCHLORIDE 2 MG/ML
INJECTION, SOLUTION EPIDURAL; INFILTRATION; PERINEURAL
Status: COMPLETED
Start: 2021-12-02 | End: 2021-12-02

## 2021-12-02 RX ORDER — ASPIRIN 325 MG
325 TABLET, DELAYED RELEASE (ENTERIC COATED) ORAL DAILY
Status: DISCONTINUED | OUTPATIENT
Start: 2021-12-03 | End: 2021-12-07 | Stop reason: HOSPADM

## 2021-12-02 RX ORDER — FENTANYL CITRATE 50 UG/ML
50 INJECTION, SOLUTION INTRAMUSCULAR; INTRAVENOUS
Status: DISCONTINUED | OUTPATIENT
Start: 2021-12-02 | End: 2021-12-02 | Stop reason: HOSPADM

## 2021-12-02 RX ORDER — SODIUM CHLORIDE 0.9 % (FLUSH) 0.9 %
10 SYRINGE (ML) INJECTION AS NEEDED
Status: DISCONTINUED | OUTPATIENT
Start: 2021-12-02 | End: 2021-12-02 | Stop reason: HOSPADM

## 2021-12-02 RX ORDER — ONDANSETRON 2 MG/ML
4 INJECTION INTRAMUSCULAR; INTRAVENOUS EVERY 6 HOURS PRN
Status: DISCONTINUED | OUTPATIENT
Start: 2021-12-02 | End: 2021-12-07 | Stop reason: HOSPADM

## 2021-12-02 RX ORDER — CEFAZOLIN SODIUM 2 G/100ML
2 INJECTION, SOLUTION INTRAVENOUS EVERY 8 HOURS
Status: COMPLETED | OUTPATIENT
Start: 2021-12-02 | End: 2021-12-03

## 2021-12-02 RX ORDER — MELOXICAM 7.5 MG/1
15 TABLET ORAL DAILY
Status: DISCONTINUED | OUTPATIENT
Start: 2021-12-03 | End: 2021-12-07 | Stop reason: HOSPADM

## 2021-12-02 RX ORDER — ONDANSETRON 4 MG/1
4 TABLET, FILM COATED ORAL EVERY 6 HOURS PRN
Status: DISCONTINUED | OUTPATIENT
Start: 2021-12-02 | End: 2021-12-07 | Stop reason: HOSPADM

## 2021-12-02 RX ORDER — TAMSULOSIN HYDROCHLORIDE 0.4 MG/1
0.4 CAPSULE ORAL DAILY
Status: DISCONTINUED | OUTPATIENT
Start: 2021-12-02 | End: 2021-12-07 | Stop reason: HOSPADM

## 2021-12-02 RX ORDER — SODIUM CHLORIDE, SODIUM LACTATE, POTASSIUM CHLORIDE, CALCIUM CHLORIDE 600; 310; 30; 20 MG/100ML; MG/100ML; MG/100ML; MG/100ML
9 INJECTION, SOLUTION INTRAVENOUS CONTINUOUS
Status: DISCONTINUED | OUTPATIENT
Start: 2021-12-02 | End: 2021-12-02

## 2021-12-02 RX ORDER — FINASTERIDE 5 MG/1
5 TABLET, FILM COATED ORAL NIGHTLY
Status: DISCONTINUED | OUTPATIENT
Start: 2021-12-02 | End: 2021-12-02

## 2021-12-02 RX ORDER — LABETALOL HYDROCHLORIDE 5 MG/ML
5 INJECTION, SOLUTION INTRAVENOUS
Status: DISCONTINUED | OUTPATIENT
Start: 2021-12-02 | End: 2021-12-02 | Stop reason: HOSPADM

## 2021-12-02 RX ORDER — SODIUM CHLORIDE 9 MG/ML
120 INJECTION, SOLUTION INTRAVENOUS CONTINUOUS
Status: DISCONTINUED | OUTPATIENT
Start: 2021-12-02 | End: 2021-12-07 | Stop reason: HOSPADM

## 2021-12-02 RX ORDER — OXYCODONE HYDROCHLORIDE 5 MG/1
5 TABLET ORAL EVERY 4 HOURS PRN
Status: DISCONTINUED | OUTPATIENT
Start: 2021-12-02 | End: 2021-12-07 | Stop reason: HOSPADM

## 2021-12-02 RX ORDER — ONDANSETRON 2 MG/ML
4 INJECTION INTRAMUSCULAR; INTRAVENOUS ONCE AS NEEDED
Status: DISCONTINUED | OUTPATIENT
Start: 2021-12-02 | End: 2021-12-02 | Stop reason: HOSPADM

## 2021-12-02 RX ORDER — LIDOCAINE HYDROCHLORIDE 10 MG/ML
0.5 INJECTION, SOLUTION EPIDURAL; INFILTRATION; INTRACAUDAL; PERINEURAL ONCE AS NEEDED
Status: DISCONTINUED | OUTPATIENT
Start: 2021-12-02 | End: 2021-12-02 | Stop reason: HOSPADM

## 2021-12-02 RX ORDER — DEXAMETHASONE SODIUM PHOSPHATE 4 MG/ML
INJECTION, SOLUTION INTRA-ARTICULAR; INTRALESIONAL; INTRAMUSCULAR; INTRAVENOUS; SOFT TISSUE AS NEEDED
Status: DISCONTINUED | OUTPATIENT
Start: 2021-12-02 | End: 2021-12-02 | Stop reason: SURG

## 2021-12-02 RX ORDER — LIDOCAINE HYDROCHLORIDE 10 MG/ML
INJECTION, SOLUTION EPIDURAL; INFILTRATION; INTRACAUDAL; PERINEURAL AS NEEDED
Status: DISCONTINUED | OUTPATIENT
Start: 2021-12-02 | End: 2021-12-02 | Stop reason: SURG

## 2021-12-02 RX ORDER — SODIUM CHLORIDE 0.9 % (FLUSH) 0.9 %
10 SYRINGE (ML) INJECTION EVERY 12 HOURS SCHEDULED
Status: DISCONTINUED | OUTPATIENT
Start: 2021-12-02 | End: 2021-12-07 | Stop reason: HOSPADM

## 2021-12-02 RX ORDER — FENTANYL CITRATE 50 UG/ML
INJECTION, SOLUTION INTRAMUSCULAR; INTRAVENOUS AS NEEDED
Status: DISCONTINUED | OUTPATIENT
Start: 2021-12-02 | End: 2021-12-02 | Stop reason: SURG

## 2021-12-02 RX ORDER — SODIUM CHLORIDE 0.9 % (FLUSH) 0.9 %
10 SYRINGE (ML) INJECTION EVERY 12 HOURS SCHEDULED
Status: DISCONTINUED | OUTPATIENT
Start: 2021-12-02 | End: 2021-12-02 | Stop reason: HOSPADM

## 2021-12-02 RX ORDER — SODIUM CHLORIDE 0.9 % (FLUSH) 0.9 %
1-10 SYRINGE (ML) INJECTION AS NEEDED
Status: DISCONTINUED | OUTPATIENT
Start: 2021-12-02 | End: 2021-12-07 | Stop reason: HOSPADM

## 2021-12-02 RX ORDER — MIDAZOLAM HYDROCHLORIDE 1 MG/ML
0.5 INJECTION INTRAMUSCULAR; INTRAVENOUS
Status: DISCONTINUED | OUTPATIENT
Start: 2021-12-02 | End: 2021-12-02 | Stop reason: HOSPADM

## 2021-12-02 RX ORDER — ROPIVACAINE HYDROCHLORIDE 5 MG/ML
INJECTION, SOLUTION EPIDURAL; INFILTRATION; PERINEURAL
Status: COMPLETED | OUTPATIENT
Start: 2021-12-02 | End: 2021-12-02

## 2021-12-02 RX ORDER — ONDANSETRON 2 MG/ML
INJECTION INTRAMUSCULAR; INTRAVENOUS AS NEEDED
Status: DISCONTINUED | OUTPATIENT
Start: 2021-12-02 | End: 2021-12-02 | Stop reason: SURG

## 2021-12-02 RX ORDER — FINASTERIDE 5 MG/1
5 TABLET, FILM COATED ORAL DAILY
Status: DISCONTINUED | OUTPATIENT
Start: 2021-12-02 | End: 2021-12-07 | Stop reason: HOSPADM

## 2021-12-02 RX ORDER — ROPIVACAINE HYDROCHLORIDE 2 MG/ML
8 INJECTION, SOLUTION EPIDURAL; INFILTRATION CONTINUOUS
Status: DISPENSED | OUTPATIENT
Start: 2021-12-02 | End: 2021-12-07

## 2021-12-02 RX ORDER — HYDROMORPHONE HYDROCHLORIDE 1 MG/ML
0.5 INJECTION, SOLUTION INTRAMUSCULAR; INTRAVENOUS; SUBCUTANEOUS
Status: DISCONTINUED | OUTPATIENT
Start: 2021-12-02 | End: 2021-12-02 | Stop reason: HOSPADM

## 2021-12-02 RX ORDER — MAGNESIUM HYDROXIDE 1200 MG/15ML
LIQUID ORAL AS NEEDED
Status: DISCONTINUED | OUTPATIENT
Start: 2021-12-02 | End: 2021-12-02 | Stop reason: HOSPADM

## 2021-12-02 RX ORDER — EPHEDRINE SULFATE 50 MG/ML
INJECTION, SOLUTION INTRAVENOUS AS NEEDED
Status: DISCONTINUED | OUTPATIENT
Start: 2021-12-02 | End: 2021-12-02 | Stop reason: SURG

## 2021-12-02 RX ORDER — PROPOFOL 10 MG/ML
VIAL (ML) INTRAVENOUS AS NEEDED
Status: DISCONTINUED | OUTPATIENT
Start: 2021-12-02 | End: 2021-12-02 | Stop reason: SURG

## 2021-12-02 RX ADMIN — PROPOFOL 200 MG: 10 INJECTION, EMULSION INTRAVENOUS at 10:21

## 2021-12-02 RX ADMIN — METOPROLOL SUCCINATE 25 MG: 25 TABLET, EXTENDED RELEASE ORAL at 08:08

## 2021-12-02 RX ADMIN — FENTANYL CITRATE 100 MCG: 50 INJECTION, SOLUTION INTRAMUSCULAR; INTRAVENOUS at 10:50

## 2021-12-02 RX ADMIN — SODIUM CHLORIDE, POTASSIUM CHLORIDE, SODIUM LACTATE AND CALCIUM CHLORIDE 9 ML/HR: 600; 310; 30; 20 INJECTION, SOLUTION INTRAVENOUS at 08:41

## 2021-12-02 RX ADMIN — ONDANSETRON 4 MG: 2 INJECTION INTRAMUSCULAR; INTRAVENOUS at 11:11

## 2021-12-02 RX ADMIN — DEXAMETHASONE SODIUM PHOSPHATE 8 MG: 4 INJECTION, SOLUTION INTRA-ARTICULAR; INTRALESIONAL; INTRAMUSCULAR; INTRAVENOUS; SOFT TISSUE at 10:46

## 2021-12-02 RX ADMIN — SODIUM CHLORIDE, PRESERVATIVE FREE 10 ML: 5 INJECTION INTRAVENOUS at 20:21

## 2021-12-02 RX ADMIN — IPRATROPIUM BROMIDE AND ALBUTEROL SULFATE 3 ML: 2.5; .5 SOLUTION RESPIRATORY (INHALATION) at 21:27

## 2021-12-02 RX ADMIN — ROPIVACAINE HYDROCHLORIDE 8 ML/HR: 2 INJECTION, SOLUTION EPIDURAL; INFILTRATION at 11:41

## 2021-12-02 RX ADMIN — SODIUM CHLORIDE 120 ML/HR: 9 INJECTION, SOLUTION INTRAVENOUS at 23:46

## 2021-12-02 RX ADMIN — ROPIVACAINE HYDROCHLORIDE 20 ML: 5 INJECTION, SOLUTION EPIDURAL; INFILTRATION; PERINEURAL at 10:18

## 2021-12-02 RX ADMIN — SODIUM CHLORIDE 120 ML/HR: 9 INJECTION, SOLUTION INTRAVENOUS at 14:44

## 2021-12-02 RX ADMIN — BACLOFEN 10 MG: 10 TABLET ORAL at 20:21

## 2021-12-02 RX ADMIN — CEFAZOLIN SODIUM 2 G: 2 INJECTION, SOLUTION INTRAVENOUS at 10:14

## 2021-12-02 RX ADMIN — PROPOFOL 200 MG: 10 INJECTION, EMULSION INTRAVENOUS at 10:16

## 2021-12-02 RX ADMIN — CEFAZOLIN SODIUM 2 G: 2 INJECTION, SOLUTION INTRAVENOUS at 17:56

## 2021-12-02 RX ADMIN — SODIUM CHLORIDE, PRESERVATIVE FREE 10 ML: 5 INJECTION INTRAVENOUS at 14:47

## 2021-12-02 RX ADMIN — TAMSULOSIN HYDROCHLORIDE 0.4 MG: 0.4 CAPSULE ORAL at 17:56

## 2021-12-02 RX ADMIN — PROPOFOL 200 MG: 10 INJECTION, EMULSION INTRAVENOUS at 10:18

## 2021-12-02 RX ADMIN — FINASTERIDE 5 MG: 5 TABLET, FILM COATED ORAL at 17:56

## 2021-12-02 RX ADMIN — EPHEDRINE SULFATE 25 MG: 50 INJECTION INTRAVENOUS at 10:30

## 2021-12-02 RX ADMIN — FAMOTIDINE 20 MG: 20 TABLET, FILM COATED ORAL at 20:22

## 2021-12-02 RX ADMIN — LIDOCAINE HYDROCHLORIDE 50 MG: 10 INJECTION, SOLUTION EPIDURAL; INFILTRATION; INTRACAUDAL; PERINEURAL at 10:16

## 2021-12-02 RX ADMIN — FAMOTIDINE 20 MG: 20 TABLET, FILM COATED ORAL at 08:08

## 2021-12-02 RX ADMIN — ATORVASTATIN CALCIUM 40 MG: 40 TABLET, FILM COATED ORAL at 20:22

## 2021-12-02 NOTE — ANESTHESIA POSTPROCEDURE EVALUATION
Patient: Obed Cotto Jr.    Procedure Summary     Date: 12/02/21 Room / Location:  [facility] 11 /  [facility]    Anesthesia Start: 1010 Anesthesia Stop:     Procedure: HIP OPEN REDUCTION INTERNAL FIXATION WITH FEMORAL NECK SYSTEM (Left Hip) Diagnosis:       Closed fracture of neck of left femur, initial encounter ([facility])      (Closed fracture of neck of left femur, initial encounter ([facility]) [S72.002A])    Surgeons: Aditya Ruano MD Provider: Avinash Cortes MD    Anesthesia Type: general ASA Status: 2          Anesthesia Type: general    Vitals  Vitals Value Taken Time   /57 12/02/21 1127   Temp 98 °F (36.7 °C) 12/02/21 1127   Pulse 68 12/02/21 1129   Resp 16 12/02/21 1127   SpO2 97 % 12/02/21 1129   Vitals shown include unvalidated device data.        Post Anesthesia Care and Evaluation    Patient location during evaluation: PACU  Patient participation: complete - patient cannot participate  Level of consciousness: responsive to physical stimuli  Pain score: 0  Pain management: adequate  Airway patency: patent  Anesthetic complications: No anesthetic complications    Cardiovascular status: stable  Respiratory status: acceptable, nasal cannula and spontaneous ventilation  Hydration status: stable    Comments: Pt transferred to PACU with O2. Vital signs stable. Report to PACU RN and care accepted.

## 2021-12-02 NOTE — ANESTHESIA PROCEDURE NOTES
Airway  Urgency: elective    Date/Time: 12/2/2021 10:22 AM  Airway not difficult    General Information and Staff    Patient location during procedure: OR  CRNA: Leanna Calvillo CRNA    Indications and Patient Condition  Indications for airway management: airway protection    Preoxygenated: yes  MILS maintained throughout  Mask difficulty assessment: 1 - vent by mask    Final Airway Details  Final airway type: supraglottic airway      Successful airway: I-gel  Size 4    Number of attempts at approach: 1  Assessment: lips, teeth, and gum same as pre-op and atraumatic intubation    Additional Comments  Pt to OR  11. Pt supine on hospital bed, bilateral arms to the side. ASA monitors applied. Pre-O2 with 100%. SIVI. LMA placed atraumatic. +ETCO2. +BBS.

## 2021-12-02 NOTE — CASE MANAGEMENT/SOCIAL WORK
Continued Stay Note   [facility]     Patient Name: Obed Cotto Jr.  MRN: [id number]  Today's Date: 12/2/2021    Admit Date: 11/30/2021     Discharge Plan     Row Name 12/02/21 1556       Plan    Plan TBD    Plan Comments Case mgt f/u. I met with Mr Cotto again today to discuss d/c plan. He is agreeable to a referral to [facility],( he was there last spring after his CVA). I spoke with April who accepted referral. Will con't to follow and assist with final d/c arrangements               Discharge Codes    No documentation.               Expected Discharge Date and Time     Expected Discharge Date Expected Discharge Time    Dec 6, 2021             Sonja C Kellerman, RN

## 2021-12-02 NOTE — OP NOTE
Orthopaedics Operative Report    PREOPERATIVE DIAGNOSIS: Closed fracture of neck of left femur, initial encounter ([facility]) [S72.002A]    POSTOPERATIVE DIAGNOSIS: Closed fracture of neck of left femur, valgus impacted    PROCEDURE PERFORMED: Femoral neck system fixation, left femoral neck (Synthes implant)    IMPLANTS:   Implant Name Type Inv. Item Serial No.  Lot No. LRB No. Used Action   DEV CONTRL TISS STRATAFIX SPIRAL MNCRYL UD 3/0 PLS 60CM - [id number] Implant DEV CONTRL TISS STRATAFIX SPIRAL MNCRYL UD 3/0 PLS 60CM  [facility]  DIV OF [employer]  Left 1 Implanted   BOLT FEM NK SYS TI ALLOY 90MM STRL - [id number] Implant BOLT FEM NK SYS TI ALLOY 90MM STRL  DEPUY SYNTHES [id number] Left 1 Explanted   SCRW ANTIROTATION FEM NK SYS TI ALLOY 90MM STRL - [id number] Implant SCRW ANTIROTATION FEM NK SYS TI ALLOY 90MM STRL  DEPUY SYNTHES [id number] Left 1 Implanted   BOLT FEM NK SYS TI ALLOY 85MM STRL - [id number] Implant BOLT FEM NK SYS TI ALLOY 85MM STRL  DEPUY SYNTHES [id number] Left 1 Implanted   SCRW LK S/TAP STRDRV TI 5X40MM STRL - [id number] Implant SCRW LK S/TAP STRDRV TI 5X40MM STRL  DEPUY SYNTHES [id number] Left 1 Implanted   PLT FEM NK SYS TI ALLOY 1H STRL - [id number] Implant PLT FEM NK SYS TI ALLOY 1H STRL  DEPUY SYNTHES [id number] Left 1 Implanted        SURGEON: Aditya Ruano MD    ASSISTANT: None    ANESTHESIA:  General    STAFF:  Circulator: Mar Lutz RN; Radha Quintanilla RN  Radiology Technologist: Jay Jay Davis  Scrub Person: Chris Olsen  Nursing Assistant: April Casanova PCT    ESTIMATED BLOOD LOSS: minimal     COMPLICATIONS: None    SPECIMENS: * No orders in the log *    PREOPERATIVE ANTIBIOTICS: Ancef 2 g    INDICATIONS: The patient is a 71 y.o. male who sustained a left hip valgus impacted femoral neck fracture secondary to a fall.  Risks, benefits, and alternatives to the procedure were discussed at length with both the patient and family.  They opted for surgical intervention  knowing the risks benefits and alternatives.  Risks discussed included, but are not limited to: Bleeding, infection, damage to blood vessels and nerves, need for further surgery, deep venous thrombosis, pulmonary embolus, death, malunion, nonunion, and anesthetic complications.  Consent was obtained.  Plan for femoral system fixation as the primary option.     DESCRIPTION OF PROCEDURE: Patient was positively identified in the preoperative holding area, brought to the operative suite, and placed in supine position.  After adequate general anesthetic had been achieved, the patient was placed on the fracture table with all the bony prominences being well-padded.  Traction was applied through the lower extremity with the foot holding device, and intraoperative fluoroscopy demonstrated good reduction of the fracture.  Sterile prep and drape of the hip and lower extremity was then performed, and guidepin was inserted percutaneously, and the incision based off of this.  Guidepin was then delivered into the femoral neck, and noted to be in good position in the femoral head neck.  The reamer for the femoral neck system was then utilized, to accommodate an 85 bolt with a 1 hole plate.  The bolt was then delivered into the femoral head neck, and was noted to be in good position in the femoral head neck.  Distal screws in place, which measured 40 mm in length, and a 90 mm antirotation screw was then placed, which was also noted to be in good position of the femoral head neck.  Intraoperative fluoroscopy demonstrated good reduction with appropriate hardware placement. Wounds were copiously irrigated, and final fluoroscopic imaging obtained.  Deep layers were reapproximated with 0 Vicryl followed by closure of the subcutaneous layer with 2-0 Vicryl, followed by closure of the subcuticular layer with 3-0 STRATAFIX in a running fashion.    Glue tape wound closure dressing was then applied, followed by sterile dressing with 4 x 4's  secured with Micropore tape.  The patient tolerated the procedure well, and was brought to the recovery room in good condition.    POSTOPERATIVE PLAN:  Patient will be toe-touch weightbearing for 6 weeks.  Follow-up with me in 1 month in the office.    Aditya Ruano MD  12/02/21  11:29 EST

## 2021-12-02 NOTE — ANESTHESIA PREPROCEDURE EVALUATION
Anesthesia Evaluation     Patient summary reviewed and Nursing notes reviewed   NPO Solid Status: > 8 hours  NPO Liquid Status: > 8 hours           Airway   Mallampati: II  TM distance: >3 FB  Neck ROM: full  No difficulty expected  Dental      Pulmonary     breath sounds clear to auscultation  Cardiovascular   Exercise tolerance: good (4-7 METS)    Rhythm: regular  Rate: normal        Neuro/Psych  GI/Hepatic/Renal/Endo      Musculoskeletal     Abdominal    Substance History      OB/GYN          Other                        Anesthesia Plan    ASA 2     general     intravenous induction     Anesthetic plan, all risks, benefits, and alternatives have been provided, discussed and informed consent has been obtained with: patient.    PRASHANTH block for post op pain

## 2021-12-02 NOTE — ANESTHESIA PROCEDURE NOTES
Right FA 18 G Peripheral IV    Patient location during procedure: OR  Line placed for Fluids/Medication Admin.  Performed By   CRNA: Leanna Calvillo CRNA  Preanesthetic Checklist  Completed: patient identified, IV checked, site marked, risks and benefits discussed, surgical consent, monitors and equipment checked, pre-op evaluation and timeout performed  Peripheral IV Prep   Patient position: supine   Prep: alcohol swabs  Patient monitoring: heart rate, cardiac monitor and continuous pulse ox  Peripheral IV Procedure   Laterality:right  Location:  Forearm  Catheter size: 18 G         Post Assessment   Dressing Type: transparent.    IV Dressing/Site: clean, dry and intact

## 2021-12-02 NOTE — ANESTHESIA PROCEDURE NOTES
Left FIC Catheter      Patient reassessed immediately prior to procedure    Patient location during procedure: OR  Reason for block: procedure for pain and at surgeon's request  Performed by  CRNA: Ari Cueva, CRNA  Assisted by: Noa Andre SRNA  Preanesthetic Checklist  Completed: patient identified, IV checked, site marked, risks and benefits discussed, surgical consent, monitors and equipment checked, pre-op evaluation and timeout performed  Prep:  Pt Position: supine  Sterile barriers:cap, gloves and mask  Prep: ChloraPrep  Patient monitoring: blood pressure monitoring, continuous pulse oximetry and EKG  Procedure  Performed under: general  Guidance:ultrasound guided  Images:still images obtained, printed/placed on chart    Laterality:left  Block Type:fascia iliaca compartment  Injection Technique:catheter  Needle Type:echogenic  Needle Gauge:18 G  Resistance on Injection: none  Catheter Size:20 G (20g)  Cath Depth at skin: 9 cm    Medications Used: ropivacaine (NAROPIN) 0.5 % injection, 20 mL  Med administered at 12/2/2021 10:18 AM      Medications  Preservative Free Saline:20ml  Comment:Rop 0.5% 20 mL + NS 20 mL for a total volume of 40 mL    Post Assessment  Injection Assessment: negative aspiration for heme, no paresthesia on injection and incremental injection  Patient Tolerance:comfortable throughout block  Complications:no  Additional Notes  Procedure:                 Pt placed in supine position.   The insertion site was prepped in sterile fashion with Chlorapreop and clear plastic drapes.  Analgesia was provided by skin infiltration at insertion site with Lidocaine 1% 3mls.  A B-Blake 18 g , 4 inch echogenic Touhy needle was advance In-plane under ultrasound guidance. The   Anterior superior Iliac crest was initially visualized and the probe was directed slightly medially and slightly towards the umbilicus.  The course of the needle was tracked over the sartorius muscle through the fascia Iliacus  and into the anterior portion of the Iliacus muscle.  Major vessels where identified and avoided as where structures of the peritoneal cavity.  LA injection was made incrementally in 1-5ml amounts spread was visualized superiorly below fascia iliacus.  Injection was completed with negative aspiration of blood and negative intravascular injection.  Injection pressures where normal or minimal resistance.  A 20 g B-Blake wire styleted catheter was then advance thru the needle and very easily placed in a superior or cephalad direction.  The catheter was secured at insertion site with exofin tissue adhesive, benzoin, and steristreps.  A CHG tegaderm dressing was placed over the insertion site and the nerve catheter labeled and capped.  Thank You.

## 2021-12-02 NOTE — PROGRESS NOTES
[facility] Medicine Services  PROGRESS NOTE    Patient Name: Obed Cotto Jr.  : 1950  MRN: [id number]    Date of Admission: 2021  Primary Care Physician: Yadira Fam APRN    Subjective   Subjective     CC:  Follow-up left hip pain s/p fall    HPI:  Patient seen resting in bed in no apparent distress following left hip repair by Dr. Ruano. Pain is currently well controlled. Tolerating diet. States that he would be agreeablehe denies for rehab. He denies any new complaints at this time.    ROS:  Gen- No fevers, chills  CV- No chest pain, palpitations  Resp- No cough, dyspnea  GI- No N/V/D, abd pain    Objective   Objective     Vital Signs:   Temp:  [97.7 °F (36.5 °C)-98.3 °F (36.8 °C)] 97.7 °F (36.5 °C)  Heart Rate:  [64-78] 67  Resp:  [14-18] 16  BP: (113-142)/(48-59) 142/50  Flow (L/min):  [2-3] 3     Physical Exam:  Constitutional: No acute distress, awake, alert  HENT: NCAT, mucous membranes moist  Respiratory: Clear to auscultation bilaterally, respiratory effort normal   Cardiovascular: RRR, no murmurs, palpable pedal pulses bilaterally, cap refill brisk   Gastrointestinal: Positive bowel sounds, soft, nontender, nondistended  Musculoskeletal: No BLE edema, Left hip dressing CDI   Psychiatric: Appropriate affect, cooperative  Neurologic: Oriented x 3, moves all extremities, speech clear  Skin: warm, dry, no visible rash     Results Reviewed:  LAB RESULTS:      Lab 21  0513 21  1240 21  0038   WBC 12.89* 14.80* 19.67*   HEMOGLOBIN 11.0* 11.0* 12.5*   HEMATOCRIT 33.1* 33.5* 36.8*   PLATELETS 201 210 228   NEUTROS ABS 8.49* 10.76* 16.09*   IMMATURE GRANS (ABS) 0.06* 0.07* 0.07*   LYMPHS ABS 2.44 2.53 2.09   MONOS ABS 1.04* 1.07* 1.35*   EOS ABS 0.79* 0.33 0.02   MCV 99.7* 100.9* 96.8   PROCALCITONIN  --   --  0.19   PROTIME  --  14.1  --          Lab 21  1240 21  0038   SODIUM 139 139   POTASSIUM 4.3 4.1   CHLORIDE 104 102   CO2 27.0 26.0    ANION GAP 8.0 11.0   BUN 26* 21   CREATININE 0.62* 0.70*   GLUCOSE 117* 122*   CALCIUM 10.3 10.6*         Lab 12/01/21  1240 12/01/21  0038   TOTAL PROTEIN 5.6* 6.3   ALBUMIN 3.40* 4.10   GLOBULIN 2.2 2.2   ALT (SGPT) 30 40   AST (SGOT) 23 27   BILIRUBIN 0.8 0.7   ALK PHOS 86 125*         Lab 12/01/21  1240 12/01/21  0038   PROBNP  --  219.8   TROPONIN T  --  <0.010   PROTIME 14.1  --    INR 1.12  --              Lab 12/01/21  1240 12/01/21  0042 12/01/21  0042   ABO TYPING B   < > B   RH TYPING Positive   < > Positive   ANTIBODY SCREEN  --   --  Negative    < > = values in this interval not displayed.         Brief Urine Lab Results  (Last result in the past 365 days)      Color   Clarity   Blood   Leuk Est   Nitrite   Protein   CREAT   Urine HCG        12/01/21 0053 Yellow   Clear   Negative   Negative   Negative   Negative                 Microbiology Results Abnormal     Procedure Component Value - Date/Time    S. Pneumo Ag Urine or CSF - Urine, Urine, Clean Catch [[id number]]  (Normal) Collected: 12/01/21 0553    Lab Status: Final result Specimen: Urine, Clean Catch Updated: 12/01/21 1514     Strep Pneumo Ag Negative    Legionella Antigen, Urine - Urine, Urine, Clean Catch [[id number]]  (Normal) Collected: 12/01/21 0414    Lab Status: Final result Specimen: Urine, Clean Catch Updated: 12/01/21 1255     LEGIONELLA ANTIGEN, URINE Negative    Respiratory Panel, PCR (WITHOUT COVID) - Swab, Nasopharynx [[id number]]  (Normal) Collected: 12/01/21 0713    Lab Status: Final result Specimen: Swab from Nasopharynx Updated: 12/01/21 0903     ADENOVIRUS, PCR Not Detected     Coronavirus 229E Not Detected     Coronavirus HKU1 Not Detected     Coronavirus NL63 Not Detected     Coronavirus OC43 Not Detected     Human Metapneumovirus Not Detected     Human Rhinovirus/Enterovirus Not Detected     Influenza B PCR Not Detected     Parainfluenza Virus 1 Not Detected     Parainfluenza Virus 2 Not Detected     Parainfluenza Virus 3  Not Detected     Parainfluenza Virus 4 Not Detected     Bordetella pertussis pcr Not Detected     Chlamydophila pneumoniae PCR Not Detected     Mycoplasma pneumo by PCR Not Detected     Influenza A PCR Not Detected     RSV, PCR Not Detected     Bordetella parapertussis PCR Not Detected    Narrative:      The coronavirus on the RVP is NOT COVID-19 and is NOT indicative of infection with COVID-19.    In the setting of a positive respiratory panel with a viral infection PLUS a negative procalcitonin without other underlying concern for bacterial infection, consider observing off antibiotics or discontinuation of antibiotics and continue supportive care. If the respiratory panel is positive for atypical bacterial infection (Bordetella pertussis, Chlamydophila pneumoniae, or Mycoplasma pneumoniae), consider antibiotic de-escalation to target atypical bacterial infection.    COVID PRE-OP / PRE-PROCEDURE SCREENING ORDER (NO ISOLATION) - Swab, Nasopharynx [[id number]]  (Normal) Collected: 12/01/21 0053    Lab Status: Final result Specimen: Swab from Nasopharynx Updated: 12/01/21 0212    Narrative:      The following orders were created for panel order COVID PRE-OP / PRE-PROCEDURE SCREENING ORDER (NO ISOLATION) - Swab, Nasopharynx.  Procedure                               Abnormality         Status                     ---------                               -----------         ------                     COVID-19,CEPHEID/HUY,LE...[[id number]]  Normal              Final result                 Please view results for these tests on the individual orders.    COVID-19,CEPHEID/HUY,KEYSHA IN-HOUSE(OR EMERGENT/ADD-ON),NP SWAB IN TRANSPORT MEDIA 3-4 HR TAT - Swab, Nasopharynx [[id number]]  (Normal) Collected: 12/01/21 0053    Lab Status: Final result Specimen: Swab from Nasopharynx Updated: 12/01/21 0212     COVID19 Not Detected    Narrative:      Fact sheet for providers: https://www.fda.gov/media/624517/download     Fact sheet for  patients: https://www.fda.gov/media/717032/download  Fact sheet for providers: https://www.fda.gov/media/773485/download     Fact sheet for patients: https://www.fda.gov/media/480999/download          XR Femur 2 View Left    Result Date: 11/30/2021  CR Femur 2 Vws LT INDICATION: Leg pain after fall. COMPARISON: None available. FINDINGS: AP and lateral views of the left femur.  No hip dislocation is seen. There is a nondisplaced fracture of the left femoral neck. There is degenerative disease in the left hip and left knee.  No joint effusion is seen within the knee. Patient is osteopenic. Atherosclerotic calcifications are present in the soft tissues.  No foreign body.     Impression: Nondisplaced left femoral neck fracture. Signer Name: Bismark Hurst MD  Signed: 11/30/2021 11:55 PM  Workstation Name: [facility]  Radiology [facility]    XR Chest 1 View    Result Date: 12/1/2021  CR Chest 1 Vw INDICATION: Trauma. Femur fracture. COMPARISON:  8/21/2021 FINDINGS: Portable AP view(s) of the chest.  Heart is mildly enlarged. There is some atherosclerotic disease in the aorta. The lungs are clear. Pulmonary vascularity is normal. No pneumothorax is seen.     Impression: No acute cardiopulmonary findings. Signer Name: Bismark Hurst MD  Signed: 12/1/2021 2:06 AM  Workstation Name: [facility]  Radiology [facility]    MRI Hip Left Without Contrast    Result Date: 12/1/2021  EXAMINATION: MRI HIP LEFT WO CONTRAST-  INDICATION: Rule out hip fracture, left hip; S72.002A-Fracture of unspecified part of neck of left femur, initial encounter for closed fracture; W19.XXXA-Unspecified fall, initial encounter; Z86.73-Personal history of transient ischemic attack (TIA), and cerebral infarction without residual deficits  TECHNIQUE: MRI of the left hip was obtained using small field-of-view images of the hip in the oblique axial and sagittal planes. Large field-of-view axial and coronal images of the  entire pelvis were obtained. No intravenous or intra-articular contrast was administered.  COMPARISON: Left femur radiographs 11/30/2021  FINDINGS:  There is a minimally displaced subcapital femoral neck fracture with associated irregular T1 hypointense fracture line and adjacent marrow edema. No additional acute fractures are identified. There is associated soft tissue edema about the fracture most conspicuously along the posterior margin of the proximal femur. There is no large/discrete fluid collection or hematoma. There is no evidence of hip dislocation. No evidence of femoral head avascular necrosis. No acute findings in the partially imaged pelvis. Note is made of enlarged prostate and colonic diverticulosis. The gluteal tendons, hamstring tendons, and anterior flexor tendons appear grossly intact. Mild edema in the left adductor musculature is favored reflect reactive edema from adjacent fracture rather than muscle strain although this could appear similarly.       Impression:  Minimally displaced subcapital femoral neck fracture as seen on prior radiographs.  This report was finalized on 12/1/2021 11:35 AM by Jp Abraham MD.        Results for orders placed during the hospital encounter of 05/30/21    Adult Transthoracic Echo Complete W/ Cont if Necessary Per Protocol (With Agitated Saline)    Interpretation Summary  · Saline test results are negative.  · Estimated left ventricular EF = 65% Left ventricular ejection fraction appears to be 61 - 65%. Left ventricular systolic function is normal.  · There is mild mitral valve prolapse of the posterior mitral leaflet.  · Left ventricular wall thickness is consistent with mild basal asymmetric hypertrophy.  · The right atrial cavity is mildly dilated.  · The right ventricular cavity is mildly dilated.  · Estimated right ventricular systolic pressure from tricuspid regurgitation is normal (<35 mmHg). Calculated right ventricular systolic pressure from  tricuspid regurgitation is 27 mmHg.  · Left ventricular diastolic function was normal.      I have reviewed the medications:  Scheduled Meds:[MAR Hold] atorvastatin, 40 mg, Oral, Nightly  [MAR Hold] baclofen, 10 mg, Oral, TID  ceFAZolin, 2 g, Intravenous, Once  [MAR Hold] escitalopram, 5 mg, Oral, Daily  famotidine, 20 mg, Oral, BID  [MAR Hold] finasteride, 5 mg, Oral, Daily  [MAR Hold] ipratropium-albuterol, 3 mL, Nebulization, Q6H While Awake - RT  lisinopril, 10 mg, Oral, Daily  metoprolol succinate XL, 25 mg, Oral, Daily  [MAR Hold] sodium chloride, 10 mL, Intravenous, Q12H  sodium chloride, 10 mL, Intravenous, Q12H  [MAR Hold] tamsulosin, 0.4 mg, Oral, Nightly  tranexamic acid, 1,000 mg, Intravenous, Once  tranexamic acid, 1,000 mg, Intravenous, Once      Continuous Infusions:lactated ringers, 75 mL/hr, Last Rate: 75 mL/hr (12/01/21 2138)  lactated ringers, 9 mL/hr      PRN Meds:.lidocaine PF 1%  •  midazolam  •  [COMPLETED] Insert peripheral IV **AND** [MAR Hold] sodium chloride  •  [MAR Hold] sodium chloride  •  sodium chloride    Assessment/Plan   Assessment & Plan     Active Hospital Problems    Diagnosis  POA   • **Closed fracture of neck of left femur (HCC) [S72.002A]  Yes   • Essential hypertension [I10]  Yes   • BPH (benign prostatic hyperplasia) [N40.0]  Yes   • Prediabetes [R73.03]  Yes   • Leukocytosis [D72.829]  Yes      Resolved Hospital Problems   No resolved problems to display.        Brief Hospital Course to date:  Obed Cotto Jr. is a 71 y.o. male  with history of prediabetes, hypertension, hyperlipidemia, CVA with left weakness residual, BPH, extobacco presents to the ED tonight after a fall at home.  Patient has history of CVA with residual left-sided weakness from May of this year and he is attending rehab once weekly.    This patient's problems and plans were partially entered by my partner and updated as appropriate by me 12/02/21.      Closed fracture of neck of left femur  ([facility])  -Ortho, Dr. Ruano following   -pain meds PRN  -muscle relaxer  -hold anticoagulation for now  -s/p left femoral neck fixation  -toe-touch weightbearing for 6 weeks  -Follow up with Dr. Ruano in 1 month     Leukocytosis with hypoxia  -wbc 19.6 on admission, inflammatory vs infectious vs stress reaction, newly requiring o2 for 82% sat on ra.  cxr hazy on the left, UA negative  -procal negative   -WBC improved to 12.89 this AM   -consider dysphagia eval  -duonebs prn  -Currently on 2L following surgery  -AM labs       Prediabetes  --No home meds, 5.7 baseline a1c 5/30      BPH  -On flomax and proscar, stable per pt  -Continue home meds  -Bladder scan and I/o cath if needed.      Essential hypertension  On ACEI and BB therapy at home  -continue home meds  -hold if bp<110.     Recent stroke  - cont PT/OT    DVT prophylaxis:  Mechanical DVT prophylaxis orders are present.       AM-PAC 6 Clicks Score (PT): 12 (12/01/21 2186)    Disposition: I expect the patient to be discharged TBD.    CODE STATUS:   Code Status and Medical Interventions:   Ordered at: 12/01/21 0258     Level Of Support Discussed With:    Patient     Code Status (Patient has no pulse and is not breathing):    CPR (Attempt to Resuscitate)     Medical Interventions (Patient has pulse or is breathing):    Full Support     Comments:    only desires 1 round of cpr       Lukasz Keating, CHANG  12/02/21

## 2021-12-03 ENCOUNTER — APPOINTMENT (OUTPATIENT)
Dept: CT IMAGING | Facility: HOSPITAL | Age: 71
End: 2021-12-03

## 2021-12-03 LAB
ANION GAP SERPL CALCULATED.3IONS-SCNC: 8 MMOL/L (ref 5–15)
BUN SERPL-MCNC: 14 MG/DL (ref 8–23)
BUN/CREAT SERPL: 34.1 (ref 7–25)
CALCIUM SPEC-SCNC: 7.2 MG/DL (ref 8.6–10.5)
CHLORIDE SERPL-SCNC: 113 MMOL/L (ref 98–107)
CO2 SERPL-SCNC: 22 MMOL/L (ref 22–29)
CREAT SERPL-MCNC: 0.41 MG/DL (ref 0.76–1.27)
GFR SERPL CREATININE-BSD FRML MDRD: >150 ML/MIN/1.73
GLUCOSE SERPL-MCNC: 82 MG/DL (ref 65–99)
POTASSIUM SERPL-SCNC: 3 MMOL/L (ref 3.5–5.2)
SODIUM SERPL-SCNC: 143 MMOL/L (ref 136–145)

## 2021-12-03 PROCEDURE — 25010000002 ROPIVACAINE PER 1 MG: Performed by: NURSE ANESTHETIST, CERTIFIED REGISTERED

## 2021-12-03 PROCEDURE — 80048 BASIC METABOLIC PNL TOTAL CA: CPT | Performed by: NURSE PRACTITIONER

## 2021-12-03 PROCEDURE — 0 CEFAZOLIN IN DEXTROSE 2-4 GM/100ML-% SOLUTION: Performed by: ORTHOPAEDIC SURGERY

## 2021-12-03 PROCEDURE — 94799 UNLISTED PULMONARY SVC/PX: CPT

## 2021-12-03 PROCEDURE — 97162 PT EVAL MOD COMPLEX 30 MIN: CPT

## 2021-12-03 PROCEDURE — 99233 SBSQ HOSP IP/OBS HIGH 50: CPT | Performed by: INTERNAL MEDICINE

## 2021-12-03 PROCEDURE — 71275 CT ANGIOGRAPHY CHEST: CPT

## 2021-12-03 PROCEDURE — 97166 OT EVAL MOD COMPLEX 45 MIN: CPT

## 2021-12-03 PROCEDURE — 0 IOPAMIDOL PER 1 ML: Performed by: INTERNAL MEDICINE

## 2021-12-03 PROCEDURE — 99024 POSTOP FOLLOW-UP VISIT: CPT | Performed by: ORTHOPAEDIC SURGERY

## 2021-12-03 RX ORDER — POTASSIUM CHLORIDE 750 MG/1
40 CAPSULE, EXTENDED RELEASE ORAL AS NEEDED
Status: DISCONTINUED | OUTPATIENT
Start: 2021-12-03 | End: 2021-12-07 | Stop reason: HOSPADM

## 2021-12-03 RX ORDER — POTASSIUM CHLORIDE 7.45 MG/ML
10 INJECTION INTRAVENOUS
Status: DISCONTINUED | OUTPATIENT
Start: 2021-12-03 | End: 2021-12-07 | Stop reason: HOSPADM

## 2021-12-03 RX ORDER — DOCUSATE SODIUM 100 MG/1
100 CAPSULE, LIQUID FILLED ORAL 2 TIMES DAILY
Status: DISCONTINUED | OUTPATIENT
Start: 2021-12-03 | End: 2021-12-07 | Stop reason: HOSPADM

## 2021-12-03 RX ORDER — POTASSIUM CHLORIDE 1.5 G/1.77G
40 POWDER, FOR SOLUTION ORAL AS NEEDED
Status: DISCONTINUED | OUTPATIENT
Start: 2021-12-03 | End: 2021-12-07 | Stop reason: HOSPADM

## 2021-12-03 RX ADMIN — IPRATROPIUM BROMIDE AND ALBUTEROL SULFATE 3 ML: 2.5; .5 SOLUTION RESPIRATORY (INHALATION) at 20:52

## 2021-12-03 RX ADMIN — DOCUSATE SODIUM 100 MG: 100 CAPSULE, LIQUID FILLED ORAL at 10:23

## 2021-12-03 RX ADMIN — TAMSULOSIN HYDROCHLORIDE 0.4 MG: 0.4 CAPSULE ORAL at 17:09

## 2021-12-03 RX ADMIN — DOCUSATE SODIUM 100 MG: 100 CAPSULE, LIQUID FILLED ORAL at 21:33

## 2021-12-03 RX ADMIN — BACLOFEN 10 MG: 10 TABLET ORAL at 17:08

## 2021-12-03 RX ADMIN — SODIUM CHLORIDE 120 ML/HR: 9 INJECTION, SOLUTION INTRAVENOUS at 17:11

## 2021-12-03 RX ADMIN — ESCITALOPRAM OXALATE 5 MG: 10 TABLET ORAL at 08:59

## 2021-12-03 RX ADMIN — IOPAMIDOL 65 ML: 755 INJECTION, SOLUTION INTRAVENOUS at 19:04

## 2021-12-03 RX ADMIN — FAMOTIDINE 20 MG: 20 TABLET, FILM COATED ORAL at 08:58

## 2021-12-03 RX ADMIN — ROPIVACAINE HYDROCHLORIDE 8 ML/HR: 2 INJECTION, SOLUTION EPIDURAL; INFILTRATION at 10:23

## 2021-12-03 RX ADMIN — FAMOTIDINE 20 MG: 20 TABLET, FILM COATED ORAL at 21:33

## 2021-12-03 RX ADMIN — LISINOPRIL 10 MG: 10 TABLET ORAL at 08:59

## 2021-12-03 RX ADMIN — ASPIRIN 325 MG: 325 TABLET, COATED ORAL at 08:59

## 2021-12-03 RX ADMIN — MELOXICAM 15 MG: 7.5 TABLET ORAL at 08:58

## 2021-12-03 RX ADMIN — IPRATROPIUM BROMIDE AND ALBUTEROL SULFATE 3 ML: 2.5; .5 SOLUTION RESPIRATORY (INHALATION) at 08:26

## 2021-12-03 RX ADMIN — BACLOFEN 10 MG: 10 TABLET ORAL at 08:59

## 2021-12-03 RX ADMIN — OXYCODONE 5 MG: 5 TABLET ORAL at 21:33

## 2021-12-03 RX ADMIN — CEFAZOLIN SODIUM 2 G: 2 INJECTION, SOLUTION INTRAVENOUS at 02:26

## 2021-12-03 RX ADMIN — METOPROLOL SUCCINATE 25 MG: 25 TABLET, EXTENDED RELEASE ORAL at 08:59

## 2021-12-03 RX ADMIN — FINASTERIDE 5 MG: 5 TABLET, FILM COATED ORAL at 17:10

## 2021-12-03 RX ADMIN — SODIUM CHLORIDE, PRESERVATIVE FREE 10 ML: 5 INJECTION INTRAVENOUS at 21:33

## 2021-12-03 RX ADMIN — BACLOFEN 10 MG: 10 TABLET ORAL at 21:33

## 2021-12-03 RX ADMIN — ATORVASTATIN CALCIUM 40 MG: 40 TABLET, FILM COATED ORAL at 21:33

## 2021-12-03 NOTE — PROGRESS NOTES
"      [facility] Progress Note    Subjective      LOS: 2 days   Patient Care Team:  Yadira Fam APRN as PCP - General (Nurse Practitioner)  Dieudonne Hartman MD as Consulting Physician (Urology)    CC: Left hip pain    Interval History:   Patient sitting comfortably in a chair.  No new complaints.    Objective      Vital Signs  Temp (24hrs), Av.8 °F (36.6 °C), Min:97.4 °F (36.3 °C), Max:98 °F (36.7 °C)      /61   Pulse 67   Temp 97.9 °F (36.6 °C) (Oral)   Resp 16   Ht 182.9 cm (72\")   Wt 57.7 kg (127 lb 1.6 oz)   SpO2 92%   BMI 17.24 kg/m²     Examination:   Examination of the left hip: The dressings are clean, dry, and intact.  Ankle dorsiflexion, ankle plantar flexion, and EHL are intact, unchanged from preop.  Sensation intact in the foot to light touch.   Thigh is soft and nontender.      Labs:  Results from last 7 days   Lab Units 21  0513 21  1240 21  0038   WBC 10*3/mm3 12.89* 14.80* 19.67*   HEMOGLOBIN g/dL 11.0* 11.0* 12.5*   HEMATOCRIT % 33.1* 33.5* 36.8*   MCV fL 99.7* 100.9* 96.8   PLATELETS 10*3/mm3 201 210 228       Radiology:  Imaging Results (Last 24 Hours)     Procedure Component Value Units Date/Time    FL C Arm During Surgery [[id number]] Resulted: 21 1658     Updated: 21 1702    XR Hip With or Without Pelvis 2 - 3 View Left [[id number]] Collected: 21 1226     Updated: 21 1230    Narrative:      EXAMINATION: XR HIP W OR WO PELVIS 2-3 VIEW LEFT-      INDICATION: Postop open reduction internal fixation left hip;  S72.002A-Fracture of unspecified part of neck of left femur, initial  encounter for closed fracture; W19.XXXA-Unspecified fall, initial  encounter; Z86.73-Personal history of transient ischemic attack (TIA),  and cerebral infarction without residual deficits      COMPARISON: NONE     FINDINGS: Interval ORIF of the previously noted left femoral neck  fracture without evidence of immediate hardware complication, " additional  acute fracture or new malalignment.       Impression:      Interval ORIF of the previously noted left femoral neck  fracture without evidence of immediate hardware complication, additional  acute fracture or new malalignment.     This report was finalized on 12/2/2021 12:27 PM by Laith Vidal.             PT:  Physical Therapy - Plan of Care Review - Outcome Summary:   ]       Results Review:     I reviewed the patient's new clinical results.    Assessment and Plan     Assessment:   Status post close reduction and internal fixation, valgus impacted left femoral neck fracture (femoral neck system device)      Closed fracture of neck of left femur (HCC)    Leukocytosis    Prediabetes    BPH (benign prostatic hyperplasia)    Essential hypertension      Plan for disposition: Planning for [facility] at the time of discharge.  Patient will follow up with me in 1 month.      Future Appointments   Date Time Provider Department Center   12/7/2021 10:00 AM Yadira Fam APRN [facility]   12/8/2021  9:00 AM Yadira Fam APRN [facility]   2/4/2022 11:00 AM Spencer Bloom MD [facility]   3/9/2022  9:45 AM Lorraine Morfin MD [facility]           Aditya Ruano MD  12/03/21  10:59 EST

## 2021-12-03 NOTE — PLAN OF CARE
Goal Outcome Evaluation:  Plan of Care Reviewed With: patient           Outcome Summary: PT eval complete. Pt. alert and oriented. He performed bed mobility with  min assist of 2. He peformed sit to stand transfer w/mod assist of 2. He performed bed to chair transfer w/max assist of 2. Educated pt. TTWB status on LLE, pt. initially maintains then performs steppage gait pattern w/transfer. Deferred ambulation for today. Recommend inpatient rehab upon discharge.

## 2021-12-03 NOTE — THERAPY EVALUATION
Patient Name: Obed Cotto Jr.  : 1950    MRN: [id number]                              Today's Date: 12/3/2021       Admit Date: 2021    Visit Dx:     ICD-10-CM ICD-9-CM   1. Closed fracture of neck of left femur, initial encounter ([facility])  S72.002A 820.8   2. Fall, initial encounter  W19.XXXA E888.9   3. Recent cerebrovascular accident (CVA)  Z86.73 V49.89     Patient Active Problem List   Diagnosis   • Urinary retention   • Positive colorectal cancer screening using DNA-based stool test   • Leukocytosis   • Prediabetes   • BPH (benign prostatic hyperplasia)   • BPH with elevated PSA   • Essential hypertension   • Stroke ([facility])   • Dysarthria   • Dysphagia   • Impaired mobility   • Memory impairment   • Leg cramps   • Left hemiplegia (HCC)   • History of tobacco abuse   • Closed fracture of neck of left femur ([facility])     Past Medical History:   Diagnosis Date   • Acute ischemic stroke ([facility]) 2021     posterior limb of the right internal capsule   • BPH with elevated PSA    • Chronic bronchitis ([facility])    • Hyperlipidemia    • Hypertension    • Lacunar infarction ([facility])    • Noncompliance with medication regimen     pt states he never took his metoprolol   • Prediabetes      Past Surgical History:   Procedure Laterality Date   • FINGER SURGERY      Right hand 4th finger   • FINGER SURGERY      R ring finger, partial amputation repair      General Information     Row Name 21 0944          OT Time and Intention    Document Type evaluation  -TA     Mode of Treatment occupational therapy  -TA     Row Name 21 0944          General Information    Patient Profile Reviewed yes  -TA     Prior Level of Function transfer; bed mobility; ADL's  -TA     Existing Precautions/Restrictions fall; weight bearing  LLE TTWB, L hemiplegia  -TA     Barriers to Rehab previous functional deficit; medically complex  -TA     Row Name 2144          Occupational Profile    Reason for Services/Referral  (Occupational Profile) fxl decline from PLOF  -TA     Patient Goals (Occupational Profile) Get back to PLOF  -TA     Row Name 12/03/21 0944          Living Environment    Lives With child(bill), adult; child(bill), dependent  Son, RAHEL, Connor  -TA     Row Name 12/03/21 0944          Home Main Entrance    Number of Stairs, Main Entrance none  -TA     Row Name 12/03/21 0944          Cognition    Orientation Status (Cognition) oriented x 4  -TA     Row Name 12/03/21 0944          Safety Issues, Functional Mobility    Safety Issues Affecting Function (Mobility) safety precautions follow-through/compliance  -TA     Impairments Affecting Function (Mobility) balance; endurance/activity tolerance; range of motion (ROM); motor control; pain; strength  -TA           User Key  (r) = Recorded By, (t) = Taken By, (c) = Cosigned By    Initials Name Provider Type    Avinash Krishnan OT Occupational Therapist                 Mobility/ADL's     Row Name 12/03/21 0944          Bed Mobility    Bed Mobility supine-sit  -TA     Supine-Sit [place] (Bed Mobility) minimum assist (75% patient effort); 2 person assist  -TA     Bed Mobility, Safety Issues decreased use of arms for pushing/pulling; decreased use of legs for bridging/pushing  -TA     Assistive Device (Bed Mobility) head of bed elevated  -TA     Row Name 12/03/21 0944          Transfers    Transfers sit-stand transfer  -TA     Sit-Stand [place] (Transfers) moderate assist (50% patient effort); 2 person assist  -TA     Row Name 12/03/21 0944          Sit-Stand Transfer    Assistive Device (Sit-Stand Transfers) --  BUE support  -TA     Row Name 12/03/21 0944          Functional Mobility    Functional Mobility- Ind. Level moderate assist (50% patient effort); 2 person assist required  -TA     Functional Mobility- Device --  BUE support  -TA     Functional Mobility-Maintain WBing able to maintain weight bearing status  -TA     Row Name 12/03/21 0944          Activities  of Daily Living    BADL Assessment/Intervention grooming; toileting  -TA     Row Name 12/03/21 0944          Mobility    Extremity Weight-bearing Status left lower extremity  -TA     Left Lower Extremity (Weight-bearing Status) toe touch weight-bearing (TTWB)  -TA     Row Name 12/03/21 0944          Grooming Assessment/Training    [place] Level (Grooming) wash face, hands; set up; supervision  -TA     Position (Grooming) sitting up in bed  -TA     Row Name 12/03/21 0944          Toileting Assessment/Training    [place] Level (Toileting) adjust/manage clothing; perform perineal hygiene; moderate assist (50% patient effort)  Clinical projection of fxl level  -TA           User Key  (r) = Recorded By, (t) = Taken By, (c) = Cosigned By    Initials Name Provider Type    Avinash Krishnan OT Occupational Therapist               Obj/Interventions     Row Name 12/03/21 0944          Sensory Assessment (Somatosensory)    Sensory Assessment (Somatosensory) bilateral UE; sensation intact  -TA     Row Name 12/03/21 0944          Vision Assessment/Intervention    Visual Impairment/Limitations WFL  -TA     Row Name 12/03/21 0944          Range of Motion Comprehensive    General Range of Motion upper extremity range of motion deficits identified  -TA     Comment, General Range of Motion RUE WFL, L shld PROM 50%, L joints distally PROM WFL  -TA     Row Name 12/03/21 0944          Strength Comprehensive (MMT)    General Manual Muscle Testing (MMT) Assessment upper extremity strength deficits identified  -TA     Comment, General Manual Muscle Testing (MMT) Assessment RUE 5/5, L shld/elbow/wrist 0/5, L  1/5  -TA     Row Name 12/03/21 0944          Balance    Balance Assessment sitting static balance; standing static balance  -TA     Static Sitting Balance WFL; sitting, edge of bed  -TA     Static Standing Balance moderate impairment; supported; standing  -TA     Balance Interventions sit to stand; occupation  based/functional task; weight shifting activity  -TA           User Key  (r) = Recorded By, (t) = Taken By, (c) = Cosigned By    Initials Name Provider Type    Avinsah Krishnan OT Occupational Therapist               Goals/Plan     Row Name 12/03/21 0944          Bed Mobility Goal 1 (OT)    Activity/Assistive Device (Bed Mobility Goal 1, OT) supine to sit; sit to supine  -TA     [place] Level/Cues Needed (Bed Mobility Goal 1, OT) standby assist  -TA     Time Frame (Bed Mobility Goal 1, OT) by discharge  -TA     Progress/Outcomes (Bed Mobility Goal 1, OT) goal ongoing  -TA     Row Name 12/03/21 0944          Transfer Goal 1 (OT)    Activity/Assistive Device (Transfer Goal 1, OT) sit-to-stand/stand-to-sit; bed-to-chair/chair-to-bed; toilet  -TA     [place] Level/Cues Needed (Transfer Goal 1, OT) minimum assist (75% or more patient effort)  AAD  -TA     Time Frame (Transfer Goal 1, OT) by discharge  -TA     Progress/Outcome (Transfer Goal 1, OT) goal ongoing  -TA     Row Name 12/03/21 0944          Dressing Goal 1 (OT)    Activity/Device (Dressing Goal 1, OT) upper body dressing  -TA     [place]/Cues Needed (Dressing Goal 1, OT) minimum assist (75% or more patient effort)  -TA     Time Frame (Dressing Goal 1, OT) by discharge  -TA     Progress/Outcome (Dressing Goal 1, OT) goal ongoing  -TA     Row Name 12/03/21 0944          Toileting Goal 1 (OT)    Activity/Device (Toileting Goal 1, OT) adjust/manage clothing; perform perineal hygiene  -TA     [place] Level/Cues Needed (Toileting Goal 1, OT) minimum assist (75% or more patient effort)  -TA     Time Frame (Toileting Goal 1, OT) by discharge  -TA     Progress/Outcome (Toileting Goal 1, OT) goal ongoing  -TA     Row Name 12/03/21 0944          Therapy Assessment/Plan (OT)    Planned Therapy Interventions (OT) activity tolerance training; BADL retraining; functional balance retraining; occupation/activity based interventions; patient/caregiver  education/training; ROM/therapeutic exercise; strengthening exercise; transfer/mobility retraining  -TA           User Key  (r) = Recorded By, (t) = Taken By, (c) = Cosigned By    Initials Name Provider Type    Avinash Krishnan, OT Occupational Therapist               Clinical Impression     Row Name 12/03/21 0944          Pain Assessment    Additional Documentation Pain Scale: Numbers Pre/Post-Treatment (Group)  -TA     Row Name 12/03/21 0944          Pain Scale: Numbers Pre/Post-Treatment    Pretreatment Pain Rating 1/10  -TA     Posttreatment Pain Rating 2/10  -TA     Pain Location - Side Left  -TA     Pain Location - Orientation lower  -TA     Pain Location extremity  -TA     Pre/Posttreatment Pain Comment Pt tolerated, improved with rest  -TA     Pain Intervention(s) Ambulation/increased activity; Repositioned  -TA     Row Name 12/03/21 0944          Plan of Care Review    Plan of Care Reviewed With patient  -TA     Outcome Summary VSS; Pt presents with fxl decline from PLOF, deficits in ADL performance, fxl mobility, occupational endurance. Pt limited by residual L side deficits from prior CVA, LLE muscle cramping, weakness, decreased balance. Pt required Min A x 2 bed mobility, Mod A x 2/BUE support for STS and transfer to chair, set up grooming. Will benefit from skilled OT services to address deficits, facilitate increased fxl I. Recommend IRF at discharge.  -TA     Row Name 12/03/21 0944          Therapy Assessment/Plan (OT)    Patient/Family Therapy Goal Statement (OT) Return to PLOF  -TA     Rehab Potential (OT) good, to achieve stated therapy goals  -TA     Criteria for Skilled Therapeutic Interventions Met (OT) yes; skilled treatment is necessary  -TA     Predicted Duration of Therapy Intervention (OT) 1 week  -TA     Row Name 12/03/21 0944          Therapy Plan Review/Discharge Plan (OT)    Anticipated Discharge Disposition (OT) inpatient rehabilitation facility  -TA     Row Name 12/03/21 0944           Vital Signs    Pre Systolic BP Rehab --  VSS; RN cleared pt for tx  -TA     O2 Delivery Pre Treatment supplemental O2  -TA     O2 Delivery Intra Treatment supplemental O2  -TA     O2 Delivery Post Treatment supplemental O2  -TA     Pre Patient Position Supine  -TA     Intra Patient Position Standing  -TA     Post Patient Position Sitting  -TA     Row Name 12/03/21 0944          Positioning and Restraints    Pre-Treatment Position in bed  -TA     Post Treatment Position chair  -TA     In Chair reclined; call light within reach; encouraged to call for assist; with PT; waffle cushion; legs elevated  all lines intact  -TA           User Key  (r) = Recorded By, (t) = Taken By, (c) = Cosigned By    Initials Name Provider Type    Avinash Krishnan OT Occupational Therapist               Outcome Measures     Row Name 12/03/21 0944          How much help from another is currently needed...    Putting on and taking off regular lower body clothing? 2  -TA     Bathing (including washing, rinsing, and drying) 2  -TA     Toileting (which includes using toilet bed pan or urinal) 2  -TA     Putting on and taking off regular upper body clothing 3  -TA     Taking care of personal grooming (such as brushing teeth) 3  -TA     Eating meals 4  -TA     AM-PAC 6 Clicks Score (OT) 16  -TA     Row Name 12/03/21 0944          Functional Assessment    Outcome Measure Options AM-PAC 6 Clicks Daily Activity (OT)  -TA           User Key  (r) = Recorded By, (t) = Taken By, (c) = Cosigned By    Initials Name Provider Type    Avinash Krishnan OT Occupational Therapist                Occupational Therapy Education                 Title: PT OT SLP Therapies (In Progress)     Topic: Occupational Therapy (Done)     Point: ADL training (Done)     Description:   Instruct learner(s) on proper safety adaptation and remediation techniques during self care or transfers.   Instruct in proper use of assistive devices.              Learning  Progress Summary           Patient Acceptance, E, VU,NR by TA at 12/3/2021 1024                   Point: Home exercise program (Done)     Description:   Instruct learner(s) on appropriate technique for monitoring, assisting and/or progressing therapeutic exercises/activities.              Learning Progress Summary           Patient Acceptance, E, VU,NR by TA at 12/3/2021 1024                   Point: Precautions (Done)     Description:   Instruct learner(s) on prescribed precautions during self-care and functional transfers.              Learning Progress Summary           Patient Acceptance, E, VU,NR by TA at 12/3/2021 1024                   Point: Body mechanics (Done)     Description:   Instruct learner(s) on proper positioning and spine alignment during self-care, functional mobility activities and/or exercises.              Learning Progress Summary           Patient Acceptance, E, VU,NR by TA at 12/3/2021 1024                               User Key     Initials Effective Dates Name Provider Type Discipline    JOSE 06/16/21 -  Avinash Pike OT Occupational Therapist OT              OT Recommendation and Plan  Planned Therapy Interventions (OT): activity tolerance training, BADL retraining, functional balance retraining, occupation/activity based interventions, patient/caregiver education/training, ROM/therapeutic exercise, strengthening exercise, transfer/mobility retraining  Plan of Care Review  Plan of Care Reviewed With: patient  Outcome Summary: VSS; Pt presents with fxl decline from PLOF, deficits in ADL performance, fxl mobility, occupational endurance. Pt limited by residual L side deficits from prior CVA, LLE muscle cramping, weakness, decreased balance. Pt required Min A x 2 bed mobility, Mod A x 2/BUE support for STS and transfer to chair, set up grooming. Will benefit from skilled OT services to address deficits, facilitate increased fxl I. Recommend IRF at discharge.     Time Calculation:     Time Calculation- OT     Row Name 12/03/21 0944             Time Calculation- OT    OT Start Time 0944  ttc 0 minutes  -TA      Total Timed Code Minutes- OT 0 minute(s)  -TA      OT Received On 12/03/21  -TA      OT Goal Re-Cert Due Date 12/13/21  -TA              Untimed Charges    OT Eval/Re-eval Minutes 48  -TA              Total Minutes    Untimed Charges Total Minutes 48  -TA       Total Minutes 48  -TA            User Key  (r) = Recorded By, (t) = Taken By, (c) = Cosigned By    Initials Name Provider Type    TA Avinash Pike, OT Occupational Therapist              Therapy Charges for Today     Code Description Service Date Service Provider Modifiers Qty    [id number] HC OT EVAL MOD COMPLEXITY 4 12/3/2021 Avinash Pike OT GO 1               Avinash Pike OT  12/3/2021

## 2021-12-03 NOTE — PLAN OF CARE
Problem: Adult Inpatient Plan of Care  Goal: Plan of Care Review  Recent Flowsheet Documentation  Taken 12/3/2021 0944 by Avinash Pike OT  Plan of Care Reviewed With: patient  Outcome Summary:   VSS   Pt presents with fxl decline from PLOF, deficits in ADL performance, fxl mobility, occupational endurance. Pt limited by residual L side deficits from prior CVA, LLE muscle cramping, weakness, decreased balance. Pt required Min A x 2 bed mobility, Mod A x 2/BUE support for STS and transfer to chair, set up grooming. Will benefit from skilled OT services to address deficits, facilitate increased fxl I. Recommend IRF at discharge.   Goal Outcome Evaluation:  Plan of Care Reviewed With: patient           Outcome Summary: VSS; Pt presents with fxl decline from PLOF, deficits in ADL performance, fxl mobility, occupational endurance. Pt limited by residual L side deficits from prior CVA, LLE muscle cramping, weakness, decreased balance. Pt required Min A x 2 bed mobility, Mod A x 2/BUE support for STS and transfer to chair, set up grooming. Will benefit from skilled OT services to address deficits, facilitate increased fxl I. Recommend IRF at discharge.

## 2021-12-03 NOTE — CASE MANAGEMENT/SOCIAL WORK
Continued Stay Note  [facility]     Patient Name: Obed Cotto Jr.  MRN: [id number]  Today's Date: 12/3/2021    Admit Date: 11/30/2021     Discharge Plan     Row Name 12/03/21 1446       Plan    Plan [facility]    Plan Comments Case mgt f/u. D/C plan is to transfer to [facility] when medically ready and insurance authorization obtained. [facility] is following and will initiate pre cert when OT/PT evals complete. the earliest possible date for transfer would be Mon 12/6, most likely tues 12/7               Discharge Codes    No documentation.               Expected Discharge Date and Time     Expected Discharge Date Expected Discharge Time    Dec 6, 2021             Sonja C Kellerman, RN

## 2021-12-03 NOTE — PROGRESS NOTES
"                  Clinical Nutrition     Reason for Visit: Identified at risk by screening criteria, MST score 2+    Patient Name: Obed Cotto Jr.  YOB: 1950  MRN: [id number]  Date of Encounter: 12/03/21 13:49 EST  Admission date: 11/30/2021    Nutrition Assessment   Admission Problem List:    Closed fracture of neck of left femur (HCC)    Leukocytosis    (12/2) Status post close reduction and internal fixation, valgus impacted left femoral neck fracture (femoral neck system device)    Applicable PMH:    Prediabetes    BPH (benign prostatic hyperplasia)    Essential hypertension    CVA (5/30/21)     Reported/Observed/Food/Nutrition Related History:     Patient reports he was on a modified diet after his stroke in May and he did not like it.  Patient indicates that is the reason for his weight loss.  Patient reports he had an MBS in august and was cleared for a regular diet.  He has been eating much better since his diet was upgraded to regular in august.  Eating well now, ate well for lunch, bread was too tough for him to cut.  He does not have use of his left arm due to previous stroke.  Would like to receive Boost with all his meals.     Anthropometrics   Height: Height: 182.9 cm (72\")  Weight: Weight: 57.7 kg (127 lb 1.6 oz) (12/02/21 0823)  BMI: BMI (Calculated): 17.2  BMI classification: Underweight:<18.5kg/m2   IBW: 178    UBW: 146# (in May 2021)   Weight change: 19# x 6 months.     Labs reviewed   Results from last 7 days   Lab Units 12/01/21  1240   SODIUM mmol/L 139   POTASSIUM mmol/L 4.3   CHLORIDE mmol/L 104   CO2 mmol/L 27.0   BUN mg/dL 26*   CREATININE mg/dL 0.62*   CALCIUM mg/dL 10.3   BILIRUBIN mg/dL 0.8   ALK PHOS U/L 86   ALT (SGPT) U/L 30   AST (SGOT) U/L 23   GLUCOSE mg/dL 117*         Lab Results   Lab Value Date/Time    HGBA1C 5.70 (H) 05/30/2021 0429    HGBA1C 5.70 (H) 03/05/2021 1519    HGBA1C 5.6 03/02/2020 1418     Medications reviewed   Pertinent: Lipitor, colace, lexapro, " pepcid,     LR @ 75ml/hr     Intake/Ouptut 24 hrs (7:00AM - 6:59 AM)     Intake & Output (last day)       12/02 0701  12/03 0700 12/03 0701  12/04 0700    P.O. 180     I.V. (mL/kg) 1600 (27.7)     IV Piggyback 200     Total Intake(mL/kg) 1980 (34.3)     Urine (mL/kg/hr) 600 (0.4)     Total Output 600     Net +1380           Urine Unmeasured Occurrence 1 x 1 x          Nutrition Focused Physical Exam Findings       Subcutaneous fat:  Orbital Severe   Triceps/Biceps Severe   Thoracic and lumbar region- ribs lower back, midaxillary line Severe     Muscle:  Temple/temporalis Severe   Clavicle- pectoralis, deltoid, trapezius Severe   Clavicle and acromion process  Severe   Scapular bone region Severe   Dorsal hand Severe   Quadriceps Moderate   Calf Severe     Some muscle loss on quadriceps and calf due to CVA and limited use.     Current Nutrition Prescription     PO: Diet Regular  Oral Nutrition Supplement:     Evaluation of Received Nutrient/Fluid Intake:  Insufficient data  100% x 1 meal      Nutrition Diagnosis   Date: 12/3 Updated:   Problem Malnutrition  severe - chronic illness    Etiology CVA in May    Signs/Symptoms Reduced oral intake while on modified diet leading to weight loss; severe muscle and fat loss on physical exam.    Status:     Nutrition Intervention   1.  Follow treatment progress, Care plan reviewed, Supplement provided   2. Boost plus with all meals.       Goal:   General: Nutrition support treatment  PO: Establish PO, Continue positive trend    Additional goals:    Monitoring/Evaluation:   Per protocol, PO intake, Supplement intake, Pertinent labs    Will Continue to follow per protocol      Jaimee Cheatham RD  Time Spent: 30min

## 2021-12-03 NOTE — PLAN OF CARE
Goal Outcome Evaluation:  Plan of Care Reviewed With: patient A&Ox4. VSS. SR on tele. Left hip dressing C/D/I. Pain controlled wit ropivacaine at 8 ML/HL.Positioned by staff q 2 hrs.Unable to ambulate pt due to left sided weakness from CVA earlier this year. Awaiting PT/OT evaluation today.

## 2021-12-03 NOTE — THERAPY EVALUATION
Patient Name: Obed Cotto Jr.  : 1950    MRN: [id number]                              Today's Date: 12/3/2021       Admit Date: 2021    Visit Dx:     ICD-10-CM ICD-9-CM   1. Closed fracture of neck of left femur, initial encounter ([facility])  S72.002A 820.8   2. Fall, initial encounter  W19.XXXA E888.9   3. Recent cerebrovascular accident (CVA)  Z86.73 V49.89     Patient Active Problem List   Diagnosis   • Urinary retention   • Positive colorectal cancer screening using DNA-based stool test   • Leukocytosis   • Prediabetes   • BPH (benign prostatic hyperplasia)   • BPH with elevated PSA   • Essential hypertension   • Stroke ([facility])   • Dysarthria   • Dysphagia   • Impaired mobility   • Memory impairment   • Leg cramps   • Left hemiplegia (HCC)   • History of tobacco abuse   • Closed fracture of neck of left femur ([facility])     Past Medical History:   Diagnosis Date   • Acute ischemic stroke ([facility]) 2021     posterior limb of the right internal capsule   • BPH with elevated PSA    • Chronic bronchitis ([facility])    • Hyperlipidemia    • Hypertension    • Lacunar infarction ([facility])    • Noncompliance with medication regimen     pt states he never took his metoprolol   • Prediabetes      Past Surgical History:   Procedure Laterality Date   • FINGER SURGERY      Right hand 4th finger   • FINGER SURGERY      R ring finger, partial amputation repair      General Information     Row Name 21 1151          Physical Therapy Time and Intention    Document Type evaluation  -SS     Mode of Treatment physical therapy  -SS     Row Name 21 1150          General Information    Patient Profile Reviewed yes  -SS     Existing Precautions/Restrictions fall; weight bearing  LLE TTWB, L sided weakness from previous CVA  -SS     Barriers to Rehab previous functional deficit; medically complex  -SS     Row Name 21 1150          Living Environment    Lives With child(bill), adult; child(bill), dependent; other (see comments)   son, daughter in law, grandson  -     Row Name 12/03/21 1150          Home Main Entrance    Number of Stairs, Main Entrance none  -     Row Name 12/03/21 1150          Stairs Within Home, Primary    Stair Railings, Within Home, Primary none  -     Row Name 12/03/21 1150          Cognition    Orientation Status (Cognition) oriented x 4  -     Row Name 12/03/21 1150          Safety Issues, Functional Mobility    Safety Issues Affecting Function (Mobility) insight into deficits/self-awareness; judgment; problem-solving; safety precaution awareness; safety precautions follow-through/compliance; sequencing abilities  -     Impairments Affecting Function (Mobility) balance; endurance/activity tolerance; range of motion (ROM); motor control; pain; strength; coordination; grasp  -           User Key  (r) = Recorded By, (t) = Taken By, (c) = Cosigned By    Initials Name Provider Type    SS Beatriz San, PT Physical Therapist               Mobility     Row Name 12/03/21 1153          Bed Mobility    Bed Mobility supine-sit; scooting/bridging  -     Scooting/Bridging [place] (Bed Mobility) minimum assist (75% patient effort); verbal cues; 2 person assist  -     Supine-Sit [place] (Bed Mobility) minimum assist (75% patient effort); 2 person assist  -     Assistive Device (Bed Mobility) head of bed elevated; bed rails  -     Comment (Bed Mobility) VC for sequencing  -     Row Name 12/03/21 1153          Transfers    Comment (Transfers) VC for hand placement, sequencing of steps to chair, maintaining TTWB - pt. attempted to put TTWB through LLE but immediately raised LLE in steppage; pt. might benefit from knee immobilizer  -     Row Name 12/03/21 1153          Bed-Chair Transfer    Bed-Chair [place] (Transfers) maximum assist (25% patient effort); 2 person assist  -     Assistive Device (Bed-Chair Transfers) other (see comments)  BUE support  -     Row Name 12/03/21 1153           Sit-Stand Transfer    Sit-Stand [place] (Transfers) moderate assist (50% patient effort); 2 person assist; verbal cues  -     Assistive Device (Sit-Stand Transfers) other (see comments)  BUE support  -     Row Name 12/03/21 1153          Gait/Stairs (Locomotion)    Comment (Gait/Stairs) pt not appropriate  -     Row Name 12/03/21 1153          Mobility    Extremity Weight-bearing Status left lower extremity  -     Left Lower Extremity (Weight-bearing Status) toe touch weight-bearing (TTWB)   -           User Key  (r) = Recorded By, (t) = Taken By, (c) = Cosigned By    Initials Name Provider Type     Beatriz San PT Physical Therapist               Obj/Interventions     Row Name 12/03/21 1454          Range of Motion Comprehensive    Comment, General Range of Motion LLE limited by 25%, RLE WFL  -SS     Row Name 12/03/21 1454          Strength Comprehensive (MMT)    Comment, General Manual Muscle Testing (MMT) Assessment RLE gross 4/5; LLE gross 3/5, unable to perform SLR  -     Row Name 12/03/21 1454          Balance    Balance Assessment sitting static balance; sitting dynamic balance; standing static balance; standing dynamic balance  -     Static Sitting Balance WFL; unsupported; sitting, edge of bed  -     Dynamic Sitting Balance WFL; unsupported; sitting, edge of bed  -SS     Static Standing Balance moderate impairment; supported  -SS     Dynamic Standing Balance moderate impairment; supported  -SS     Balance Interventions sitting; standing; sit to stand; supported; static; dynamic  -     Comment, Balance steppage gait pattern w/transfer  -     Row Name 12/03/21 1454          Sensory Assessment (Somatosensory)    Sensory Assessment (Somatosensory) LE sensation intact  -           User Key  (r) = Recorded By, (t) = Taken By, (c) = Cosigned By    Initials Name Provider Type     Beatriz San PT Physical Therapist               Goals/Plan     Row Name 12/03/21 1500          Bed  Mobility Goal 1 (PT)    Activity/Assistive Device (Bed Mobility Goal 1, PT) bed mobility activities, all  -SS     [place]/Cues Needed (Bed Mobility Goal 1, PT) independent  -SS     Time Frame (Bed Mobility Goal 1, PT) long term goal (LTG); 10 days  -SS     Row Name 12/03/21 1500          Transfer Goal 1 (PT)    Activity/Assistive Device (Transfer Goal 1, PT) sit-to-stand/stand-to-sit; bed-to-chair/chair-to-bed  -SS     [place]/Cues Needed (Transfer Goal 1, PT) minimum assist (75% or more patient effort)  -SS     Time Frame (Transfer Goal 1, PT) long term goal (LTG); 10 days  -SS     Row Name 12/03/21 1500          Gait Training Goal 1 (PT)    Activity/Assistive Device (Gait Training Goal 1, PT) gait (walking locomotion); assistive device use; walker, gayathri; walker, platform  -SS     [place] (Gait Training Goal 1, PT) minimum assist (75% or more patient effort)  -SS     Distance (Gait Training Goal 1, PT) 50  -SS     Time Frame (Gait Training Goal 1, PT) long term goal (LTG); 10 days  -SS           User Key  (r) = Recorded By, (t) = Taken By, (c) = Cosigned By    Initials Name Provider Type     Beatriz San, PT Physical Therapist               Clinical Impression     Row Name 12/03/21 5588          Pain    Additional Documentation Pain Scale: Numbers Pre/Post-Treatment (Group)  -     Row Name 12/03/21 0049          Pain Scale: Numbers Pre/Post-Treatment    Pretreatment Pain Rating 1/10  -SS     Posttreatment Pain Rating 2/10  -SS     Pain Location - Side Left  -SS     Pain Location - Orientation lower  -SS     Pain Location extremity  -SS     Pain Intervention(s) Repositioned; Ambulation/increased activity; Cold applied  -     Row Name 12/03/21 4263          Plan of Care Review    Plan of Care Reviewed With patient  -SS     Outcome Summary PT eval complete. Pt. alert and oriented. He performed bed mobility with  min assist of 2. He peformed sit to stand transfer w/mod assist of  2. He performed bed to chair transfer w/max assist of 2. Educated pt. TTWB status on LLE, pt. initially maintains then performs steppage gait pattern w/transfer. Deferred ambulation for today. Recommend inpatient rehab upon discharge.  -     Row Name 12/03/21 1457          Therapy Assessment/Plan (PT)    Rehab Potential (PT) good, to achieve stated therapy goals  -     Criteria for Skilled Interventions Met (PT) yes; meets criteria; skilled treatment is necessary  -     Row Name 12/03/21 1457          Vital Signs    Pre Systolic BP Rehab --  VSS, RN cleared for therapy  -     Row Name 12/03/21 1457          Positioning and Restraints    Pre-Treatment Position in bed  -SS     Post Treatment Position chair  -SS     In Chair notified nsg; reclined; call light within reach; encouraged to call for assist; exit alarm on; waffle cushion; on mechanical lift sling; legs elevated  -           User Key  (r) = Recorded By, (t) = Taken By, (c) = Cosigned By    Initials Name Provider Type    SS Beatriz San, PT Physical Therapist               Outcome Measures     Row Name 12/03/21 1501          How much help from another person do you currently need...    Turning from your back to your side while in flat bed without using bedrails? 2  -SS     Moving from lying on back to sitting on the side of a flat bed without bedrails? 2  -SS     Moving to and from a bed to a chair (including a wheelchair)? 2  -SS     Standing up from a chair using your arms (e.g., wheelchair, bedside chair)? 2  -SS     Climbing 3-5 steps with a railing? 1  -SS     To walk in hospital room? 1  -SS     AM-PAC 6 Clicks Score (PT) 10  -     Row Name 12/03/21 1501 12/03/21 0944       Functional Assessment    Outcome Measure Options AM-PAC 6 Clicks Basic Mobility (PT)  -SS AM-PAC 6 Clicks Daily Activity (OT)  -TA          User Key  (r) = Recorded By, (t) = Taken By, (c) = Cosigned By    Initials Name Provider Type    TA Avinash Pike, OT  Occupational Therapist     Beatriz San, PT Physical Therapist                             Physical Therapy Education                 Title: PT OT SLP Therapies (In Progress)     Topic: Physical Therapy (In Progress)     Point: Mobility training (Done)     Learning Progress Summary           Patient LOUIE Quintana, VU,NR by  at 12/3/2021 1502    Comment: Educated pt. safety/technique with bed mobility, transfers, TTWB status, benefits of inpatient rehab, PT POC                   Point: Home exercise program (Not Started)     Learner Progress:  Not documented in this visit.          Point: Body mechanics (Done)     Learning Progress Summary           Patient Lilian, E, VU,NR by  at 12/3/2021 1502    Comment: Educated pt. safety/technique with bed mobility, transfers, TTWB status, benefits of inpatient rehab, PT POC                   Point: Precautions (Done)     Learning Progress Summary           Patient LOUIE Quintana, VU,NR by  at 12/3/2021 1502    Comment: Educated pt. safety/technique with bed mobility, transfers, TTWB status, benefits of inpatient rehab, PT POC                               User Key     Initials Effective Dates Name Provider Type Discipline     06/01/21 -  Beatriz San, PT Physical Therapist PT              PT Recommendation and Plan  Planned Therapy Interventions (PT): balance training, bed mobility training, gait training, home exercise program, stretching, strengthening, ROM (range of motion), postural re-education, patient/family education, neuromuscular re-education, transfer training  Plan of Care Reviewed With: patient  Outcome Summary: PT eval complete. Pt. alert and oriented. He performed bed mobility with  min assist of 2. He peformed sit to stand transfer w/mod assist of 2. He performed bed to chair transfer w/max assist of 2. Educated pt. TTWB status on LLE, pt. initially maintains then performs steppage gait pattern w/transfer. Deferred ambulation for today. Recommend inpatient  rehab upon discharge.     Time Calculation:    PT Charges     Row Name 12/03/21 1505             Time Calculation    Start Time 1002  -SS      PT Received On 12/03/21  -SS      PT Goal Re-Cert Due Date 12/13/21  -SS              Untimed Charges    PT Eval/Re-eval Minutes 50  -SS              Total Minutes    Untimed Charges Total Minutes 50  -SS       Total Minutes 50  -SS            User Key  (r) = Recorded By, (t) = Taken By, (c) = Cosigned By    Initials Name Provider Type    SS Beatriz San, NAINA Physical Therapist              Therapy Charges for Today     Code Description Service Date Service Provider Modifiers Qty    98736336662 HC PT EVAL MOD COMPLEXITY 4 12/3/2021 Beatriz San, NAINA GP 1          PT G-Codes  Outcome Measure Options: AM-PAC 6 Clicks Basic Mobility (PT)  AM-PAC 6 Clicks Score (PT): 10  AM-PAC 6 Clicks Score (OT): 16    Beatriz San PT  12/3/2021

## 2021-12-03 NOTE — PROGRESS NOTES
Malnutrition Severity Assessment    Patient Name:  Obed Cotto Jr.  YOB: 1950  MRN: [id number]  Admit Date:  11/30/2021    Patient meets criteria for : Severe Malnutrition    Comments:  Based on patient recent wt loss and poor PO intake, patient meets criteria for severe malnutrition r/t acute illness/injury.  Nutrition Focused Physical Exam completed to determine fat and muscle wasting.  MD please attest and include in pt diagnosis as you deem appropriate.        Malnutrition Severity Assessment  Malnutrition Type: Chronic Disease - Related Malnutrition  Malnutrition Type (last 8 hours)     Malnutrition Severity Assessment     Row Name 12/03/21 1404       Malnutrition Severity Assessment    Malnutrition Type Chronic Disease - Related Malnutrition    Row Name 12/03/21 1404       Insufficient Energy Intake     Insufficient Energy Intake Findings --  patient reports his eating has improved since august.    Row Name 12/03/21 1404       Unintentional Weight Loss     Unintentional Weight Loss Findings Severe  13% x 6 months    Unintentional Weight Loss  Weight loss of 10% in six months    Row Name 12/03/21 1404       Muscle Loss    Loss of Muscle Mass Findings Severe    Row Name 12/03/21 1404       Fat Loss    Subcutaneous Fat Loss Findings Severe    Row Name 12/03/21 1404       Criteria Met (Must meet criteria for severity in at least 2 of these categories: M Wasting, Fat Loss, Fluid, Secondary Signs, Wt. Status, Intake)    Patient meets criteria for  Severe Malnutrition                Electronically signed by:  Jaimee Cheatham RD  12/03/21 14:05 EST

## 2021-12-03 NOTE — PROGRESS NOTES
[facility] Medicine Services  PROGRESS NOTE    Patient Name: Obed Cotto Jr.  : 1950  MRN: [id number]    Date of Admission: 2021  Primary Care Physician: Yadira Fam APRN    Subjective   Subjective     CC:  Hip fracture    HPI:  Worried that he is going to lose all of his progress from previous CVA with physical therapy after fall    ROS:  Gen- No fevers, chills  CV- No chest pain, palpitations  Resp- No cough, dyspnea  GI- No N/V/D, abd pain      Objective   Objective     Vital Signs:   Temp:  [97.4 °F (36.3 °C)-98 °F (36.7 °C)] 97.9 °F (36.6 °C)  Heart Rate:  [64-79] 67  Resp:  [15-18] 16  BP: (128-150)/(51-61) 141/61  Flow (L/min):  [2-4] 2.5     Physical Exam:  Constitutional: No acute distress, awake, alert  HENT: NCAT, mucous membranes moist  Respiratory: Clear to auscultation bilaterally, respiratory effort normal   Cardiovascular: RRR, no murmurs, rubs, or gallops  Gastrointestinal: Positive bowel sounds, soft, nontender, nondistended  Musculoskeletal: No bilateral ankle edema,   Psychiatric: Appropriate affect, cooperative  Neurologic: Oriented x 3, speech clear, chronic LUE weakness  Skin: No rashes      Results Reviewed:  LAB RESULTS:      Lab 21  0513 21  1240 21  0038   WBC 12.89* 14.80* 19.67*   HEMOGLOBIN 11.0* 11.0* 12.5*   HEMATOCRIT 33.1* 33.5* 36.8*   PLATELETS 201 210 228   NEUTROS ABS 8.49* 10.76* 16.09*   IMMATURE GRANS (ABS) 0.06* 0.07* 0.07*   LYMPHS ABS 2.44 2.53 2.09   MONOS ABS 1.04* 1.07* 1.35*   EOS ABS 0.79* 0.33 0.02   MCV 99.7* 100.9* 96.8   PROCALCITONIN  --   --  0.19   PROTIME  --  14.1  --          Lab 21  1240 21  0038   SODIUM 139 139   POTASSIUM 4.3 4.1   CHLORIDE 104 102   CO2 27.0 26.0   ANION GAP 8.0 11.0   BUN 26* 21   CREATININE 0.62* 0.70*   GLUCOSE 117* 122*   CALCIUM 10.3 10.6*         Lab 21  1240 21  0038   TOTAL PROTEIN 5.6* 6.3   ALBUMIN 3.40* 4.10   GLOBULIN 2.2 2.2   ALT (SGPT)  30 40   AST (SGOT) 23 27   BILIRUBIN 0.8 0.7   ALK PHOS 86 125*         Lab 12/01/21  1240 12/01/21  0038   PROBNP  --  219.8   TROPONIN T  --  <0.010   PROTIME 14.1  --    INR 1.12  --              Lab 12/01/21  1240 12/01/21  0042 12/01/21  0042   ABO TYPING B   < > B   RH TYPING Positive   < > Positive   ANTIBODY SCREEN  --   --  Negative    < > = values in this interval not displayed.         Brief Urine Lab Results  (Last result in the past 365 days)      Color   Clarity   Blood   Leuk Est   Nitrite   Protein   CREAT   Urine HCG        12/01/21 0053 Yellow   Clear   Negative   Negative   Negative   Negative                 Microbiology Results Abnormal     Procedure Component Value - Date/Time    S. Pneumo Ag Urine or CSF - Urine, Urine, Clean Catch [[id number]]  (Normal) Collected: 12/01/21 0553    Lab Status: Final result Specimen: Urine, Clean Catch Updated: 12/01/21 1514     Strep Pneumo Ag Negative    Legionella Antigen, Urine - Urine, Urine, Clean Catch [[id number]]  (Normal) Collected: 12/01/21 0414    Lab Status: Final result Specimen: Urine, Clean Catch Updated: 12/01/21 1255     LEGIONELLA ANTIGEN, URINE Negative    Respiratory Panel, PCR (WITHOUT COVID) - Swab, Nasopharynx [[id number]]  (Normal) Collected: 12/01/21 0713    Lab Status: Final result Specimen: Swab from Nasopharynx Updated: 12/01/21 0903     ADENOVIRUS, PCR Not Detected     Coronavirus 229E Not Detected     Coronavirus HKU1 Not Detected     Coronavirus NL63 Not Detected     Coronavirus OC43 Not Detected     Human Metapneumovirus Not Detected     Human Rhinovirus/Enterovirus Not Detected     Influenza B PCR Not Detected     Parainfluenza Virus 1 Not Detected     Parainfluenza Virus 2 Not Detected     Parainfluenza Virus 3 Not Detected     Parainfluenza Virus 4 Not Detected     Bordetella pertussis pcr Not Detected     Chlamydophila pneumoniae PCR Not Detected     Mycoplasma pneumo by PCR Not Detected     Influenza A PCR Not Detected      RSV, PCR Not Detected     Bordetella parapertussis PCR Not Detected    Narrative:      The coronavirus on the RVP is NOT COVID-19 and is NOT indicative of infection with COVID-19.    In the setting of a positive respiratory panel with a viral infection PLUS a negative procalcitonin without other underlying concern for bacterial infection, consider observing off antibiotics or discontinuation of antibiotics and continue supportive care. If the respiratory panel is positive for atypical bacterial infection (Bordetella pertussis, Chlamydophila pneumoniae, or Mycoplasma pneumoniae), consider antibiotic de-escalation to target atypical bacterial infection.    COVID PRE-OP / PRE-PROCEDURE SCREENING ORDER (NO ISOLATION) - Swab, Nasopharynx [[id number]]  (Normal) Collected: 12/01/21 0053    Lab Status: Final result Specimen: Swab from Nasopharynx Updated: 12/01/21 0212    Narrative:      The following orders were created for panel order COVID PRE-OP / PRE-PROCEDURE SCREENING ORDER (NO ISOLATION) - Swab, Nasopharynx.  Procedure                               Abnormality         Status                     ---------                               -----------         ------                     COVID-19,CEPHEID/HUY,LE...[[id number]]  Normal              Final result                 Please view results for these tests on the individual orders.    COVID-19,CEPHEID/HUY,KEYSHA IN-HOUSE(OR EMERGENT/ADD-ON),NP SWAB IN TRANSPORT MEDIA 3-4 HR TAT - Swab, Nasopharynx [[id number]]  (Normal) Collected: 12/01/21 0053    Lab Status: Final result Specimen: Swab from Nasopharynx Updated: 12/01/21 0212     COVID19 Not Detected    Narrative:      Fact sheet for providers: https://www.fda.gov/media/153023/download     Fact sheet for patients: https://www.fda.gov/media/742950/download  Fact sheet for providers: https://www.fda.gov/media/331082/download     Fact sheet for patients: https://www.fda.gov/media/571899/download          MRI Hip Left Without  Contrast    Result Date: 12/1/2021  EXAMINATION: MRI HIP LEFT WO CONTRAST-  INDICATION: Rule out hip fracture, left hip; S72.002A-Fracture of unspecified part of neck of left femur, initial encounter for closed fracture; W19.XXXA-Unspecified fall, initial encounter; Z86.73-Personal history of transient ischemic attack (TIA), and cerebral infarction without residual deficits  TECHNIQUE: MRI of the left hip was obtained using small field-of-view images of the hip in the oblique axial and sagittal planes. Large field-of-view axial and coronal images of the entire pelvis were obtained. No intravenous or intra-articular contrast was administered.  COMPARISON: Left femur radiographs 11/30/2021  FINDINGS:  There is a minimally displaced subcapital femoral neck fracture with associated irregular T1 hypointense fracture line and adjacent marrow edema. No additional acute fractures are identified. There is associated soft tissue edema about the fracture most conspicuously along the posterior margin of the proximal femur. There is no large/discrete fluid collection or hematoma. There is no evidence of hip dislocation. No evidence of femoral head avascular necrosis. No acute findings in the partially imaged pelvis. Note is made of enlarged prostate and colonic diverticulosis. The gluteal tendons, hamstring tendons, and anterior flexor tendons appear grossly intact. Mild edema in the left adductor musculature is favored reflect reactive edema from adjacent fracture rather than muscle strain although this could appear similarly.       Impression:  Minimally displaced subcapital femoral neck fracture as seen on prior radiographs.  This report was finalized on 12/1/2021 11:35 AM by Jp Abraham MD.      Left FIC Catheter    Result Date: 12/2/2021  Leanna Calvillo CRNA     12/2/2021 10:48 AM Left FIC Catheter Patient reassessed immediately prior to procedure Patient location during procedure: OR Reason for block: procedure  for pain and at surgeon's request Performed by CRNA: Ari Cueva, CRNA Assisted by: Noa Andre SRNA Preanesthetic Checklist Completed: patient identified, IV checked, site marked, risks and benefits discussed, surgical consent, monitors and equipment checked, pre-op evaluation and timeout performed Prep: Pt Position: supine Sterile barriers:cap, gloves and mask Prep: ChloraPrep Patient monitoring: blood pressure monitoring, continuous pulse oximetry and EKG Procedure Performed under: general Guidance:ultrasound guided Images:still images obtained, printed/placed on chart Laterality:left Block Type:fascia iliaca compartment Injection Technique:catheter Needle Type:echogenic Needle Gauge:18 G Resistance on Injection: none Catheter Size:20 G (20g) Cath Depth at skin: 9 cm Medications Used: ropivacaine (NAROPIN) 0.5 % injection, 20 mL Med administered at 12/2/2021 10:18 AM Medications Preservative Free Saline:20ml Comment:Rop 0.5% 20 mL + NS 20 mL for a total volume of 40 mL Post Assessment Injection Assessment: negative aspiration for heme, no paresthesia on injection and incremental injection Patient Tolerance:comfortable throughout block Complications:no Additional Notes Procedure:         Pt placed in supine position.   The insertion site was prepped in sterile fashion with Chlorapreop and clear plastic drapes.  Analgesia was provided by skin infiltration at insertion site with Lidocaine 1% 3mls.  A B-Blake 18 g , 4 inch echogenic Touhy needle was advance In-plane under ultrasound guidance. The   Anterior superior Iliac crest was initially visualized and the probe was directed slightly medially and slightly towards the umbilicus.  The course of the needle was tracked over the sartorius muscle through the fascia Iliacus and into the anterior portion of the Iliacus muscle.  Major vessels where identified and avoided as where structures of the peritoneal cavity.  LA injection was made incrementally in 1-5ml  amounts spread was visualized superiorly below fascia iliacus.  Injection was completed with negative aspiration of blood and negative intravascular injection.  Injection pressures where normal or minimal resistance.  A 20 g B-Blake wire styleted catheter was then advance thru the needle and very easily placed in a superior or cephalad direction.  The catheter was secured at insertion site with exofin tissue adhesive, benzoin, and steristreps.  A CHG tegaderm dressing was placed over the insertion site and the nerve catheter labeled and capped.  Thank You.     XR Hip With or Without Pelvis 2 - 3 View Left    Result Date: 12/2/2021  EXAMINATION: XR HIP W OR WO PELVIS 2-3 VIEW LEFT-  INDICATION: Postop open reduction internal fixation left hip; S72.002A-Fracture of unspecified part of neck of left femur, initial encounter for closed fracture; W19.XXXA-Unspecified fall, initial encounter; Z86.73-Personal history of transient ischemic attack (TIA), and cerebral infarction without residual deficits  COMPARISON: NONE  FINDINGS: Interval ORIF of the previously noted left femoral neck fracture without evidence of immediate hardware complication, additional acute fracture or new malalignment.      Impression: Interval ORIF of the previously noted left femoral neck fracture without evidence of immediate hardware complication, additional acute fracture or new malalignment.  This report was finalized on 12/2/2021 12:27 PM by Laith Vidal.        Results for orders placed during the hospital encounter of 05/30/21    Adult Transthoracic Echo Complete W/ Cont if Necessary Per Protocol (With Agitated Saline)    Interpretation Summary  · Saline test results are negative.  · Estimated left ventricular EF = 65% Left ventricular ejection fraction appears to be 61 - 65%. Left ventricular systolic function is normal.  · There is mild mitral valve prolapse of the posterior mitral leaflet.  · Left ventricular wall thickness is  consistent with mild basal asymmetric hypertrophy.  · The right atrial cavity is mildly dilated.  · The right ventricular cavity is mildly dilated.  · Estimated right ventricular systolic pressure from tricuspid regurgitation is normal (<35 mmHg). Calculated right ventricular systolic pressure from tricuspid regurgitation is 27 mmHg.  · Left ventricular diastolic function was normal.      I have reviewed the medications:  Scheduled Meds:aspirin, 325 mg, Oral, Daily  atorvastatin, 40 mg, Oral, Nightly  baclofen, 10 mg, Oral, TID  escitalopram, 5 mg, Oral, Daily  famotidine, 20 mg, Oral, BID  finasteride, 5 mg, Oral, Daily  ipratropium-albuterol, 3 mL, Nebulization, Q6H While Awake - RT  lisinopril, 10 mg, Oral, Daily  meloxicam, 15 mg, Oral, Daily  metoprolol succinate XL, 25 mg, Oral, Daily  sodium chloride, 10 mL, Intravenous, Q12H  sodium chloride, 10 mL, Intravenous, Q12H  tamsulosin, 0.4 mg, Oral, Daily      Continuous Infusions:lactated ringers, 75 mL/hr, Last Rate: 75 mL/hr (12/01/21 2138)  ropivacaine (NAROPIN) 0.2% peripheral nerve cath (moog), 8 mL/hr, Last Rate: 8 mL/hr (12/02/21 1141)  sodium chloride, 120 mL/hr, Last Rate: 120 mL/hr (12/02/21 2346)      PRN Meds:.ondansetron **OR** ondansetron  •  oxyCODONE  •  sodium chloride  •  [COMPLETED] Insert peripheral IV **AND** [MAR Hold] sodium chloride  •  sodium chloride    Assessment/Plan   Assessment & Plan     Active Hospital Problems    Diagnosis  POA   • **Closed fracture of neck of left femur (HCC) [S72.002A]  Yes   • Essential hypertension [I10]  Yes   • BPH (benign prostatic hyperplasia) [N40.0]  Yes   • Prediabetes [R73.03]  Yes   • Leukocytosis [D72.829]  Yes      Resolved Hospital Problems   No resolved problems to display.        Brief Hospital Course to date:  Obed Cotto Jr. is a 71 y.o. male with history of prediabetes, hypertension, hyperlipidemia, CVA with left weakness residual, BPH, extobacco presents to the ED tonight after a fall at  home.  Patient has history of CVA with residual left-sided weakness from May of this year and he was attending rehab once weekly prior to fall.        Closed fracture of neck of left femur (HCC)  -Ortho, Dr. Ruano following   -pain meds PRN  -Continue baclofen, TID  -hold anticoagulation for now  -s/p left femoral neck fixation on 12/2  -toe-touch weightbearing for 6 weeks  -Follow up with Dr. Ruano in 1 month      Leukocytosis with hypoxia  -Check CTA chest  -procal negative   -requiring up to 4 L at times  -speech eval for possible dysphagia     Prediabetes  --No home meds, 5.7 baseline a1c 5/30      BPH  -On flomax and proscar, stable per pt  -Continue home meds  -Bladder scan and I/o cath if needed.      Essential hypertension  On ACEI and BB therapy at home  -continue home meds  -hold if bp<110.     Recent stroke  - cont PT/OT     DVT prophylaxis:  Mechanical DVT prophylaxis orders are present.       AM-PAC 6 Clicks Score (PT): 12 (12/02/21 2000)    Disposition: I expect the patient to be discharged TBD, needs rehab but difficult to place due to previous weakness from CVA    CODE STATUS:   Code Status and Medical Interventions:   Ordered at: 12/02/21 1312     Code Status (Patient has no pulse and is not breathing):    CPR (Attempt to Resuscitate)     Medical Interventions (Patient has pulse or is breathing):    Full Support       Radha Melchor, DO  12/03/21

## 2021-12-04 LAB
BASOPHILS # BLD AUTO: 0.07 10*3/MM3 (ref 0–0.2)
BASOPHILS NFR BLD AUTO: 0.5 % (ref 0–1.5)
DEPRECATED RDW RBC AUTO: 45.8 FL (ref 37–54)
EOSINOPHIL # BLD AUTO: 0.89 10*3/MM3 (ref 0–0.4)
EOSINOPHIL NFR BLD AUTO: 6.6 % (ref 0.3–6.2)
ERYTHROCYTE [DISTWIDTH] IN BLOOD BY AUTOMATED COUNT: 12.8 % (ref 12.3–15.4)
HCT VFR BLD AUTO: 30.3 % (ref 37.5–51)
HGB BLD-MCNC: 10.2 G/DL (ref 13–17.7)
IMM GRANULOCYTES # BLD AUTO: 0.06 10*3/MM3 (ref 0–0.05)
IMM GRANULOCYTES NFR BLD AUTO: 0.4 % (ref 0–0.5)
LYMPHOCYTES # BLD AUTO: 2.99 10*3/MM3 (ref 0.7–3.1)
LYMPHOCYTES NFR BLD AUTO: 22.1 % (ref 19.6–45.3)
MCH RBC QN AUTO: 32.7 PG (ref 26.6–33)
MCHC RBC AUTO-ENTMCNC: 33.7 G/DL (ref 31.5–35.7)
MCV RBC AUTO: 97.1 FL (ref 79–97)
MONOCYTES # BLD AUTO: 1.47 10*3/MM3 (ref 0.1–0.9)
MONOCYTES NFR BLD AUTO: 10.9 % (ref 5–12)
NEUTROPHILS NFR BLD AUTO: 59.5 % (ref 42.7–76)
NEUTROPHILS NFR BLD AUTO: 8.03 10*3/MM3 (ref 1.7–7)
NRBC BLD AUTO-RTO: 0 /100 WBC (ref 0–0.2)
PLATELET # BLD AUTO: 203 10*3/MM3 (ref 140–450)
PMV BLD AUTO: 11 FL (ref 6–12)
POTASSIUM SERPL-SCNC: 4.9 MMOL/L (ref 3.5–5.2)
RBC # BLD AUTO: 3.12 10*6/MM3 (ref 4.14–5.8)
WBC NRBC COR # BLD: 13.51 10*3/MM3 (ref 3.4–10.8)

## 2021-12-04 PROCEDURE — 92610 EVALUATE SWALLOWING FUNCTION: CPT

## 2021-12-04 PROCEDURE — 97110 THERAPEUTIC EXERCISES: CPT

## 2021-12-04 PROCEDURE — 84132 ASSAY OF SERUM POTASSIUM: CPT | Performed by: NURSE PRACTITIONER

## 2021-12-04 PROCEDURE — 94799 UNLISTED PULMONARY SVC/PX: CPT

## 2021-12-04 PROCEDURE — 25010000002 ROPIVACAINE PER 1 MG: Performed by: NURSE ANESTHETIST, CERTIFIED REGISTERED

## 2021-12-04 PROCEDURE — 85025 COMPLETE CBC W/AUTO DIFF WBC: CPT | Performed by: INTERNAL MEDICINE

## 2021-12-04 PROCEDURE — 99024 POSTOP FOLLOW-UP VISIT: CPT | Performed by: ORTHOPAEDIC SURGERY

## 2021-12-04 PROCEDURE — 99232 SBSQ HOSP IP/OBS MODERATE 35: CPT | Performed by: NURSE PRACTITIONER

## 2021-12-04 RX ADMIN — ESCITALOPRAM OXALATE 5 MG: 10 TABLET ORAL at 08:49

## 2021-12-04 RX ADMIN — FAMOTIDINE 20 MG: 20 TABLET, FILM COATED ORAL at 08:49

## 2021-12-04 RX ADMIN — ASPIRIN 325 MG: 325 TABLET, COATED ORAL at 08:48

## 2021-12-04 RX ADMIN — MELOXICAM 15 MG: 7.5 TABLET ORAL at 08:48

## 2021-12-04 RX ADMIN — BACLOFEN 10 MG: 10 TABLET ORAL at 16:51

## 2021-12-04 RX ADMIN — ATORVASTATIN CALCIUM 40 MG: 40 TABLET, FILM COATED ORAL at 21:55

## 2021-12-04 RX ADMIN — POTASSIUM CHLORIDE 40 MEQ: 750 CAPSULE, EXTENDED RELEASE ORAL at 00:14

## 2021-12-04 RX ADMIN — POTASSIUM CHLORIDE 40 MEQ: 750 CAPSULE, EXTENDED RELEASE ORAL at 04:37

## 2021-12-04 RX ADMIN — ROPIVACAINE HYDROCHLORIDE 8 ML/HR: 2 INJECTION, SOLUTION EPIDURAL; INFILTRATION at 00:14

## 2021-12-04 RX ADMIN — ROPIVACAINE HYDROCHLORIDE 8 ML/HR: 2 INJECTION, SOLUTION EPIDURAL; INFILTRATION at 12:51

## 2021-12-04 RX ADMIN — IPRATROPIUM BROMIDE AND ALBUTEROL SULFATE 3 ML: 2.5; .5 SOLUTION RESPIRATORY (INHALATION) at 12:25

## 2021-12-04 RX ADMIN — TAMSULOSIN HYDROCHLORIDE 0.4 MG: 0.4 CAPSULE ORAL at 16:51

## 2021-12-04 RX ADMIN — POTASSIUM CHLORIDE 40 MEQ: 750 CAPSULE, EXTENDED RELEASE ORAL at 08:49

## 2021-12-04 RX ADMIN — BACLOFEN 10 MG: 10 TABLET ORAL at 08:49

## 2021-12-04 RX ADMIN — METOPROLOL SUCCINATE 25 MG: 25 TABLET, EXTENDED RELEASE ORAL at 08:49

## 2021-12-04 RX ADMIN — IPRATROPIUM BROMIDE AND ALBUTEROL SULFATE 3 ML: 2.5; .5 SOLUTION RESPIRATORY (INHALATION) at 20:07

## 2021-12-04 RX ADMIN — SODIUM CHLORIDE, PRESERVATIVE FREE 10 ML: 5 INJECTION INTRAVENOUS at 21:57

## 2021-12-04 RX ADMIN — FINASTERIDE 5 MG: 5 TABLET, FILM COATED ORAL at 16:51

## 2021-12-04 RX ADMIN — BACLOFEN 10 MG: 10 TABLET ORAL at 21:55

## 2021-12-04 RX ADMIN — DOCUSATE SODIUM 100 MG: 100 CAPSULE, LIQUID FILLED ORAL at 08:49

## 2021-12-04 RX ADMIN — DOCUSATE SODIUM 100 MG: 100 CAPSULE, LIQUID FILLED ORAL at 21:54

## 2021-12-04 RX ADMIN — LISINOPRIL 10 MG: 10 TABLET ORAL at 08:50

## 2021-12-04 RX ADMIN — IPRATROPIUM BROMIDE AND ALBUTEROL SULFATE 3 ML: 2.5; .5 SOLUTION RESPIRATORY (INHALATION) at 07:20

## 2021-12-04 RX ADMIN — FAMOTIDINE 20 MG: 20 TABLET, FILM COATED ORAL at 21:54

## 2021-12-04 NOTE — PLAN OF CARE
Goal Outcome Evaluation:  Plan of Care Reviewed With: patient        Progress: improving  Outcome Summary: Able tyo stand pivot with mod A.  Leans left and cannot m/t TTWB ing.

## 2021-12-04 NOTE — PLAN OF CARE
"Goal Outcome Evaluation:  Plan of Care Reviewed With: patient pt C/O pelvic pain stating \"I have to pee but cant.\" Bladder scan performed followed by in and  out cath with 600 ml of urine collected. Pt tolerated well and pelvic pain resolved..              "

## 2021-12-04 NOTE — THERAPY EVALUATION
Acute Care - Speech Language Pathology   Swallow Initial Evaluation [facility] Clinical Swallow Evaluation       Patient Name: Obed Cotto Jr.  : 1950  MRN: [id number]  Today's Date: 2021               Admit Date: 2021    Visit Dx:     ICD-10-CM ICD-9-CM   1. Closed fracture of neck of left femur, initial encounter ([facility])  S72.002A 820.8   2. Fall, initial encounter  W19.XXXA E888.9   3. Recent cerebrovascular accident (CVA)  Z86.73 V49.89     Patient Active Problem List   Diagnosis   • Urinary retention   • Positive colorectal cancer screening using DNA-based stool test   • Leukocytosis   • Prediabetes   • BPH (benign prostatic hyperplasia)   • BPH with elevated PSA   • Essential hypertension   • Stroke ([facility])   • Dysarthria   • Dysphagia   • Impaired mobility   • Memory impairment   • Leg cramps   • Left hemiplegia ([facility])   • History of tobacco abuse   • Closed fracture of neck of left femur ([facility])     Past Medical History:   Diagnosis Date   • Acute ischemic stroke ([facility]) 2021     posterior limb of the right internal capsule   • BPH with elevated PSA    • Chronic bronchitis ([facility])    • Hyperlipidemia    • Hypertension    • Lacunar infarction ([facility])    • Noncompliance with medication regimen     pt states he never took his metoprolol   • Prediabetes      Past Surgical History:   Procedure Laterality Date   • FINGER SURGERY      Right hand 4th finger   • FINGER SURGERY      R ring finger, partial amputation repair   • ORIF HIP FRACTURE Left 2021    Procedure: HIP OPEN REDUCTION INTERNAL FIXATION WITH FEMORAL NECK SYSTEM LEFT;  Surgeon: Aditya Ruano MD;  Location: [facility];  Service: Orthopedics;  Laterality: Left;       SLP Recommendation and Plan  SLP Swallowing Diagnosis: swallow WFL (21 1000)  SLP Diet Recommendation: regular textures, thin liquids (21 1000)  Recommended Precautions and Strategies: upright posture during/after eating (21 1000)  SLP Rec. for Method  of Medication Administration: meds whole, with pudding or applesauce (12/04/21 1000)     Monitor for Signs of Aspiration: yes, notify SLP if any concerns (12/04/21 1000)  Recommended Diagnostics: No further SLP services recommended (12/04/21 1000)  Swallow Criteria for Skilled Therapeutic Interventions Met: no problems identified which require skilled intervention (12/04/21 1000)        Therapy Frequency (Swallow): evaluation only (12/04/21 1000)                            Outcome Summary: Swallowing assessment completed. NO clinical signs of dysphagia. Hx of dysphagia following CVA, however dysphagia essentially resolved based on most recent instrumental. No evidnece to suggest swallowing has changed or declined.      SWALLOW EVALUATION (last 72 hours)     SLP Adult Swallow Evaluation     Row Name 12/04/21 1000                   Rehab Evaluation    Document Type evaluation  -ML        Subjective Information no complaints  -ML        Patient Observations alert; cooperative; agree to therapy  -ML        Patient/Family/Caregiver Comments/Observations Son present and without concern  -ML        Patient Effort excellent  -ML        Symptoms Noted During/After Treatment none  -ML                  General Information    Patient Profile Reviewed yes  -ML        Pertinent History Of Current Problem Pt with hx of dysphagia- most recent MBS 10/21 where pt was placed on Regular/thin.  Pt admitted post fall with a broken hip.  Elevated WBC, prediabetic, HTn, CVA 5/2021, and dysphagia.  RD notes indicate severe malnutrition.  -ML        Current Method of Nutrition regular textures; thin liquids  -ML        Precautions/Limitations, Vision WFL with corrective lenses; for purposes of eval  -ML        Precautions/Limitations, Hearing WFL; for purposes of eval  -ML        Prior Level of Function-Communication WFL  Pt denies any communication difficulty  -ML        Prior Level of Function-Swallowing no diet consistency restrictions   "Pt is careful when he eats- intentional with swallowing  -ML        Plans/Goals Discussed with patient and family; agreed upon  -ML        Barriers to Rehab none identified  -ML        Patient's Goals for Discharge return home  -ML                  Pain    Additional Documentation Pain Scale: Numbers Pre/Post-Treatment (Group)  -ML                  Pain Scale: Numbers Pre/Post-Treatment    Pretreatment Pain Rating 0/10 - no pain  Excited that he is not experiencing any pain.  -ML                  Oral Motor Structure and Function    Dentition Assessment upper dentures/partial in place  -ML        Secretion Management WNL/WFL  -ML        Mucosal Quality moist, healthy  -ML        Volitional Swallow WFL  -ML        Volitional Cough WFL  -ML                  Oral Musculature and Cranial Nerve Assessment    Oral Motor General Assessment WFL  -ML                  General Eating/Swallowing Observations    Respiratory Support Currently in Use nasal cannula  -ML        Eating/Swallowing Skills self-fed  -ML        Positioning During Eating upright in bed  -ML        Utensils Used spoon; cup; straw  -ML        Consistencies Trialed regular textures; pureed; thin liquids  -ML        Pre SpO2 (%) 94  -ML        Post SpO2 (%) 94  -ML                  Clinical Swallow Eval    Oral Prep Phase WFL  -ML        Oral Transit WFL  -ML        Oral Residue WFL  -ML        Pharyngeal Phase no overt signs/symptoms of pharyngeal impairment  -ML        Esophageal Phase unremarkable  -ML        Clinical Swallow Evaluation Summary Clinical swallowing assessment completed. Pt verbalized hx of dysphagia. He described a few episodes of \"choking\" or coughing with PO, which is an improvement given documentated silent aspiration.  Both he and his son endorse no swallowing difficulty since his last MBS.  Today, he exhibits no overt signs of oral or pharyngeal impairment.  He appears safe to continue on MD ordered diet of regular/thin.  No reason for " "further testing.  Per RN, pt with unchanged bilateral lung sounds which she attributes to lifelong smoker. Pt admits to being a \"grazer\" when eating- he eats all day but never eats a large amount.  He drinks 1-2 ensure drinks per day.  He proudly admits to walking 15 miles per week- 3 miles per day.  Swallowing appears to be functional.  NO further ST indicated.  -ML                  Clinical Impression    SLP Swallowing Diagnosis swallow WFL  -ML        Functional Impact no impact on function  -ML        Swallow Criteria for Skilled Therapeutic Interventions Met no problems identified which require skilled intervention  -ML                  Recommendations    Therapy Frequency (Swallow) evaluation only  -ML        SLP Diet Recommendation regular textures; thin liquids  -ML        Recommended Diagnostics No further SLP services recommended  -ML        Recommended Precautions and Strategies upright posture during/after eating  -ML        Oral Care Recommendations Oral Care BID/PRN  -ML        SLP Rec. for Method of Medication Administration meds whole; with pudding or applesauce  -ML        Monitor for Signs of Aspiration yes; notify SLP if any concerns  -ML              User Key  (r) = Recorded By, (t) = Taken By, (c) = Cosigned By    Initials Name Effective Dates     Nevaeh Arthur, CCC-SLP 06/16/21 -                 EDUCATION  The patient has been educated in the following areas:   Dysphagia (Swallowing Impairment).              Time Calculation:    Time Calculation- SLP     Row Name 12/04/21 1152             Time Calculation- SLP    SLP Start Time 1000  -ML      SLP Received On 12/04/21  -ML              Untimed Charges    SLP Eval/Re-eval  ST Eval Oral Pharyng Swallow - [id number]  -ML      [id number] Eval Oral Pharyng Swallow Minutes 60  -ML              Total Minutes    Untimed Charges Total Minutes 60  -ML       Total Minutes 60  -ML            User Key  (r) = Recorded By, (t) = Taken By, (c) = Cosigned By "    Initials Name Provider Type     Nevaeh Arthur CCC-SLP Speech and Language Pathologist                Therapy Charges for Today     Code Description Service Date Service Provider Modifiers Qty    [id number] HC ST EVAL ORAL PHARYNG SWALLOW 4 12/4/2021 Nevaeh Arthur, VIKTORIYA-SLP GN 1               IVORY Cordero  12/4/2021

## 2021-12-04 NOTE — PLAN OF CARE
Goal Outcome Evaluation:  Plan of Care Reviewed With: patient A&Ox4. VSS. Cap refill of 3 seca and + pedal pulse in left leg. Pain in left leg conntrolled with ropivacaine and PO medication. Voiding \with out difficulty. Potassium lab of 3.0 with 2 of 3 replacement doses given this shift.

## 2021-12-04 NOTE — PROGRESS NOTES
"      [facility]opaedic Surgery Progress Note    Subjective      LOS: 3 days   Patient Care Team:  Yadira Fam APRN as PCP - General (Nurse Practitioner)  Dieudonne Hartman MD as Consulting Physician (Urology)    CC: Left hip pain    Interval History:   Patient resting comfortably in bed. His son is in the room. No hip pain.    Objective      Vital Signs  Temp (24hrs), Av.8 °F (36.6 °C), Min:97 °F (36.1 °C), Max:98.2 °F (36.8 °C)      /59 (BP Location: Right arm, Patient Position: Lying)   Pulse 70   Temp 98.2 °F (36.8 °C) (Oral)   Resp 14   Ht 182.9 cm (72\")   Wt 57.7 kg (127 lb 1.6 oz)   SpO2 96%   BMI 17.24 kg/m²     Examination:   Examination of the left hip: The wound is clean, dry, and intact.  Ankle dorsiflexion, ankle plantar flexion, and EHL are intact, unchanged from preop.  Sensation intact in the foot to light touch.   Thigh is soft and nontender.      Labs:  Results from last 7 days   Lab Units 21  0515 21  0513 21  1240 21  0038   WBC 10*3/mm3 13.51* 12.89* 14.80* 19.67*   HEMOGLOBIN g/dL 10.2* 11.0* 11.0* 12.5*   HEMATOCRIT % 30.3* 33.1* 33.5* 36.8*   MCV fL 97.1* 99.7* 100.9* 96.8   PLATELETS 10*3/mm3 203 201 210 228       Radiology:  Imaging Results (Last 24 Hours)     Procedure Component Value Units Date/Time    CT Angiogram Chest With & Without Contrast [[id number]] Collected: 21     Updated: 21    Narrative:      CT ANGIOGRAM CHEST W WO CONTRAST    INDICATION:   Hypoxia. Recent surgery.    TECHNIQUE:   CT angiogram of the chest with 100 cc of Isovue-300 IV contrast. 3-D reconstructions were obtained and reviewed.   Radiation dose reduction techniques included automated exposure control or exposure modulation based on body size. Count of known CT and  cardiac nuc med studies performed in previous 12 months: 5.     COMPARISON:   None available.    FINDINGS:   Chest images at mediastinal window show no acute changes in the aorta. " Atherosclerotic calcification is seen in the descending thoracic aorta. There is a trace pericardial effusion and there are small bilateral pleural effusions. No pulmonary artery  filling defects are seen to suggest emboli. The CT angiographic images also show no evidence of emboli.    Chest images at lung window show bibasilar atelectasis. Emphysema is noted.      Impression:      Emphysema. Bibasilar atelectasis with small bilateral pleural effusions and a trace pericardial effusion. No evidence of pulmonary embolism.    Signer Name: Bismark Ramon MD   Signed: 12/3/2021 7:20 PM   Workstation Name: [id number]    [facility]    FL C Arm During Surgery [[id number]] Collected: 12/03/21 1249     Updated: 12/03/21 1253    Narrative:      EXAMINATION: FL C ARM DURING SURGERY-      INDICATION: HIP OPEN REDUCTION INTERNAL FIXATION WITH FEMORAL NECK  SYSTEM; S72.002A-Fracture of unspecified part of neck of left femur,  initial encounter for closed fracture; W19.XXXA-Unspecified fall,  initial encounter; Z86.73-Personal history of transient ischemic attack  (TIA), and cerebral infarction without residual deficits      COMPARISON: NONE     FINDINGS: 1 minute 26 seconds of fluoroscopy time provided with 2 images  saved during left hip ORIF       Impression:      1 minute 26 seconds of fluoroscopy time provided with 2  images saved during left hip ORIF     This report was finalized on 12/3/2021 12:49 PM by Laith Vidal.             PT:  Physical Therapy - Plan of Care Review - Outcome Summary:  Outcome Summary: PT eval complete. Pt. alert and oriented. He performed bed mobility with  min assist of 2. He peformed sit to stand transfer w/mod assist of 2. He performed bed to chair transfer w/max assist of 2. Educated pt. TTWB status on LLE, pt. initially maintains then performs steppage gait pattern w/transfer. Deferred ambulation for today. Recommend inpatient rehab upon discharge. (12/03/21  0577)]       Results Review:     I reviewed the patient's new clinical results.    Assessment and Plan     Assessment:   Status post close reduction and internal fixation, valgus impacted left femoral neck fracture (femoral neck system device)      Closed fracture of neck of left femur (HCC)    Leukocytosis    Prediabetes    BPH (benign prostatic hyperplasia)    Essential hypertension      Plan for disposition: Planning for [facility] at the time of discharge.  Patient will follow up with me in 1 month.      Future Appointments   Date Time Provider Department Center   12/7/2021 10:00 AM Yadira Fam APRN [facility]   1/3/2022  9:10 AM Aditya Ruano MD [facility]   2/4/2022 11:00 AM Spencer Bloom MD [facility]   3/9/2022  9:45 AM Lorraine Morfin MD [facility]           Aditya Ruano MD  12/04/21  11:59 EST

## 2021-12-04 NOTE — THERAPY PROGRESS REPORT/RE-CERT
Patient Name: Obed Cotto Jr.  : 1950    MRN: [id number]                              Today's Date: 2021       Admit Date: 2021    Visit Dx:     ICD-10-CM ICD-9-CM   1. Closed fracture of neck of left femur, initial encounter (HCC)  S72.002A 820.8   2. Fall, initial encounter  W19.XXXA E888.9   3. Recent cerebrovascular accident (CVA)  Z86.73 V49.89     Patient Active Problem List   Diagnosis   • Urinary retention   • Positive colorectal cancer screening using DNA-based stool test   • Leukocytosis   • Prediabetes   • BPH (benign prostatic hyperplasia)   • BPH with elevated PSA   • Essential hypertension   • Stroke (Formerly [facility])   • Dysarthria   • Dysphagia   • Impaired mobility   • Memory impairment   • Leg cramps   • Left hemiplegia (HCC)   • History of tobacco abuse   • Closed fracture of neck of left femur (HCC)     Past Medical History:   Diagnosis Date   • Acute ischemic stroke (Formerly [facility]) 2021     posterior limb of the right internal capsule   • BPH with elevated PSA    • Chronic bronchitis (Formerly [facility])    • Hyperlipidemia    • Hypertension    • Lacunar infarction (HCC)    • Noncompliance with medication regimen     pt states he never took his metoprolol   • Prediabetes      Past Surgical History:   Procedure Laterality Date   • FINGER SURGERY      Right hand 4th finger   • FINGER SURGERY      R ring finger, partial amputation repair   • ORIF HIP FRACTURE Left 2021    Procedure: HIP OPEN REDUCTION INTERNAL FIXATION WITH FEMORAL NECK SYSTEM LEFT;  Surgeon: Aditya Ruano MD;  Location: [facility];  Service: Orthopedics;  Laterality: Left;      General Information     Row Name 21 1347          Physical Therapy Time and Intention    Document Type therapy note (daily note)  -CT     Mode of Treatment physical therapy  -CT     Row Name 21 1347          General Information    Existing Precautions/Restrictions hip; left; other (see comments)  TTWB ing  -CT     Barriers to Rehab medically complex;  previous functional deficit; physical barrier  -CT     Row Name 12/04/21 1347          Cognition    Orientation Status (Cognition) oriented x 3  -CT     Row Name 12/04/21 1347          Safety Issues, Functional Mobility    Safety Issues Affecting Function (Mobility) unable to maintain weight-bearing restrictions  -CT     Impairments Affecting Function (Mobility) endurance/activity tolerance; motor control; range of motion (ROM)  -CT           User Key  (r) = Recorded By, (t) = Taken By, (c) = Cosigned By    Initials Name Provider Type    CT Macho Pereira, PT Physical Therapist               Mobility     Row Name 12/04/21 1350          Bed Mobility    Bed Mobility bed mobility (all) activities; rolling left  -CT     All Activities, [place] (Bed Mobility) moderate assist (50% patient effort)  -CT     Rolling Left [place] (Bed Mobility) moderate assist (50% patient effort)  -CT     Supine-Sit [place] (Bed Mobility) moderate assist (50% patient effort)  -CT     Row Name 12/04/21 1350          Bed-Chair Transfer    Bed-Chair [place] (Transfers) moderate assist (50% patient effort)  -CT     Row Name 12/04/21 1350          Sit-Stand Transfer    Sit-Stand [place] (Transfers) moderate assist (50% patient effort); maximum assist (25% patient effort)  -CT     Row Name 12/04/21 1350          Gait/Stairs (Locomotion)    [place] Level (Gait) unable to assess  unable to m/t balance over R LE and m/t LLE TTWB ing  -CT     Assistive Device (Gait) other (see comments)  difficulty as TTWB and L:UE spaticity does not leave a good safe option for gait.  A unidrive WC would be reasonable until his fx heals.  -CT     Deviations/Abnormal Patterns (Gait) base of support, narrow  -CT     Right Sided Gait Deviations leans left  -CT     Row Name 12/04/21 1350          Mobility    Left Lower Extremity (Weight-bearing Status) toe touch weight-bearing (TTWB)  -CT           User Key  (r) = Recorded By, (t) =  Taken By, (c) = Cosigned By    Initials Name Provider Type    CT Macho Pereira, NAINA Physical Therapist               Obj/Interventions     Row Name 12/04/21 1408          Range of Motion Comprehensive    General Range of Motion lower extremity range of motion deficits identified  -CT     Row Name 12/04/21 1408          Strength Comprehensive (MMT)    General Manual Muscle Testing (MMT) Assessment lower extremity strength deficits identified  -CT     Row Name 12/04/21 1408          Motor Skills    Motor Skills functional endurance  -CT     Therapeutic Exercise hip  -CT     Row Name 12/04/21 1408          Hip (Therapeutic Exercise)    Hip (Therapeutic Exercise) AROM (active range of motion)  -CT     Hip AROM (Therapeutic Exercise) left; 10 repetitions  -CT     Row Name 12/04/21 1408          Balance    Balance Assessment sitting static balance; sitting dynamic balance; standing static balance; standing dynamic balance  -CT     Static Sitting Balance WNL  -CT     Dynamic Sitting Balance mild impairment  -CT     Static Standing Balance moderate impairment  -CT     Dynamic Standing Balance severe impairment  -CT     Balance Interventions sitting; standing; sit to stand; narrowed base of support  -CT           User Key  (r) = Recorded By, (t) = Taken By, (c) = Cosigned By    Initials Name Provider Type    CT Macho Pereira, NAINA Physical Therapist               Goals/Plan    No documentation.                Clinical Impression     Row Name 12/04/21 1413          Pain    Additional Documentation Pain Scale: Numbers Pre/Post-Treatment (Group)  -CT     Row Name 12/04/21 1413          Pain Scale: Numbers Pre/Post-Treatment    Pretreatment Pain Rating 3/10  -CT     Posttreatment Pain Rating 3/10  -CT     Pain Location - Side Left  -CT     Pain Location hip  -CT     Pain Intervention(s) Repositioned  -CT     Row Name 12/04/21 1413          Plan of Care Review    Plan of Care Reviewed With patient  -CT      Progress improving  -CT     Outcome Summary Able tyo stand pivot with mod A.  Leans left and cannot m/t TTWB ing.  -CT     Row Name 12/04/21 1413          Positioning and Restraints    Pre-Treatment Position in bed  -CT     Post Treatment Position chair  -CT     In Chair notified nsg; reclined; sitting; call light within reach; encouraged to call for assist; exit alarm on; on mechanical lift sling  -CT           User Key  (r) = Recorded By, (t) = Taken By, (c) = Cosigned By    Initials Name Provider Type    CT Macho Pereira, PT Physical Therapist               Outcome Measures    No documentation.                              Physical Therapy Education                 Title: PT OT SLP Therapies (In Progress)     Topic: Physical Therapy (In Progress)     Point: Mobility training (In Progress)     Learning Progress Summary           Patient Acceptance, E,D, NR by CT at 12/4/2021 1415    Eager, E, VU,NR by SS at 12/3/2021 1502    Comment: Educated pt. safety/technique with bed mobility, transfers, TTWB status, benefits of inpatient rehab, PT POC                   Point: Home exercise program (In Progress)     Learning Progress Summary           Patient Acceptance, E,D, NR by CT at 12/4/2021 1415                   Point: Body mechanics (In Progress)     Learning Progress Summary           Patient Acceptance, E,D, NR by CT at 12/4/2021 1415    Eager, E, VU,NR by SS at 12/3/2021 1502    Comment: Educated pt. safety/technique with bed mobility, transfers, TTWB status, benefits of inpatient rehab, PT POC                   Point: Precautions (In Progress)     Learning Progress Summary           Patient Acceptance, E,D, NR by CT at 12/4/2021 1415    Eager, E, VU,NR by SS at 12/3/2021 1502    Comment: Educated pt. safety/technique with bed mobility, transfers, TTWB status, benefits of inpatient rehab, PT POC                               User Key     Initials Effective Dates Name Provider Type Discipline    CT  06/16/21 -  Macho Pereira, PT Physical Therapist PT    SS 06/01/21 -  Beatriz San, PT Physical Therapist PT              PT Recommendation and Plan     Plan of Care Reviewed With: patient  Progress: improving  Outcome Summary: Able tyo stand pivot with mod A.  Leans left and cannot m/t TTWB ing.     Time Calculation:     Therapy Charges for Today     Code Description Service Date Service Provider Modifiers Qty    [id number] HC PT THER PROC EA 15 MIN 12/4/2021 Macho Pereira, PT GP 2          PT G-Codes  Outcome Measure Options: AM-PAC 6 Clicks Basic Mobility (PT)  AM-PAC 6 Clicks Score (PT): 10  AM-PAC 6 Clicks Score (OT): 16    Macho Pereira, PT  12/4/2021

## 2021-12-04 NOTE — PROGRESS NOTES
"    [facility] Medicine Services  PROGRESS NOTE    Patient Name: Obed Cotto Jr.  : 1950  MRN: [id number]    Date of Admission: 2021  Primary Care Physician: Yadira Fam APRN    Subjective   Subjective     CC:  Hip fracture    HPI:  Patient seen resting up in bed awake alert.  Son at bedside.  Patient reports chronically weak to the left upper extremity but stable.  Denies current pain to left leg.  No nausea or vomiting.  Says his left leg feels like \"dead weight\" with nerve block in place.  Otherwise slowly improving and awaiting rehab.    ROS:  Gen- No fevers, chills  CV- No chest pain, palpitations  Resp- No cough, dyspnea  GI- No N/V/D, abd pain      Objective   Objective     Vital Signs:   Temp:  [97 °F (36.1 °C)-98.2 °F (36.8 °C)] 98 °F (36.7 °C)  Heart Rate:  [65-81] 81  Resp:  [14-16] 16  BP: (122-149)/(49-60) 122/51  Flow (L/min):  [3] 3     Physical Exam:  Constitutional: No acute distress, awake, alert.  Resting in bed with son at bedside.  Disheveled appearing.  HENT: NCAT, mucous membranes moist  Respiratory: Clear to auscultation bilaterally, respiratory effort normal   Cardiovascular: RRR, no murmurs, rubs, or gallops  Gastrointestinal: Positive bowel sounds, soft, nontender, nondistended.  Thin habitus  Musculoskeletal: No bilateral ankle edema, left LE with decreased ROM due to postop pain  Psychiatric: Appropriate affect, cooperative  Neurologic: Oriented x 3, speech clear and appropriate, chronic LUE weakness.  Follows commands.  Skin: No rashes.  Left hip dressing in place dry and intact.  Nerve block catheter in place.      Results Reviewed:  LAB RESULTS:      Lab 21  0515 21  0513 21  1240 21  0038   WBC 13.51* 12.89* 14.80* 19.67*   HEMOGLOBIN 10.2* 11.0* 11.0* 12.5*   HEMATOCRIT 30.3* 33.1* 33.5* 36.8*   PLATELETS 203 201 210 228   NEUTROS ABS 8.03* 8.49* 10.76* 16.09*   IMMATURE GRANS (ABS) 0.06* 0.06* 0.07* 0.07*   LYMPHS " ABS 2.99 2.44 2.53 2.09   MONOS ABS 1.47* 1.04* 1.07* 1.35*   EOS ABS 0.89* 0.79* 0.33 0.02   MCV 97.1* 99.7* 100.9* 96.8   PROCALCITONIN  --   --   --  0.19   PROTIME  --   --  14.1  --          Lab 12/04/21  1224 12/03/21  1413 12/01/21  1240 12/01/21  0038   SODIUM  --  143 139 139   POTASSIUM 4.9 3.0* 4.3 4.1   CHLORIDE  --  113* 104 102   CO2  --  22.0 27.0 26.0   ANION GAP  --  8.0 8.0 11.0   BUN  --  14 26* 21   CREATININE  --  0.41* 0.62* 0.70*   GLUCOSE  --  82 117* 122*   CALCIUM  --  7.2* 10.3 10.6*         Lab 12/01/21  1240 12/01/21  0038   TOTAL PROTEIN 5.6* 6.3   ALBUMIN 3.40* 4.10   GLOBULIN 2.2 2.2   ALT (SGPT) 30 40   AST (SGOT) 23 27   BILIRUBIN 0.8 0.7   ALK PHOS 86 125*         Lab 12/01/21  1240 12/01/21  0038   PROBNP  --  219.8   TROPONIN T  --  <0.010   PROTIME 14.1  --    INR 1.12  --              Lab 12/01/21  1240 12/01/21  0042 12/01/21  0042   ABO TYPING B   < > B   RH TYPING Positive   < > Positive   ANTIBODY SCREEN  --   --  Negative    < > = values in this interval not displayed.         Brief Urine Lab Results  (Last result in the past 365 days)      Color   Clarity   Blood   Leuk Est   Nitrite   Protein   CREAT   Urine HCG        12/01/21 0053 Yellow   Clear   Negative   Negative   Negative   Negative                 Microbiology Results Abnormal     Procedure Component Value - Date/Time    S. Pneumo Ag Urine or CSF - Urine, Urine, Clean Catch [[id number]]  (Normal) Collected: 12/01/21 0553    Lab Status: Final result Specimen: Urine, Clean Catch Updated: 12/01/21 1514     Strep Pneumo Ag Negative    Legionella Antigen, Urine - Urine, Urine, Clean Catch [[id number]]  (Normal) Collected: 12/01/21 9364    Lab Status: Final result Specimen: Urine, Clean Catch Updated: 12/01/21 1255     LEGIONELLA ANTIGEN, URINE Negative    Respiratory Panel, PCR (WITHOUT COVID) - Swab, Nasopharynx [[id number]]  (Normal) Collected: 12/01/21 0738    Lab Status: Final result Specimen: Swab from Nasopharynx  Updated: 12/01/21 0903     ADENOVIRUS, PCR Not Detected     Coronavirus 229E Not Detected     Coronavirus HKU1 Not Detected     Coronavirus NL63 Not Detected     Coronavirus OC43 Not Detected     Human Metapneumovirus Not Detected     Human Rhinovirus/Enterovirus Not Detected     Influenza B PCR Not Detected     Parainfluenza Virus 1 Not Detected     Parainfluenza Virus 2 Not Detected     Parainfluenza Virus 3 Not Detected     Parainfluenza Virus 4 Not Detected     Bordetella pertussis pcr Not Detected     Chlamydophila pneumoniae PCR Not Detected     Mycoplasma pneumo by PCR Not Detected     Influenza A PCR Not Detected     RSV, PCR Not Detected     Bordetella parapertussis PCR Not Detected    Narrative:      The coronavirus on the RVP is NOT COVID-19 and is NOT indicative of infection with COVID-19.    In the setting of a positive respiratory panel with a viral infection PLUS a negative procalcitonin without other underlying concern for bacterial infection, consider observing off antibiotics or discontinuation of antibiotics and continue supportive care. If the respiratory panel is positive for atypical bacterial infection (Bordetella pertussis, Chlamydophila pneumoniae, or Mycoplasma pneumoniae), consider antibiotic de-escalation to target atypical bacterial infection.    COVID PRE-OP / PRE-PROCEDURE SCREENING ORDER (NO ISOLATION) - Swab, Nasopharynx [[id number]]  (Normal) Collected: 12/01/21 0053    Lab Status: Final result Specimen: Swab from Nasopharynx Updated: 12/01/21 0212    Narrative:      The following orders were created for panel order COVID PRE-OP / PRE-PROCEDURE SCREENING ORDER (NO ISOLATION) - Swab, Nasopharynx.  Procedure                               Abnormality         Status                     ---------                               -----------         ------                     COVID-19,CEPHEID/HUY,LE...[[id number]]  Normal              Final result                 Please view results for these  tests on the individual orders.    COVID-19,CEPHEID/HUY,KEYSHA IN-HOUSE(OR EMERGENT/ADD-ON),NP SWAB IN TRANSPORT MEDIA 3-4 HR TAT - Swab, Nasopharynx [[id number]]  (Normal) Collected: 12/01/21 0053    Lab Status: Final result Specimen: Swab from Nasopharynx Updated: 12/01/21 0212     COVID19 Not Detected    Narrative:      Fact sheet for providers: https://www.fda.gov/media/019885/download     Fact sheet for patients: https://www.fda.gov/media/665033/download  Fact sheet for providers: https://www.fda.gov/media/081284/download     Fact sheet for patients: https://www.fda.gov/media/134008/download          CT Angiogram Chest With & Without Contrast    Result Date: 12/3/2021  CT ANGIOGRAM CHEST W WO CONTRAST INDICATION: Hypoxia. Recent surgery. TECHNIQUE: CT angiogram of the chest with 100 cc of Isovue-300 IV contrast. 3-D reconstructions were obtained and reviewed.   Radiation dose reduction techniques included automated exposure control or exposure modulation based on body size. Count of known CT and cardiac nuc med studies performed in previous 12 months: 5. COMPARISON: None available. FINDINGS: Chest images at mediastinal window show no acute changes in the aorta. Atherosclerotic calcification is seen in the descending thoracic aorta. There is a trace pericardial effusion and there are small bilateral pleural effusions. No pulmonary artery filling defects are seen to suggest emboli. The CT angiographic images also show no evidence of emboli. Chest images at lung window show bibasilar atelectasis. Emphysema is noted.     Impression: Emphysema. Bibasilar atelectasis with small bilateral pleural effusions and a trace pericardial effusion. No evidence of pulmonary embolism. Signer Name: Bismark Ramon MD  Signed: 12/3/2021 7:20 PM  Workstation Name: [id number]  [facility]      Results for orders placed during the hospital encounter of 05/30/21    Adult Transthoracic Echo Complete W/ Cont if  Necessary Per Protocol (With Agitated Saline)    Interpretation Summary  · Saline test results are negative.  · Estimated left ventricular EF = 65% Left ventricular ejection fraction appears to be 61 - 65%. Left ventricular systolic function is normal.  · There is mild mitral valve prolapse of the posterior mitral leaflet.  · Left ventricular wall thickness is consistent with mild basal asymmetric hypertrophy.  · The right atrial cavity is mildly dilated.  · The right ventricular cavity is mildly dilated.  · Estimated right ventricular systolic pressure from tricuspid regurgitation is normal (<35 mmHg). Calculated right ventricular systolic pressure from tricuspid regurgitation is 27 mmHg.  · Left ventricular diastolic function was normal.      I have reviewed the medications:  Scheduled Meds:aspirin, 325 mg, Oral, Daily  atorvastatin, 40 mg, Oral, Nightly  baclofen, 10 mg, Oral, TID  docusate sodium, 100 mg, Oral, BID  escitalopram, 5 mg, Oral, Daily  famotidine, 20 mg, Oral, BID  finasteride, 5 mg, Oral, Daily  ipratropium-albuterol, 3 mL, Nebulization, Q6H While Awake - RT  lisinopril, 10 mg, Oral, Daily  meloxicam, 15 mg, Oral, Daily  metoprolol succinate XL, 25 mg, Oral, Daily  sodium chloride, 10 mL, Intravenous, Q12H  sodium chloride, 10 mL, Intravenous, Q12H  tamsulosin, 0.4 mg, Oral, Daily      Continuous Infusions:lactated ringers, 75 mL/hr, Last Rate: 75 mL/hr (12/01/21 2138)  ropivacaine (NAROPIN) 0.2% peripheral nerve cath (moog), 8 mL/hr, Last Rate: 8 mL/hr (12/04/21 0014)  sodium chloride, 120 mL/hr, Last Rate: 120 mL/hr (12/03/21 1711)      PRN Meds:.ondansetron **OR** ondansetron  •  oxyCODONE  •  potassium chloride **OR** potassium chloride **OR** potassium chloride  •  sodium chloride  •  [COMPLETED] Insert peripheral IV **AND** [MAR Hold] sodium chloride  •  sodium chloride    Assessment/Plan   Assessment & Plan     Active Hospital Problems    Diagnosis  POA   • **Closed fracture of neck of left  femur (HCC) [S72.002A]  Yes   • Essential hypertension [I10]  Yes   • BPH (benign prostatic hyperplasia) [N40.0]  Yes   • Prediabetes [R73.03]  Yes   • Leukocytosis [D72.829]  Yes      Resolved Hospital Problems   No resolved problems to display.        Brief Hospital Course to date:  Obed Cotto Jr. is a 71 y.o. male with history of prediabetes, hypertension, hyperlipidemia, CVA with left weakness residual, BPH, extobacco presents to the ED tonight after a fall at home.  Patient has history of CVA with residual left-sided weakness from May of this year and he was attending rehab once weekly prior to fall.     This patient's problems and plans were partially entered by my partner and updated as appropriate by me 12/04/21.    Assessment/plan:   Patient is new to me today     Closed fracture of neck of left femur (HCC)  -Ortho, Dr. Ruano following   -pain meds PRN  -Continue baclofen, TID  -hold anticoagulation for now  -s/p left femoral neck fixation on 12/2  -toe-touch weightbearing for 6 weeks  -Follow up with Dr. Ruano in 1 month  --Awaiting rehab      Leukocytosis with hypoxia  -Checked CTA chest 12/3.  Results: Emphysema. Bibasilar atelectasis with small bilateral pleural effusions and a trace pericardial effusion. No evidence of pulmonary embolism.  --Continue to monitor labs, vitals and oxygenation.  Rescan if worsening.  -procal negative   -Requirements down to 3 LNC today.  Sats stable.  -speech eval for possible dysphagia     Prediabetes  --No home meds, 5.7 baseline a1c 5.30      BPH  -On flomax and proscar, stable per pt  -Continue home meds  -Bladder scan and I/o cath if needed.  Now with pale-appearing complacent making good output.      Essential hypertension  On ACEI and BB therapy at home  -continue home meds  -hold if bp<110.  Stable.     Recent stroke  - cont PT/OT  --Residual left upper extremity weakness.  Was at [facility] for rehab post stroke.  Eager to get back to [facility] [place].  --Case management  following for disposition.     DVT prophylaxis:  Mechanical DVT prophylaxis orders are present.       AM-PAC 6 Clicks Score (PT): 10 (12/03/21 1501)    Disposition: I expect the patient to be discharged TBD, needs rehab but difficult to place due to previous weakness from CVA    CODE STATUS:   Code Status and Medical Interventions:   Ordered at: 12/02/21 1312     Code Status (Patient has no pulse and is not breathing):    CPR (Attempt to Resuscitate)     Medical Interventions (Patient has pulse or is breathing):    Full Support       Libby Hamilton, APRN  12/04/21

## 2021-12-04 NOTE — PLAN OF CARE
Goal Outcome Evaluation:              Outcome Summary: Swallowing assessment completed. NO clinical signs of dysphagia. Hx of dysphagia following CVA, however dysphagia essentially resolved based on most recent instrumental. No evidnece to suggest swallowing has changed or declined.

## 2021-12-05 LAB
ANION GAP SERPL CALCULATED.3IONS-SCNC: 7 MMOL/L (ref 5–15)
BUN SERPL-MCNC: 14 MG/DL (ref 8–23)
BUN/CREAT SERPL: 28 (ref 7–25)
CALCIUM SPEC-SCNC: 10.1 MG/DL (ref 8.6–10.5)
CHLORIDE SERPL-SCNC: 105 MMOL/L (ref 98–107)
CO2 SERPL-SCNC: 28 MMOL/L (ref 22–29)
CREAT SERPL-MCNC: 0.5 MG/DL (ref 0.76–1.27)
DEPRECATED RDW RBC AUTO: 46.8 FL (ref 37–54)
ERYTHROCYTE [DISTWIDTH] IN BLOOD BY AUTOMATED COUNT: 12.7 % (ref 12.3–15.4)
FLUAV SPEC QL CULT: NEGATIVE
FLUBV SPEC QL CULT: NEGATIVE
GFR SERPL CREATININE-BSD FRML MDRD: >150 ML/MIN/1.73
GLUCOSE SERPL-MCNC: 147 MG/DL (ref 65–99)
HADV SPEC QL CULT: NEGATIVE
HCT VFR BLD AUTO: 33.2 % (ref 37.5–51)
HGB BLD-MCNC: 10.9 G/DL (ref 13–17.7)
HPIV1 SPEC QL CULT: NEGATIVE
HPIV2 SPEC QL CULT: NEGATIVE
HPIV3 SPEC QL CULT: NEGATIVE
MCH RBC QN AUTO: 32.9 PG (ref 26.6–33)
MCHC RBC AUTO-ENTMCNC: 32.8 G/DL (ref 31.5–35.7)
MCV RBC AUTO: 100.3 FL (ref 79–97)
PLATELET # BLD AUTO: 211 10*3/MM3 (ref 140–450)
PMV BLD AUTO: 10.2 FL (ref 6–12)
POTASSIUM SERPL-SCNC: 4.3 MMOL/L (ref 3.5–5.2)
RBC # BLD AUTO: 3.31 10*6/MM3 (ref 4.14–5.8)
RSV SPEC QL CULT: NEGATIVE
SODIUM SERPL-SCNC: 140 MMOL/L (ref 136–145)
WBC NRBC COR # BLD: 10.87 10*3/MM3 (ref 3.4–10.8)

## 2021-12-05 PROCEDURE — 99024 POSTOP FOLLOW-UP VISIT: CPT | Performed by: ORTHOPAEDIC SURGERY

## 2021-12-05 PROCEDURE — 94799 UNLISTED PULMONARY SVC/PX: CPT

## 2021-12-05 PROCEDURE — 97110 THERAPEUTIC EXERCISES: CPT

## 2021-12-05 PROCEDURE — 25010000002 ROPIVACAINE PER 1 MG: Performed by: NURSE ANESTHETIST, CERTIFIED REGISTERED

## 2021-12-05 PROCEDURE — 80048 BASIC METABOLIC PNL TOTAL CA: CPT | Performed by: NURSE PRACTITIONER

## 2021-12-05 PROCEDURE — 99232 SBSQ HOSP IP/OBS MODERATE 35: CPT | Performed by: NURSE PRACTITIONER

## 2021-12-05 PROCEDURE — 85027 COMPLETE CBC AUTOMATED: CPT | Performed by: ORTHOPAEDIC SURGERY

## 2021-12-05 RX ADMIN — FAMOTIDINE 20 MG: 20 TABLET, FILM COATED ORAL at 19:59

## 2021-12-05 RX ADMIN — BACLOFEN 10 MG: 10 TABLET ORAL at 19:59

## 2021-12-05 RX ADMIN — TAMSULOSIN HYDROCHLORIDE 0.4 MG: 0.4 CAPSULE ORAL at 16:46

## 2021-12-05 RX ADMIN — MELOXICAM 15 MG: 7.5 TABLET ORAL at 08:14

## 2021-12-05 RX ADMIN — DOCUSATE SODIUM 100 MG: 100 CAPSULE, LIQUID FILLED ORAL at 19:59

## 2021-12-05 RX ADMIN — BACLOFEN 10 MG: 10 TABLET ORAL at 16:46

## 2021-12-05 RX ADMIN — IPRATROPIUM BROMIDE AND ALBUTEROL SULFATE 3 ML: 2.5; .5 SOLUTION RESPIRATORY (INHALATION) at 20:16

## 2021-12-05 RX ADMIN — SODIUM CHLORIDE, PRESERVATIVE FREE 10 ML: 5 INJECTION INTRAVENOUS at 19:59

## 2021-12-05 RX ADMIN — ATORVASTATIN CALCIUM 40 MG: 40 TABLET, FILM COATED ORAL at 19:58

## 2021-12-05 RX ADMIN — BACLOFEN 10 MG: 10 TABLET ORAL at 08:14

## 2021-12-05 RX ADMIN — FAMOTIDINE 20 MG: 20 TABLET, FILM COATED ORAL at 08:14

## 2021-12-05 RX ADMIN — FINASTERIDE 5 MG: 5 TABLET, FILM COATED ORAL at 16:47

## 2021-12-05 RX ADMIN — METOPROLOL SUCCINATE 25 MG: 25 TABLET, EXTENDED RELEASE ORAL at 08:19

## 2021-12-05 RX ADMIN — DOCUSATE SODIUM 100 MG: 100 CAPSULE, LIQUID FILLED ORAL at 08:14

## 2021-12-05 RX ADMIN — ROPIVACAINE HYDROCHLORIDE 8 ML/HR: 2 INJECTION, SOLUTION EPIDURAL; INFILTRATION at 13:20

## 2021-12-05 RX ADMIN — IPRATROPIUM BROMIDE AND ALBUTEROL SULFATE 3 ML: 2.5; .5 SOLUTION RESPIRATORY (INHALATION) at 13:26

## 2021-12-05 RX ADMIN — ASPIRIN 325 MG: 325 TABLET, COATED ORAL at 08:14

## 2021-12-05 RX ADMIN — ROPIVACAINE HYDROCHLORIDE 8 ML/HR: 2 INJECTION, SOLUTION EPIDURAL; INFILTRATION at 00:53

## 2021-12-05 RX ADMIN — LISINOPRIL 10 MG: 10 TABLET ORAL at 08:14

## 2021-12-05 RX ADMIN — ESCITALOPRAM OXALATE 5 MG: 10 TABLET ORAL at 08:14

## 2021-12-05 NOTE — PROGRESS NOTES
"      [facility] Orthopaedic Surgery Progress Note    Subjective      LOS: 4 days   Patient Care Team:  Yadira Fam APRN as PCP - General (Nurse Practitioner)  Dieudonne Hartman MD as Consulting Physician (Urology)    CC: Left hip pain    Interval History:   No complaints this morning.  No hip pain.    Objective      Vital Signs  Temp (24hrs), Av.9 °F (36.6 °C), Min:97.8 °F (36.6 °C), Max:98.3 °F (36.8 °C)      /57 (BP Location: Right arm, Patient Position: Lying)   Pulse 61   Temp 97.8 °F (36.6 °C) (Oral)   Resp 16   Ht 182.9 cm (72\")   Wt 57.7 kg (127 lb 1.6 oz)   SpO2 93%   BMI 17.24 kg/m²     Examination:   Examination of the left hip: The wound is clean, dry, and intact.  Ankle dorsiflexion, ankle plantar flexion, and EHL are intact, unchanged from preop.  Sensation intact in the foot to light touch.   Thigh is soft and nontender.      Labs:  Results from last 7 days   Lab Units 21  0515 21  0513 21  1240 21  0038   WBC 10*3/mm3 13.51* 12.89* 14.80* 19.67*   HEMOGLOBIN g/dL 10.2* 11.0* 11.0* 12.5*   HEMATOCRIT % 30.3* 33.1* 33.5* 36.8*   MCV fL 97.1* 99.7* 100.9* 96.8   PLATELETS 10*3/mm3 203 201 210 228       Radiology:  Imaging Results (Last 24 Hours)     ** No results found for the last 24 hours. **          PT:  Physical Therapy - Plan of Care Review - Outcome Summary:  Outcome Summary: Able tyo stand pivot with mod A.  Leans left and cannot m/t TTWB ing. (21 1413)]       Results Review:     I reviewed the patient's new clinical results.    Assessment and Plan     Assessment:   Status post close reduction and internal fixation, valgus impacted left femoral neck fracture (femoral neck system device)      Closed fracture of neck of left femur (HCC)    Leukocytosis    Prediabetes    BPH (benign prostatic hyperplasia)    Essential hypertension      Plan for disposition: Planning for [facility] at the time of discharge (awaiting approval and bed).  Patient " will follow up with me in 1 month.      Future Appointments   Date Time Provider Department Center   12/7/2021 10:00 AM Yadira Fam APRN [facility]   1/3/2022  9:10 AM Aditya Ruano MD [facility]   2/4/2022 11:00 AM Spencer Bloom MD [facility]   3/9/2022  9:45 AM Lorraine Morfin MD [facility]           Aditya Ruano MD  12/05/21  08:29 EST

## 2021-12-05 NOTE — PLAN OF CARE
Goal Outcome Evaluation:  Plan of Care Reviewed With: patient        Progress: improving  Outcome Summary: better assisting with standing and maintaining TTWB to pivot to chair.

## 2021-12-05 NOTE — THERAPY TREATMENT NOTE
Patient Name: Obed Cotto Jr.  : 1950    MRN: [id number]                              Today's Date: 2021       Admit Date: 2021    Visit Dx:     ICD-10-CM ICD-9-CM   1. Closed fracture of neck of left femur, initial encounter ([facility])  S72.002A 820.8   2. Fall, initial encounter  W19.XXXA E888.9   3. Recent cerebrovascular accident (CVA)  Z86.73 V49.89     Patient Active Problem List   Diagnosis   • Urinary retention   • Positive colorectal cancer screening using DNA-based stool test   • Leukocytosis   • Prediabetes   • BPH (benign prostatic hyperplasia)   • BPH with elevated PSA   • Essential hypertension   • Stroke ([facility])   • Dysarthria   • Dysphagia   • Impaired mobility   • Memory impairment   • Leg cramps   • Left hemiplegia (HCC)   • History of tobacco abuse   • Closed fracture of neck of left femur (HCC)     Past Medical History:   Diagnosis Date   • Acute ischemic stroke ([facility]) 2021     posterior limb of the right internal capsule   • BPH with elevated PSA    • Chronic bronchitis ([facility])    • Hyperlipidemia    • Hypertension    • Lacunar infarction ([facility])    • Noncompliance with medication regimen     pt states he never took his metoprolol   • Prediabetes      Past Surgical History:   Procedure Laterality Date   • FINGER SURGERY      Right hand 4th finger   • FINGER SURGERY      R ring finger, partial amputation repair   • ORIF HIP FRACTURE Left 2021    Procedure: HIP OPEN REDUCTION INTERNAL FIXATION WITH FEMORAL NECK SYSTEM LEFT;  Surgeon: Aditya Ruano MD;  Location: [facility];  Service: Orthopedics;  Laterality: Left;      General Information     Row Name 21 1402          Physical Therapy Time and Intention    Document Type therapy note (daily note)  -CT     Mode of Treatment physical therapy  -CT     Row Name 21 1402          General Information    Patient Profile Reviewed yes  -CT     Prior Level of Function min assist:; bed mobility; transfer; gait  -CT     Existing  Precautions/Restrictions hip; weight bearing  -CT     Barriers to Rehab medically complex; previous functional deficit; physical barrier  -CT     Row Name 12/05/21 1402          Cognition    Orientation Status (Cognition) oriented x 4  -CT     Row Name 12/05/21 1402          Safety Issues, Functional Mobility    Safety Issues Affecting Function (Mobility) unable to maintain weight-bearing restrictions; sequencing abilities  -CT     Impairments Affecting Function (Mobility) coordination; endurance/activity tolerance; motor control  -CT           User Key  (r) = Recorded By, (t) = Taken By, (c) = Cosigned By    Initials Name Provider Type    CT Macho Pereira, PT Physical Therapist               Mobility     Row Name 12/05/21 1404          Bed Mobility    Bed Mobility bed mobility (all) activities  -CT     All Activities, [place] (Bed Mobility) supervision  -CT     Rolling Left [place] (Bed Mobility) supervision  -CT     Supine-Sit [place] (Bed Mobility) minimum assist (75% patient effort)  -CT     Row Name 12/05/21 1404          Bed-Chair Transfer    Bed-Chair [place] (Transfers) moderate assist (50% patient effort)  -CT     Row Name 12/05/21 1404          Sit-Stand Transfer    Sit-Stand [place] (Transfers) moderate assist (50% patient effort)  -CT     Row Name 12/05/21 1404          Gait/Stairs (Locomotion)    [place] Level (Gait) unable to assess  -CT     Row Name 12/05/21 1404          Mobility    Extremity Weight-bearing Status left lower extremity  -CT     Left Lower Extremity (Weight-bearing Status) toe touch weight-bearing (TTWB)  -CT           User Key  (r) = Recorded By, (t) = Taken By, (c) = Cosigned By    Initials Name Provider Type    CT Macho Pereira PT Physical Therapist               Obj/Interventions     Row Name 12/05/21 1405          Strength Comprehensive (MMT)    General Manual Muscle Testing (MMT) Assessment lower extremity strength deficits  identified  -CT     Comment, General Manual Muscle Testing (MMT) Assessment 3/5 left hip  -CT     Row Name 12/05/21 1405          Motor Skills    Motor Skills functional endurance; coordination  -CT     Row Name 12/05/21 1405          Balance    Balance Assessment sitting static balance; sitting dynamic balance; standing static balance; standing dynamic balance  -CT     Static Sitting Balance WNL  -CT     Dynamic Sitting Balance mild impairment  -CT     Static Standing Balance moderate impairment  -CT     Dynamic Standing Balance severe impairment  -CT     Balance Interventions sitting; standing; sit to stand; supported; weight shifting activity  -CT           User Key  (r) = Recorded By, (t) = Taken By, (c) = Cosigned By    Initials Name Provider Type    CT Macho Pereira, PT Physical Therapist               Goals/Plan    No documentation.                Clinical Impression     Row Name 12/05/21 1406          Pain    Additional Documentation Pain Scale: Numbers Pre/Post-Treatment (Group)  -CT     Row Name 12/05/21 1406          Pain Scale: Numbers Pre/Post-Treatment    Pretreatment Pain Rating 2/10  -CT     Posttreatment Pain Rating 2/10  -CT     Pain Location - Orientation incisional  -CT     Pain Location hip  -CT     Pain Intervention(s) Ambulation/increased activity; Repositioned  -CT     Row Name 12/05/21 1406          Plan of Care Review    Plan of Care Reviewed With patient  -CT     Progress improving  -CT     Outcome Summary better assisting with standing and maintaining TTWB to pivot to chair.  -CT     Row Name 12/05/21 1406          Therapy Assessment/Plan (PT)    Rehab Potential (PT) good, to achieve stated therapy goals  -CT     Criteria for Skilled Interventions Met (PT) yes  -CT     Row Name 12/05/21 1406          Positioning and Restraints    Pre-Treatment Position in bed  -CT     Post Treatment Position chair  -CT     In Chair notified nsg; reclined; sitting; call light within reach; on  mechanical lift sling; legs elevated  -CT           User Key  (r) = Recorded By, (t) = Taken By, (c) = Cosigned By    Initials Name Provider Type    CT Macho Pereira, PT Physical Therapist               Outcome Measures     Row Name 12/05/21 1411 12/05/21 0814       How much help from another person do you currently need...    Turning from your back to your side while in flat bed without using bedrails? 3  -CT 3  -SC    Moving from lying on back to sitting on the side of a flat bed without bedrails? 3  -CT 3  -SC    Moving to and from a bed to a chair (including a wheelchair)? 2  -CT 2  -SC    Standing up from a chair using your arms (e.g., wheelchair, bedside chair)? 2  -CT 2  -SC    Climbing 3-5 steps with a railing? 1  -CT 2  -SC    To walk in hospital room? 1  -CT 2  -SC    AM-PAC 6 Clicks Score (PT) 12  -CT 14  -SC    Row Name 12/05/21 1411          Functional Assessment    Outcome Measure Options AM-PAC 6 Clicks Basic Mobility (PT)  -CT           User Key  (r) = Recorded By, (t) = Taken By, (c) = Cosigned By    Initials Name Provider Type    CT Macho Pereira, NAINA Physical Therapist    Ree Garcia RN Registered Nurse                             Physical Therapy Education                 Title: PT OT SLP Therapies (In Progress)     Topic: Physical Therapy (In Progress)     Point: Mobility training (In Progress)     Learning Progress Summary           Patient Eager, E,D, NR by CT at 12/5/2021 1411    Acceptance, E,D, NR by CT at 12/4/2021 1415    Eager, E, VU,NR by SS at 12/3/2021 1502    Comment: Educated pt. safety/technique with bed mobility, transfers, TTWB status, benefits of inpatient rehab, PT POC                   Point: Home exercise program (In Progress)     Learning Progress Summary           Patient Eager, E,D, NR by CT at 12/5/2021 1411    Acceptance, E,D, NR by CT at 12/4/2021 1415                   Point: Body mechanics (In Progress)     Learning Progress Summary            Patient Eager, E,D, NR by CT at 12/5/2021 1411    Acceptance, E,D, NR by CT at 12/4/2021 1415    Eager, E, VU,NR by SS at 12/3/2021 1502    Comment: Educated pt. safety/technique with bed mobility, transfers, TTWB status, benefits of inpatient rehab, PT POC                   Point: Precautions (In Progress)     Learning Progress Summary           Patient Eager, E,D, NR by CT at 12/5/2021 1411    Acceptance, E,D, NR by CT at 12/4/2021 1415    Eager, E, VU,NR by SS at 12/3/2021 1502    Comment: Educated pt. safety/technique with bed mobility, transfers, TTWB status, benefits of inpatient rehab, PT POC                               User Key     Initials Effective Dates Name Provider Type Discipline    CT 06/16/21 -  Macho Pereira, PT Physical Therapist PT    SS 06/01/21 -  Beatriz San PT Physical Therapist PT              PT Recommendation and Plan     Plan of Care Reviewed With: patient  Progress: improving  Outcome Summary: better assisting with standing and maintaining TTWB to pivot to chair.     Time Calculation:    PT Charges     Row Name 12/05/21 1412             Time Calculation    Start Time 1340  -CT      PT Received On 12/05/21  -CT              Time Calculation- PT    Total Timed Code Minutes- PT 30 minute(s)  -CT            User Key  (r) = Recorded By, (t) = Taken By, (c) = Cosigned By    Initials Name Provider Type    CT Macho Pereira, PT Physical Therapist              Therapy Charges for Today     Code Description Service Date Service Provider Modifiers Qty    03035251787 HC PT THER PROC EA 15 MIN 12/4/2021 Macho Pereira, PT GP 2    74432937432 HC PT THER PROC EA 15 MIN 12/5/2021 Macho Pereira, PT GP 2          PT G-Codes  Outcome Measure Options: AM-PAC 6 Clicks Basic Mobility (PT)  AM-PAC 6 Clicks Score (PT): 12  AM-PAC 6 Clicks Score (OT): 16    Macho Pereira PT  12/5/2021

## 2021-12-05 NOTE — PROGRESS NOTES
[facility] Medicine Services  PROGRESS NOTE    Patient Name: Obed Cotto Jr.  : 1950  MRN: [id number]    Date of Admission: 2021  Primary Care Physician: Yadira Fam APRN    Subjective   Subjective     CC:  Hip fracture    HPI:  Patient seen resting up in bed no acute distress.  Awake alert.  States he feels okay.  Left lower extremity still numb from nerve cath in place.  Dressing removed this morning.  Incision dry and intact.  Reports no pain currently.    ROS:  Gen- No fevers, chills  CV- No chest pain, palpitations  Resp- No cough, dyspnea  GI- No N/V/D, abd pain      Objective   Objective     Vital Signs:   Temp:  [97.8 °F (36.6 °C)-98.3 °F (36.8 °C)] 97.9 °F (36.6 °C)  Heart Rate:  [54-71] 58  Resp:  [16-18] 16  BP: (122-140)/(48-60) 131/55  Flow (L/min):  [2-3] 2     Physical Exam:  Constitutional: No acute distress, awake, alert.  Resting in bed.  Disheveled appearing.  HENT: NCAT, mucous membranes moist  Respiratory: Clear to auscultation bilaterally, respiratory effort normal   Cardiovascular: RRR, no murmurs, rubs, or gallops  Gastrointestinal: Positive bowel sounds, soft, nontender, nondistended.  Thin habitus  Musculoskeletal: No bilateral ankle edema, left LE with decreased ROM due to postop pain (stable)  Psychiatric: Appropriate affect, cooperative  Neurologic: Oriented x 3, speech clear and appropriate, chronic LUE weakness.  Follows commands.  Skin: No rashes.  Left hip incision open to air.  Dry and intact.  Nerve block catheter in place.      Results Reviewed:  LAB RESULTS:      Lab 21  0942 21  0515 21  0513 21  1240 21  0038   WBC 10.87* 13.51* 12.89* 14.80* 19.67*   HEMOGLOBIN 10.9* 10.2* 11.0* 11.0* 12.5*   HEMATOCRIT 33.2* 30.3* 33.1* 33.5* 36.8*   PLATELETS 211 203 201 210 228   NEUTROS ABS  --  8.03* 8.49* 10.76* 16.09*   IMMATURE GRANS (ABS)  --  0.06* 0.06* 0.07* 0.07*   LYMPHS ABS  --  2.99 2.44 2.53 2.09    MONOS ABS  --  1.47* 1.04* 1.07* 1.35*   EOS ABS  --  0.89* 0.79* 0.33 0.02   .3* 97.1* 99.7* 100.9* 96.8   PROCALCITONIN  --   --   --   --  0.19   PROTIME  --   --   --  14.1  --          Lab 12/05/21  0942 12/04/21  1224 12/03/21  1413 12/01/21  1240 12/01/21  0038   SODIUM 140  --  143 139 139   POTASSIUM 4.3 4.9 3.0* 4.3 4.1   CHLORIDE 105  --  113* 104 102   CO2 28.0  --  22.0 27.0 26.0   ANION GAP 7.0  --  8.0 8.0 11.0   BUN 14  --  14 26* 21   CREATININE 0.50*  --  0.41* 0.62* 0.70*   GLUCOSE 147*  --  82 117* 122*   CALCIUM 10.1  --  7.2* 10.3 10.6*         Lab 12/01/21  1240 12/01/21  0038   TOTAL PROTEIN 5.6* 6.3   ALBUMIN 3.40* 4.10   GLOBULIN 2.2 2.2   ALT (SGPT) 30 40   AST (SGOT) 23 27   BILIRUBIN 0.8 0.7   ALK PHOS 86 125*         Lab 12/01/21  1240 12/01/21  0038   PROBNP  --  219.8   TROPONIN T  --  <0.010   PROTIME 14.1  --    INR 1.12  --              Lab 12/01/21  1240 12/01/21  0042 12/01/21  0042   ABO TYPING B   < > B   RH TYPING Positive   < > Positive   ANTIBODY SCREEN  --   --  Negative    < > = values in this interval not displayed.         Brief Urine Lab Results  (Last result in the past 365 days)      Color   Clarity   Blood   Leuk Est   Nitrite   Protein   CREAT   Urine HCG        12/01/21 0053 Yellow   Clear   Negative   Negative   Negative   Negative                 Microbiology Results Abnormal     Procedure Component Value - Date/Time    S. Pneumo Ag Urine or CSF - Urine, Urine, Clean Catch [[id number]]  (Normal) Collected: 12/01/21 0506    Lab Status: Final result Specimen: Urine, Clean Catch Updated: 12/01/21 1514     Strep Pneumo Ag Negative    Legionella Antigen, Urine - Urine, Urine, Clean Catch [[id number]]  (Normal) Collected: 12/01/21 0414    Lab Status: Final result Specimen: Urine, Clean Catch Updated: 12/01/21 1255     LEGIONELLA ANTIGEN, URINE Negative    Respiratory Panel, PCR (WITHOUT COVID) - Swab, Nasopharynx [[id number]]  (Normal) Collected: 12/01/21 3008     Lab Status: Final result Specimen: Swab from Nasopharynx Updated: 12/01/21 0903     ADENOVIRUS, PCR Not Detected     Coronavirus 229E Not Detected     Coronavirus HKU1 Not Detected     Coronavirus NL63 Not Detected     Coronavirus OC43 Not Detected     Human Metapneumovirus Not Detected     Human Rhinovirus/Enterovirus Not Detected     Influenza B PCR Not Detected     Parainfluenza Virus 1 Not Detected     Parainfluenza Virus 2 Not Detected     Parainfluenza Virus 3 Not Detected     Parainfluenza Virus 4 Not Detected     Bordetella pertussis pcr Not Detected     Chlamydophila pneumoniae PCR Not Detected     Mycoplasma pneumo by PCR Not Detected     Influenza A PCR Not Detected     RSV, PCR Not Detected     Bordetella parapertussis PCR Not Detected    Narrative:      The coronavirus on the RVP is NOT COVID-19 and is NOT indicative of infection with COVID-19.    In the setting of a positive respiratory panel with a viral infection PLUS a negative procalcitonin without other underlying concern for bacterial infection, consider observing off antibiotics or discontinuation of antibiotics and continue supportive care. If the respiratory panel is positive for atypical bacterial infection (Bordetella pertussis, Chlamydophila pneumoniae, or Mycoplasma pneumoniae), consider antibiotic de-escalation to target atypical bacterial infection.    COVID PRE-OP / PRE-PROCEDURE SCREENING ORDER (NO ISOLATION) - Swab, Nasopharynx [[id number]]  (Normal) Collected: 12/01/21 0053    Lab Status: Final result Specimen: Swab from Nasopharynx Updated: 12/01/21 0212    Narrative:      The following orders were created for panel order COVID PRE-OP / PRE-PROCEDURE SCREENING ORDER (NO ISOLATION) - Swab, Nasopharynx.  Procedure                               Abnormality         Status                     ---------                               -----------         ------                     COVID-19,CEPHEID/HUY,LE...[[id number]]  Normal               Final result                 Please view results for these tests on the individual orders.    COVID-19,CEPHEID/HUY,KEYSHA IN-HOUSE(OR EMERGENT/ADD-ON),NP SWAB IN TRANSPORT MEDIA 3-4 HR TAT - Swab, Nasopharynx [[id number]]  (Normal) Collected: 12/01/21 0053    Lab Status: Final result Specimen: Swab from Nasopharynx Updated: 12/01/21 0212     COVID19 Not Detected    Narrative:      Fact sheet for providers: https://www.fda.gov/media/957734/download     Fact sheet for patients: https://www.fda.gov/media/752465/download  Fact sheet for providers: https://www.fda.gov/media/075774/download     Fact sheet for patients: https://www.fda.gov/media/564671/download          CT Angiogram Chest With & Without Contrast    Result Date: 12/3/2021  CT ANGIOGRAM CHEST W WO CONTRAST INDICATION: Hypoxia. Recent surgery. TECHNIQUE: CT angiogram of the chest with 100 cc of Isovue-300 IV contrast. 3-D reconstructions were obtained and reviewed.   Radiation dose reduction techniques included automated exposure control or exposure modulation based on body size. Count of known CT and cardiac nuc med studies performed in previous 12 months: 5. COMPARISON: None available. FINDINGS: Chest images at mediastinal window show no acute changes in the aorta. Atherosclerotic calcification is seen in the descending thoracic aorta. There is a trace pericardial effusion and there are small bilateral pleural effusions. No pulmonary artery filling defects are seen to suggest emboli. The CT angiographic images also show no evidence of emboli. Chest images at lung window show bibasilar atelectasis. Emphysema is noted.     Impression: Emphysema. Bibasilar atelectasis with small bilateral pleural effusions and a trace pericardial effusion. No evidence of pulmonary embolism. Signer Name: Bismark Ramon MD  Signed: 12/3/2021 7:20 PM  Workstation Name: [id number]  [facility]      Results for orders placed during the hospital encounter of  05/30/21    Adult Transthoracic Echo Complete W/ Cont if Necessary Per Protocol (With Agitated Saline)    Interpretation Summary  · Saline test results are negative.  · Estimated left ventricular EF = 65% Left ventricular ejection fraction appears to be 61 - 65%. Left ventricular systolic function is normal.  · There is mild mitral valve prolapse of the posterior mitral leaflet.  · Left ventricular wall thickness is consistent with mild basal asymmetric hypertrophy.  · The right atrial cavity is mildly dilated.  · The right ventricular cavity is mildly dilated.  · Estimated right ventricular systolic pressure from tricuspid regurgitation is normal (<35 mmHg). Calculated right ventricular systolic pressure from tricuspid regurgitation is 27 mmHg.  · Left ventricular diastolic function was normal.      I have reviewed the medications:  Scheduled Meds:aspirin, 325 mg, Oral, Daily  atorvastatin, 40 mg, Oral, Nightly  baclofen, 10 mg, Oral, TID  docusate sodium, 100 mg, Oral, BID  escitalopram, 5 mg, Oral, Daily  famotidine, 20 mg, Oral, BID  finasteride, 5 mg, Oral, Daily  ipratropium-albuterol, 3 mL, Nebulization, Q6H While Awake - RT  lisinopril, 10 mg, Oral, Daily  meloxicam, 15 mg, Oral, Daily  metoprolol succinate XL, 25 mg, Oral, Daily  sodium chloride, 10 mL, Intravenous, Q12H  sodium chloride, 10 mL, Intravenous, Q12H  tamsulosin, 0.4 mg, Oral, Daily      Continuous Infusions:lactated ringers, 75 mL/hr, Last Rate: 75 mL/hr (12/01/21 2138)  ropivacaine (NAROPIN) 0.2% peripheral nerve cath (moog), 8 mL/hr, Last Rate: 8 mL/hr (12/05/21 1320)  sodium chloride, 120 mL/hr, Last Rate: 120 mL/hr (12/03/21 1711)      PRN Meds:.ondansetron **OR** ondansetron  •  oxyCODONE  •  potassium chloride **OR** potassium chloride **OR** potassium chloride  •  sodium chloride  •  [COMPLETED] Insert peripheral IV **AND** [MAR Hold] sodium chloride  •  sodium chloride    Assessment/Plan   Assessment & Plan     Active Hospital Problems     Diagnosis  POA   • **Closed fracture of neck of left femur (HCC) [S72.002A]  Yes   • Essential hypertension [I10]  Yes   • BPH (benign prostatic hyperplasia) [N40.0]  Yes   • Prediabetes [R73.03]  Yes   • Leukocytosis [D72.829]  Yes      Resolved Hospital Problems   No resolved problems to display.        Brief Hospital Course to date:  Obed Cotto Jr. is a 71 y.o. male with history of prediabetes, hypertension, hyperlipidemia, CVA with left weakness residual, BPH, extobacco presents to the ED tonight after a fall at home.  Patient has history of CVA with residual left-sided weakness from May of this year and he was attending rehab once weekly prior to fall.     This patient's problems and plans were partially entered by my partner and updated as appropriate by me 12/05/21.    Assessment/plan:      Closed fracture of neck of left femur (HCC)  -Ortho, Dr. Ruano following   -pain meds PRN  -Continue baclofen, TID  -hold anticoagulation for now--resume when okay with ortho  -s/p left femoral neck fixation on 12/2  -toe-touch weightbearing for 6 weeks  -Follow up with Dr. Ruano in 1 month  --Awaiting rehab      Leukocytosis with hypoxia  -Checked CTA chest 12/3.  Results: Emphysema. Bibasilar atelectasis with small bilateral pleural effusions and a trace pericardial effusion. No evidence of pulmonary embolism.  --Continue to monitor labs, vitals and oxygenation.  Rescan if worsening.  -procal negative   -Requirements down to 2 LNC today.  Sats stable.  -speech eval for possible dysphagia.  Currently tolerating diet.     Prediabetes  --No home meds, 5.7 baseline a1c 5.30      BPH  -On flomax and proscar, stable per pt  -Continue home meds  -Bladder scan and I/o cath if needed.  Now with pale-appearing complacent making good output.  --Slight output overnight.      Essential hypertension  On ACEI and BB therapy at home  -continue home meds  -hold if bp<110.  Stable.     Recent stroke  - cont PT/OT  --Residual left upper  extremity weakness.  Was at [facility] for rehab post stroke.  Eager to get back to [facility].  --Case management following for disposition.     DVT prophylaxis:  Mechanical DVT prophylaxis orders are present.       AM-PAC 6 Clicks Score (PT): 14 (12/05/21 0814)    Disposition: I expect the patient to be discharged TBD, needs rehab but difficult to place due to previous weakness from CVA    CODE STATUS:   Code Status and Medical Interventions:   Ordered at: 12/02/21 1312     Code Status (Patient has no pulse and is not breathing):    CPR (Attempt to Resuscitate)     Medical Interventions (Patient has pulse or is breathing):    Full Support       Libby Hamilton, APRN  12/05/21

## 2021-12-05 NOTE — PROGRESS NOTES
[facility]    Acute pain service Inpatient Progress Note    Patient Name: Obed Cotto Jr.  :  1950  MRN:  [id number]        Acute Pain  Service Inpatient Progress Note:    Analgesia:Good  LOC: alert and awake  Resp Status: room air  Cardiac: VS stable  Side Effects:None  Catheter Site:clean, dressing intact and dry  Cath type: peripheral nerve cath with ON Q  Infusion rate: 6ml/hr  Catheter Plan:Catheter to remain Insitu and Continue catheter infusion rate unchanged

## 2021-12-05 NOTE — PLAN OF CARE
Patient had mild complaints of pain but not enough to warrant PRN narcotics.  Worked with PT and in the chair for the afternoon.  External catheter in place.  PNC at 8ml/hr.  Room air throughout the day.

## 2021-12-05 NOTE — PLAN OF CARE
Goal Outcome Evaluation:  Plan of Care Reviewed With: patient        Progress: improving  Outcome Summary: slept all night, no complaints of pain, nerve block at 8, voiding well, PVR checked once, 111ml, VSS, cont to monitor

## 2021-12-06 PROCEDURE — 99232 SBSQ HOSP IP/OBS MODERATE 35: CPT | Performed by: NURSE PRACTITIONER

## 2021-12-06 PROCEDURE — 99024 POSTOP FOLLOW-UP VISIT: CPT | Performed by: ORTHOPAEDIC SURGERY

## 2021-12-06 PROCEDURE — 97110 THERAPEUTIC EXERCISES: CPT

## 2021-12-06 PROCEDURE — 94799 UNLISTED PULMONARY SVC/PX: CPT

## 2021-12-06 PROCEDURE — 25010000002 ROPIVACAINE PER 1 MG: Performed by: NURSE ANESTHETIST, CERTIFIED REGISTERED

## 2021-12-06 PROCEDURE — 97530 THERAPEUTIC ACTIVITIES: CPT

## 2021-12-06 PROCEDURE — 94761 N-INVAS EAR/PLS OXIMETRY MLT: CPT

## 2021-12-06 RX ADMIN — IPRATROPIUM BROMIDE AND ALBUTEROL SULFATE 3 ML: 2.5; .5 SOLUTION RESPIRATORY (INHALATION) at 20:06

## 2021-12-06 RX ADMIN — ESCITALOPRAM OXALATE 5 MG: 10 TABLET ORAL at 08:20

## 2021-12-06 RX ADMIN — BACLOFEN 10 MG: 10 TABLET ORAL at 20:44

## 2021-12-06 RX ADMIN — BACLOFEN 10 MG: 10 TABLET ORAL at 08:20

## 2021-12-06 RX ADMIN — IPRATROPIUM BROMIDE AND ALBUTEROL SULFATE 3 ML: 2.5; .5 SOLUTION RESPIRATORY (INHALATION) at 08:05

## 2021-12-06 RX ADMIN — FAMOTIDINE 20 MG: 20 TABLET, FILM COATED ORAL at 08:20

## 2021-12-06 RX ADMIN — ATORVASTATIN CALCIUM 40 MG: 40 TABLET, FILM COATED ORAL at 20:44

## 2021-12-06 RX ADMIN — ASPIRIN 325 MG: 325 TABLET, COATED ORAL at 08:20

## 2021-12-06 RX ADMIN — METOPROLOL SUCCINATE 25 MG: 25 TABLET, EXTENDED RELEASE ORAL at 08:20

## 2021-12-06 RX ADMIN — FINASTERIDE 5 MG: 5 TABLET, FILM COATED ORAL at 18:28

## 2021-12-06 RX ADMIN — MELOXICAM 15 MG: 7.5 TABLET ORAL at 08:20

## 2021-12-06 RX ADMIN — DOCUSATE SODIUM 100 MG: 100 CAPSULE, LIQUID FILLED ORAL at 08:20

## 2021-12-06 RX ADMIN — BACLOFEN 10 MG: 10 TABLET ORAL at 18:28

## 2021-12-06 RX ADMIN — TAMSULOSIN HYDROCHLORIDE 0.4 MG: 0.4 CAPSULE ORAL at 18:28

## 2021-12-06 RX ADMIN — DOCUSATE SODIUM 100 MG: 100 CAPSULE, LIQUID FILLED ORAL at 20:44

## 2021-12-06 RX ADMIN — FAMOTIDINE 20 MG: 20 TABLET, FILM COATED ORAL at 20:44

## 2021-12-06 RX ADMIN — SODIUM CHLORIDE, PRESERVATIVE FREE 10 ML: 5 INJECTION INTRAVENOUS at 08:23

## 2021-12-06 RX ADMIN — LISINOPRIL 10 MG: 10 TABLET ORAL at 08:20

## 2021-12-06 RX ADMIN — ROPIVACAINE HYDROCHLORIDE 8 ML/HR: 2 INJECTION, SOLUTION EPIDURAL; INFILTRATION at 01:20

## 2021-12-06 NOTE — PLAN OF CARE
Goal Outcome Evaluation:  Plan of Care Reviewed With: patient        Progress: improving  Outcome Summary: VC safe HP, mod-A x2, BUE support, TTWB LLE for SPT bed to chair, then 2 sets STS min-A x2 from chair, pt needs cues for TTWB

## 2021-12-06 NOTE — PROGRESS NOTES
"      [facility] [place] [facility] Progress Note    Subjective      LOS: 5 days   Patient Care Team:  Yadira Fam APRN as PCP - General (Nurse Practitioner)  Dieudonne Hartman MD as Consulting Physician (Urology)    CC: Left hip pain    Interval History:   No complaints.  No hip pain.    Objective      Vital Signs  Temp (24hrs), Av.1 °F (36.7 °C), Min:97.9 °F (36.6 °C), Max:98.3 °F (36.8 °C)      /99 (BP Location: Left arm, Patient Position: Lying)   Pulse 66   Temp 98 °F (36.7 °C) (Oral)   Resp 18   Ht 182.9 cm (72\")   Wt 57.7 kg (127 lb 1.6 oz)   SpO2 92%   BMI 17.24 kg/m²     Examination:   Examination of the left hip: The wound is clean, dry, and intact.  Ankle dorsiflexion, ankle plantar flexion, and EHL are intact, unchanged from preop.  Sensation intact in the foot to light touch.   Thigh is soft and nontender.      Labs:  Results from last 7 days   Lab Units 21  0942 21  0515 21  0513 21  1240 21  0038   WBC 10*3/mm3 10.87* 13.51* 12.89* 14.80* 19.67*   HEMOGLOBIN g/dL 10.9* 10.2* 11.0* 11.0* 12.5*   HEMATOCRIT % 33.2* 30.3* 33.1* 33.5* 36.8*   MCV fL 100.3* 97.1* 99.7* 100.9* 96.8   PLATELETS 10*3/mm3 211 203 201 210 228       Radiology:  Imaging Results (Last 24 Hours)     ** No results found for the last 24 hours. **          PT:  Physical Therapy - Plan of Care Review - Outcome Summary:  Outcome Summary: better assisting with standing and maintaining TTWB to pivot to chair. (21 1406)]       Results Review:     I reviewed the patient's new clinical results.    Assessment and Plan     Assessment:   Status post close reduction and internal fixation, valgus impacted left femoral neck fracture (femoral neck system device)      Closed fracture of neck of left femur (HCC)    Leukocytosis    Prediabetes    BPH (benign prostatic hyperplasia)    Essential hypertension      Plan for disposition: Planning for [facility] at the time of discharge (awaiting " approval and bed).  Patient will follow up with me in 1 month.      Future Appointments   Date Time Provider Department Center   12/7/2021 10:00 AM Yadira Fam APRN [facility]   1/3/2022  9:10 AM Aditya Ruano MD [facility]   2/4/2022 11:00 AM Spencer Bloom MD [facility]   3/9/2022  9:45 AM Lorraine Morfin MD [facility]           Aditya Ruano MD  12/06/21  08:01 EST

## 2021-12-06 NOTE — CASE MANAGEMENT/SOCIAL WORK
Continued Stay Note  [facility]     Patient Name: Obed Cotto Jr.  MRN: [id number]  Today's Date: 12/6/2021    Admit Date: 11/30/2021     Discharge Plan     Row Name 12/06/21 1549       Plan    Plan [facility]    Plan Comments Case mgt f/u. Per Aptil at [facility],they have received insurance approval for transfer to an acute level rehab bed tomorrow 12/7. Mr Cotto is in agreement with plan. [facility] scheduled to transport at 1245               Discharge Codes    No documentation.               Expected Discharge Date and Time     Expected Discharge Date Expected Discharge Time    Dec 6, 2021             Sonja C Kellerman, RN

## 2021-12-06 NOTE — PROGRESS NOTES
[place]    Acute pain service Inpatient Progress Note    Patient Name: Obed Cotto Jr.  :  1950  MRN:  [id number]        Acute Pain  Service Inpatient Progress Note:    Analgesia:Good  Pain Score:0/10  LOC: alert and awake  Resp Status: room air  Cardiac: VS stable  Side Effects:None  Catheter Site:clean, dressing intact and dry  Cath type: peripheral nerve cath(MOOG pump)  Infusion rate: 8ml/hr  Catheter Plan:Catheter to remain Insitu and Continue catheter infusion rate unchanged

## 2021-12-06 NOTE — PROGRESS NOTES
[facility] Medicine Services  PROGRESS NOTE    Patient Name: Obed Cotto Jr.  : 1950  MRN: [id number]    Date of Admission: 2021  Primary Care Physician: Yadira Fam APRN    Subjective   Subjective     CC:  Hip fracture    HPI:  Patient seen resting up in the chair awake and alert. No acute distress. No visitors at bedside. Denies pain, nausea, vomiting. Tolerating diet. Left lower extremity still with nerve block catheter in place. No new issues. Awaiting rehab.    ROS:  Gen- No fevers, chills  CV- No chest pain, palpitations  Resp- No cough, dyspnea  GI- No N/V/D, abd pain      Objective   Objective     Vital Signs:   Temp:  [98 °F (36.7 °C)-98.3 °F (36.8 °C)] 98.2 °F (36.8 °C)  Heart Rate:  [57-68] 68  Resp:  [16-18] 16  BP: (123-147)/(52-99) 147/63     Physical Exam:  Constitutional: No acute distress, awake, alert.  Resting up in the chair.   HENT: NCAT, mucous membranes moist  Respiratory: Clear to auscultation bilaterally, respiratory effort normal   Cardiovascular: RRR, no murmurs, rubs, or gallops  Gastrointestinal: Positive bowel sounds, soft, nontender, nondistended.  Thin habitus  Musculoskeletal: No bilateral ankle edema, left LE with decreased ROM due to postop pain (stable)  Psychiatric: Appropriate affect, cooperative  Neurologic: Oriented x 3, speech clear and appropriate, chronic LUE weakness.  Follows commands.  Skin: No rashes.  Left hip incision open to air.  Dry and intact.  Nerve block catheter in place.      Results Reviewed:  LAB RESULTS:      Lab 21  0942 21  0515 21  0513 21  1240 21  0038   WBC 10.87* 13.51* 12.89* 14.80* 19.67*   HEMOGLOBIN 10.9* 10.2* 11.0* 11.0* 12.5*   HEMATOCRIT 33.2* 30.3* 33.1* 33.5* 36.8*   PLATELETS 211 203 201 210 228   NEUTROS ABS  --  8.03* 8.49* 10.76* 16.09*   IMMATURE GRANS (ABS)  --  0.06* 0.06* 0.07* 0.07*   LYMPHS ABS  --  2.99 2.44 2.53 2.09   MONOS ABS  --  1.47* 1.04* 1.07*  1.35*   EOS ABS  --  0.89* 0.79* 0.33 0.02   .3* 97.1* 99.7* 100.9* 96.8   PROCALCITONIN  --   --   --   --  0.19   PROTIME  --   --   --  14.1  --          Lab 12/05/21  0942 12/04/21  1224 12/03/21  1413 12/01/21  1240 12/01/21  0038   SODIUM 140  --  143 139 139   POTASSIUM 4.3 4.9 3.0* 4.3 4.1   CHLORIDE 105  --  113* 104 102   CO2 28.0  --  22.0 27.0 26.0   ANION GAP 7.0  --  8.0 8.0 11.0   BUN 14  --  14 26* 21   CREATININE 0.50*  --  0.41* 0.62* 0.70*   GLUCOSE 147*  --  82 117* 122*   CALCIUM 10.1  --  7.2* 10.3 10.6*         Lab 12/01/21  1240 12/01/21  0038   TOTAL PROTEIN 5.6* 6.3   ALBUMIN 3.40* 4.10   GLOBULIN 2.2 2.2   ALT (SGPT) 30 40   AST (SGOT) 23 27   BILIRUBIN 0.8 0.7   ALK PHOS 86 125*         Lab 12/01/21  1240 12/01/21  0038   PROBNP  --  219.8   TROPONIN T  --  <0.010   PROTIME 14.1  --    INR 1.12  --              Lab 12/01/21  1240 12/01/21  0042 12/01/21  0042   ABO TYPING B   < > B   RH TYPING Positive   < > Positive   ANTIBODY SCREEN  --   --  Negative    < > = values in this interval not displayed.         Brief Urine Lab Results  (Last result in the past 365 days)      Color   Clarity   Blood   Leuk Est   Nitrite   Protein   CREAT   Urine HCG        12/01/21 0053 Yellow   Clear   Negative   Negative   Negative   Negative                 Microbiology Results Abnormal     Procedure Component Value - Date/Time    Viral Culture, Rapid, Respiratory - Swab, Nasopharynx [[id number]] Collected: 12/01/21 1211    Lab Status: Final result Specimen: Swab from Nasopharynx Updated: 12/05/21 2106     Adenovirus Negative     Rapid Influenza A Ag Negative     Rapid Influenza B Ag Negative     Parainfluenza Virus 1 Negative     Parainfluenza Virus 2 Negative     Parainfluenza Virus 3 Negative     Respiratory Syncytial Virus Negative    Narrative:      Performed at:  [address] - [facility]  [id number]  : Ashley George MD, Phone:  [phone]    S.  Pneumo Ag Urine or CSF - Urine, Urine, Clean Catch [[id number]]  (Normal) Collected: 12/01/21 0553    Lab Status: Final result Specimen: Urine, Clean Catch Updated: 12/01/21 1514     Strep Pneumo Ag Negative    Legionella Antigen, Urine - Urine, Urine, Clean Catch [[id number]]  (Normal) Collected: 12/01/21 0414    Lab Status: Final result Specimen: Urine, Clean Catch Updated: 12/01/21 1255     LEGIONELLA ANTIGEN, URINE Negative    Respiratory Panel, PCR (WITHOUT COVID) - Swab, Nasopharynx [[id number]]  (Normal) Collected: 12/01/21 0713    Lab Status: Final result Specimen: Swab from Nasopharynx Updated: 12/01/21 0903     ADENOVIRUS, PCR Not Detected     Coronavirus 229E Not Detected     Coronavirus HKU1 Not Detected     Coronavirus NL63 Not Detected     Coronavirus OC43 Not Detected     Human Metapneumovirus Not Detected     Human Rhinovirus/Enterovirus Not Detected     Influenza B PCR Not Detected     Parainfluenza Virus 1 Not Detected     Parainfluenza Virus 2 Not Detected     Parainfluenza Virus 3 Not Detected     Parainfluenza Virus 4 Not Detected     Bordetella pertussis pcr Not Detected     Chlamydophila pneumoniae PCR Not Detected     Mycoplasma pneumo by PCR Not Detected     Influenza A PCR Not Detected     RSV, PCR Not Detected     Bordetella parapertussis PCR Not Detected    Narrative:      The coronavirus on the RVP is NOT COVID-19 and is NOT indicative of infection with COVID-19.    In the setting of a positive respiratory panel with a viral infection PLUS a negative procalcitonin without other underlying concern for bacterial infection, consider observing off antibiotics or discontinuation of antibiotics and continue supportive care. If the respiratory panel is positive for atypical bacterial infection (Bordetella pertussis, Chlamydophila pneumoniae, or Mycoplasma pneumoniae), consider antibiotic de-escalation to target atypical bacterial infection.    COVID PRE-OP / PRE-PROCEDURE SCREENING ORDER (NO  ISOLATION) - Swab, Nasopharynx [[id number]]  (Normal) Collected: 12/01/21 0053    Lab Status: Final result Specimen: Swab from Nasopharynx Updated: 12/01/21 0212    Narrative:      The following orders were created for panel order COVID PRE-OP / PRE-PROCEDURE SCREENING ORDER (NO ISOLATION) - Swab, Nasopharynx.  Procedure                               Abnormality         Status                     ---------                               -----------         ------                     COVID-19,CEPHEID/HUY,LE...[[id number]]  Normal              Final result                 Please view results for these tests on the individual orders.    COVID-19,CEPHEID/HUY,KEYSHA IN-HOUSE(OR EMERGENT/ADD-ON),NP SWAB IN TRANSPORT MEDIA 3-4 HR TAT - Swab, Nasopharynx [[id number]]  (Normal) Collected: 12/01/21 0053    Lab Status: Final result Specimen: Swab from Nasopharynx Updated: 12/01/21 0212     COVID19 Not Detected    Narrative:      Fact sheet for providers: https://www.fda.gov/media/670243/download     Fact sheet for patients: https://www.fda.gov/media/580361/download  Fact sheet for providers: https://www.fda.gov/media/416227/download     Fact sheet for patients: https://www.fda.gov/media/861597/download          No radiology results from the last 24 hrs    Results for orders placed during the hospital encounter of 05/30/21    Adult Transthoracic Echo Complete W/ Cont if Necessary Per Protocol (With Agitated Saline)    Interpretation Summary  · Saline test results are negative.  · Estimated left ventricular EF = 65% Left ventricular ejection fraction appears to be 61 - 65%. Left ventricular systolic function is normal.  · There is mild mitral valve prolapse of the posterior mitral leaflet.  · Left ventricular wall thickness is consistent with mild basal asymmetric hypertrophy.  · The right atrial cavity is mildly dilated.  · The right ventricular cavity is mildly dilated.  · Estimated right ventricular systolic pressure from  tricuspid regurgitation is normal (<35 mmHg). Calculated right ventricular systolic pressure from tricuspid regurgitation is 27 mmHg.  · Left ventricular diastolic function was normal.      I have reviewed the medications:  Scheduled Meds:aspirin, 325 mg, Oral, Daily  atorvastatin, 40 mg, Oral, Nightly  baclofen, 10 mg, Oral, TID  docusate sodium, 100 mg, Oral, BID  escitalopram, 5 mg, Oral, Daily  famotidine, 20 mg, Oral, BID  finasteride, 5 mg, Oral, Daily  ipratropium-albuterol, 3 mL, Nebulization, Q6H While Awake - RT  lisinopril, 10 mg, Oral, Daily  meloxicam, 15 mg, Oral, Daily  metoprolol succinate XL, 25 mg, Oral, Daily  sodium chloride, 10 mL, Intravenous, Q12H  sodium chloride, 10 mL, Intravenous, Q12H  tamsulosin, 0.4 mg, Oral, Daily      Continuous Infusions:lactated ringers, 75 mL/hr, Last Rate: 75 mL/hr (12/01/21 2138)  ropivacaine (NAROPIN) 0.2% peripheral nerve cath (moog), 8 mL/hr, Last Rate: 8 mL/hr (12/06/21 0120)  sodium chloride, 120 mL/hr, Last Rate: 120 mL/hr (12/03/21 1711)      PRN Meds:.ondansetron **OR** ondansetron  •  oxyCODONE  •  potassium chloride **OR** potassium chloride **OR** potassium chloride  •  sodium chloride  •  [COMPLETED] Insert peripheral IV **AND** [MAR Hold] sodium chloride  •  sodium chloride    Assessment/Plan   Assessment & Plan     Active Hospital Problems    Diagnosis  POA   • **Closed fracture of neck of left femur (HCC) [S72.002A]  Yes   • Essential hypertension [I10]  Yes   • BPH (benign prostatic hyperplasia) [N40.0]  Yes   • Prediabetes [R73.03]  Yes   • Leukocytosis [D72.829]  Yes      Resolved Hospital Problems   No resolved problems to display.        Brief Hospital Course to date:  Obed Cotto Jr. is a 71 y.o. male with history of prediabetes, hypertension, hyperlipidemia, CVA with left weakness residual, BPH, extobacco presents to the ED tonight after a fall at home.  Patient has history of CVA with residual left-sided weakness from May of this year and  he was attending rehab once weekly prior to fall.     This patient's problems and plans were partially entered by my partner and updated as appropriate by me 12/06/21.    Assessment/plan:      Closed fracture of neck of left femur (HCC)  -Ortho, Dr. Ruano following   -pain meds PRN  -Continue baclofen, TID  -hold anticoagulation for now--resume when okay with ortho  -s/p left femoral neck fixation on 12/2  -toe-touch weightbearing for 6 weeks  -Follow up with Dr. Ruano in 1 month  --Awaiting rehab      Leukocytosis with hypoxia  -Checked CTA chest 12/3.  Results: Emphysema. Bibasilar atelectasis with small bilateral pleural effusions and a trace pericardial effusion. No evidence of pulmonary embolism.  -procal negative   --Continue to monitor labs, vitals and oxygenation.  Rescan if worsening. WBC Improved at last check yesterday to 10.87. Afebrile.  -Requirements down to room air today. Sats stable.  -speech eval for possible dysphagia.  Currently tolerating diet.     Prediabetes  --No home meds, 5.7 baseline a1c 5.30      BPH  -On flomax and proscar, stable per pt  -Continue home meds  -Bladder scan and I/o cath if needed.  Now with pale-appearing complacent making good output.      Essential hypertension  On ACEI and BB therapy at home  -continue home meds  -hold if bp<110.  Stable.     Recent stroke  - cont PT/OT  --Residual left upper extremity weakness.  Was at [facility] for rehab post stroke.  Eager to get back to [facility].  --Case management following for disposition. Awaiting placement.     DVT prophylaxis:  Mechanical DVT prophylaxis orders are present.       AM-PAC 6 Clicks Score (PT): 13 (12/06/21 1148)    Disposition: I expect the patient to be discharged TBD, needs rehab but difficult to place due to previous weakness from CVA    CODE STATUS:   Code Status and Medical Interventions:   Ordered at: 12/02/21 1312     Code Status (Patient has no pulse and is not breathing):    CPR (Attempt to Resuscitate)      Medical Interventions (Patient has pulse or is breathing):    Full Support       Libby Hamilton, CHANG  12/06/21

## 2021-12-06 NOTE — THERAPY TREATMENT NOTE
Patient Name: Obed Cotto Jr.  : 1950    MRN: [id number]                              Today's Date: 2021       Admit Date: 2021    Visit Dx:     ICD-10-CM ICD-9-CM   1. Closed fracture of neck of left femur, initial encounter ([facility])  S72.002A 820.8   2. Fall, initial encounter  W19.XXXA E888.9   3. Recent cerebrovascular accident (CVA)  Z86.73 V49.89     Patient Active Problem List   Diagnosis   • Urinary retention   • Positive colorectal cancer screening using DNA-based stool test   • Leukocytosis   • Prediabetes   • BPH (benign prostatic hyperplasia)   • BPH with elevated PSA   • Essential hypertension   • Stroke ([facility])   • Dysarthria   • Dysphagia   • Impaired mobility   • Memory impairment   • Leg cramps   • Left hemiplegia (HCC)   • History of tobacco abuse   • Closed fracture of neck of left femur (HCC)     Past Medical History:   Diagnosis Date   • Acute ischemic stroke ([facility]) 2021     posterior limb of the right internal capsule   • BPH with elevated PSA    • Chronic bronchitis ([facility])    • Hyperlipidemia    • Hypertension    • Lacunar infarction ([facility])    • Noncompliance with medication regimen     pt states he never took his metoprolol   • Prediabetes      Past Surgical History:   Procedure Laterality Date   • FINGER SURGERY      Right hand 4th finger   • FINGER SURGERY      R ring finger, partial amputation repair   • ORIF HIP FRACTURE Left 2021    Procedure: HIP OPEN REDUCTION INTERNAL FIXATION WITH FEMORAL NECK SYSTEM LEFT;  Surgeon: Aditya Ruano MD;  [facility];  Service: Orthopedics;  Laterality: Left;      General Information     Row Name 21 1140          Physical Therapy Time and Intention    Document Type therapy note (daily note)  -KG     Mode of Treatment physical therapy  -KG     Row Name 21 1140          General Information    Patient Profile Reviewed yes  -KG     Existing Precautions/Restrictions other (see comments)  TTWB LLE, weakness/ tone  LUE  -KG     Row Name 12/06/21 1140          Cognition    Orientation Status (Cognition) oriented x 4  -KG     Row Name 12/06/21 1140          Safety Issues, Functional Mobility    Impairments Affecting Function (Mobility) coordination; endurance/activity tolerance; motor control  -KG           User Key  (r) = Recorded By, (t) = Taken By, (c) = Cosigned By    Initials Name Provider Type    KG Live Lori Physical Therapist               Mobility     Row Name 12/06/21 1140          Bed Mobility    Bed Mobility bed mobility (all) activities  -KG     Rolling Left Ellis (Bed Mobility) supervision  -KG     Scooting/Bridging Ellis (Bed Mobility) minimum assist (75% patient effort); verbal cues; 2 person assist  -KG     Supine-Sit Ellis (Bed Mobility) minimum assist (75% patient effort)  -KG     Assistive Device (Bed Mobility) head of bed elevated; bed rails  -KG     Comment (Bed Mobility) min-A to achieve EOB  -KG     Row Name 12/06/21 1140          Transfers    Comment (Transfers) VC safe HP, mod-A x2, BUE support, TTWB LLE to t/f bed to chair, then 2 sets STS from chair, pt needs cues for TTWB  -KG     Row Name 12/06/21 1140          Bed-Chair Transfer    Bed-Chair Ellis (Transfers) moderate assist (50% patient effort)  -KG     Assistive Device (Bed-Chair Transfers) other (see comments)  BUE support  -KG     Row Name 12/06/21 1140          Sit-Stand Transfer    Sit-Stand Ellis (Transfers) moderate assist (50% patient effort)  -KG     Assistive Device (Sit-Stand Transfers) other (see comments)  BUE support  -KG     Row Name 12/06/21 1140          Gait/Stairs (Locomotion)    Ellis Level (Gait) unable to assess  -KG     Row Name 12/06/21 1140          Mobility    Extremity Weight-bearing Status left lower extremity  -KG     Left Lower Extremity (Weight-bearing Status) toe touch weight-bearing (TTWB)  -KG           User Key  (r) = Recorded By, (t) = Taken By, (c) = Cosigned By     Initials Name Provider Type    MARGARET Lori Mancini Physical Therapist               Obj/Interventions     Row Name 12/06/21 1143          Motor Skills    Motor Skills therapeutic exercise  -KG     Therapeutic Exercise ankle; knee  -KG     Row Name 12/06/21 1143          Hip (Therapeutic Exercise)    Hip (Therapeutic Exercise) AAROM (active assistive range of motion)  -KG     Hip AROM (Therapeutic Exercise) left; aBduction; aDduction; 10 repetitions  -KG     Row Name 12/06/21 1143          Knee (Therapeutic Exercise)    Knee (Therapeutic Exercise) strengthening exercise  -KG     Knee Strengthening (Therapeutic Exercise) bilateral; LAQ (long arc quad); heel slides; 2 sets; 10 repetitions  -KG     Row Name 12/06/21 1143          Ankle (Therapeutic Exercise)    Ankle (Therapeutic Exercise) strengthening exercise  -KG     Ankle Strengthening (Therapeutic Exercise) bilateral; dorsiflexion; plantarflexion; 2 sets; 10 repetitions  -KG     Row Name 12/06/21 1143          Balance    Dynamic Sitting Balance mild impairment  -KG     Static Standing Balance moderate impairment  -KG     Dynamic Standing Balance severe impairment; supported; standing  -KG           User Key  (r) = Recorded By, (t) = Taken By, (c) = Cosigned By    Initials Name Provider Type    MARGARET Lori Mancini Physical Therapist               Goals/Plan    No documentation.                Clinical Impression     Row Name 12/06/21 1147          Pain    Additional Documentation Pain Scale: Numbers Pre/Post-Treatment (Group)  -KG     Row Name 12/06/21 1147          Pain Scale: Numbers Pre/Post-Treatment    Pretreatment Pain Rating 2/10  -KG     Posttreatment Pain Rating 2/10  -KG     Pain Location - Side Left  -KG     Pain Location - Orientation incisional  -KG     Pain Location hip  -KG     Pain Intervention(s) Ambulation/increased activity; Repositioned  -KG     Row Name 12/06/21 1140          Plan of Care Review    Plan of Care Reviewed With patient  -KG      Progress improving  -KG     Outcome Summary VC safe HP, mod-A x2, BUE support, TTWB LLE for SPT bed to chair, then 2 sets STS min-A x2 from chair, pt needs cues for TTWB  -KG     Row Name 12/06/21 1147          Positioning and Restraints    Pre-Treatment Position in bed  -KG     Post Treatment Position chair  -KG     In Chair notified nsg; call light within reach; encouraged to call for assist; exit alarm on; waffle cushion; legs elevated  -KG           User Key  (r) = Recorded By, (t) = Taken By, (c) = Cosigned By    Initials Name Provider Type    Lori Lopez Physical Therapist               Outcome Measures     Row Name 12/06/21 1148          How much help from another person do you currently need...    Turning from your back to your side while in flat bed without using bedrails? 3  -KG     Moving from lying on back to sitting on the side of a flat bed without bedrails? 3  -KG     Moving to and from a bed to a chair (including a wheelchair)? 2  -KG     Standing up from a chair using your arms (e.g., wheelchair, bedside chair)? 3  -KG     Climbing 3-5 steps with a railing? 1  -KG     To walk in hospital room? 1  -KG     AM-PAC 6 Clicks Score (PT) 13  -KG     Row Name 12/06/21 1148          Functional Assessment    Outcome Measure Options AM-PAC 6 Clicks Basic Mobility (PT)  -KG           User Key  (r) = Recorded By, (t) = Taken By, (c) = Cosigned By    Initials Name Provider Type    Lori Lopez Physical Therapist                             Physical Therapy Education                 Title: PT OT SLP Therapies (In Progress)     Topic: Physical Therapy (Done)     Point: Mobility training (Done)     Learning Progress Summary           Patient Acceptance, TB,E, VU by KG at 12/6/2021 1148    Eager, E,D, NR by CT at 12/5/2021 1411    Acceptance, E,D, NR by CT at 12/4/2021 1415    Eager, E, VU,NR by SS at 12/3/2021 1502    Comment: Educated pt. safety/technique with bed mobility, transfers, TTWB  status, benefits of inpatient rehab, PT POC                   Point: Home exercise program (Done)     Learning Progress Summary           Patient Acceptance, TB,E, VU by KG at 12/6/2021 1148    Eager, E,D, NR by CT at 12/5/2021 1411    Acceptance, E,D, NR by CT at 12/4/2021 1415                   Point: Body mechanics (Done)     Learning Progress Summary           Patient Acceptance, TB,E, VU by KG at 12/6/2021 1148    Eager, E,D, NR by CT at 12/5/2021 1411    Acceptance, E,D, NR by CT at 12/4/2021 1415    Eager, E, VU,NR by SS at 12/3/2021 1502    Comment: Educated pt. safety/technique with bed mobility, transfers, TTWB status, benefits of inpatient rehab, PT POC                   Point: Precautions (Done)     Learning Progress Summary           Patient Acceptance, TB,E, VU by KG at 12/6/2021 1148    Eager, E,D, NR by CT at 12/5/2021 1411    Acceptance, E,D, NR by CT at 12/4/2021 1415    Eager, E, VU,NR by SS at 12/3/2021 1502    Comment: Educated pt. safety/technique with bed mobility, transfers, TTWB status, benefits of inpatient rehab, PT POC                               User Key     Initials Effective Dates Name Provider Type Discipline    CT 06/16/21 -  Macho Pereira, PT Physical Therapist PT    KG 06/16/21 -  Lori Mancini Physical Therapist PT     06/01/21 -  Beatriz San PT Physical Therapist PT              PT Recommendation and Plan     Plan of Care Reviewed With: patient  Progress: improving  Outcome Summary: VC safe HP, mod-A x2, BUE support, TTWB LLE for SPT bed to chair, then 2 sets STS min-A x2 from chair, pt needs cues for TTWB     Time Calculation:    PT Charges     Row Name 12/06/21 1150             Time Calculation    Start Time 1040  -KG      PT Received On 12/06/21  -KG      PT Goal Re-Cert Due Date 12/13/21  -KG              Time Calculation- PT    Total Timed Code Minutes- PT 23 minute(s)  -KG              Timed Charges    07166 - PT Therapeutic Exercise Minutes 10  -KG       47108 - PT Therapeutic Activity Minutes 13  -KG              Total Minutes    Timed Charges Total Minutes 23  -KG       Total Minutes 23  -KG            User Key  (r) = Recorded By, (t) = Taken By, (c) = Cosigned By    Initials Name Provider Type    MARGARET Lori Mancini Physical Therapist              Therapy Charges for Today     Code Description Service Date Service Provider Modifiers Qty    37692721996 HC PT THER PROC EA 15 MIN 12/6/2021 Lori Mancini GP 1    31522916359 HC PT THERAPEUTIC ACT EA 15 MIN 12/6/2021 Lori Mancini GP 1    25044799784 HC PT THER SUPP EA 15 MIN 12/6/2021 Lori Mancini GP 2          PT G-Codes  Outcome Measure Options: AM-PAC 6 Clicks Basic Mobility (PT)  AM-PAC 6 Clicks Score (PT): 13  AM-PAC 6 Clicks Score (OT): 16    Lori Mancini  12/6/2021

## 2021-12-07 VITALS
HEART RATE: 63 BPM | HEIGHT: 72 IN | TEMPERATURE: 97.8 F | BODY MASS INDEX: 17.21 KG/M2 | RESPIRATION RATE: 16 BRPM | OXYGEN SATURATION: 95 % | WEIGHT: 127.1 LBS | DIASTOLIC BLOOD PRESSURE: 65 MMHG | SYSTOLIC BLOOD PRESSURE: 134 MMHG

## 2021-12-07 PROBLEM — E43 SEVERE MALNUTRITION: Status: ACTIVE | Noted: 2021-12-07

## 2021-12-07 PROCEDURE — 99239 HOSP IP/OBS DSCHRG MGMT >30: CPT | Performed by: INTERNAL MEDICINE

## 2021-12-07 PROCEDURE — 94799 UNLISTED PULMONARY SVC/PX: CPT

## 2021-12-07 PROCEDURE — 94761 N-INVAS EAR/PLS OXIMETRY MLT: CPT

## 2021-12-07 RX ORDER — OXYCODONE HYDROCHLORIDE 5 MG/1
5 TABLET ORAL EVERY 4 HOURS PRN
Start: 2021-12-07 | End: 2021-12-10

## 2021-12-07 RX ADMIN — BACLOFEN 10 MG: 10 TABLET ORAL at 09:07

## 2021-12-07 RX ADMIN — LISINOPRIL 10 MG: 10 TABLET ORAL at 09:07

## 2021-12-07 RX ADMIN — IPRATROPIUM BROMIDE AND ALBUTEROL SULFATE 3 ML: 2.5; .5 SOLUTION RESPIRATORY (INHALATION) at 08:24

## 2021-12-07 RX ADMIN — DOCUSATE SODIUM 100 MG: 100 CAPSULE, LIQUID FILLED ORAL at 09:07

## 2021-12-07 RX ADMIN — FAMOTIDINE 20 MG: 20 TABLET, FILM COATED ORAL at 09:06

## 2021-12-07 RX ADMIN — ESCITALOPRAM OXALATE 5 MG: 10 TABLET ORAL at 09:07

## 2021-12-07 RX ADMIN — ASPIRIN 325 MG: 325 TABLET, COATED ORAL at 09:06

## 2021-12-07 RX ADMIN — MELOXICAM 15 MG: 7.5 TABLET ORAL at 09:06

## 2021-12-07 RX ADMIN — METOPROLOL SUCCINATE 25 MG: 25 TABLET, EXTENDED RELEASE ORAL at 09:07

## 2021-12-07 NOTE — PROGRESS NOTES
[facility]    Acute pain service Inpatient Progress Note    Patient Name: Obed Cotto Jr.  :  1950  MRN:  [id number]        Acute Pain  Service Inpatient Progress Note:    Analgesia:Good  Pain Score:3/10  LOC: alert and awake  Resp Status: room air  Cardiac: VS stable  Side Effects:None  Catheter Plan:Catheter removed and tip intact  Comments: Patient states that catheter was removed yesterday. Pt is resting in bed comfortably.

## 2021-12-07 NOTE — DISCHARGE SUMMARY
[facility] Medicine Services  DISCHARGE SUMMARY    Patient Name: Obed Cotto Jr.  : 1950  MRN: [id number]    Date of Admission: 2021 10:10 PM  Date of Discharge:  2021  Primary Care Physician: Yadira Fam APRN    Consults     Date and Time Order Name Status Description    2021  2:58 AM Inpatient Orthopedic Surgery Consult Completed           Hospital Course     Presenting Problem:   Closed fracture of neck of left femur, initial encounter ([facility]) [S72.002A]    Active Hospital Problems    Diagnosis  POA   • **Closed fracture of neck of left femur ([facility]) [S72.002A]  Yes   • Severe malnutrition (CMS/HCC) [E43]  Yes   • Essential hypertension [I10]  Yes   • BPH (benign prostatic hyperplasia) [N40.0]  Yes   • Prediabetes [R73.03]  Yes   • Leukocytosis [D72.829]  Yes      Resolved Hospital Problems   No resolved problems to display.          Hospital Course:  Obed Cotto Jr. is a 71 y.o. male with history of prediabetes, hypertension, hyperlipidemia, CVA with left weakness residual, BPH, extobacco presents to the ED tonight after a fall at home.  Patient has history of CVA with residual left-sided weakness from May of this year and he was attending rehab once weekly prior to fall.    Assessment/plan:     Closed fracture of neck of left femur (HCC)  -Ortho, Dr. Ruano following   -Continue baclofen TID  -restart plavix, asa at DC  -s/p left femoral neck fixation on   -toe-touch weightbearing for 6 weeks  -Follow up with Dr. Ruano in 1 month after discharge     Leukocytosis, improved  -CTA chest 12/3.  Results: Emphysema. Bibasilar atelectasis with small bilateral pleural effusions and a trace pericardial effusion. No evidence of pulmonary embolism.  -procal negative   -suspect more likely reactive, improved at  DC      Prediabetes  --A1c 5.3      BPH  -Continue flomax and proscar     Essential hypertension  -continue home meds  -hold if sbp less than 110, hr less than  55     Recent stroke  -cont PT/OT  -Residual left upper extremity weakness secondary to recent stroke    Discharge Follow Up Recommendations for outpatient labs/diagnostics:  Dr Ruano in 1 month after rehab    Day of Discharge     HPI:   Doing ok, no complaints other than having some pain    Review of Systems  Gen- No fevers, chills  CV- No chest pain, palpitations  Resp- No cough, dyspnea  GI- No N/V/D, abd pain      Vital Signs:   Temp:  [97.8 °F (36.6 °C)-98.4 °F (36.9 °C)] 97.8 °F (36.6 °C)  Heart Rate:  [57-70] 63  Resp:  [16-18] 16  BP: (127-138)/(53-68) 134/65     Physical Exam:  Constitutional: No acute distress, awake, alert  HENT: NCAT, mucous membranes moist  Respiratory: Clear to auscultation bilaterally, respiratory effort normal   Cardiovascular: RRR, no murmurs, rubs, or gallops  Gastrointestinal: Positive bowel sounds, soft, nontender, nondistended  Musculoskeletal: No bilateral ankle edema  Psychiatric: Appropriate affect, cooperative  Neurologic: Oriented x 3, residual LUE weakness, speech mostly clear  Skin: No rashes      Pertinent  and/or Most Recent Results     LAB RESULTS:      Lab 12/05/21  0942 12/04/21  0515 12/02/21  0513 12/01/21  1240 12/01/21  0038   WBC 10.87* 13.51* 12.89* 14.80* 19.67*   HEMOGLOBIN 10.9* 10.2* 11.0* 11.0* 12.5*   HEMATOCRIT 33.2* 30.3* 33.1* 33.5* 36.8*   PLATELETS 211 203 201 210 228   NEUTROS ABS  --  8.03* 8.49* 10.76* 16.09*   IMMATURE GRANS (ABS)  --  0.06* 0.06* 0.07* 0.07*   LYMPHS ABS  --  2.99 2.44 2.53 2.09   MONOS ABS  --  1.47* 1.04* 1.07* 1.35*   EOS ABS  --  0.89* 0.79* 0.33 0.02   .3* 97.1* 99.7* 100.9* 96.8   PROCALCITONIN  --   --   --   --  0.19   PROTIME  --   --   --  14.1  --          Lab 12/05/21  0942 12/04/21  1224 12/03/21  1413 12/01/21  1240 12/01/21  0038   SODIUM 140  --  143 139 139   POTASSIUM 4.3 4.9 3.0* 4.3 4.1   CHLORIDE 105  --  113* 104 102   CO2 28.0  --  22.0 27.0 26.0   ANION GAP 7.0  --  8.0 8.0 11.0   BUN 14  --  14 26*  21   CREATININE 0.50*  --  0.41* 0.62* 0.70*   GLUCOSE 147*  --  82 117* 122*   CALCIUM 10.1  --  7.2* 10.3 10.6*         Lab 12/01/21  1240 12/01/21  0038   TOTAL PROTEIN 5.6* 6.3   ALBUMIN 3.40* 4.10   GLOBULIN 2.2 2.2   ALT (SGPT) 30 40   AST (SGOT) 23 27   BILIRUBIN 0.8 0.7   ALK PHOS 86 125*         Lab 12/01/21  1240 12/01/21  0038   PROBNP  --  219.8   TROPONIN T  --  <0.010   PROTIME 14.1  --    INR 1.12  --              Lab 12/01/21  1240 12/01/21  0042 12/01/21  0042   ABO TYPING B   < > B   RH TYPING Positive   < > Positive   ANTIBODY SCREEN  --   --  Negative    < > = values in this interval not displayed.         Brief Urine Lab Results  (Last result in the past 365 days)      Color   Clarity   Blood   Leuk Est   Nitrite   Protein   CREAT   Urine HCG        12/01/21 0053 Yellow   Clear   Negative   Negative   Negative   Negative               Microbiology Results (last 10 days)     Procedure Component Value - Date/Time    Viral Culture, Rapid, Respiratory - Swab, Nasopharynx [[id number]] Collected: 12/01/21 1211    Lab Status: Final result Specimen: Swab from Nasopharynx Updated: 12/05/21 2106     Adenovirus Negative     Rapid Influenza A Ag Negative     Rapid Influenza B Ag Negative     Parainfluenza Virus 1 Negative     Parainfluenza Virus 2 Negative     Parainfluenza Virus 3 Negative     Respiratory Syncytial Virus Negative    Narrative:      Performed at:  [address] [place] [address]  [id number]  : Ashley George MD, Phone:  [phone]    Respiratory Panel, PCR (WITHOUT COVID) - Swab, Nasopharynx [[id number]]  (Normal) Collected: 12/01/21 0713    Lab Status: Final result Specimen: Swab from Nasopharynx Updated: 12/01/21 0903     ADENOVIRUS, PCR Not Detected     Coronavirus 229E Not Detected     Coronavirus HKU1 Not Detected     Coronavirus NL63 Not Detected     Coronavirus OC43 Not Detected     Human Metapneumovirus Not Detected     Human Rhinovirus/Enterovirus  Not Detected     Influenza B PCR Not Detected     Parainfluenza Virus 1 Not Detected     Parainfluenza Virus 2 Not Detected     Parainfluenza Virus 3 Not Detected     Parainfluenza Virus 4 Not Detected     Bordetella pertussis pcr Not Detected     Chlamydophila pneumoniae PCR Not Detected     Mycoplasma pneumo by PCR Not Detected     Influenza A PCR Not Detected     RSV, PCR Not Detected     Bordetella parapertussis PCR Not Detected    Narrative:      The coronavirus on the RVP is NOT COVID-19 and is NOT indicative of infection with COVID-19.    In the setting of a positive respiratory panel with a viral infection PLUS a negative procalcitonin without other underlying concern for bacterial infection, consider observing off antibiotics or discontinuation of antibiotics and continue supportive care. If the respiratory panel is positive for atypical bacterial infection (Bordetella pertussis, Chlamydophila pneumoniae, or Mycoplasma pneumoniae), consider antibiotic de-escalation to target atypical bacterial infection.    S. Pneumo Ag Urine or CSF - Urine, Urine, Clean Catch [[id number]]  (Normal) Collected: 12/01/21 0553    Lab Status: Final result Specimen: Urine, Clean Catch Updated: 12/01/21 1514     Strep Pneumo Ag Negative    Legionella Antigen, Urine - Urine, Urine, Clean Catch [[id number]]  (Normal) Collected: 12/01/21 0414    Lab Status: Final result Specimen: Urine, Clean Catch Updated: 12/01/21 1255     LEGIONELLA ANTIGEN, URINE Negative    COVID PRE-OP / PRE-PROCEDURE SCREENING ORDER (NO ISOLATION) - Swab, Nasopharynx [[id number]]  (Normal) Collected: 12/01/21 0053    Lab Status: Final result Specimen: Swab from Nasopharynx Updated: 12/01/21 0212    Narrative:      The following orders were created for panel order COVID PRE-OP / PRE-PROCEDURE SCREENING ORDER (NO ISOLATION) - Swab, Nasopharynx.  Procedure                               Abnormality         Status                     ---------                                -----------         ------                     COVID-19,CEPHEID/HUY,LE...[[id number]]  Normal              Final result                 Please view results for these tests on the individual orders.    COVID-19,CEPHEID/HUY,KEYSHA IN-HOUSE(OR EMERGENT/ADD-ON),NP SWAB IN TRANSPORT MEDIA 3-4 HR TAT - Swab, Nasopharynx [[id number]]  (Normal) Collected: 12/01/21 0053    Lab Status: Final result Specimen: Swab from Nasopharynx Updated: 12/01/21 0212     COVID19 Not Detected    Narrative:      Fact sheet for providers: https://www.fda.gov/media/853300/download     Fact sheet for patients: https://www.fda.gov/media/844679/download  Fact sheet for providers: https://www.fda.gov/media/183234/download     Fact sheet for patients: https://www.fda.gov/media/897040/download          XR Femur 2 View Left    Result Date: 11/30/2021  CR Femur 2 Vws LT INDICATION: Leg pain after fall. COMPARISON: None available. FINDINGS: AP and lateral views of the left femur.  No hip dislocation is seen. There is a nondisplaced fracture of the left femoral neck. There is degenerative disease in the left hip and left knee.  No joint effusion is seen within the knee. Patient is osteopenic. Atherosclerotic calcifications are present in the soft tissues.  No foreign body.     Nondisplaced left femoral neck fracture. Signer Name: Bismark Hurst MD  Signed: 11/30/2021 11:55 PM  Workstation Name: [email or web address]  [facility]    CT Angiogram Chest With & Without Contrast    Result Date: 12/3/2021  CT ANGIOGRAM CHEST W WO CONTRAST INDICATION: Hypoxia. Recent surgery. TECHNIQUE: CT angiogram of the chest with 100 cc of Isovue-300 IV contrast. 3-D reconstructions were obtained and reviewed.   Radiation dose reduction techniques included automated exposure control or exposure modulation based on body size. Count of known CT and cardiac nuc med studies performed in previous 12 months: 5. COMPARISON: None available. FINDINGS: Chest images  at mediastinal window show no acute changes in the aorta. Atherosclerotic calcification is seen in the descending thoracic aorta. There is a trace pericardial effusion and there are small bilateral pleural effusions. No pulmonary artery filling defects are seen to suggest emboli. The CT angiographic images also show no evidence of emboli. Chest images at lung window show bibasilar atelectasis. Emphysema is noted.     Emphysema. Bibasilar atelectasis with small bilateral pleural effusions and a trace pericardial effusion. No evidence of pulmonary embolism. Signer Name: Bismark Ramon MD  Signed: 12/3/2021 7:20 PM  Workstation Name: [id number]  [facility]    XR Chest 1 View    Result Date: 12/1/2021  CR Chest 1 Vw INDICATION: Trauma. Femur fracture. COMPARISON:  8/21/2021 FINDINGS: Portable AP view(s) of the chest.  Heart is mildly enlarged. There is some atherosclerotic disease in the aorta. The lungs are clear. Pulmonary vascularity is normal. No pneumothorax is seen.     No acute cardiopulmonary findings. Signer Name: Bismark Hurst MD  Signed: 12/1/2021 2:06 AM  Workstation Name: [email or web address]  [facility]    MRI Hip Left Without Contrast    Result Date: 12/1/2021  EXAMINATION: MRI HIP LEFT WO CONTRAST-  INDICATION: Rule out hip fracture, left hip; S72.002A-Fracture of unspecified part of neck of left femur, initial encounter for closed fracture; W19.XXXA-Unspecified fall, initial encounter; Z86.73-Personal history of transient ischemic attack (TIA), and cerebral infarction without residual deficits  TECHNIQUE: MRI of the left hip was obtained using small field-of-view images of the hip in the oblique axial and sagittal planes. Large field-of-view axial and coronal images of the entire pelvis were obtained. No intravenous or intra-articular contrast was administered.  COMPARISON: Left femur radiographs 11/30/2021  FINDINGS:  There is a minimally displaced subcapital  femoral neck fracture with associated irregular T1 hypointense fracture line and adjacent marrow edema. No additional acute fractures are identified. There is associated soft tissue edema about the fracture most conspicuously along the posterior margin of the proximal femur. There is no large/discrete fluid collection or hematoma. There is no evidence of hip dislocation. No evidence of femoral head avascular necrosis. No acute findings in the partially imaged pelvis. Note is made of enlarged prostate and colonic diverticulosis. The gluteal tendons, hamstring tendons, and anterior flexor tendons appear grossly intact. Mild edema in the left adductor musculature is favored reflect reactive edema from adjacent fracture rather than muscle strain although this could appear similarly.        Minimally displaced subcapital femoral neck fracture as seen on prior radiographs.  This report was finalized on 12/1/2021 11:35 AM by Jp Abraham MD.      Left FIC Catheter    Result Date: 12/2/2021  Leanna Calvillo CRNA     12/2/2021 10:48 AM Left FIC Catheter Patient reassessed immediately prior to procedure Patient location during procedure: OR Reason for block: procedure for pain and at surgeon's request Performed by CRNA: Ari Cueva CRNA Assisted by: Noa Andre SRNA Preanesthetic Checklist Completed: patient identified, IV checked, site marked, risks and benefits discussed, surgical consent, monitors and equipment checked, pre-op evaluation and timeout performed Prep: Pt Position: supine Sterile barriers:cap, gloves and mask Prep: ChloraPrep Patient monitoring: blood pressure monitoring, continuous pulse oximetry and EKG Procedure Performed under: general Guidance:ultrasound guided Images:still images obtained, printed/placed on chart Laterality:left Block Type:fascia iliaca compartment Injection Technique:catheter Needle Type:echogenic Needle Gauge:18 G Resistance on Injection: none Catheter Size:20 G (20g) Cath  Depth at skin: 9 cm Medications Used: ropivacaine (NAROPIN) 0.5 % injection, 20 mL Med administered at 12/2/2021 10:18 AM Medications Preservative Free Saline:20ml Comment:Rop 0.5% 20 mL + NS 20 mL for a total volume of 40 mL Post Assessment Injection Assessment: negative aspiration for heme, no paresthesia on injection and incremental injection Patient Tolerance:comfortable throughout block Complications:no Additional Notes Procedure:         Pt placed in supine position.   The insertion site was prepped in sterile fashion with Chlorapreop and clear plastic drapes.  Analgesia was provided by skin infiltration at insertion site with Lidocaine 1% 3mls.  A B-Blake 18 g , 4 inch echogenic Cherry needle was advance In-plane under ultrasound guidance. The   Anterior superior Iliac crest was initially visualized and the probe was directed slightly medially and slightly towards the umbilicus.  The course of the needle was tracked over the sartorius muscle through the fascia Iliacus and into the anterior portion of the Iliacus muscle.  Major vessels where identified and avoided as where structures of the peritoneal cavity.  LA injection was made incrementally in 1-5ml amounts spread was visualized superiorly below fascia iliacus.  Injection was completed with negative aspiration of blood and negative intravascular injection.  Injection pressures where normal or minimal resistance.  A 20 g B-Blake wire styleted catheter was then advance thru the needle and very easily placed in a superior or cephalad direction.  The catheter was secured at insertion site with exofin tissue adhesive, benzoin, and steristreps.  A CHG tegaderm dressing was placed over the insertion site and the nerve catheter labeled and capped.  Thank You.     FL C Arm During Surgery    Result Date: 12/3/2021  EXAMINATION: FL C ARM DURING SURGERY-  INDICATION: HIP OPEN REDUCTION INTERNAL FIXATION WITH FEMORAL NECK SYSTEM; S72.002A-Fracture of unspecified part  of neck of left femur, initial encounter for closed fracture; W19.XXXA-Unspecified fall, initial encounter; Z86.73-Personal history of transient ischemic attack (TIA), and cerebral infarction without residual deficits  COMPARISON: NONE  FINDINGS: 1 minute 26 seconds of fluoroscopy time provided with 2 images saved during left hip ORIF      1 minute 26 seconds of fluoroscopy time provided with 2 images saved during left hip ORIF  This report was finalized on 12/3/2021 12:49 PM by Laith Vidal.      XR Hip With or Without Pelvis 2 - 3 View Left    Result Date: 12/2/2021  EXAMINATION: XR HIP W OR WO PELVIS 2-3 VIEW LEFT-  INDICATION: Postop open reduction internal fixation left hip; S72.002A-Fracture of unspecified part of neck of left femur, initial encounter for closed fracture; W19.XXXA-Unspecified fall, initial encounter; Z86.73-Personal history of transient ischemic attack (TIA), and cerebral infarction without residual deficits  COMPARISON: NONE  FINDINGS: Interval ORIF of the previously noted left femoral neck fracture without evidence of immediate hardware complication, additional acute fracture or new malalignment.      Interval ORIF of the previously noted left femoral neck fracture without evidence of immediate hardware complication, additional acute fracture or new malalignment.  This report was finalized on 12/2/2021 12:27 PM by Laith Vidal.                Results for orders placed during the hospital encounter of 05/30/21    Adult Transthoracic Echo Complete W/ Cont if Necessary Per Protocol (With Agitated Saline)    Interpretation Summary  · Saline test results are negative.  · Estimated left ventricular EF = 65% Left ventricular ejection fraction appears to be 61 - 65%. Left ventricular systolic function is normal.  · There is mild mitral valve prolapse of the posterior mitral leaflet.  · Left ventricular wall thickness is consistent with mild basal asymmetric hypertrophy.  · The right atrial  cavity is mildly dilated.  · The right ventricular cavity is mildly dilated.  · Estimated right ventricular systolic pressure from tricuspid regurgitation is normal (<35 mmHg). Calculated right ventricular systolic pressure from tricuspid regurgitation is 27 mmHg.  · Left ventricular diastolic function was normal.    Plan for Follow-up of Pending Labs/Results:     Discharge Details        Discharge Medications      New Medications      Instructions Start Date   oxyCODONE 5 MG immediate release tablet  Commonly known as: ROXICODONE   5 mg, Oral, Every 4 Hours PRN         Changes to Medications      Instructions Start Date   atorvastatin 40 MG tablet  Commonly known as: Lipitor  What changed: Another medication with the same name was removed. Continue taking this medication, and follow the directions you see here.   40 mg, Oral, Daily         Continue These Medications      Instructions Start Date   aspirin 81 MG EC tablet   81 mg, Oral, Daily      baclofen 10 MG tablet  Commonly known as: LIORESAL   10 mg, Oral, 3 Times Daily      clopidogrel 75 MG tablet  Commonly known as: PLAVIX   TAKE ONE TABLET BY MOUTH DAILY      docusate sodium 100 MG capsule  Commonly known as: Colace   100 mg, Oral, 2 Times Daily      escitalopram 5 MG tablet  Commonly known as: Lexapro   5 mg, Oral, Daily      famotidine 20 MG tablet  Commonly known as: PEPCID   TAKE ONE TABLET BY MOUTH TWICE A DAY      finasteride 5 MG tablet  Commonly known as: PROSCAR   5 mg, Oral, Daily      lisinopril 10 MG tablet  Commonly known as: PRINIVIL,ZESTRIL   10 mg, Oral, Daily      metoprolol succinate XL 25 MG 24 hr tablet  Commonly known as: TOPROL-XL   TAKE ONE TABLET BY MOUTH DAILY      nystatin 100,000 unit/mL suspension  Commonly known as: MYCOSTATIN   500,000 Units, Swish & Swallow, 4 Times Daily      tamsulosin 0.4 MG capsule 24 hr capsule  Commonly known as: FLOMAX   0.4 mg, Oral, Nightly         Stop These Medications    OnabotulinumtoxinA 200  units reconstituted solution     potassium chloride 10 MEQ CR tablet     psyllium 58.6 % powder  Commonly known as: METAMUCIL            Allergies   Allergen Reactions   • Penicillins Hives         Discharge Disposition:  Rehab Facility or Unit (DC - External)    Diet:  Hospital:  Diet Order   Procedures   • Diet Regular       Activity:  Activity Instructions    TOE TOUCH WEIGHT BEARING ON LEFT WITH WALKER FOR 6 WEEKS    KNEE IMMOBILIZER AS NEEDED           Restrictions or Other Recommendations:         CODE STATUS:    Code Status and Medical Interventions:   Ordered at: 12/02/21 1312     Code Status (Patient has no pulse and is not breathing):    CPR (Attempt to Resuscitate)     Medical Interventions (Patient has pulse or is breathing):    Full Support       Future Appointments   Date Time Provider Department Center   1/3/2022  9:10 AM Aditya Ruano MD [facility]   2/4/2022 11:00 AM Spencer Bloom MD [facility]   3/9/2022  9:45 AM Lorraine Morfin MD [facility]       Additional Instructions for the Follow-ups that You Need to Schedule     Discharge Follow-up with PCP   As directed       Currently Documented PCP:    Yadira Fam APRN    PCP Phone Number:    [phone]     Follow Up Details: 1 week after discharge         Discharge Follow-up with Specified Provider: 1 Month   As directed      Follow Up: 1 Month    Follow Up Details: Call [phone] for appointment.         Discharge Follow-up with Specified Provider: Dr Ruano; 1 Month   As directed      To: Dr Ruano    Follow Up: 1 Month    Follow Up Details: dalila Melchor DO  12/07/21      Time Spent on Discharge:  I spent  45 minutes on this discharge activity which included: face-to-face encounter with the patient, reviewing the data in the system, coordination of the care with the nursing staff as well as consultants, documentation, and entering orders.

## 2021-12-07 NOTE — CASE MANAGEMENT/SOCIAL WORK
Case Management Discharge Note      Final Note: Arrangements complete for transfer to an acute rehab bed today at [facility] scheduled to [facility] at 1245. Mr Cotto is agreement with plan. Call report to [phone]         Selected Continued Care - Admitted Since 11/30/2021     Destination    No services have been selected for the patient.              Durable Medical Equipment    No services have been selected for the patient.              Dialysis/Infusion    No services have been selected for the patient.              Home Medical Care    No services have been selected for the patient.              Therapy    No services have been selected for the patient.              Community Resources    No services have been selected for the patient.              Community & DME    No services have been selected for the patient.                  Transportation Services  Ambulance: [facility]    Final Discharge Disposition Code: 62 - inpatient rehab facility

## 2021-12-07 NOTE — PLAN OF CARE
Goal Outcome Evaluation:                 Problem: Hypertension Comorbidity  Goal: Blood Pressure in Desired Range  Intervention: Maintain Hypertension-Management Strategies  Recent Flowsheet Documentation  Taken 12/7/2021 0400 by Lukasz Paul RN  Medication Review/Management: medications reviewed  Taken 12/7/2021 0200 by Lukasz Paul RN  Medication Review/Management: medications reviewed  Taken 12/7/2021 0000 by Lukasz Paul RN  Medication Review/Management: medications reviewed  Taken 12/6/2021 2200 by Lukasz Paul RN  Medication Review/Management: medications reviewed     Problem: Pain Chronic (Persistent) (Comorbidity Management)  Goal: Acceptable Pain Control and Functional Ability  Intervention: Develop Pain Management Plan  Recent Flowsheet Documentation  Taken 12/7/2021 0400 by Lukasz Paul RN  Pain Management Interventions: quiet environment facilitated  Taken 12/7/2021 0200 by Lukasz Paul RN  Pain Management Interventions: quiet environment facilitated  Taken 12/7/2021 0000 by Lukasz Paul RN  Pain Management Interventions: quiet environment facilitated  Taken 12/6/2021 2200 by Lukasz Paul RN  Pain Management Interventions: quiet environment facilitated  Taken 12/6/2021 2000 by Lukasz Paul RN  Pain Management Interventions: no interventions per patient request  Intervention: Manage Persistent Pain  Recent Flowsheet Documentation  Taken 12/7/2021 0400 by Lukasz Paul RN  Medication Review/Management: medications reviewed  Taken 12/7/2021 0200 by Lukasz Paul RN  Medication Review/Management: medications reviewed  Taken 12/7/2021 0000 by Lukasz Paul RN  Medication Review/Management: medications reviewed  Taken 12/6/2021 2200 by Lukasz Paul RN  Medication Review/Management: medications reviewed  Intervention: Optimize Psychosocial Wellbeing  Recent Flowsheet Documentation  Taken 12/6/2021 2000 by Lukasz Paul RN  Supportive Measures: active listening  utilized     Problem: Fall Injury Risk  Goal: Absence of Fall and Fall-Related Injury  Intervention: Identify and Manage Contributors to Fall Injury Risk  Recent Flowsheet Documentation  Taken 12/7/2021 0400 by Lukasz Paul RN  Medication Review/Management: medications reviewed  Taken 12/7/2021 0200 by Lukasz Paul RN  Medication Review/Management: medications reviewed  Taken 12/7/2021 0000 by Lukasz Paul RN  Medication Review/Management: medications reviewed  Taken 12/6/2021 2200 by Lukasz Paul RN  Medication Review/Management: medications reviewed  Intervention: Promote Injury-Free Environment  Recent Flowsheet Documentation  Taken 12/7/2021 0400 by Lukasz Paul RN  Safety Promotion/Fall Prevention:   activity supervised   safety round/check completed   toileting scheduled  Taken 12/7/2021 0200 by Lukasz Paul RN  Safety Promotion/Fall Prevention:   activity supervised   safety round/check completed   toileting scheduled  Taken 12/7/2021 0000 by Lukasz Paul RN  Safety Promotion/Fall Prevention:   activity supervised   safety round/check completed   toileting scheduled  Taken 12/6/2021 2200 by Lukasz Paul RN  Safety Promotion/Fall Prevention:   activity supervised   safety round/check completed   toileting scheduled  Taken 12/6/2021 2000 by Lukasz Paul RN  Safety Promotion/Fall Prevention:   activity supervised   safety round/check completed   toileting scheduled     Problem: Adult Inpatient Plan of Care  Goal: Absence of Hospital-Acquired Illness or Injury  Intervention: Identify and Manage Fall Risk  Recent Flowsheet Documentation  Taken 12/7/2021 0400 by Lukasz Paul RN  Safety Promotion/Fall Prevention:   activity supervised   safety round/check completed   toileting scheduled  Taken 12/7/2021 0200 by Lukasz Paul RN  Safety Promotion/Fall Prevention:   activity supervised   safety round/check completed   toileting scheduled  Taken 12/7/2021 0000 by Lukasz Paul  D, RN  Safety Promotion/Fall Prevention:   activity supervised   safety round/check completed   toileting scheduled  Taken 12/6/2021 2200 by Lukasz Paul RN  Safety Promotion/Fall Prevention:   activity supervised   safety round/check completed   toileting scheduled  Taken 12/6/2021 2000 by Lukasz Paul RN  Safety Promotion/Fall Prevention:   activity supervised   safety round/check completed   toileting scheduled  Intervention: Prevent Skin Injury  Recent Flowsheet Documentation  Taken 12/7/2021 0400 by Lukasz Paul RN  Body Position: supine  Skin Protection: adhesive use limited  Taken 12/7/2021 0200 by Lukasz Paul RN  Body Position: supine  Skin Protection: adhesive use limited  Taken 12/7/2021 0000 by Lukasz Paul RN  Body Position: supine  Skin Protection: adhesive use limited  Taken 12/6/2021 2200 by Lukasz Paul RN  Body Position: supine  Skin Protection: adhesive use limited  Taken 12/6/2021 2000 by Lukasz Paul RN  Body Position: supine  Skin Protection: adhesive use limited  Intervention: Prevent and Manage VTE (venous thromboembolism) Risk  Recent Flowsheet Documentation  Taken 12/7/2021 0400 by Lukasz Paul RN  VTE Prevention/Management:   bilateral   sequential compression devices off  Taken 12/7/2021 0200 by Lukasz Paul RN  VTE Prevention/Management:   bilateral   foot pump device off  Taken 12/7/2021 0000 by Lukasz Paul RN  VTE Prevention/Management:   bilateral   foot pump device off  Taken 12/6/2021 2200 by Lukasz Paul RN  VTE Prevention/Management:   bilateral   foot pump device off  Taken 12/6/2021 2000 by Lukasz Paul RN  VTE Prevention/Management:   bilateral   foot pump device off  Intervention: Prevent Infection  Recent Flowsheet Documentation  Taken 12/7/2021 0400 by Lukasz Paul RN  Infection Prevention: environmental surveillance performed  Taken 12/7/2021 0200 by Lukasz Paul RN  Infection Prevention: environmental surveillance  performed  Taken 12/7/2021 0000 by Lukasz Paul RN  Infection Prevention: environmental surveillance performed  Taken 12/6/2021 2200 by Lukasz Paul RN  Infection Prevention: environmental surveillance performed  Taken 12/6/2021 2000 by Lukasz Paul RN  Infection Prevention: environmental surveillance performed  Goal: Optimal Comfort and Wellbeing  Intervention: Provide Person-Centered Care  Recent Flowsheet Documentation  Taken 12/7/2021 0400 by Lukasz Paul RN  Trust Relationship/Rapport: care explained  Taken 12/7/2021 0200 by Lukasz Paul RN  Trust Relationship/Rapport: care explained  Taken 12/7/2021 0000 by Lukasz Paul RN  Trust Relationship/Rapport: care explained  Taken 12/6/2021 2200 by Lukasz Paul RN  Trust Relationship/Rapport: care explained  Taken 12/6/2021 2000 by Lukasz Paul RN  Trust Relationship/Rapport: care explained     Problem: Skin Injury Risk Increased  Goal: Skin Health and Integrity  Intervention: Optimize Skin Protection  Recent Flowsheet Documentation  Taken 12/7/2021 0400 by Lukasz Paul RN  Pressure Reduction Techniques: frequent weight shift encouraged  Head of Bed (HOB): HOB at 30-45 degrees  Pressure Reduction Devices: pressure-redistributing mattress utilized  Skin Protection: adhesive use limited  Taken 12/7/2021 0200 by Lukasz Paul RN  Pressure Reduction Techniques: frequent weight shift encouraged  Head of Bed (HOB): HOB at 30-45 degrees  Pressure Reduction Devices: pressure-redistributing mattress utilized  Skin Protection: adhesive use limited  Taken 12/7/2021 0000 by Lukasz Paul RN  Pressure Reduction Techniques: frequent weight shift encouraged  Head of Bed (HOB): HOB at 30-45 degrees  Pressure Reduction Devices: pressure-redistributing mattress utilized  Skin Protection: adhesive use limited  Taken 12/6/2021 2200 by Lukasz Paul RN  Pressure Reduction Techniques: frequent weight shift encouraged  Head of Bed (HOB): HOB at  30-45 degrees  Pressure Reduction Devices: pressure-redistributing mattress utilized  Skin Protection: adhesive use limited  Taken 12/6/2021 2000 by Lukasz Paul RN  Pressure Reduction Techniques: frequent weight shift encouraged  Head of Bed (HOB): HOB at 30-45 degrees  Pressure Reduction Devices: pressure-redistributing mattress utilized  Skin Protection: adhesive use limited     Problem: Joint Function Impaired (Hip Arthroplasty)  Goal: Optimal Functional Ability  Intervention: Protect Joint Integrity  Recent Flowsheet Documentation  Taken 12/7/2021 0400 by Lukasz Paul RN  Positioning/Transfer Devices: pillows  Taken 12/7/2021 0200 by Lukasz Paul RN  Positioning/Transfer Devices: pillows  Taken 12/7/2021 0000 by Lukasz Paul RN  Positioning/Transfer Devices: pillows  Taken 12/6/2021 2200 by Lukasz Paul RN  Positioning/Transfer Devices: pillows  Taken 12/6/2021 2000 by Lukasz Paul RN  Positioning/Transfer Devices: pillows     Problem: Ongoing Anesthesia Effects (Hip Arthroplasty)  Goal: Anesthesia/Sedation Recovery  Intervention: Optimize Anesthesia Recovery  Recent Flowsheet Documentation  Taken 12/7/2021 0400 by Lukasz Paul RN  Safety Promotion/Fall Prevention:   activity supervised   safety round/check completed   toileting scheduled  Taken 12/7/2021 0200 by Lukasz Paul RN  Safety Promotion/Fall Prevention:   activity supervised   safety round/check completed   toileting scheduled  Taken 12/7/2021 0000 by Lukasz Paul RN  Safety Promotion/Fall Prevention:   activity supervised   safety round/check completed   toileting scheduled  Taken 12/6/2021 2200 by Lukasz Paul RN  Safety Promotion/Fall Prevention:   activity supervised   safety round/check completed   toileting scheduled  Taken 12/6/2021 2000 by Lukasz Paul RN  Safety Promotion/Fall Prevention:   activity supervised   safety round/check completed   toileting scheduled     Problem: Pain (Hip  Arthroplasty)  Goal: Acceptable Pain Control  Intervention: Prevent or Manage Pain  Recent Flowsheet Documentation  Taken 12/7/2021 0400 by Lukasz Paul RN  Pain Management Interventions: quiet environment facilitated  Taken 12/7/2021 0200 by Lukasz Paul RN  Pain Management Interventions: quiet environment facilitated  Taken 12/7/2021 0000 by Lukasz Paul RN  Pain Management Interventions: quiet environment facilitated  Taken 12/6/2021 2200 by Lukasz Paul RN  Pain Management Interventions: quiet environment facilitated  Taken 12/6/2021 2000 by Lukasz Paul RN  Pain Management Interventions: no interventions per patient request       VSS, voids well, rested throughout the night, tele NSR, will continue to monitor for changes.

## 2021-12-15 ENCOUNTER — DOCUMENTATION (OUTPATIENT)
Dept: PHYSICAL THERAPY | Facility: CLINIC | Age: 71
End: 2021-12-15

## 2021-12-15 DIAGNOSIS — Z74.09 IMPAIRED MOBILITY AND ADLS: Primary | ICD-10-CM

## 2021-12-15 DIAGNOSIS — G81.94 LEFT HEMIPARESIS (HCC): ICD-10-CM

## 2021-12-15 DIAGNOSIS — Z78.9 IMPAIRED MOBILITY AND ADLS: Primary | ICD-10-CM

## 2021-12-15 DIAGNOSIS — R29.898 DECREASED GRIP STRENGTH OF LEFT HAND: ICD-10-CM

## 2021-12-15 DIAGNOSIS — M25.60 LIMITED JOINT RANGE OF MOTION (ROM): ICD-10-CM

## 2021-12-15 NOTE — PROGRESS NOTES
Occupational Therapy D/C Note  Patient: Obed Cotto Jr.   : 1950  Diagnosis/ICD-10 Code:  Impaired mobility and ADLs [Z74.09, Z78.9]  Referring practitioner: No ref. provider found  Date of Initial Visit: Type: THERAPY  Noted: 2021  Today's Date: 12/15/2021       Assessment & Plan     Assessment  Impairments: abnormal coordination, abnormal gait, activity intolerance, impaired balance, impaired physical strength, lacks appropriate home exercise program and safety issue  Functional Limitations: walking, moving in bed and standing  Assessment details: Pt fell, fx'ing LE and requiring sx and rehab stay. D/C POC/goals as this time  Prognosis: fair    Goals  Plan Goals:     Pt will increase L  strength by 3 # by 8 wks to demonstrate improved strength and function for daily tasks. Ongoing    Pt will be independent with SROM UE HEP to increase performance in ADL/IADL tasks by 8 wks. MET    Pt will be independent assist with UB dressing tasks to promote independence and efficiency by 4 wks. Progressing    Pt will be independent assist with LBD dressing tasks to promote independence and efficiency by 4 wks. PARTIALLY MET    Pt will be independent assist with UE stretching HEP to promote increased function and improved comfort level during ADL/IADL tasks by 8 wks. MET      Plan  Planned therapy interventions: ADL retraining, balance/weight-bearing training, fine motor coordination training, flexibility, functional ROM exercises, home exercise program, motor coordination training, neuromuscular re-education, postural training, strengthening, stretching, therapeutic activities and transfer training  Treatment plan discussed with: patient and family  Plan details: D/C d/t change in medical status        Visit Diagnoses:    ICD-10-CM ICD-9-CM   1. Impaired mobility and ADLs  Z74.09 V49.89    Z78.9    2. Decreased  strength of left hand  R29.898 729.89   3. Left hemiparesis (HCC)  G81.94 342.90   4. Limited  joint range of motion (ROM)  M25.60 719.50             Timed:  Manual Therapy:    0     mins  58513;  Therapeutic Exercise:    0     mins  69623;     Neuromuscular Lulu:    0    mins  29933;    Therapeutic Activity:     0     mins  59823;     Self-Care/ADL     0     mins  20668;   Sensory Int. Tech      0     mins 48744;  Ultrasound:     0     mins  47147;    Electrical Stimulation:    0     mins  83796 ( );    Untimed:  Electrical Stimulation:    0     mins  26957 ( );    Timed Treatment:   0   mins   Total Treatment:     0   mins    OT Signature: Keara Avila MS, OTR/L, CDP  [place] License #: [id number]

## 2021-12-21 ENCOUNTER — READMISSION MANAGEMENT (OUTPATIENT)
Dept: CALL CENTER | Facility: HOSPITAL | Age: 71
End: 2021-12-21

## 2021-12-21 ENCOUNTER — TRANSCRIBE ORDERS (OUTPATIENT)
Dept: HOME HEALTH SERVICES | Facility: HOME HEALTHCARE | Age: 71
End: 2021-12-21

## 2021-12-21 ENCOUNTER — HOME HEALTH ADMISSION (OUTPATIENT)
Dept: HOME HEALTH SERVICES | Facility: HOME HEALTHCARE | Age: 71
End: 2021-12-21

## 2021-12-21 DIAGNOSIS — Z47.89 UNSPECIFIED ORTHOPEDIC AFTERCARE: Primary | ICD-10-CM

## 2021-12-21 NOTE — OUTREACH NOTE
Prep Survey      Responses   [facility] facility patient discharged from? Non-[facility]   Is LACE score < 7 ? Non-BH Discharge   Emergency Room discharge w/ pulse ox? No   Eligibility [facility]   Date of Discharge 12/21/21   [place] [facility] [place] diagnosis Unavailable   Does the patient have one of the following disease processes/diagnoses(primary or secondary)? Other   Does the patient have Home health ordered? Yes   What is the Home health agency?  Home - with Home Health Services   Prep survey completed? Yes          Keke Diallo RN

## 2021-12-22 ENCOUNTER — TRANSITIONAL CARE MANAGEMENT TELEPHONE ENCOUNTER (OUTPATIENT)
Dept: CALL CENTER | Facility: HOSPITAL | Age: 71
End: 2021-12-22

## 2021-12-22 NOTE — OUTREACH NOTE
Call Center TCM Note      Responses   [facility] patient discharged from? Non-   Does the patient have one of the following disease processes/diagnoses(primary or secondary)? Other   TCM attempt successful? Yes   Call start time 0946   Call end time 0954   Discharge diagnosis Unavailable   Does the patient have a primary care provider?  Yes   Does the patient have an appointment with their PCP within 7 days of discharge? No   Comments regarding PCP Pt declined appt with current PCP as he reports he has changed PCP office and has an appt 1/11/21.    What is preventing the patient from scheduling follow up appointments within 7 days of discharge? --  [Pt changing PCP ]   Nursing Interventions --  [New Pt appt 1/11/21]   Has the patient kept scheduled appointments due by today? N/A   Comments NWB - Left leg. Using WC   What is the Home health agency?  Home - with Home Health Services   Has home health visited the patient within 72 hours of discharge? Call prior to 72 hours   Psychosocial issues? No   Did the patient receive a copy of their discharge instructions? Yes   What is the patient's perception of their health status since discharge? Improving   Is the patient/caregiver able to teach back signs and symptoms related to disease process for when to call PCP? Yes   Is the patient/caregiver able to teach back signs and symptoms related to disease process for when to call 911? Yes   Is the patient/caregiver able to teach back the hierarchy of who to call/visit for symptoms/problems? PCP, Specialist, Home health nurse, Urgent Care, ED, 911 Yes   TCM call completed? Yes   Wrap up additional comments Pt declined sooner HOSP FU appt as he has appt with a new PCP 1/11/22.           Ashley John RN    12/22/2021, 09:54 EST

## 2021-12-23 ENCOUNTER — TELEPHONE (OUTPATIENT)
Dept: ORTHOPEDIC SURGERY | Facility: CLINIC | Age: 71
End: 2021-12-23

## 2021-12-23 ENCOUNTER — HOME CARE VISIT (OUTPATIENT)
Dept: HOME HEALTH SERVICES | Facility: HOME HEALTHCARE | Age: 71
End: 2021-12-23

## 2021-12-23 VITALS
DIASTOLIC BLOOD PRESSURE: 60 MMHG | OXYGEN SATURATION: 98 % | RESPIRATION RATE: 16 BRPM | HEART RATE: 65 BPM | TEMPERATURE: 97.9 F | SYSTOLIC BLOOD PRESSURE: 130 MMHG

## 2021-12-23 PROCEDURE — G0151 HHCP-SERV OF PT,EA 15 MIN: HCPCS

## 2021-12-23 NOTE — TELEPHONE ENCOUNTER
YARI Ramirez to let her know patient will need to be seen in the office for post op appointment.  He will need x-rays and a physical examination.

## 2021-12-23 NOTE — TELEPHONE ENCOUNTER
Caller: KEARA     Relationship: [facility] NURSE    Best call back number:  [phone]    What is the best time to reach you: ANY     Who are you requesting to speak with (clinical staff, provider,  specific staff member): CLINICAL STAFF    What was the call regarding: PATIENT WAS RELEASED FROM REHAB ON 12/21.  I ADVISED HE HAS APPT FOR 1/3/22 AND THEY WERE WONDERING IF HE COULD HAVE A VIRTUAL APPT BEFORE THEN.  I ADVISED I WASN'T SURE BECAUSE HIS 1/3 APPT IS HIS NEW PATIENT APPT.    Do you require a callback: YES

## 2021-12-24 NOTE — HOME HEALTH
Pt is a 71 y.o. male with history of prediabetes, hypertension, hyperlipidemia, CVA with left weakness residual, BPH, who presented to [facility] ED on 11/30/21 after a fall at home. He was found to have a L hip fx and underwent ORIF on 12/2/21.  Pt is toe touch WB LLE for 6 weeks post op.  Patient has history of CVA with residual left-sided weakness from May 2021 and he was attending rehab once weekly prior to fall.  Pt lives in a one story home with his son and daughter in law.  Pt has a hospital bed, w/c, RW with L arm platform, SBQC.

## 2021-12-27 ENCOUNTER — TELEPHONE (OUTPATIENT)
Dept: ORTHOPEDIC SURGERY | Facility: CLINIC | Age: 71
End: 2021-12-27

## 2021-12-27 NOTE — TELEPHONE ENCOUNTER
Could you let them know that we have not seen the patient so we can not give a verbal.  Thank you.  Astrid

## 2021-12-27 NOTE — TELEPHONE ENCOUNTER
Attempted to contact patient to ask for physical therapy information to let them know we cannot extend physical therapy until patient has been seen by our office.

## 2021-12-27 NOTE — TELEPHONE ENCOUNTER
Physical therapist contacted the hub would like a verbal order for an extension for this patient to continue to receive physical therapy.

## 2021-12-27 NOTE — TELEPHONE ENCOUNTER
PATIENT CALLED BACK. INFORMED PATIENT THAT WE CAN NOT EXTEND PT DUE TO HIM NOT BEEN SEEN YET. PATIENT UNDERSTOOD.

## 2021-12-29 ENCOUNTER — HOME CARE VISIT (OUTPATIENT)
Dept: HOME HEALTH SERVICES | Facility: HOME HEALTHCARE | Age: 71
End: 2021-12-29

## 2021-12-29 VITALS — DIASTOLIC BLOOD PRESSURE: 48 MMHG | SYSTOLIC BLOOD PRESSURE: 111 MMHG | OXYGEN SATURATION: 100 % | HEART RATE: 85 BPM

## 2021-12-29 PROCEDURE — G0152 HHCP-SERV OF OT,EA 15 MIN: HCPCS

## 2021-12-30 ENCOUNTER — HOME CARE VISIT (OUTPATIENT)
Dept: HOME HEALTH SERVICES | Facility: HOME HEALTHCARE | Age: 71
End: 2021-12-30

## 2021-12-30 PROCEDURE — G0157 HHC PT ASSISTANT EA 15: HCPCS

## 2021-12-30 NOTE — HOME HEALTH
Patient is a 71 year old male who presented to [facility] ED on 11/30/21 after a fall at home. He was found to have a L hip fx and underwent ORIF on 12/2/21. Pt is toe touch WB LLE for 6 weeks post op. Patient has history of CVA with residual left-sided weakness from May 2021 and he was attending rehab once weekly prior to fall. Patient lives with son and DIL in one story home with no steps to enter. Patient seen today for OT evaluation in which he presents with deficits in UB strength, balance, and endurance affecting his independence with ADL's and transfers in the home.

## 2021-12-31 VITALS
HEART RATE: 70 BPM | RESPIRATION RATE: 18 BRPM | OXYGEN SATURATION: 98 % | TEMPERATURE: 98.1 F | DIASTOLIC BLOOD PRESSURE: 48 MMHG | SYSTOLIC BLOOD PRESSURE: 124 MMHG

## 2022-01-03 ENCOUNTER — HOME CARE VISIT (OUTPATIENT)
Dept: HOME HEALTH SERVICES | Facility: HOME HEALTHCARE | Age: 72
End: 2022-01-03

## 2022-01-03 VITALS
SYSTOLIC BLOOD PRESSURE: 144 MMHG | RESPIRATION RATE: 18 BRPM | HEART RATE: 68 BPM | DIASTOLIC BLOOD PRESSURE: 50 MMHG | TEMPERATURE: 97.8 F | OXYGEN SATURATION: 98 %

## 2022-01-03 PROCEDURE — G0151 HHCP-SERV OF PT,EA 15 MIN: HCPCS

## 2022-01-05 ENCOUNTER — HOME CARE VISIT (OUTPATIENT)
Dept: HOME HEALTH SERVICES | Facility: HOME HEALTHCARE | Age: 72
End: 2022-01-05

## 2022-01-05 VITALS — DIASTOLIC BLOOD PRESSURE: 68 MMHG | HEART RATE: 74 BPM | OXYGEN SATURATION: 99 % | SYSTOLIC BLOOD PRESSURE: 136 MMHG

## 2022-01-05 PROCEDURE — G0152 HHCP-SERV OF OT,EA 15 MIN: HCPCS

## 2022-01-06 ENCOUNTER — HOME CARE VISIT (OUTPATIENT)
Dept: HOME HEALTH SERVICES | Facility: HOME HEALTHCARE | Age: 72
End: 2022-01-06

## 2022-01-06 VITALS
TEMPERATURE: 97.5 F | OXYGEN SATURATION: 97 % | DIASTOLIC BLOOD PRESSURE: 54 MMHG | RESPIRATION RATE: 18 BRPM | SYSTOLIC BLOOD PRESSURE: 132 MMHG | HEART RATE: 80 BPM

## 2022-01-06 PROCEDURE — G0151 HHCP-SERV OF PT,EA 15 MIN: HCPCS

## 2022-01-06 NOTE — HOME HEALTH
OT session focused on functional mobility and transfer training with platform walker, establishing UB HEP, applying K-tape to assist with shoulder stabilization, and UBD task. Patient tolerated therapy session well. He was unable to make it to ortho appointment this week due to transportation, but has it rescheduled for 1/17/22. OT to continue focus on ADL retraining, and functional transfers to increase pt's independence in home environment.

## 2022-01-11 ENCOUNTER — HOME CARE VISIT (OUTPATIENT)
Dept: HOME HEALTH SERVICES | Facility: HOME HEALTHCARE | Age: 72
End: 2022-01-11

## 2022-01-11 VITALS — DIASTOLIC BLOOD PRESSURE: 68 MMHG | HEART RATE: 77 BPM | OXYGEN SATURATION: 95 % | SYSTOLIC BLOOD PRESSURE: 140 MMHG

## 2022-01-11 VITALS
RESPIRATION RATE: 18 BRPM | DIASTOLIC BLOOD PRESSURE: 60 MMHG | TEMPERATURE: 97.5 F | HEART RATE: 102 BPM | OXYGEN SATURATION: 98 % | SYSTOLIC BLOOD PRESSURE: 138 MMHG

## 2022-01-11 PROCEDURE — G0152 HHCP-SERV OF OT,EA 15 MIN: HCPCS

## 2022-01-11 PROCEDURE — G0151 HHCP-SERV OF PT,EA 15 MIN: HCPCS

## 2022-01-12 NOTE — HOME HEALTH
OT session focused on ADL retraining, and upgrading and completing UB HEP with theraband for increased strength with transfers. Patient tolerated therapy session well, and is supposed to be going to see ortho MD on 1/17/22 for a follow up. OT plans to reassess next visit depending on upgraded WB status, and continue to work on ADL tasks and functional mobility to increase independence in the home.

## 2022-01-13 ENCOUNTER — HOME CARE VISIT (OUTPATIENT)
Dept: HOME HEALTH SERVICES | Facility: HOME HEALTHCARE | Age: 72
End: 2022-01-13

## 2022-01-13 VITALS
SYSTOLIC BLOOD PRESSURE: 110 MMHG | TEMPERATURE: 98.1 F | HEART RATE: 58 BPM | OXYGEN SATURATION: 99 % | DIASTOLIC BLOOD PRESSURE: 56 MMHG | RESPIRATION RATE: 18 BRPM

## 2022-01-13 PROCEDURE — G0151 HHCP-SERV OF PT,EA 15 MIN: HCPCS

## 2022-01-18 ENCOUNTER — HOME CARE VISIT (OUTPATIENT)
Dept: HOME HEALTH SERVICES | Facility: HOME HEALTHCARE | Age: 72
End: 2022-01-18

## 2022-01-18 VITALS
DIASTOLIC BLOOD PRESSURE: 54 MMHG | RESPIRATION RATE: 18 BRPM | SYSTOLIC BLOOD PRESSURE: 118 MMHG | HEART RATE: 61 BPM | TEMPERATURE: 97.1 F | OXYGEN SATURATION: 97 %

## 2022-01-18 PROCEDURE — G0151 HHCP-SERV OF PT,EA 15 MIN: HCPCS

## 2022-01-19 ENCOUNTER — HOME CARE VISIT (OUTPATIENT)
Dept: HOME HEALTH SERVICES | Facility: HOME HEALTHCARE | Age: 72
End: 2022-01-19

## 2022-01-19 VITALS — DIASTOLIC BLOOD PRESSURE: 70 MMHG | SYSTOLIC BLOOD PRESSURE: 152 MMHG | OXYGEN SATURATION: 99 % | HEART RATE: 68 BPM

## 2022-01-19 PROCEDURE — G0152 HHCP-SERV OF OT,EA 15 MIN: HCPCS

## 2022-01-20 ENCOUNTER — HOME CARE VISIT (OUTPATIENT)
Dept: HOME HEALTH SERVICES | Facility: HOME HEALTHCARE | Age: 72
End: 2022-01-20

## 2022-01-20 VITALS
RESPIRATION RATE: 18 BRPM | OXYGEN SATURATION: 98 % | SYSTOLIC BLOOD PRESSURE: 114 MMHG | DIASTOLIC BLOOD PRESSURE: 52 MMHG | HEART RATE: 68 BPM | TEMPERATURE: 97 F

## 2022-01-20 PROCEDURE — G0151 HHCP-SERV OF PT,EA 15 MIN: HCPCS

## 2022-01-20 NOTE — CASE COMMUNICATION
[employer] application approved.  I will send patient a copy of the approval letter but he will also receive a packet from [employer] with information.

## 2022-01-20 NOTE — HOME HEALTH
OT 30 day reassessment completed. Patient still has deficits with ADL's, functional mobility/transfers, and UB strength. He is still TTWB on LLE and has had to reschedule follow up appointments with ortho MD due to transportation. OT contacted LYN Cornejo and she applied for [employer] for patient and he should be receiving confimation soon in order to use the service. OT will plan to see patient an additional 1w3 in order to return to PLOF and transition to outpatient therapy as appropriate. POC updated as needed.

## 2022-01-21 PROCEDURE — G0180 MD CERTIFICATION HHA PATIENT: HCPCS | Performed by: ORTHOPAEDIC SURGERY

## 2022-01-24 ENCOUNTER — HOME CARE VISIT (OUTPATIENT)
Dept: HOME HEALTH SERVICES | Facility: HOME HEALTHCARE | Age: 72
End: 2022-01-24

## 2022-01-24 VITALS
SYSTOLIC BLOOD PRESSURE: 130 MMHG | RESPIRATION RATE: 18 BRPM | TEMPERATURE: 97.3 F | HEART RATE: 71 BPM | OXYGEN SATURATION: 97 % | DIASTOLIC BLOOD PRESSURE: 50 MMHG

## 2022-01-24 PROCEDURE — G0151 HHCP-SERV OF PT,EA 15 MIN: HCPCS

## 2022-01-26 ENCOUNTER — HOME CARE VISIT (OUTPATIENT)
Dept: HOME HEALTH SERVICES | Facility: HOME HEALTHCARE | Age: 72
End: 2022-01-26

## 2022-01-26 VITALS — OXYGEN SATURATION: 98 % | SYSTOLIC BLOOD PRESSURE: 164 MMHG | HEART RATE: 67 BPM | DIASTOLIC BLOOD PRESSURE: 70 MMHG

## 2022-01-26 PROCEDURE — G0152 HHCP-SERV OF OT,EA 15 MIN: HCPCS

## 2022-01-26 NOTE — HOME HEALTH
OT session focused on functional transfers from w/c, functional mobility with platform walker around home, and completing HEP. OT also encouraged pt to make sure he is setup in advance for a ride with [employer] for Monday's ortho appt he has had to reschedule twice, and pt verbalized agreement. OT to continue focusing on ADL's, HEP, and transfers to increase independence in the home and return to PLOF.

## 2022-01-31 ENCOUNTER — OFFICE VISIT (OUTPATIENT)
Dept: ORTHOPEDIC SURGERY | Facility: CLINIC | Age: 72
End: 2022-01-31

## 2022-01-31 VITALS — TEMPERATURE: 97.1 F

## 2022-01-31 DIAGNOSIS — Z87.81 STATUS POST OPEN REDUCTION AND INTERNAL FIXATION (ORIF) OF FRACTURE: Primary | ICD-10-CM

## 2022-01-31 DIAGNOSIS — Z98.890 STATUS POST OPEN REDUCTION AND INTERNAL FIXATION (ORIF) OF FRACTURE: Primary | ICD-10-CM

## 2022-01-31 DIAGNOSIS — S72.002D CLOSED FRACTURE OF NECK OF LEFT FEMUR WITH ROUTINE HEALING, SUBSEQUENT ENCOUNTER: ICD-10-CM

## 2022-01-31 PROCEDURE — 99024 POSTOP FOLLOW-UP VISIT: CPT | Performed by: ORTHOPAEDIC SURGERY

## 2022-01-31 RX ORDER — ACETAMINOPHEN 325 MG/1
650 TABLET ORAL
COMMUNITY
Start: 2021-12-20 | End: 2022-01-31

## 2022-01-31 RX ORDER — OXYCODONE HYDROCHLORIDE 5 MG/1
TABLET ORAL
COMMUNITY
Start: 2021-12-20 | End: 2022-01-31

## 2022-01-31 NOTE — PROGRESS NOTES
[facility]    Subjective     Chief Complaint   Patient presents with   • Post-op     8 weeks s/p Hip Open Reduction Internal Fixation With Femoral Neck System Left 12/2/21        HPI    It has been 8  week(s) since Mr. Cotto's last visit. He returns to clinic today for postoperative follow-up of left hip fracture fixation. The issue has been ongoing for 8 week(s). He rates his pain a 0/10 on the pain scale. Previous/current treatments: physical therapy. Overall, he is doing better. He is in a wheelchair.    I have reviewed the following portions of the patient's history and agree with: History of Present Illness and Review of Systems    Patient Active Problem List   Diagnosis   • Urinary retention   • Positive colorectal cancer screening using DNA-based stool test   • Leukocytosis   • Prediabetes   • BPH (benign prostatic hyperplasia)   • BPH with elevated PSA   • Essential hypertension   • Stroke (HCC)   • Dysarthria   • Dysphagia   • Impaired mobility   • Memory impairment   • Leg cramps   • Left hemiplegia (HCC)   • History of tobacco abuse   • Closed fracture of neck of left femur (HCC)   • Severe malnutrition (CMS/HCC)     Past Medical History:   Diagnosis Date   • Acute ischemic stroke (HCC) 05/30/2021     posterior limb of the right internal capsule   • BPH with elevated PSA    • Chronic bronchitis (HCC)    • Hyperlipidemia    • Hypertension    • Lacunar infarction (HCC)    • Noncompliance with medication regimen     pt states he never took his metoprolol   • Prediabetes       Past Surgical History:   Procedure Laterality Date   • FINGER SURGERY      Right hand 4th finger   • FINGER SURGERY      R ring finger, partial amputation repair   • ORIF HIP FRACTURE Left 12/2/2021    Procedure: HIP OPEN REDUCTION INTERNAL FIXATION WITH FEMORAL NECK SYSTEM LEFT;  Surgeon: Aditya Ruano MD;  Location: [facility];  Service: Orthopedics;  Laterality: Left;      Family History   Problem  Relation Age of Onset   • Lymphoma Mother    • Lung disease Father    • Dementia Maternal Grandfather      Social History     Socioeconomic History   • Marital status: Single   Tobacco Use   • Smoking status: Former Smoker     Packs/day: 1.00     Years: 48.00     Pack years: 48.00     Types: Cigarettes     Quit date: 2021     Years since quittin.6   • Smokeless tobacco: Never Used   Substance and Sexual Activity   • Alcohol use: No   • Drug use: No   • Sexual activity: Defer      Current Outpatient Medications on File Prior to Visit   Medication Sig Dispense Refill   • aspirin (aspirin) 81 MG EC tablet Take 1 tablet by mouth Daily. 30 tablet 2   • atorvastatin (Lipitor) 40 MG tablet Take 1 tablet by mouth Daily. 30 tablet 11   • baclofen (LIORESAL) 10 MG tablet Take 1 tablet by mouth 3 (Three) Times a Day. 90 tablet 2   • clopidogrel (PLAVIX) 75 MG tablet TAKE ONE TABLET BY MOUTH DAILY 90 tablet 1   • docusate sodium (Colace) 100 MG capsule Take 1 capsule by mouth 2 (Two) Times a Day. 60 capsule 5   • escitalopram (Lexapro) 5 MG tablet Take 1 tablet by mouth Daily. 30 tablet 1   • famotidine (PEPCID) 20 MG tablet TAKE ONE TABLET BY MOUTH TWICE A DAY 60 tablet 2   • finasteride (PROSCAR) 5 MG tablet Take 1 tablet by mouth Daily. 90 tablet 1   • lisinopril (PRINIVIL,ZESTRIL) 10 MG tablet Take 10 mg by mouth Daily.     • metoprolol succinate XL (TOPROL-XL) 25 MG 24 hr tablet TAKE ONE TABLET BY MOUTH DAILY 30 tablet 2   • nystatin (MYCOSTATIN) 854197 UNIT/ML suspension Swish and swallow 5 mL 4 (Four) Times a Day. 140 mL 0   • tamsulosin (FLOMAX) 0.4 MG capsule 24 hr capsule Take 1 capsule by mouth Every Night. 90 capsule 1   • [DISCONTINUED] acetaminophen (TYLENOL) 325 MG tablet 650 mg.     • [DISCONTINUED] oxyCODONE (ROXICODONE) 5 MG immediate release tablet        No current facility-administered medications on file prior to visit.      Allergies   Allergen Reactions   • Penicillins Hives        Review of  Systems   Constitutional: Negative for activity change, appetite change, chills, diaphoresis, fatigue, fever and unexpected weight change.   HENT: Negative for congestion, dental problem, drooling, ear discharge, ear pain, facial swelling, hearing loss, mouth sores, nosebleeds, postnasal drip, rhinorrhea, sinus pressure, sneezing, sore throat, tinnitus, trouble swallowing and voice change.    Eyes: Negative for photophobia, pain, discharge, redness, itching and visual disturbance.   Respiratory: Negative for apnea, cough, choking, chest tightness, shortness of breath, wheezing and stridor.    Cardiovascular: Negative for chest pain, palpitations and leg swelling.   Gastrointestinal: Negative for abdominal distention, abdominal pain, anal bleeding, blood in stool, constipation, diarrhea, nausea, rectal pain and vomiting.   Endocrine: Negative for cold intolerance, heat intolerance, polydipsia, polyphagia and polyuria.   Genitourinary: Negative for decreased urine volume, difficulty urinating, dysuria, enuresis, flank pain, frequency, genital sores, hematuria and urgency.   Musculoskeletal: Positive for arthralgias. Negative for back pain, gait problem, joint swelling, myalgias, neck pain and neck stiffness.   Skin: Negative for color change, pallor, rash and wound.   Allergic/Immunologic: Negative for environmental allergies, food allergies and immunocompromised state.   Neurological: Negative for dizziness, tremors, seizures, syncope, facial asymmetry, speech difficulty, weakness, light-headedness, numbness and headaches.   Hematological: Negative for adenopathy. Does not bruise/bleed easily.   Psychiatric/Behavioral: Negative for agitation, behavioral problems, confusion, decreased concentration, dysphoric mood, hallucinations, self-injury, sleep disturbance and suicidal ideas. The patient is not nervous/anxious and is not hyperactive.         Objective      Physical Exam  Temp 97.1 °F (36.2 °C)     There is no  height or weight on file to calculate BMI.    General:   Mental Status:  Alert   Appearance: Cooperative, in no acute distress   Build and Nutrition: Thin, deconditioned male   Orientation: Alert and oriented to person, place and time   Posture: Normal   Gait: In a wheelchair    Integument:   Left hip: Wound is well-healed with no signs of infection    Lower Extremity:   Left Hip:    Tenderness:  None    Swelling:  None    Crepitus:  None    Range of motion:  External Rotation: 30°       Internal Rotation: 30°       Flexion:  90°       Extension:  0°    Deformities:  None  Functional testing: Negative [facility]    Slightly short on the left compared to the right      Imaging/Studies  Imaging Results (Last 24 Hours)     Procedure Component Value Units Date/Time    XR Hip With or Without Pelvis 2 - 3 View Left [[id number]] Resulted: 01/31/22 1003     Updated: 01/31/22 1004    Narrative:      Left Hip Radiographs  Indication: Follow-up valgus impacted left femoral neck fracture  Views: low AP pelvis and lateral of the left hip    Comparison: 12/2/2021    Findings:   Valgus impacted femoral neck fracture stable compared to the previous   imaging, with no signs of hardware loosening or failure.            Assessment and Plan     Diagnoses and all orders for this visit:    1. Status post open reduction and internal fixation (ORIF) of fracture (Primary)  -     XR Hip With or Without Pelvis 2 - 3 View Left    2. Closed fracture of neck of left femur with routine healing, subsequent encounter        1. Status post open reduction and internal fixation (ORIF) of fracture    2. Closed fracture of neck of left femur with routine healing, subsequent encounter        I reviewed my findings with the patient. He is having no hip pain. He may be weightbearing as tolerated with a walker. I will see him back in 4 weeks with a repeat x-ray, but sooner for any problems.    Return in about 4 weeks (around 2/28/2022) for Recheck with  X-Rays.      Aditya Ruano MD  01/31/22  10:11 EST

## 2022-02-02 ENCOUNTER — HOME CARE VISIT (OUTPATIENT)
Dept: HOME HEALTH SERVICES | Facility: HOME HEALTHCARE | Age: 72
End: 2022-02-02

## 2022-02-02 VITALS
SYSTOLIC BLOOD PRESSURE: 130 MMHG | OXYGEN SATURATION: 98 % | TEMPERATURE: 98.2 F | RESPIRATION RATE: 18 BRPM | DIASTOLIC BLOOD PRESSURE: 58 MMHG | HEART RATE: 87 BPM

## 2022-02-02 VITALS — OXYGEN SATURATION: 97 % | SYSTOLIC BLOOD PRESSURE: 130 MMHG | DIASTOLIC BLOOD PRESSURE: 58 MMHG | HEART RATE: 63 BPM

## 2022-02-02 PROCEDURE — G0151 HHCP-SERV OF PT,EA 15 MIN: HCPCS

## 2022-02-02 PROCEDURE — G0152 HHCP-SERV OF OT,EA 15 MIN: HCPCS

## 2022-02-03 NOTE — HOME HEALTH
OT session focused on functional mobility and transfers in the home with platform walker, and completing HEP. Patient now WBAT on LLE. Encouraging pt to complete more bathroom transfers using walker and assist from family. Patient tolerated therapy session well. OT to discharge next visit as pt is nearing max rehab potential.

## 2022-02-08 ENCOUNTER — HOME CARE VISIT (OUTPATIENT)
Dept: HOME HEALTH SERVICES | Facility: HOME HEALTHCARE | Age: 72
End: 2022-02-08

## 2022-02-08 ENCOUNTER — TRANSCRIBE ORDERS (OUTPATIENT)
Dept: PHYSICAL THERAPY | Facility: CLINIC | Age: 72
End: 2022-02-08

## 2022-02-08 VITALS
RESPIRATION RATE: 18 BRPM | SYSTOLIC BLOOD PRESSURE: 104 MMHG | HEART RATE: 58 BPM | DIASTOLIC BLOOD PRESSURE: 58 MMHG | TEMPERATURE: 97.8 F | OXYGEN SATURATION: 98 %

## 2022-02-08 VITALS — OXYGEN SATURATION: 100 % | DIASTOLIC BLOOD PRESSURE: 70 MMHG | HEART RATE: 75 BPM | SYSTOLIC BLOOD PRESSURE: 156 MMHG

## 2022-02-08 PROCEDURE — G0151 HHCP-SERV OF PT,EA 15 MIN: HCPCS

## 2022-02-08 PROCEDURE — G0152 HHCP-SERV OF OT,EA 15 MIN: HCPCS

## 2022-02-08 NOTE — HOME HEALTH
OT discipline discharge completed today on 2/8/22. Patient has improved in all goal areas, and is back to near baseline with ADL's and functional mobility. He is now WBAT on LLE after 6-8 weeks of TTWB on LLE due to hip fx. He is independent with HEP also. He is transitioning back to outpatient OT/PT on 2/14/22 for further therapy services.

## 2022-02-10 ENCOUNTER — PROCEDURE VISIT (OUTPATIENT)
Dept: NEUROLOGY | Facility: CLINIC | Age: 72
End: 2022-02-10

## 2022-02-10 VITALS — BODY MASS INDEX: 17.61 KG/M2 | HEIGHT: 72 IN | WEIGHT: 130 LBS

## 2022-02-10 DIAGNOSIS — I63.81 LACUNAR STROKE: Primary | ICD-10-CM

## 2022-02-10 DIAGNOSIS — I69.354 HEMIPLEGIA AND HEMIPARESIS FOLLOWING CEREBRAL INFARCTION AFFECTING LEFT NON-DOMINANT SIDE: ICD-10-CM

## 2022-02-10 PROCEDURE — 64644 CHEMODENERV 1 EXTREM 5/> MUS: CPT | Performed by: PSYCHIATRY & NEUROLOGY

## 2022-02-10 NOTE — PROGRESS NOTES
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  Patient has developed spasticity of left upper extremity involving DIP, PIP, FDP, biceps.  There is also spasticity of left foot involving inversion of the left foot, plantar flexion of left foot.     Botox Procedure Note     The risks and benefits were discussed with the patient. The patient was given the opportunity to ask questions. Informed consent form was signed.     Onabotulinumtoxin A was reconstituted with 0.9% normal saline. All injections sites were prepped with alcohol swab.     Following muscles were injected:     Bicep -75 units at 3 sites.  Brachialis: 25 units  Brachioradialis: 25 units  FCR/flexor carpi radialis: 25 units  FCU/flexor carpi ulnaris: 25 units  FDP/flexor digitorum profundus-25 units  FDS/flexor digitorum superficialis 25 units     Gastrocnemius -75units at 3 sites  Soleus- 50 units at 2 sites  Tibialis posterior-50 units at 2 sites     The patient tolerated the procedure well. There were no immediate complications. The patient will follow up in 12 weeks for next injection.    Note: He did feel reduced muscle tone after the first injection.  I am expecting better response with further Botox treatment.     Total amount of onabotulinumtoxin A injected was 400 units with 0  units wasted.

## 2022-02-14 ENCOUNTER — TREATMENT (OUTPATIENT)
Dept: PHYSICAL THERAPY | Facility: CLINIC | Age: 72
End: 2022-02-14

## 2022-02-14 DIAGNOSIS — Z74.09 IMPAIRED MOBILITY AND ADLS: ICD-10-CM

## 2022-02-14 DIAGNOSIS — G81.94 LEFT HEMIPARESIS: Primary | ICD-10-CM

## 2022-02-14 DIAGNOSIS — R27.8 DECREASED COORDINATION: ICD-10-CM

## 2022-02-14 DIAGNOSIS — R29.898 DECREASED GRIP STRENGTH OF LEFT HAND: ICD-10-CM

## 2022-02-14 DIAGNOSIS — Z78.9 IMPAIRED MOBILITY AND ADLS: ICD-10-CM

## 2022-02-14 DIAGNOSIS — I63.9 CEREBROVASCULAR ACCIDENT (CVA), UNSPECIFIED MECHANISM: ICD-10-CM

## 2022-02-14 DIAGNOSIS — Z74.09 IMPAIRED FUNCTIONAL MOBILITY, BALANCE, GAIT, AND ENDURANCE: ICD-10-CM

## 2022-02-14 PROCEDURE — 97110 THERAPEUTIC EXERCISES: CPT | Performed by: PHYSICAL THERAPIST

## 2022-02-14 PROCEDURE — 97163 PT EVAL HIGH COMPLEX 45 MIN: CPT | Performed by: PHYSICAL THERAPIST

## 2022-02-14 PROCEDURE — 97166 OT EVAL MOD COMPLEX 45 MIN: CPT | Performed by: OCCUPATIONAL THERAPIST

## 2022-02-14 NOTE — PROGRESS NOTES
"Occupational Therapy Initial Evaluation and Plan of Care      Patient: Obed Cotto Jr.   : 1950  Diagnosis/ICD-10 Code:  Left hemiparesis (HCC) [G81.94]  Referring practitioner: Jovanny Lockhart MD  Date of Initial Visit: Type: THERAPY  Noted: 2022  Today's Date: 2022  Patient seen for 1 sessions      Subjective:     Subjective Questionnaire: unable to perform FMC testing at this time d/t hand weakness and ROM  BARTHEL INDEX OF ADLS: 10    Subjective Evaluation    History of Present Illness  Mechanism of injury: Pt returns to PT and OT services after fall last yr sustaining L hip fx with resulting surgery 2021 and rehab stay at [facility]. Pt was instructed by orthopedic surgeon to use platform walker from now on as a cane will not provide enough support. Pt was NWB/PWB for 8 wks initially and now notes increased stiffness and difficulty with movement. He is flexibile enough in bed to be able to don/doff pants and slip on shoes. Is keeping BSC over top of toilet to make it taller. He takes his w/c as far as possible into bathroom then performs stand pivot transfer. Still cannot pull pants up/down and requires his son to assist. Pt wants to be able to learn to stabilize self with legs against surface of commode and hold onto handle with R hand while managing pants with L. Pt continues to use same set-up with tub and tub bench, scooting and lifting L leg over edge.   Pt went back 1/10/22 for second round of Botox, which pt feels is still \"kicking in.\" Pt able to grossly extend all digits, but IV and V most difficult.       Patient Occupation: retired and now disabled Quality of life: fair    Pain  Location: L hand arthritis pain with certain movements    Social Support  Lives in: one-story house  Lives with: adult children    Hand dominance: right    Patient Goals  Patient goals for therapy: increased strength, independence with ADLs/IADLs, improved balance and increased motion  Patient goal: L " hand and arm use      Objective          Active Range of Motion   Left Shoulder   Flexion: 0 degrees   Abduction: 30 degrees     Left Elbow   Flexion: 80 degrees   Extension: 8 degrees     Left Wrist   Wrist flexion: 30 degrees   Wrist extension: 15 degrees     Additional Active Range of Motion Details  Pt demonstrates good extension of CMC joints, PIP and DIP joints on flat surface- difficulty with active movement, elian with digits IV and V, good flexion of all joints but does have arthritic changes. With flexion, unable to make complete fist.     Active thumb ab/adduction, however u/a to complete many reps d/t fatigue/weakness    Able to perform gross grasp/release of blocks digits I/II and I/III    Passive Range of Motion   Left Shoulder   Flexion: 105 degrees     Left Elbow   Flexion: 115 degrees   Extension: 10 degrees     Strength/Myotome Testing     Left Shoulder     Planes of Motion   Flexion: 2   Abduction: 3-     Left Elbow   Flexion: 3-  Extension: 2+    Left Wrist/Hand   Wrist extension: 2+  Wrist flexion: 3-      OT Neuro         Assessment & Plan     Assessment  Impairments: abnormal coordination, abnormal gait, abnormal muscle firing, abnormal muscle tone, abnormal or restricted ROM, activity intolerance, impaired balance, impaired physical strength, lacks appropriate home exercise program, safety issue and weight-bearing intolerance  Functional Limitations: carrying objects, lifting, walking, pushing, moving in bed, standing, stooping, reaching behind back, reaching overhead and unable to perform repetitive tasks  Assessment details: Pt returns to OP OT after hip fx and surgery. Pt notes increased overall weakness and tightness through L UE/LE since last being seen at clinic. He continues to have difficulty with ADLs including independence and safety with toileting, clothing management and functional active grasp/release of L digits. Pt to benefit greatly from continued skilled OT services to address  ADL/IADL retraining, ROM, strengthening, coordination, NMR techniques, AE/DME needs, home/activity/environmental mods and balance training.   Prognosis: fair    Goals  Plan Goals:   Pt will be indepedent with hand strengthening HEP to increase independence with ADL/IADL performance tasks by 8 wks.    Pt will be independent with L UE strengthening HEP to increase performance in ADL/IADL tasks by 8 wks.     Pt will be SUA assist with UB dressing tasks to promote independence and efficiency by 4 wks.     Pt will successfully simulate toilet transfer with hygiene and clothing management with good safety awareness and SBA by 8 wks to improve pt satisfaction, safety and independence at home and in community environments.         Plan  Planned therapy interventions: ADL retraining, balance/weight-bearing training, fine motor coordination training, flexibility, functional ROM exercises, home exercise program, motor coordination training, neuromuscular re-education, postural training, strengthening, stretching, therapeutic activities, transfer training and IADL retraining  Frequency: 1x week  Duration in visits: 12  Treatment plan discussed with: patient  Plan details: Est OT POC and goals to reflect pt needs and to promote increased independence, safety and engagement in daily tasks.         Timed:  Manual Therapy:    0     mins  02866;  Therapeutic Exercise:    0     mins  48616;     Neuromuscular Lulu:    0    mins  60573;    Therapeutic Activity:     0     mins  22373;     Self-Care/ADL     0     mins  33339;   Sensory Int. Tech      0     mins 62028;  Ultrasound:     0     mins  83875;    Electrical Stimulation:    0     mins  67895 ( );    Untimed:  Electrical Stimulation:    0     mins  01047 ( );    Timed Treatment:   0   mins   Total Treatment:     45   mins    OT Signature: Keara Avila MS, OTR/L, CDP  [place] License #: [id number]  DATE TREATMENT INITIATED: 2/14/2022    Initial Certification Certification  Period: 2/14/2022 thru 5/14/2022  I certify that the therapy services are furnished while this patient is under my care. The services outlined above are required by this patient and will be reviewed every 90 days.     PHYSICIAN: Jovanny Lockhart MD  NPI: [id number]                                      DATE:     Physician Signature: __________________________________  Jovanny Lockhart MD    Please sign and return via fax to [phone]  Thank you,   [facility]

## 2022-02-14 NOTE — PROGRESS NOTES
Physical Therapy Initial Evaluation and Plan of Care      Patient: Obed Cotto Jr.   : 1950  Diagnosis/ICD-10 Code:  Left hemiparesis (HCC) [G81.94]  Referring practitioner: Jovanny Lockhart MD  Date of Initial Visit: 2022  Today's Date: 2022  Patient seen for 1 sessions      Subjective:     Subjective Questionnaire: KC 6/56  10 Meter 1 min and 12 sec       Subjective Evaluation    History of Present Illness  Mechanism of injury: Patient reports on  He was walking to the bathroom with his cane. He lost his balance and fell and hit the bathroom floor. He fractured L hip with sx following on . Pt was in the hospital for about a week and then went to [facility] x 14 days. When he was released he had home health PT and OT for 1.5 months. He was released last week. He has been released to weight bear about 2 weeks ago. He thinks he had an anterior approach. He has been walking in his house with a platform walker. He does feel stable but he needs help getting his arm strapped in. His tightness has become pretty bad recently and he feels he has lost a lot in the last month.       Precautions and Work Restrictions: no restrictions - full weight bearing Pain  No pain reported             Objective          Strength/Myotome Testing     Left Hip   Planes of Motion   Flexion: 4    Right Hip   Planes of Motion   Flexion: 4+    Left Knee   Flexion: 2  Extension: 2    Right Knee   Flexion: 4+  Extension: 4+    Right Ankle/Foot   Dorsiflexion: 5    Additional Strength Details  Inc tone on L     Ambulation     Comments   Using a L platform walker, pt was able to ambulate with a step to gait pattern. Leading with LLE.         PT Neuro         Assessment & Plan     Assessment  Impairments: abnormal coordination, abnormal gait, abnormal muscle tone, abnormal or restricted ROM, activity intolerance, impaired balance, impaired physical strength, lacks appropriate home exercise program and safety issue  Functional  Limitations: carrying objects, lifting, walking, pulling, standing, stooping and reaching overhead  Assessment details: Patient is a 71 YOM that returns to therapy following recent fall and subsequent L hip fx and pinning, as well as CVA a few months prior to fall. He has been non WB until about 2 weeks prior. He does now have a platform walker, but is unable to independently manage any of his care, transfers or gait. Patient will require skilled PT intervention to address deficits in order to improve global mobility and function.   Prognosis: fair    Goals  Plan Goals: STG (6 visits)  1. Patient will report compliance with initial HEP.   2. Patient to improve KC balance score to >/= 10 /56 to decrease client's risk of falls.  3. Patient to ambulate 10 meters within 1 min without LOB for improved  gait al and functional mobility.  4. Patient will demo ability to stand and don/doff platform walker.     LTG (12 visits)  1. Patient will be I with final HEP.   2. Patient to improve KC balance score to >/= 14 /56 to decrease client's risk of falls.  3. Patient to ambulate 10 meters within 55 sec without LOB for improved gait al and functional mobility.  4. Patient will demonstrate ability to perform transfers with SBA using quad cane.           Plan  Therapy options: will be seen for skilled therapy services  Planned modality interventions: TENS  Planned therapy interventions: ADL retraining, balance/weight-bearing training, flexibility, gait training, manual therapy, neuromuscular re-education, motor coordination training, postural training, strengthening, stretching, therapeutic activities, transfer training and home exercise program  Frequency: 1x week  Duration in visits: 12  Treatment plan discussed with: patient  Plan details: Patient will be seen 1x/wk x 12 wks with treatment to include strengthening, stretching, manual therapy, neuromuscular re-education, balance, gait and endurance training.            Timed:  Manual Therapy:    0     mins  48754;  Therapeutic Exercise:    10     mins  86061;     Neuromuscular Lulu:    0    mins  90587;    Therapeutic Activity:     0     mins  55160;     Gait Trainin     mins  44476;     Electrical Stimulation:    0     mins  53435 ( );    Untimed:  Canalith Repositioning    0     mins 62671    Timed Treatment:   10   mins   Total Treatment:     45   mins    PT SIGNATURE: Fabiana Latham, PT, DPT, MSCS, CDP  [place] License #[id number]  DATE TREATMENT INITIATED: 2022      Medicare Initial Certification Certification Period: 2022thru2022  I certify that the therapy services are furnished while this patient is under my care.  The services outlined above are required by this patient, and will be reviewed every 90 days.     PHYSICIAN: Jovanny Lockhart MD  NPI: [id number]                                         DATE:     Please sign and return via fax to [phone].   Thank you,   [facility].

## 2022-02-21 ENCOUNTER — TREATMENT (OUTPATIENT)
Dept: PHYSICAL THERAPY | Facility: CLINIC | Age: 72
End: 2022-02-21

## 2022-02-21 DIAGNOSIS — Z74.09 IMPAIRED FUNCTIONAL MOBILITY, BALANCE, GAIT, AND ENDURANCE: ICD-10-CM

## 2022-02-21 DIAGNOSIS — R27.8 DECREASED COORDINATION: ICD-10-CM

## 2022-02-21 DIAGNOSIS — I63.9 CEREBROVASCULAR ACCIDENT (CVA), UNSPECIFIED MECHANISM: ICD-10-CM

## 2022-02-21 DIAGNOSIS — Z74.09 IMPAIRED MOBILITY AND ADLS: ICD-10-CM

## 2022-02-21 DIAGNOSIS — G81.94 LEFT HEMIPARESIS: Primary | ICD-10-CM

## 2022-02-21 DIAGNOSIS — Z78.9 IMPAIRED MOBILITY AND ADLS: ICD-10-CM

## 2022-02-21 DIAGNOSIS — R29.898 DECREASED GRIP STRENGTH OF LEFT HAND: ICD-10-CM

## 2022-02-21 PROCEDURE — 97112 NEUROMUSCULAR REEDUCATION: CPT | Performed by: PHYSICAL THERAPIST

## 2022-02-21 PROCEDURE — 97110 THERAPEUTIC EXERCISES: CPT | Performed by: OCCUPATIONAL THERAPIST

## 2022-02-21 PROCEDURE — 97530 THERAPEUTIC ACTIVITIES: CPT | Performed by: OCCUPATIONAL THERAPIST

## 2022-02-21 PROCEDURE — 97110 THERAPEUTIC EXERCISES: CPT | Performed by: PHYSICAL THERAPIST

## 2022-02-21 NOTE — PROGRESS NOTES
"Occupational Therapy Daily Progress Note  Visit: 2  Date of Initial Visit: Type: THERAPY  Noted: 2022      Patient: Obed Cotto Jr.   : 1950  Diagnosis/ICD-10 Code:  Left hemiparesis (HCC) [G81.94]  Referring practitioner: Jovanny Lockhart MD  Date of Initial Visit: Type: THERAPY  Noted: 2022  Today's Date: 2022  Patient seen for 2 sessions      Subjective:   Patient reports: \"my son won't bring my walker for me because he says it won't fit in the SUV with my w/c.\"  Pain: 0/10  Subjective Questionnaire: n/a  Clinical Progress: improved  Home Program Compliance: Yes  Treatment has included: therapeutic exercise and therapeutic activity    Subjective   Objective     OT Neuro          OT Exercises     Row Name 22 0845             Exercise 1    Exercise Name 1 WB'ing onto LUE, progressing wrist and digit extension, flexion and extension onto elbow with pushing up to increase strength and proprioceptive input  -ST              Exercise 2    Exercise Name 2 scapular retraction BUE's, cuing to avoid shoulder hiking L and avoiding trunk rotation  -ST              Exercise 3    Exercise Name 3 trunk rotation to R/L, reaching LUE across body toward R hip  -ST              Exercise 4    Exercise Name 4 grasp/release of varying items key grasp to retrieve from mat table and place on elevated surface  -ST              Exercise 5    Exercise Name 5 gross grasp/release of varying items, removing from elevated surface and placing on mat table  -ST              Exercise 6    Exercise Name 6 AROM flexion/extension of each L handed digt, required to complete as group  -ST              Exercise 7    Exercise Name 7 L hand on ball, performing entire hand squeeze/release, progressing to individual digit focus  -ST            User Key  (r) = Recorded By, (t) = Taken By, (c) = Cosigned By    Initials Name Provider Type    Keara Enriquez OTR Occupational Therapist                 Assessment & Plan "     Assessment    Assessment details: Pt with improved ability to control active grasp and release with digit f/e for retrieval of varying sized items. Pt has increased trouble when required to raise LUE utilizing shoulder; pt requires cuing to avoid compensatory movement and will continue to focus on correct posture, stretching of UE and trunk and utilization of correct muscle usage.     Plan  Plan details: Continue w/POC and goals as est to meet pt needs        Visit Diagnoses:    ICD-10-CM ICD-9-CM   1. Left hemiparesis (HCC)  G81.94 342.90   2. Decreased  strength of left hand  R29.898 729.89   3. Decreased coordination  R27.8 781.3   4. Impaired mobility and ADLs  Z74.09 V49.89    Z78.9              Timed:  Manual Therapy:    0     mins  62684;  Therapeutic Exercise:    30     mins  68736;     Neuromuscular Lulu:    0    mins  21393;    Therapeutic Activity:     15     mins  46226;     Self-Care/ADL     0     mins  78479;   Sensory Int. Tech      0     mins 15649;  Ultrasound:     0     mins  84939;    Electrical Stimulation:    0     mins  23691 ( );    Untimed:  Electrical Stimulation:    0     mins  73850 ( );    Timed Treatment:   45   mins   Total Treatment:     45   mins    OT Signature: Keara Avila MS, OTR/L, CDP  [place] License #: [id number]

## 2022-02-22 NOTE — PROGRESS NOTES
Physical Therapy Daily Note  Visit: 2  Date of Initial Visit: Type: THERAPY  Noted: 2022    Patient: Obed Cotto Jr.   : 1950  Diagnosis/ICD-10 Code:  Left hemiparesis (HCC) [G81.94]  Referring practitioner: Jovanny Lockhart MD  Date of Initial Visit: Type: THERAPY  Noted: 2022  Today's Date: 2022  Patient seen for 2 sessions      Subjective:   Patient reports: he hasn't been able to do much walking because of his home environment.   Pain: 0/10  Clinical Progress: unchanged  Home Program Compliance: Yes-partially  Treatment has included: therapeutic exercise and gait training     Objective   See Exercise, Manual, and Modality Logs for complete treatment.    PT Neuro          Assessment & Plan     Assessment    Assessment details: Patient's son does not bring platform walker to appointment. The clinic walker had to have the platform attachment added which takes an increased amount of time. Pt's son was asked to bring his platform walker in order for patient to have practice with his personal walker. Patient was fatigued after walking 230 ft, however no knee buckling noted. He was also able to stand independently and removed L arm from platform.     Plan  Plan details: Patient to continue with PT services to improve gait, balance, strength, transfers and overall functional mobility.          Timed:  Manual Therapy:            0     mins  69231;  Therapeutic Exercise:    15    mins  69194;     Neuromuscular Lulu:    30    mins  47603;    Therapeutic Activity:      0     mins  88670;     Gait Trainin    mins  15106;     Electrical Stimulation:    0    mins  62190 ( );     Untimed:  Canalith Repositioning techniques _[id number]      Timed Treatment:   45   mins   Total Treatment:     45   mins      Fabiana Latham, PT, DPT, MSCS, CDP  [place] License #: [id number]  Physical Therapist

## 2022-03-14 ENCOUNTER — OFFICE VISIT (OUTPATIENT)
Dept: ORTHOPEDIC SURGERY | Facility: CLINIC | Age: 72
End: 2022-03-14

## 2022-03-14 VITALS
WEIGHT: 126 LBS | DIASTOLIC BLOOD PRESSURE: 62 MMHG | BODY MASS INDEX: 17.07 KG/M2 | HEIGHT: 72 IN | SYSTOLIC BLOOD PRESSURE: 132 MMHG

## 2022-03-14 DIAGNOSIS — Z87.81 STATUS POST OPEN REDUCTION AND INTERNAL FIXATION (ORIF) OF FRACTURE: Primary | ICD-10-CM

## 2022-03-14 DIAGNOSIS — Z98.890 STATUS POST OPEN REDUCTION AND INTERNAL FIXATION (ORIF) OF FRACTURE: Primary | ICD-10-CM

## 2022-03-14 DIAGNOSIS — S72.002D CLOSED FRACTURE OF NECK OF LEFT FEMUR WITH ROUTINE HEALING, SUBSEQUENT ENCOUNTER: ICD-10-CM

## 2022-03-14 PROCEDURE — 99213 OFFICE O/P EST LOW 20 MIN: CPT | Performed by: PHYSICIAN ASSISTANT

## 2022-03-14 NOTE — PROGRESS NOTES
"    [facility] Orthopaedic Surgery Clinic Note        Subjective     CC: Follow-up (Late 4 week f/u 3.5 months s/p Hip Open Reduction Internal Fixation With Femoral Neck System Left 12/2/21)      REINALDO Cotto Jr. is a 72 y.o. male.  Patient returns today for follow-up status post left hip fracture fixation.  He is now approximately 3-1/2 months out from date of surgery.  Patient continues to deny pain.  He is still working with physical therapy.  Patient is sitting in a wheelchair but he states he has a platform walker at home to help with transfers.    Overall, patient's symptoms are improved.    ROS:    Constiutional:Pt denies fever, chills, nausea, or vomiting.  MSK:as above        Objective      Past Medical History  Past Medical History:   Diagnosis Date   • Acute ischemic stroke (HCC) 05/30/2021     posterior limb of the right internal capsule   • BPH with elevated PSA    • Chronic bronchitis (HCC)    • Hyperlipidemia    • Hypertension    • Lacunar infarction (HCC)    • Noncompliance with medication regimen     pt states he never took his metoprolol   • Prediabetes          Physical Exam  /62   Ht 182.9 cm (72.01\")   Wt 57.2 kg (126 lb)   BMI 17.08 kg/m²     Body mass index is 17.08 kg/m².    Patient is well nourished and well developed.        Ortho Exam  Integument:   Left hip: Incision is well-healed with no signs of infection    Lower Extremity:   Left hip:    Tenderness:  None    Swelling:  None    Crepitus:  None    Range of motion:  External Rotation: 30°       Internal Rotation: 30°       Flexion:  90°       Extension:  0°      Deformities:  None    Functional testing: Negative Stinchfield     Slightly shortened on left      Imaging/Labs/EMG Reviewed:  Ordered left hip plain films.  Imaging read/interpreted by Dr. Ruano.    Imaging Results (Last 24 Hours)     Procedure Component Value Units Date/Time    XR Hip With or Without Pelvis 2 - 3 View Left [[id number]] Resulted: 03/14/22 0933     " Updated: 03/14/22 0934    Narrative:      Left Hip Radiographs  Indication: left hip pain  Views: low AP pelvis and lateral of the left hip    Comparison: 1/31/2022    Findings:   Stable alignment compared to the previous imaging, with no signs of   hardware loosening or failure, with no change in patchy osteopenia in the   femur.          Assessment:  1. Status post open reduction and internal fixation (ORIF) of fracture    2. Closed fracture of neck of left femur with routine healing, subsequent encounter        Plan:  1. Status post left hip fracture fixation, stable.  2. Continue working with PT.  3. To remain weightbearing as tolerated with platform walker.  4. Recommend OTC pain medication.  5. Follow up in 3 months for repeat evaluation to include repeat imaging of the left hip.  6. Questions and concerns answered.    Patient was also examined by Dr. Ruano and he agrees with the above assessment and plan.      Omayra Rosenberg PA-C  03/14/22  09:54 EDT      Dictated Utilizing Dragon Dictation.

## 2022-03-21 ENCOUNTER — OFFICE VISIT (OUTPATIENT)
Dept: INTERNAL MEDICINE | Facility: CLINIC | Age: 72
End: 2022-03-21

## 2022-03-21 ENCOUNTER — LAB (OUTPATIENT)
Dept: LAB | Facility: HOSPITAL | Age: 72
End: 2022-03-21

## 2022-03-21 VITALS
TEMPERATURE: 97.3 F | WEIGHT: 127.9 LBS | BODY MASS INDEX: 17.34 KG/M2 | DIASTOLIC BLOOD PRESSURE: 64 MMHG | OXYGEN SATURATION: 97 % | HEART RATE: 63 BPM | RESPIRATION RATE: 20 BRPM | SYSTOLIC BLOOD PRESSURE: 134 MMHG

## 2022-03-21 DIAGNOSIS — E55.9 VITAMIN D DEFICIENCY, UNSPECIFIED: ICD-10-CM

## 2022-03-21 DIAGNOSIS — N40.0 BPH WITH ELEVATED PSA: ICD-10-CM

## 2022-03-21 DIAGNOSIS — R73.03 PREDIABETES: ICD-10-CM

## 2022-03-21 DIAGNOSIS — I63.9 CEREBROVASCULAR ACCIDENT (CVA), UNSPECIFIED MECHANISM: ICD-10-CM

## 2022-03-21 DIAGNOSIS — I10 ESSENTIAL HYPERTENSION: Primary | ICD-10-CM

## 2022-03-21 DIAGNOSIS — Z87.891 HISTORY OF TOBACCO ABUSE: ICD-10-CM

## 2022-03-21 DIAGNOSIS — R97.20 BPH WITH ELEVATED PSA: ICD-10-CM

## 2022-03-21 DIAGNOSIS — Z74.09 IMPAIRED MOBILITY: ICD-10-CM

## 2022-03-21 DIAGNOSIS — G81.94 LEFT HEMIPLEGIA: ICD-10-CM

## 2022-03-21 DIAGNOSIS — I10 ESSENTIAL HYPERTENSION: ICD-10-CM

## 2022-03-21 PROCEDURE — 99214 OFFICE O/P EST MOD 30 MIN: CPT | Performed by: NURSE PRACTITIONER

## 2022-03-21 RX ORDER — METOPROLOL SUCCINATE 25 MG/1
TABLET, EXTENDED RELEASE ORAL
Qty: 30 TABLET | Refills: 2 | OUTPATIENT
Start: 2022-03-21

## 2022-03-21 NOTE — TELEPHONE ENCOUNTER
Rx Refill Note  Requested Prescriptions     Pending Prescriptions Disp Refills   • metoprolol succinate XL (TOPROL-XL) 25 MG 24 hr tablet [Pharmacy Med Name: METOPROLOL SUCC ER 25 MG TAB] 30 tablet 2     Sig: TAKE ONE TABLET BY MOUTH DAILY      Last office visit with prescribing clinician: 10/5/2021      Next office visit with prescribing clinician: Visit date not found   Ashley Ignacio  03/21/22, 08:26 EDT     Refill denied due to patient not under one of our providers.

## 2022-03-21 NOTE — PROGRESS NOTES
Subjective   Obed Cotto Jr. is a 72 y.o. male    Chief Complaint   Patient presents with   • Establish Care     Pt was being seen by Dr. Lockhart but wheels weren't able to travel into [facility]. Prior to this was seeing Sameera through [facility]   • Hypertension   • Depression     History of Present Illness     New patient here to establish care    CVA -patient suffered an acute ischemic stroke on 2021.  This is left him with residual weakness of the left side.  He suffered a subsequent fall him 2021 that resulted in a closed fracture of the left hip.    Depression-chronic; current regimen is Lexapro 5 mg p.o. daily    Hypertension-chronic; current regimen is lisinopril 10 mg p.o. daily, metoprolol XL 25 mg daily.    Hyperlipidemia-chronic; current regimen is Lipitor 40 mg p.o. daily    Past Medical History:   Diagnosis Date   • Acute ischemic stroke (HCC) 2021     posterior limb of the right internal capsule   • Arthritis    • BPH with elevated PSA    • Chronic bronchitis (HCC)    • Closed fracture of left hip with routine healing 2021   • Hyperlipidemia    • Hypertension    • Lacunar infarction (HCC)    • Noncompliance with medication regimen     pt states he never took his metoprolol   • Prediabetes      Past Surgical History:   Procedure Laterality Date   • FINGER SURGERY      R ring finger, partial amputation repair   • ORIF HIP FRACTURE Left 2021    Procedure: HIP OPEN REDUCTION INTERNAL FIXATION WITH FEMORAL NECK SYSTEM LEFT;  Surgeon: Aditya Ruano MD;  [facility];  Service: Orthopedics;  Laterality: Left;     Allergies   Allergen Reactions   • Penicillins Hives     Social History     Socioeconomic History   • Marital status: Single   Tobacco Use   • Smoking status: Former Smoker     Packs/day: 1.00     Years: 48.00     Pack years: 48.00     Types: Cigarettes     Quit date: 2021     Years since quittin.8   • Smokeless tobacco: Never Used   Substance  and Sexual Activity   • Alcohol use: No   • Drug use: No   • Sexual activity: Defer     Comment:      Family History   Problem Relation Age of Onset   • Lymphoma Mother    • Lung disease Father    • No Known Problems Son    • Dementia Maternal Grandfather          The following portions of the patient's history were reviewed and updated as appropriate: allergies, current medications, past family history, past medical history, past social history, past surgical history and problem list.    Current Outpatient Medications:   •  aspirin (aspirin) 81 MG EC tablet, Take 1 tablet by mouth Daily., Disp: 30 tablet, Rfl: 2  •  atorvastatin (Lipitor) 40 MG tablet, Take 1 tablet by mouth Daily., Disp: 30 tablet, Rfl: 11  •  baclofen (LIORESAL) 10 MG tablet, Take 1 tablet by mouth 3 (Three) Times a Day., Disp: 90 tablet, Rfl: 2  •  clopidogrel (PLAVIX) 75 MG tablet, TAKE ONE TABLET BY MOUTH DAILY, Disp: 90 tablet, Rfl: 1  •  docusate sodium (Colace) 100 MG capsule, Take 1 capsule by mouth 2 (Two) Times a Day., Disp: 60 capsule, Rfl: 5  •  escitalopram (Lexapro) 5 MG tablet, Take 1 tablet by mouth Daily., Disp: 30 tablet, Rfl: 1  •  famotidine (PEPCID) 20 MG tablet, TAKE ONE TABLET BY MOUTH TWICE A DAY, Disp: 60 tablet, Rfl: 2  •  finasteride (PROSCAR) 5 MG tablet, Take 1 tablet by mouth Daily., Disp: 90 tablet, Rfl: 1  •  lisinopril (PRINIVIL,ZESTRIL) 10 MG tablet, Take 10 mg by mouth Daily., Disp: , Rfl:   •  metoprolol succinate XL (TOPROL-XL) 25 MG 24 hr tablet, TAKE ONE TABLET BY MOUTH DAILY, Disp: 30 tablet, Rfl: 2  •  tamsulosin (FLOMAX) 0.4 MG capsule 24 hr capsule, Take 1 capsule by mouth Every Night., Disp: 90 capsule, Rfl: 1     Review of Systems   Constitutional: Negative for chills, fatigue and fever.   Respiratory: Negative for cough, chest tightness and shortness of breath.    Cardiovascular: Negative for chest pain.   Gastrointestinal: Negative for abdominal pain, diarrhea, nausea and vomiting.   Endocrine:  Negative for cold intolerance and heat intolerance.   Musculoskeletal: Positive for arthralgias and gait problem.   Neurological: Positive for weakness. Negative for dizziness.       Objective   Physical Exam  Constitutional:       Appearance: He is well-developed.   HENT:      Head: Normocephalic and atraumatic.   Eyes:      Conjunctiva/sclera: Conjunctivae normal.      Pupils: Pupils are equal, round, and reactive to light.   Cardiovascular:      Rate and Rhythm: Normal rate and regular rhythm.      Heart sounds: Normal heart sounds.   Pulmonary:      Effort: Pulmonary effort is normal.      Breath sounds: Normal breath sounds.   Abdominal:      General: Bowel sounds are normal.      Palpations: Abdomen is soft.   Musculoskeletal:         General: Normal range of motion.      Cervical back: Normal range of motion.   Skin:     General: Skin is warm and dry.   Neurological:      Mental Status: He is alert and oriented to person, place, and time.      Motor: Weakness (left sided) present.      Gait: Gait abnormal.      Deep Tendon Reflexes: Reflexes are normal and symmetric.   Psychiatric:         Behavior: Behavior normal.         Thought Content: Thought content normal.         Judgment: Judgment normal.       Vitals:    03/21/22 1000 03/21/22 1039   BP: 140/60 134/64   Cuff Size: Adult    Pulse: 63    Resp: 20    Temp: 97.3 °F (36.3 °C)    TempSrc: Infrared    SpO2: 97%    Weight: 58 kg (127 lb 14.4 oz)          Assessment/Plan   Diagnoses and all orders for this visit:    1. Essential hypertension (Primary)  -     CBC & Differential; Future  -     Comprehensive Metabolic Panel; Future  -     Lipid Panel; Future  -     TSH; Future  -     Vitamin B12; Future  -     Vitamin D 25 Hydroxy; Future  -     PSA DIAGNOSTIC ONLY; Future  -     Hemoglobin A1c; Future    2. Prediabetes  -     CBC & Differential; Future  -     Comprehensive Metabolic Panel; Future  -     Lipid Panel; Future  -     TSH; Future  -     Vitamin  B12; Future  -     Vitamin D 25 Hydroxy; Future  -     PSA DIAGNOSTIC ONLY; Future  -     Hemoglobin A1c; Future    3. BPH with elevated PSA  -     CBC & Differential; Future  -     Comprehensive Metabolic Panel; Future  -     Lipid Panel; Future  -     TSH; Future  -     Vitamin B12; Future  -     Vitamin D 25 Hydroxy; Future  -     PSA DIAGNOSTIC ONLY; Future  -     Hemoglobin A1c; Future    4. Cerebrovascular accident (CVA), unspecified mechanism (HCC)  -     CBC & Differential; Future  -     Comprehensive Metabolic Panel; Future  -     Lipid Panel; Future  -     TSH; Future  -     Vitamin B12; Future  -     Vitamin D 25 Hydroxy; Future  -     PSA DIAGNOSTIC ONLY; Future  -     Hemoglobin A1c; Future    5. Left hemiplegia (HCC)  -     CBC & Differential; Future  -     Comprehensive Metabolic Panel; Future  -     Lipid Panel; Future  -     TSH; Future  -     Vitamin B12; Future  -     Vitamin D 25 Hydroxy; Future  -     PSA DIAGNOSTIC ONLY; Future  -     Hemoglobin A1c; Future    6. Impaired mobility  -     CBC & Differential; Future  -     Comprehensive Metabolic Panel; Future  -     Lipid Panel; Future  -     TSH; Future  -     Vitamin B12; Future  -     Vitamin D 25 Hydroxy; Future  -     PSA DIAGNOSTIC ONLY; Future  -     Hemoglobin A1c; Future    7. History of tobacco abuse  -     CBC & Differential; Future  -     Comprehensive Metabolic Panel; Future  -     Lipid Panel; Future  -     TSH; Future  -     Vitamin B12; Future  -     Vitamin D 25 Hydroxy; Future  -     PSA DIAGNOSTIC ONLY; Future  -     Hemoglobin A1c; Future    8. Vitamin D deficiency, unspecified   -     Vitamin D 25 Hydroxy; Future  -     Hemoglobin A1c; Future      Blood pressure improved on recheck  Labs sent  No refills needed today  Return in about 3 months (around 6/21/2022) for Medicare Wellness, collect labs today.

## 2022-03-22 LAB
25(OH)D3+25(OH)D2 SERPL-MCNC: 25.8 NG/ML (ref 30–100)
ALBUMIN SERPL-MCNC: 4.2 G/DL (ref 3.5–5.2)
ALBUMIN/GLOB SERPL: 2.1 G/DL
ALP SERPL-CCNC: 108 U/L (ref 39–117)
ALT SERPL-CCNC: 26 U/L (ref 1–41)
AST SERPL-CCNC: 17 U/L (ref 1–40)
BASOPHILS # BLD AUTO: 0.08 10*3/MM3 (ref 0–0.2)
BASOPHILS NFR BLD AUTO: 0.9 % (ref 0–1.5)
BILIRUB SERPL-MCNC: 0.4 MG/DL (ref 0–1.2)
BUN SERPL-MCNC: 11 MG/DL (ref 8–23)
BUN/CREAT SERPL: 15.3 (ref 7–25)
CALCIUM SERPL-MCNC: 10.9 MG/DL (ref 8.6–10.5)
CHLORIDE SERPL-SCNC: 102 MMOL/L (ref 98–107)
CHOLEST SERPL-MCNC: 92 MG/DL (ref 0–200)
CO2 SERPL-SCNC: 27.7 MMOL/L (ref 22–29)
CREAT SERPL-MCNC: 0.72 MG/DL (ref 0.76–1.27)
EGFRCR SERPLBLD CKD-EPI 2021: 96.5 ML/MIN/1.73
EOSINOPHIL # BLD AUTO: 0.41 10*3/MM3 (ref 0–0.4)
EOSINOPHIL NFR BLD AUTO: 4.5 % (ref 0.3–6.2)
ERYTHROCYTE [DISTWIDTH] IN BLOOD BY AUTOMATED COUNT: 11.7 % (ref 12.3–15.4)
GLOBULIN SER CALC-MCNC: 2 GM/DL
GLUCOSE SERPL-MCNC: 103 MG/DL (ref 65–99)
HBA1C MFR BLD: 5.4 % (ref 4.8–5.6)
HCT VFR BLD AUTO: 34.7 % (ref 37.5–51)
HDLC SERPL-MCNC: 39 MG/DL (ref 40–60)
HGB BLD-MCNC: 11.5 G/DL (ref 13–17.7)
IMM GRANULOCYTES # BLD AUTO: 0.04 10*3/MM3 (ref 0–0.05)
IMM GRANULOCYTES NFR BLD AUTO: 0.4 % (ref 0–0.5)
LDLC SERPL CALC-MCNC: 37 MG/DL (ref 0–100)
LYMPHOCYTES # BLD AUTO: 2.86 10*3/MM3 (ref 0.7–3.1)
LYMPHOCYTES NFR BLD AUTO: 31.4 % (ref 19.6–45.3)
MCH RBC QN AUTO: 33 PG (ref 26.6–33)
MCHC RBC AUTO-ENTMCNC: 33.1 G/DL (ref 31.5–35.7)
MCV RBC AUTO: 99.4 FL (ref 79–97)
MONOCYTES # BLD AUTO: 0.63 10*3/MM3 (ref 0.1–0.9)
MONOCYTES NFR BLD AUTO: 6.9 % (ref 5–12)
NEUTROPHILS # BLD AUTO: 5.1 10*3/MM3 (ref 1.7–7)
NEUTROPHILS NFR BLD AUTO: 55.9 % (ref 42.7–76)
NRBC BLD AUTO-RTO: 0 /100 WBC (ref 0–0.2)
PLATELET # BLD AUTO: 302 10*3/MM3 (ref 140–450)
POTASSIUM SERPL-SCNC: 4.5 MMOL/L (ref 3.5–5.2)
PROT SERPL-MCNC: 6.2 G/DL (ref 6–8.5)
RBC # BLD AUTO: 3.49 10*6/MM3 (ref 4.14–5.8)
SODIUM SERPL-SCNC: 138 MMOL/L (ref 136–145)
TRIGL SERPL-MCNC: 74 MG/DL (ref 0–150)
TSH SERPL DL<=0.005 MIU/L-ACNC: 0.76 UIU/ML (ref 0.27–4.2)
VIT B12 SERPL-MCNC: 579 PG/ML (ref 211–946)
VLDLC SERPL CALC-MCNC: 16 MG/DL (ref 5–40)
WBC # BLD AUTO: 9.12 10*3/MM3 (ref 3.4–10.8)

## 2022-04-06 ENCOUNTER — TELEPHONE (OUTPATIENT)
Dept: INTERNAL MEDICINE | Facility: CLINIC | Age: 72
End: 2022-04-06

## 2022-04-06 NOTE — TELEPHONE ENCOUNTER
----- Message from CHANG Perez sent at 4/5/2022  2:22 PM EDT -----  Please let patient know that his A1c looks great at 5.4%.    Vitamin D is low.  I recommend OTC vitamin D3 of 2000 units daily.    Cholesterol looks great.    He is slightly anemic.  It looks like he has a history of this.  Is he taking any iron supplements?

## 2022-04-06 NOTE — TELEPHONE ENCOUNTER
Pt notified of results and verbalized a good understanding.    He is not taking any iron supplement

## 2022-04-07 RX ORDER — LANOLIN ALCOHOL/MO/W.PET/CERES
325 CREAM (GRAM) TOPICAL 2 TIMES DAILY WITH MEALS
Qty: 60 TABLET | Refills: 11 | Status: SHIPPED | OUTPATIENT
Start: 2022-04-07 | End: 2023-04-07

## 2022-04-08 ENCOUNTER — HOSPITAL ENCOUNTER (EMERGENCY)
Facility: HOSPITAL | Age: 72
Discharge: HOME | End: 2022-04-08
Attending: EMERGENCY MEDICINE | Admitting: EMERGENCY MEDICINE

## 2022-04-08 VITALS
RESPIRATION RATE: 16 BRPM | HEIGHT: 72 IN | SYSTOLIC BLOOD PRESSURE: 156 MMHG | TEMPERATURE: 98.7 F | OXYGEN SATURATION: 97 % | HEART RATE: 107 BPM | BODY MASS INDEX: 18.28 KG/M2 | WEIGHT: 135 LBS | DIASTOLIC BLOOD PRESSURE: 68 MMHG

## 2022-04-08 DIAGNOSIS — N13.9 URINARY OBSTRUCTION: Primary | ICD-10-CM

## 2022-04-08 LAB
ALBUMIN SERPL-MCNC: 4.4 G/DL (ref 3.5–5.2)
ALBUMIN/GLOB SERPL: 1.7 G/DL
ALP SERPL-CCNC: 108 U/L (ref 39–117)
ALT SERPL W P-5'-P-CCNC: 30 U/L (ref 1–41)
ANION GAP SERPL CALCULATED.3IONS-SCNC: 12 MMOL/L (ref 5–15)
AST SERPL-CCNC: 22 U/L (ref 1–40)
BASOPHILS # BLD AUTO: 0.08 10*3/MM3 (ref 0–0.2)
BASOPHILS NFR BLD AUTO: 0.6 % (ref 0–1.5)
BILIRUB SERPL-MCNC: 0.4 MG/DL (ref 0–1.2)
BILIRUB UR QL STRIP: NEGATIVE
BUN SERPL-MCNC: 15 MG/DL (ref 8–23)
BUN/CREAT SERPL: 18.8 (ref 7–25)
CALCIUM SPEC-SCNC: 11.1 MG/DL (ref 8.6–10.5)
CHLORIDE SERPL-SCNC: 103 MMOL/L (ref 98–107)
CLARITY UR: CLEAR
CO2 SERPL-SCNC: 24 MMOL/L (ref 22–29)
COLOR UR: YELLOW
CREAT SERPL-MCNC: 0.8 MG/DL (ref 0.76–1.27)
D-LACTATE SERPL-SCNC: 1.4 MMOL/L (ref 0.5–2)
DEPRECATED RDW RBC AUTO: 43.1 FL (ref 37–54)
EGFRCR SERPLBLD CKD-EPI 2021: 93.4 ML/MIN/1.73
EOSINOPHIL # BLD AUTO: 0.26 10*3/MM3 (ref 0–0.4)
EOSINOPHIL NFR BLD AUTO: 1.9 % (ref 0.3–6.2)
ERYTHROCYTE [DISTWIDTH] IN BLOOD BY AUTOMATED COUNT: 12.3 % (ref 12.3–15.4)
GLOBULIN UR ELPH-MCNC: 2.6 GM/DL
GLUCOSE SERPL-MCNC: 107 MG/DL (ref 65–99)
GLUCOSE UR STRIP-MCNC: NEGATIVE MG/DL
HCT VFR BLD AUTO: 35.3 % (ref 37.5–51)
HGB BLD-MCNC: 12 G/DL (ref 13–17.7)
HGB UR QL STRIP.AUTO: NEGATIVE
HOLD SPECIMEN: NORMAL
IMM GRANULOCYTES # BLD AUTO: 0.07 10*3/MM3 (ref 0–0.05)
IMM GRANULOCYTES NFR BLD AUTO: 0.5 % (ref 0–0.5)
KETONES UR QL STRIP: NEGATIVE
LEUKOCYTE ESTERASE UR QL STRIP.AUTO: NEGATIVE
LIPASE SERPL-CCNC: 28 U/L (ref 13–60)
LYMPHOCYTES # BLD AUTO: 3.29 10*3/MM3 (ref 0.7–3.1)
LYMPHOCYTES NFR BLD AUTO: 24.2 % (ref 19.6–45.3)
MCH RBC QN AUTO: 32.6 PG (ref 26.6–33)
MCHC RBC AUTO-ENTMCNC: 34 G/DL (ref 31.5–35.7)
MCV RBC AUTO: 95.9 FL (ref 79–97)
MONOCYTES # BLD AUTO: 1.12 10*3/MM3 (ref 0.1–0.9)
MONOCYTES NFR BLD AUTO: 8.2 % (ref 5–12)
NEUTROPHILS NFR BLD AUTO: 64.6 % (ref 42.7–76)
NEUTROPHILS NFR BLD AUTO: 8.76 10*3/MM3 (ref 1.7–7)
NITRITE UR QL STRIP: NEGATIVE
NRBC BLD AUTO-RTO: 0 /100 WBC (ref 0–0.2)
PH UR STRIP.AUTO: 5.5 [PH] (ref 5–8)
PLATELET # BLD AUTO: 312 10*3/MM3 (ref 140–450)
PMV BLD AUTO: 10.2 FL (ref 6–12)
POTASSIUM SERPL-SCNC: 4.6 MMOL/L (ref 3.5–5.2)
PROT SERPL-MCNC: 7 G/DL (ref 6–8.5)
PROT UR QL STRIP: NEGATIVE
RBC # BLD AUTO: 3.68 10*6/MM3 (ref 4.14–5.8)
SODIUM SERPL-SCNC: 139 MMOL/L (ref 136–145)
SP GR UR STRIP: 1.01 (ref 1–1.03)
UROBILINOGEN UR QL STRIP: NORMAL
WBC NRBC COR # BLD: 13.58 10*3/MM3 (ref 3.4–10.8)
WHOLE BLOOD HOLD SPECIMEN: NORMAL
WHOLE BLOOD HOLD SPECIMEN: NORMAL

## 2022-04-08 PROCEDURE — 85025 COMPLETE CBC W/AUTO DIFF WBC: CPT | Performed by: EMERGENCY MEDICINE

## 2022-04-08 PROCEDURE — 83605 ASSAY OF LACTIC ACID: CPT | Performed by: EMERGENCY MEDICINE

## 2022-04-08 PROCEDURE — 80053 COMPREHEN METABOLIC PANEL: CPT | Performed by: EMERGENCY MEDICINE

## 2022-04-08 PROCEDURE — 51702 INSERT TEMP BLADDER CATH: CPT

## 2022-04-08 PROCEDURE — 81003 URINALYSIS AUTO W/O SCOPE: CPT | Performed by: EMERGENCY MEDICINE

## 2022-04-08 PROCEDURE — 83690 ASSAY OF LIPASE: CPT | Performed by: EMERGENCY MEDICINE

## 2022-04-08 PROCEDURE — 51798 US URINE CAPACITY MEASURE: CPT

## 2022-04-08 PROCEDURE — 99283 EMERGENCY DEPT VISIT LOW MDM: CPT

## 2022-04-08 RX ORDER — LANOLIN ALCOHOL/MO/W.PET/CERES
325 CREAM (GRAM) TOPICAL 2 TIMES DAILY WITH MEALS
Qty: 60 TABLET | Refills: 11 | Status: CANCELLED | OUTPATIENT
Start: 2022-04-08 | End: 2023-04-08

## 2022-04-08 RX ORDER — LIDOCAINE HYDROCHLORIDE 20 MG/ML
JELLY TOPICAL ONCE
Status: DISCONTINUED | OUTPATIENT
Start: 2022-04-08 | End: 2022-04-08 | Stop reason: HOSPADM

## 2022-04-08 RX ORDER — SODIUM CHLORIDE 9 MG/ML
10 INJECTION INTRAVENOUS AS NEEDED
Status: DISCONTINUED | OUTPATIENT
Start: 2022-04-08 | End: 2022-04-08 | Stop reason: HOSPADM

## 2022-04-08 NOTE — TELEPHONE ENCOUNTER
Caller: Obed Cotto Jr.    Relationship: Self    Best call back number: [phone]    Requested Prescriptions:   Requested Prescriptions     Pending Prescriptions Disp Refills   • ferrous sulfate 325 (65 FE) MG EC tablet 60 tablet 11     Sig: Take 1 tablet by mouth 2 (Two) Times a Day With Meals.        Pharmacy where request should be sent: [id number] [place] [address] [address]  AT [facility] [phone] [facility] [phone] FX     Additional details provided by patient: PATIENT IS WANTING THE MEDICATION TO GO HERE [facility]    Does the patient have less than a 3 day supply:  [x] Yes  [] No    Stormy Bhat Rep   04/08/22 09:08 EDT

## 2022-04-12 ENCOUNTER — PATIENT OUTREACH (OUTPATIENT)
Dept: CASE MANAGEMENT | Facility: OTHER | Age: 72
End: 2022-04-12

## 2022-04-12 NOTE — OUTREACH NOTE
"AMBULATORY CASE MANAGEMENT NOTE    Name and Relationship of Patient/Support Person: Obed Cotto . - Self    Patient Outreach    Pt contacted regarding [facility] ED visit 4/8/22 with chief c/o listed as urinary retention.  Role of Ambulatory Nurse  explained and number provided. States everything is \"back to normal.\"  Voiding without difficulty. Symptoms discussed that would warrant return to ED.  Offered to assist in scheduling ED f/u with PCP, which he states he has upcoming appt in June and at present does not need sooner appt.  He states he also has urologist if needed.  He explains he had stroke in May of last year and he lives with his son, daughter in law and grandson.  Son manages his medicines, fixes his meals and assist him when needed.  States stroke residual left leg, arm and hand.  He goes to PT and OT weekly at [facility].  He has Wheels as transportation.  He does not check bp at home, but states usually around 130/60s when checked at appts. Care gaps reviewed.  He does not have living will, however does have material regarding LW.  [facility] hotline explained and number provided.  Also, explained [facility] 24/7 Nurse Call Center and number provided.  He denies food or transportation issues, however did endorse some finacial hardship with rent and utilities.  He does not want to move b/c states he has lived in this house for 16 years and it is safe and quite neighborhood. Although his rent has increased recently, states he has been able to catch up.  Community resources provided-[employer], [facility], [facility] and [facility] [place] if needed.  He denies any needs that RN-ACM could assist him with today and voiced his appreciation for the call.  Encouraged to contact RN-ACM for future needs.     SDOH updated and reviewed with the patient during this program:  Financial Resource Strain: Medium Risk   • Difficulty of Paying Living Expenses: " Somewhat hard      Food Insecurity: No Food Insecurity   • Worried About Running Out of Food in the Last Year: Never true   • Ran Out of Food in the Last Year: Never true      Transportation Needs: No Transportation Needs   • Lack of Transportation (Medical): No   • Lack of Transportation (Non-Medical): No      Housing Stability: High Risk   • Unable to Pay for Housing in the Last Year: Yes   • Number of Places Lived in the Last Year: 1   • Unstable Housing in the Last Year: No      Continuing Care   [facility]    [address], [facility] [address]    Phone: [phone]    Resource for: Financial Resource Strain   [employer] [facility] [place] [address], [facility] [address]    Phone: [phone]    Resource for: Food Insecurity     Adult Patient Profile  Questions/Answers    Flowsheet Row Most Recent Value   Equipment Currently Used at Home cane, quad tip, commode, hospital bed, walker, rolling, wheelchair  [grabber reacher]   Change in Functional Status Since Onset of Current Illness/Injury --  [Goes to PT and OT 1 x /week]   People in Home child(bill), dependent, grandchild(bill)  [son, daughter in law and grandson live with him]   Current Living Arrangements home        Social Work Assessment  Questions/Answers    Flowsheet Row Most Recent Value   People in Home child(bill), dependent, grandchild(bill)  [son, daughter in law and grandson live with him]   Current Living Arrangements home   Primary Care Provided by self, child(bill)   Quality of Family Relationships supportive   Equipment Currently Used at Home cane, quad tip, commode, hospital bed, walker, rolling, wheelchair  [grabber reacher]        Send Education  Questions/Answers    Flowsheet Row Most Recent Value   Annual Wellness Visit:  --  [is scheduled for 6/23/22]   Other Patient Education/Resources  24/[facility] Nurse Call Line, Advanced Care Planning, Transportation   24/7 Nurse  Call Line Education Method Verbal   ACP Education Method Verbal   Transportation Education Method Verbal  [Pt has Wheels]   Advanced Directives: --  [pt has material.  ACP hotline explained and number provided ]            ANTONIO JEROME  Ambulatory Case Management    4/12/2022, 11:32 EDT

## 2022-05-06 ENCOUNTER — PROCEDURE VISIT (OUTPATIENT)
Dept: NEUROLOGY | Facility: CLINIC | Age: 72
End: 2022-05-06

## 2022-05-06 VITALS
SYSTOLIC BLOOD PRESSURE: 132 MMHG | DIASTOLIC BLOOD PRESSURE: 72 MMHG | OXYGEN SATURATION: 99 % | TEMPERATURE: 97.5 F | BODY MASS INDEX: 18.28 KG/M2 | HEIGHT: 72 IN | HEART RATE: 55 BPM | WEIGHT: 135 LBS | RESPIRATION RATE: 16 BRPM

## 2022-05-06 DIAGNOSIS — R25.2 SPASTICITY AS LATE EFFECT OF CEREBROVASCULAR ACCIDENT (CVA): Primary | ICD-10-CM

## 2022-05-06 DIAGNOSIS — I69.354 HEMIPLEGIA AND HEMIPARESIS FOLLOWING CEREBRAL INFARCTION AFFECTING LEFT NON-DOMINANT SIDE: ICD-10-CM

## 2022-05-06 DIAGNOSIS — I69.398 SPASTICITY AS LATE EFFECT OF CEREBROVASCULAR ACCIDENT (CVA): Primary | ICD-10-CM

## 2022-05-06 PROCEDURE — 64644 CHEMODENERV 1 EXTREM 5/> MUS: CPT | Performed by: PSYCHIATRY & NEUROLOGY

## 2022-05-06 RX ORDER — ATORVASTATIN CALCIUM 80 MG/1
80 TABLET, FILM COATED ORAL
COMMUNITY
Start: 2022-04-13 | End: 2022-08-12

## 2022-05-06 NOTE — PROGRESS NOTES
CC: He is in clinic for third Botox injection.  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  Patient has developed spasticity of left upper extremity involving DIP, PIP, FDP, biceps.  There is also spasticity of left foot involving inversion of the left foot, plantar flexion of left foot.     Botox Procedure Note     The risks and benefits were discussed with the patient. The patient was given the opportunity to ask questions. Informed consent form was signed.     Onabotulinumtoxin A was reconstituted with 0.9% normal saline. All injections sites were prepped with alcohol swab.     Following muscles were injected:     Bicep -75 units at 3 sites.  Brachialis: 50 units  Brachioradialis: 50 units  FCR/flexor carpi radialis: 25 units  FCU/flexor carpi ulnaris: 25 units  FDP/flexor digitorum profundus-25 units  FDS/flexor digitorum superficialis 25 units     Gastrocnemius -75units at 3 sites  Soleus- 50 units at 2 sites  Tibialis posterior-50 units at 2 sites     The patient tolerated the procedure well. There were no immediate complications. The patient will follow up in 12 weeks for next injection.    Note: He did feel reduced muscle tone after the first injection.  I am expecting better response with further Botox treatment.     Total amount of onabotulinumtoxin A injected was 400 units with 0 units wasted.    Note: Reports that he did notice significant improvement in left lower extremity spasticity but did not notice much change in left upper extremity spasticity.  I have injected slightly higher dose in left upper extremity this time.  I will plan to inject high-dose With a total of 600 units on next injection.

## 2022-05-23 NOTE — PROGRESS NOTES
Physical Therapy Daily Progress Note  Visit: 8  Date of Initial Visit: Type: THERAPY  Noted: 2021    Patient: Obed Cotto Jr.   : 1950  Diagnosis/ICD-10 Code:  Impaired functional mobility, balance, gait, and endurance [Z74.09]  Referring practitioner: CHANG Mathew  Date of Initial Visit: Type: THERAPY  Noted: 2021  Today's Date: 10/4/2021  Patient seen for 8 sessions      Subjective:   Patient reports: he gets botox on Friday and returns to the MD tomorrow.   Pain: 0/10  Clinical Progress: improved  Home Program Compliance: Yes  Treatment has included: therapeutic exercise and neuromuscular re-education    Objective   See Exercise, Manual, and Modality Logs for complete treatment.    PT Neuro          Assessment & Plan     Assessment  Assessment details: Patient demonstrates difficulty pushing into a QP position due to L arm and hand. He is limited with ER. Patient was able to use R arm to push into TK. This position did exhaust his L leg. Continue to progress as tolerated.     Plan  Plan details: Patient to continue with PT services to improve gait, balance, strength, transfers and overall functional mobility.          Timed:  Manual Therapy:            0     mins  17593;  Therapeutic Exercise:    8    mins  14824;     Neuromuscular Lulu:    32    mins  69540;    Therapeutic Activity:      0     mins  70835;     Gait Trainin    mins  98057;     Electrical Stimulation:    0    mins  51915 ( );     Untimed:  Canalith Repositioning techniques _[id number]_ [id number]      Timed Treatment:   40   mins   Total Treatment:     40   mins      Fabiana Latham, PT, DPT, MSCS, CDP  [place] License #: [id number]  Physical Therapist     normal gait and station , no tenderness or deformities present

## 2022-05-26 ENCOUNTER — TELEPHONE (OUTPATIENT)
Dept: INTERNAL MEDICINE | Facility: CLINIC | Age: 72
End: 2022-05-26

## 2022-05-26 RX ORDER — FAMOTIDINE 20 MG/1
20 TABLET, FILM COATED ORAL 2 TIMES DAILY
Qty: 60 TABLET | Refills: 2 | Status: SHIPPED | OUTPATIENT
Start: 2022-05-26 | End: 2022-09-30

## 2022-05-26 NOTE — TELEPHONE ENCOUNTER
Caller: Obed Cotto Jr.    Relationship: Self    Best call back number: [phone]    What is the best time to reach you: AS SOON AS POSSIBLE    Who are you requesting to speak with (clinical staff, provider,  specific staff member): NA    Do you know the name of the person who called: NA    What was the call regarding: PATIENT CALLED TO FOLLOW UP ON STATUS OF REFILL REQUEST    PATIENT IS OUT OF HIS MEDICATION.        Do you require a callback: YES

## 2022-05-26 NOTE — TELEPHONE ENCOUNTER
Caller: Obed Cotto Jr.    Relationship: Self    Best call back number:  [phone]    Requested Prescriptions:   Requested Prescriptions      No prescriptions requested or ordered in this encounter    [facility]      Pharmacy where request should be sent: [id number] [place] [id number] [facility] & [facility] - [phone] [facility] [phone] FX     Additional details provided by patient: PATIENT WOULD LIKE TO KNOW IF HE NEEDS TO STILL TAKE THIS MEDICATION, HE ALSO STATED THAT THIS MEDICATION IS OUT OF REFILLS.    Does the patient have less than a 3 day supply:  [x] Yes  [] No    Stormy Kee Rep   05/26/22 09:18 EDT

## 2022-05-26 NOTE — TELEPHONE ENCOUNTER
Called and spoke with patient, informed him that Laura did want him to continue medication and that refills were sent to the pharmacy. He verbalized understanding and had no further questions.

## 2022-05-26 NOTE — TELEPHONE ENCOUNTER
05/20/21 1517   C-SSRS (Frequent Screen)   2. Have you actually had any thoughts of killing yourself? No   6. Have you done anything, started to do anything, or prepared to do anything to end your life? No   Suicide Evaluation Negative screen= no ideation, behaviors or history     Nursing Suicide Assessment Note - Inpatient    Current assessment:    Current C-SSRS score: Negative screen= no ideation, behaviors or history      Protective Factors / Reason for Living: Ability to cope with frustration, Ability to cope with stress, Responsibility to children, Lutheran beliefs, Social supports, Restricted access to highly lethal methods of suicide    Interventions:   · Implemented the Safety / Recovery Plan    Other Interventions Implemented:  · Pt denies SI (no plan or intent)   Laura,   Last visit with our office was 3/21/2022. But I don't see documentation regarding this medication. Please advise.

## 2022-06-14 ENCOUNTER — OFFICE VISIT (OUTPATIENT)
Dept: ORTHOPEDIC SURGERY | Facility: CLINIC | Age: 72
End: 2022-06-14

## 2022-06-14 VITALS
DIASTOLIC BLOOD PRESSURE: 50 MMHG | WEIGHT: 130 LBS | SYSTOLIC BLOOD PRESSURE: 140 MMHG | BODY MASS INDEX: 17.61 KG/M2 | HEIGHT: 72 IN

## 2022-06-14 DIAGNOSIS — S72.002D CLOSED FRACTURE OF NECK OF LEFT FEMUR WITH ROUTINE HEALING, SUBSEQUENT ENCOUNTER: ICD-10-CM

## 2022-06-14 DIAGNOSIS — Z87.81 STATUS POST OPEN REDUCTION AND INTERNAL FIXATION (ORIF) OF FRACTURE: Primary | ICD-10-CM

## 2022-06-14 DIAGNOSIS — Z98.890 STATUS POST OPEN REDUCTION AND INTERNAL FIXATION (ORIF) OF FRACTURE: Primary | ICD-10-CM

## 2022-06-14 PROCEDURE — 99213 OFFICE O/P EST LOW 20 MIN: CPT | Performed by: PHYSICIAN ASSISTANT

## 2022-06-14 NOTE — PROGRESS NOTES
"    [facility] [place] [facility] Note        Subjective     CC: Follow-up (3 month follow up; 6.5 months s/p Hip Open Reduction Internal Fixation With Femoral Neck System Left 12/2/21)      REINALDO Cotto Jr. is a 72 y.o. male.  Patient returns today for follow-up status post left femoral neck fracture ORIF with femoral neck system.  He has no complaints at this time.  He states he is working with physical therapy 1 time a week.  He uses a walker when he is at home but for this appointment he is in a wheelchair.    Pain scale: 0/10.    Overall patient feels his symptoms have continued to improve.    ROS:    Constiutional:Pt denies fever, chills, nausea, or vomiting.  MSK:as above        Objective      Past Medical History  Past Medical History:   Diagnosis Date   • Acute ischemic stroke (HCC) 05/30/2021     posterior limb of the right internal capsule   • Arthritis    • BPH with elevated PSA    • Chronic bronchitis (HCC)    • Closed fracture of left hip with routine healing 11/30/2021   • Hyperlipidemia    • Hypertension    • Lacunar infarction (HCC)    • Noncompliance with medication regimen     pt states he never took his metoprolol   • Prediabetes          Physical Exam  /50   Ht 182.9 cm (72.01\")   Wt 59 kg (130 lb) Comment: per pt report; in wheelchair  BMI 17.63 kg/m²     Body mass index is 17.63 kg/m².    Patient is well nourished and well developed.        Ortho Exam  Integument:              Left hip: Incision is well-healed with no signs of infection     Lower Extremity:              Left hip:                          Tenderness:    None                          Swelling:          None                          Crepitus:          None                          Range of motion:        External Rotation:       30°                                                              Internal Rotation:        30°                                                              Flexion:                       " 90°                                                              Extension:                   0°                                               Deformities:     None                          Functional testing: Negative Stinchfield                                      Slightly shortened on left      Imaging/Labs/EMG Reviewed:  Ordered left hip plain films.  Imaging read/interpreted by Dr. Marcano.    Left Hip X-Ray     Indication: s/p ORIF     Views:  AP pelvis and Frog Leg views     Comparison: Left hip 3/14/2022     Findings:  The  fracture of the femoral neck appears to be healing appropriately  No signs of implant cutout are present in the proximal femur.  The implants are intact.    No bony lesions are appreciated  Normal soft tissue appearance     Impression:  Stable exam with interval and complete healing of the femoral neck fracture s/p ORIF      Assessment:  1. Status post open reduction and internal fixation (ORIF) of fracture    2. Closed fracture of neck of left femur with routine healing, subsequent encounter        Plan:  1. Status post left hip fracture fixation, stable and healed.  2. Continue working with PT.  3. To remain weightbearing as tolerated with walker.  4. Recommend OTC pain medication.  5. Follow up in 6 months, when he is 1 year out from his surgery date (December 2022).  Patient will require repeat imaging of his left hip at that appointment.  The appointment needs to be on a Monday afternoon or on a Wednesday when Dr. Ruano is in clinic.    6. Questions and concerns answered.      Omayra Rosenberg PA-C  06/15/22  14:34 EDT      Dictated Utilizing Dragon Dictation.

## 2022-06-23 ENCOUNTER — OFFICE VISIT (OUTPATIENT)
Dept: INTERNAL MEDICINE | Facility: CLINIC | Age: 72
End: 2022-06-23

## 2022-06-23 VITALS
HEART RATE: 85 BPM | TEMPERATURE: 97.1 F | DIASTOLIC BLOOD PRESSURE: 60 MMHG | RESPIRATION RATE: 18 BRPM | SYSTOLIC BLOOD PRESSURE: 120 MMHG | OXYGEN SATURATION: 96 %

## 2022-06-23 DIAGNOSIS — Z00.00 MEDICARE ANNUAL WELLNESS VISIT, SUBSEQUENT: Primary | ICD-10-CM

## 2022-06-23 DIAGNOSIS — Z00.00 ROUTINE GENERAL MEDICAL EXAMINATION AT A HEALTH CARE FACILITY: ICD-10-CM

## 2022-06-23 PROCEDURE — 1126F AMNT PAIN NOTED NONE PRSNT: CPT | Performed by: NURSE PRACTITIONER

## 2022-06-23 PROCEDURE — 1170F FXNL STATUS ASSESSED: CPT | Performed by: NURSE PRACTITIONER

## 2022-06-23 PROCEDURE — 96160 PT-FOCUSED HLTH RISK ASSMT: CPT | Performed by: NURSE PRACTITIONER

## 2022-06-23 PROCEDURE — 99397 PER PM REEVAL EST PAT 65+ YR: CPT | Performed by: NURSE PRACTITIONER

## 2022-06-23 PROCEDURE — 1159F MED LIST DOCD IN RCRD: CPT | Performed by: NURSE PRACTITIONER

## 2022-06-23 NOTE — PATIENT INSTRUCTIONS
Fall Prevention in the Home, Adult  Falls can cause injuries and can happen to people of all ages. There are many things you can do to make your home safe and to help prevent falls. Ask for help when making these changes.  What actions can I take to prevent falls?  General Instructions  Use good lighting in all rooms. Replace any light bulbs that burn out.  Turn on the lights in dark areas. Use night-lights.  Keep items that you use often in easy-to-reach places. Lower the shelves around your home if needed.  Set up your furniture so you have a clear path. Avoid moving your furniture around.  Do not have throw rugs or other things on the floor that can make you trip.  Avoid walking on wet floors.  If any of your floors are uneven, fix them.  Add color or contrast paint or tape to clearly aditya and help you see:  Grab bars or handrails.  First and last steps of staircases.  Where the edge of each step is.  If you use a stepladder:  Make sure that it is fully opened. Do not climb a closed stepladder.  Make sure the sides of the stepladder are locked in place.  Ask someone to hold the stepladder while you use it.  Know where your pets are when moving through your home.  What can I do in the bathroom?         Keep the floor dry. Clean up any water on the floor right away.  Remove soap buildup in the tub or shower.  Use nonskid mats or decals on the floor of the tub or shower.  Attach bath mats securely with double-sided, nonslip rug tape.  If you need to sit down in the shower, use a plastic, nonslip stool.  Install grab bars by the toilet and in the tub and shower. Do not use towel bars as grab bars.  What can I do in the bedroom?  Make sure that you have a light by your bed that is easy to reach.  Do not use any sheets or blankets for your bed that hang to the floor.  Have a firm chair with side arms that you can use for support when you get dressed.  What can I do in the kitchen?  Clean up any spills right away.  If  you need to reach something above you, use a step stool with a grab bar.  Keep electrical cords out of the way.  Do not use floor polish or wax that makes floors slippery.  What can I do with my stairs?  Do not leave any items on the stairs.  Make sure that you have a light switch at the top and the bottom of the stairs.  Make sure that there are handrails on both sides of the stairs. Fix handrails that are broken or loose.  Install nonslip stair treads on all your stairs.  Avoid having throw rugs at the top or bottom of the stairs.  Choose a carpet that does not hide the edge of the steps on the stairs.  Check carpeting to make sure that it is firmly attached to the stairs. Fix carpet that is loose or worn.  What can I do on the outside of my home?  Use bright outdoor lighting.  Fix the edges of walkways and driveways and fix any cracks.  Remove anything that might make you trip as you walk through a door, such as a raised step or threshold.  Trim any bushes or trees on paths to your home.  Check to see if handrails are loose or broken and that both sides of all steps have handrails.  Install guardrails along the edges of any raised decks and porches.  Clear paths of anything that can make you trip, such as tools or rocks.  Have leaves, snow, or ice cleared regularly.  Use sand or salt on paths during winter.  Clean up any spills in your garage right away. This includes grease or oil spills.  What other actions can I take?  Wear shoes that:  Have a low heel. Do not wear high heels.  Have rubber bottoms.  Feel good on your feet and fit well.  Are closed at the toe. Do not wear open-toe sandals.  Use tools that help you move around if needed. These include:  Canes.  Walkers.  Scooters.  Crutches.  Review your medicines with your doctor. Some medicines can make you feel dizzy. This can increase your chance of falling.  Ask your doctor what else you can do to help prevent falls.  Where to find more information  Centers  for Disease Control and Prevention, [employer]: www.cdc.gov  [employer] [place] on Aging: www.alina.nih.gov  Contact a doctor if:  You are afraid of falling at home.  You feel weak, drowsy, or dizzy at home.  You fall at home.  Summary  There are many simple things that you can do to make your home safe and to help prevent falls.  Ways to make your home safe include removing things that can make you trip and installing grab bars in the bathroom.  Ask for help when making these changes in your home.  This information is not intended to replace advice given to you by your health care provider. Make sure you discuss any questions you have with your health care provider.  Document Revised: 07/21/2021 Document Reviewed: 07/21/2021  Elsevier Patient Education © 2021 Elsevier Inc.

## 2022-06-23 NOTE — PROGRESS NOTES
The ABCs of the Annual Wellness Visit  Subsequent Medicare Wellness Visit    Chief Complaint   Patient presents with   • Medicare Wellness-subsequent      Subjective    History of Present Illness:  Obed Cotto Jr. is a 72 y.o. male who presents for a Subsequent Medicare Wellness Visit.    The following portions of the patient's history were reviewed and   updated as appropriate: allergies, current medications, past family history, past medical history, past social history, past surgical history and problem list.    Compared to one year ago, the patient feels his physical   health is better.    Compared to one year ago, the patient feels his mental   health is better.    Recent Hospitalizations:  This patient has had a [facility] admission record on file within the last 365 days.    Current Medical Providers:  Patient Care Team:  Laura Hicks APRN as PCP - General (Family Medicine)  Dieudonne Hartman MD as Consulting Physician (Urology)    Outpatient Medications Prior to Visit   Medication Sig Dispense Refill   • aspirin (aspirin) 81 MG EC tablet Take 1 tablet by mouth Daily. 30 tablet 2   • baclofen (LIORESAL) 10 MG tablet Take 1 tablet by mouth 3 (Three) Times a Day. 90 tablet 2   • clopidogrel (PLAVIX) 75 MG tablet TAKE ONE TABLET BY MOUTH DAILY 90 tablet 1   • docusate sodium (Colace) 100 MG capsule Take 1 capsule by mouth 2 (Two) Times a Day. 60 capsule 5   • escitalopram (Lexapro) 5 MG tablet Take 1 tablet by mouth Daily. 30 tablet 1   • famotidine (PEPCID) 20 MG tablet Take 1 tablet by mouth 2 (Two) Times a Day. 60 tablet 2   • ferrous sulfate 325 (65 FE) MG EC tablet Take 1 tablet by mouth 2 (Two) Times a Day With Meals. 60 tablet 11   • finasteride (PROSCAR) 5 MG tablet Take 1 tablet by mouth Daily. 90 tablet 1   • lisinopril (PRINIVIL,ZESTRIL) 10 MG tablet Take 10 mg by mouth Daily.     • metoprolol succinate XL (TOPROL-XL) 25 MG 24 hr tablet TAKE ONE TABLET BY MOUTH DAILY 30 tablet 2   •  tamsulosin (FLOMAX) 0.4 MG capsule 24 hr capsule Take 1 capsule by mouth Every Night. 90 capsule 1   • atorvastatin (Lipitor) 40 MG tablet Take 1 tablet by mouth Daily. 30 tablet 11   • atorvastatin (LIPITOR) 80 MG tablet Take 80 mg by mouth every night at bedtime.       No facility-administered medications prior to visit.       No opioid medication identified on active medication list. I have reviewed chart for other potential  high risk medication/s and harmful drug interactions in the elderly.          Aspirin is on active medication list. Aspirin use is indicated based on review of current medical condition/s. Pros and cons of this therapy have been discussed today. Benefits of this medication outweigh potential harm.  Patient has been encouraged to continue taking this medication.  .      Patient Active Problem List   Diagnosis   • Urinary retention   • Positive colorectal cancer screening using DNA-based stool test   • Leukocytosis   • Prediabetes   • BPH (benign prostatic hyperplasia)   • BPH with elevated PSA   • Essential hypertension   • Stroke (HCC)   • Dysarthria   • Dysphagia   • Impaired mobility   • Memory impairment   • Leg cramps   • Left hemiplegia (HCC)   • History of tobacco abuse   • Closed fracture of neck of left femur (HCC)   • Severe malnutrition (CMS/HCC)   • Vitamin D deficiency, unspecified      Advance Care Planning  Advance Directive is not on file.  ACP discussion was held with the patient during this visit. Patient does not have an advance directive, information provided.    Review of Systems   Musculoskeletal: Positive for gait problem.   Neurological: Positive for weakness.   All other systems reviewed and are negative.    CVA -patient suffered an acute ischemic stroke on 5/30/2021.  This is left him with residual weakness of the left side.  He suffered a subsequent fall him November 2021 that resulted in a closed fracture of the left hip.     Depression-chronic; current regimen is  "Lexapro 5 mg p.o. daily     Hypertension-chronic; current regimen is lisinopril 10 mg p.o. daily, metoprolol XL 25 mg daily.     Hyperlipidemia-chronic; current regimen is Lipitor 40 mg p.o. daily       Objective    Vitals:    06/23/22 1304   BP: 120/60   Cuff Size: Adult   Pulse: 85   Resp: 18   Temp: 97.1 °F (36.2 °C)   TempSrc: Infrared   SpO2: 96%   Weight: Comment: in wheel chair/stroke left side   Height: Comment: in wheel chair   PainSc: 0-No pain     Estimated body mass index is 17.63 kg/m² as calculated from the following:    Height as of 6/14/22: 182.9 cm (72.01\").    Weight as of 6/14/22: 59 kg (130 lb).    BMI is below normal parameters (malnutrition). Recommendations: none (medical contraindication)      Does the patient have evidence of cognitive impairment? No    Physical Exam  Constitutional:       Appearance: He is well-developed.   HENT:      Head: Normocephalic and atraumatic.   Eyes:      Conjunctiva/sclera: Conjunctivae normal.      Pupils: Pupils are equal, round, and reactive to light.   Cardiovascular:      Rate and Rhythm: Normal rate and regular rhythm.      Heart sounds: Normal heart sounds.   Pulmonary:      Effort: Pulmonary effort is normal.      Breath sounds: Normal breath sounds.   Abdominal:      General: Bowel sounds are normal.      Palpations: Abdomen is soft.   Musculoskeletal:         General: Normal range of motion.      Cervical back: Normal range of motion.   Skin:     General: Skin is warm and dry.   Neurological:      Mental Status: He is alert and oriented to person, place, and time.      Gait: Gait abnormal (in WC, left sided weakness).      Deep Tendon Reflexes: Reflexes are normal and symmetric.   Psychiatric:         Behavior: Behavior normal.         Thought Content: Thought content normal.         Judgment: Judgment normal.                 HEALTH RISK ASSESSMENT    Smoking Status:  Social History     Tobacco Use   Smoking Status Former Smoker   • Packs/day: 1.00   • " Years: 48.00   • Pack years: 48.00   • Types: Cigarettes   • Quit date: 2021   • Years since quittin.0   Smokeless Tobacco Never Used     Alcohol Consumption:  Social History     Substance and Sexual Activity   Alcohol Use No     Fall Risk Screen:    KAIN Fall Risk Assessment was completed, and patient is at MODERATE risk for falls. Assessment completed on:2022    Depression Screening:  PHQ-2/PHQ-9 Depression Screening 2022   Retired PHQ-9 Total Score -   Retired Total Score -   Little Interest or Pleasure in Doing Things 0-->not at all   Feeling Down, Depressed or Hopeless 0-->not at all   PHQ-9: Brief Depression Severity Measure Score 0       Health Habits and Functional and Cognitive Screening:  Functional & Cognitive Status 2022   Do you have difficulty preparing food and eating? Yes   Do you have difficulty bathing yourself, getting dressed or grooming yourself? No   Do you have difficulty using the toilet? No   Do you have difficulty moving around from place to place? No   Do you have trouble with steps or getting out of a bed or a chair? No   Current Diet Unhealthy Diet        Current Diet Comment microwaved meals   Dental Exam Not up to date        Dental Exam Comment 2021   Eye Exam Up to date        Eye Exam Comment 2021   Exercise (times per week) 0 times per week   Current Exercises Include Other        Exercise Comment physical therapy   Current Exercise Activities Include -   Do you need help using the phone?  No   Are you deaf or do you have serious difficulty hearing?  No   Do you need help with transportation? No   Do you need help shopping? Yes   Do you need help preparing meals?  Yes   Do you need help with housework?  Yes   Do you need help with laundry? Yes   Do you need help taking your medications? Yes   Do you need help managing money? No   Do you ever drive or ride in a car without wearing a seat belt? No   Have you felt unusual stress, anger or loneliness in the  last month? Yes   Who do you live with? Child   If you need help, do you have trouble finding someone available to you? Yes   Have you been bothered in the last four weeks by sexual problems? No   Do you have difficulty concentrating, remembering or making decisions? No       Age-appropriate Screening Schedule:  Refer to the list below for future screening recommendations based on patient's age, sex and/or medical conditions. Orders for these recommended tests are listed in the plan section. The patient has been provided with a written plan.    Health Maintenance   Topic Date Due   • URINE MICROALBUMIN  Never done   • DIABETIC FOOT EXAM  Never done   • ZOSTER VACCINE (2 of 2) 07/18/2017   • TDAP/TD VACCINES (1 - Tdap) 06/23/2023 (Originally 3/3/1969)   • DIABETIC EYE EXAM  07/01/2022   • HEMOGLOBIN A1C  09/21/2022   • INFLUENZA VACCINE  10/01/2022   • LIPID PANEL  03/21/2023              Assessment & Plan   CMS Preventative Services Quick Reference  Risk Factors Identified During Encounter  Fall Risk-High or Moderate  Inactivity/Sedentary  The above risks/problems have been discussed with the patient.  Follow up actions/plans if indicated are seen below in the Assessment/Plan Section.  Pertinent information has been shared with the patient in the After Visit Summary.    COVID-4/10/2021 ([facility])  Flu shot-10/5/2021  Prevnar 13 3/1/2019  Pneumovax 23 2/23/2017  Diabetic eye exam 7/1/2021  Low-dose lung cancer screening 12/3/2021    Diagnoses and all orders for this visit:    1. Medicare annual wellness visit, subsequent (Primary)    2. Routine general medical examination at a health care facility      Counseling - diet and exercise, continue PT, fall prevention    Follow Up:   Return in about 6 months (around 12/23/2022).     An After Visit Summary and PPPS were made available to the patient.

## 2022-06-24 ENCOUNTER — TELEPHONE (OUTPATIENT)
Dept: INTERNAL MEDICINE | Facility: CLINIC | Age: 72
End: 2022-06-24

## 2022-07-22 RX ORDER — ESCITALOPRAM OXALATE 10 MG/1
5 TABLET ORAL DAILY
Qty: 15 TABLET | Refills: 0 | Status: SHIPPED | OUTPATIENT
Start: 2022-07-22 | End: 2022-08-29

## 2022-07-22 RX ORDER — METOPROLOL SUCCINATE 25 MG/1
TABLET, EXTENDED RELEASE ORAL
Qty: 30 TABLET | Refills: 5 | Status: SHIPPED | OUTPATIENT
Start: 2022-07-22 | End: 2022-07-22 | Stop reason: SDUPTHER

## 2022-07-22 RX ORDER — METOPROLOL SUCCINATE 25 MG/1
25 TABLET, EXTENDED RELEASE ORAL DAILY
Qty: 30 TABLET | Refills: 5 | Status: SHIPPED | OUTPATIENT
Start: 2022-07-22 | End: 2022-12-19 | Stop reason: SDUPTHER

## 2022-07-22 NOTE — TELEPHONE ENCOUNTER
Metoprolol was sent in under wrong provider Yadira Fam.  I resent under CHANG Chung.  Patient was seen in June for his medicare wellness.

## 2022-07-22 NOTE — TELEPHONE ENCOUNTER
Rx Refill Note  Requested Prescriptions     Pending Prescriptions Disp Refills   • escitalopram (LEXAPRO) 10 MG tablet [Pharmacy Med Name: ESCITALOPRAM 10MG TABLETS] 45 tablet      Sig: TAKE 1/2 TABLET BY MOUTH ONCE DAILY      Last filled:  Last office visit with prescribing clinician: 6/23/2022      Next office visit with prescribing clinician: 8/4/2022 April JENIFFER Da Silva MA  07/22/22, 16:26 EDT     Please advise, previous provider last prescribed this and not Laura WATSON

## 2022-08-02 RX ORDER — LISINOPRIL 10 MG/1
10 TABLET ORAL DAILY
Qty: 30 TABLET | Refills: 2 | Status: SHIPPED | OUTPATIENT
Start: 2022-08-02 | End: 2022-10-26

## 2022-08-02 NOTE — TELEPHONE ENCOUNTER
Caller: Obed Cotto Jr.    Relationship: Self    Best call back number: [phone]    Requested Prescriptions:   Requested Prescriptions     Pending Prescriptions Disp Refills   • lisinopril (PRINIVIL,ZESTRIL) 10 MG tablet       Sig: Take 1 tablet by mouth Daily.        Pharmacy where request should be sent: [facility] #[id number] [place] [address] [address]  AT [facility] [phone] [facility] [phone]      Additional details provided by patient:   Does the patient have less than a 3 day supply:  [x] Yes  [] No    Stormy Montez Rep   08/02/22 09:29 EDT

## 2022-08-02 NOTE — TELEPHONE ENCOUNTER
Rx Refill Note  Requested Prescriptions     Pending Prescriptions Disp Refills   • lisinopril (PRINIVIL,ZESTRIL) 10 MG tablet       Sig: Take 1 tablet by mouth Daily.      Last filled:  Last office visit with prescribing clinician: 6/23/2022      Next office visit with prescribing clinician: 8/4/2022     Lawanda Da Silva MA  08/02/22, 09:44 EDT     Please advise, doesn't look like this medication has been prescribed by you before.

## 2022-08-04 ENCOUNTER — OFFICE VISIT (OUTPATIENT)
Dept: INTERNAL MEDICINE | Facility: CLINIC | Age: 72
End: 2022-08-04

## 2022-08-04 VITALS
HEIGHT: 72 IN | BODY MASS INDEX: 16.74 KG/M2 | SYSTOLIC BLOOD PRESSURE: 130 MMHG | WEIGHT: 123.6 LBS | TEMPERATURE: 96.8 F | HEART RATE: 51 BPM | OXYGEN SATURATION: 99 % | DIASTOLIC BLOOD PRESSURE: 56 MMHG

## 2022-08-04 DIAGNOSIS — I63.9 CEREBROVASCULAR ACCIDENT (CVA), UNSPECIFIED MECHANISM: Primary | ICD-10-CM

## 2022-08-04 DIAGNOSIS — R53.1 WEAKNESS: ICD-10-CM

## 2022-08-04 DIAGNOSIS — S72.002D CLOSED FRACTURE OF NECK OF LEFT FEMUR WITH ROUTINE HEALING, SUBSEQUENT ENCOUNTER: ICD-10-CM

## 2022-08-04 DIAGNOSIS — G81.94 LEFT HEMIPLEGIA: ICD-10-CM

## 2022-08-04 DIAGNOSIS — Z74.09 IMPAIRED MOBILITY: ICD-10-CM

## 2022-08-04 PROCEDURE — 99213 OFFICE O/P EST LOW 20 MIN: CPT | Performed by: NURSE PRACTITIONER

## 2022-08-04 NOTE — PROGRESS NOTES
Subjective   Obed Cotto Jr. is a 72 y.o. male    Chief Complaint   Patient presents with   • Hip Injury     Needs referral for a electric wheelchair      History of Present Illness     CVA -patient suffered an acute ischemic stroke on 5/30/2021.  This is left him with residual weakness of the left side.  He suffered a subsequent fall him November 2021 that resulted in a closed fracture of the left hip.  He is WC dependent.  He is active in PT and OT and they have suggested an electric WC.  He states that he has a very difficult time managing his WC in his home due to carpeted floors      The following portions of the patient's history were reviewed and updated as appropriate: allergies, current medications, past family history, past medical history, past social history, past surgical history and problem list.    Current Outpatient Medications:   •  aspirin (aspirin) 81 MG EC tablet, Take 1 tablet by mouth Daily., Disp: 30 tablet, Rfl: 2  •  atorvastatin (Lipitor) 40 MG tablet, Take 1 tablet by mouth Daily., Disp: 30 tablet, Rfl: 11  •  atorvastatin (LIPITOR) 80 MG tablet, Take 80 mg by mouth every night at bedtime., Disp: , Rfl:   •  baclofen (LIORESAL) 10 MG tablet, Take 1 tablet by mouth 3 (Three) Times a Day., Disp: 90 tablet, Rfl: 2  •  clopidogrel (PLAVIX) 75 MG tablet, TAKE ONE TABLET BY MOUTH DAILY, Disp: 90 tablet, Rfl: 1  •  docusate sodium (Colace) 100 MG capsule, Take 1 capsule by mouth 2 (Two) Times a Day., Disp: 60 capsule, Rfl: 5  •  escitalopram (LEXAPRO) 10 MG tablet, Take 0.5 tablets by mouth Daily., Disp: 15 tablet, Rfl: 0  •  famotidine (PEPCID) 20 MG tablet, Take 1 tablet by mouth 2 (Two) Times a Day., Disp: 60 tablet, Rfl: 2  •  ferrous sulfate 325 (65 FE) MG EC tablet, Take 1 tablet by mouth 2 (Two) Times a Day With Meals., Disp: 60 tablet, Rfl: 11  •  finasteride (PROSCAR) 5 MG tablet, Take 1 tablet by mouth Daily., Disp: 90 tablet, Rfl: 1  •  lisinopril (PRINIVIL,ZESTRIL) 10 MG tablet, Take  1 tablet by mouth Daily., Disp: 30 tablet, Rfl: 2  •  metoprolol succinate XL (TOPROL-XL) 25 MG 24 hr tablet, Take 1 tablet by mouth Daily., Disp: 30 tablet, Rfl: 5  •  tamsulosin (FLOMAX) 0.4 MG capsule 24 hr capsule, Take 1 capsule by mouth Every Night., Disp: 90 capsule, Rfl: 1     Review of Systems   Constitutional: Negative for chills, fatigue and fever.   Respiratory: Negative for cough, chest tightness and shortness of breath.    Cardiovascular: Negative for chest pain.   Gastrointestinal: Negative for abdominal pain, diarrhea, nausea and vomiting.   Endocrine: Negative for cold intolerance and heat intolerance.   Musculoskeletal: Positive for gait problem. Negative for arthralgias.   Neurological: Positive for weakness. Negative for dizziness.       Objective   Physical Exam  Constitutional:       Appearance: He is well-developed.   HENT:      Head: Normocephalic and atraumatic.   Eyes:      Conjunctiva/sclera: Conjunctivae normal.      Pupils: Pupils are equal, round, and reactive to light.   Cardiovascular:      Rate and Rhythm: Normal rate and regular rhythm.      Heart sounds: Normal heart sounds.   Pulmonary:      Effort: Pulmonary effort is normal.      Breath sounds: Normal breath sounds.   Abdominal:      General: Bowel sounds are normal.      Palpations: Abdomen is soft.   Musculoskeletal:         General: Normal range of motion.      Cervical back: Normal range of motion.   Skin:     General: Skin is warm and dry.   Neurological:      Mental Status: He is alert and oriented to person, place, and time.      Motor: Weakness present.      Gait: Gait abnormal.      Deep Tendon Reflexes: Reflexes are normal and symmetric.      Comments: WC bound   Psychiatric:         Behavior: Behavior normal.         Thought Content: Thought content normal.         Judgment: Judgment normal.       Vitals:    08/04/22 1501 08/04/22 1521   BP: (!) 112/30 130/56   Pulse: 51    Temp: 96.8 °F (36 °C)    TempSrc: Temporal   "  SpO2: 99%    Weight: 56.1 kg (123 lb 9.6 oz)    Height: 182.9 cm (72.01\")          Assessment & Plan   Diagnoses and all orders for this visit:    1. Cerebrovascular accident (CVA), unspecified mechanism (HCC) (Primary)  -     Ambulatory Referral to Occupational Therapy    2. Left hemiplegia (HCC)  -     Ambulatory Referral to Occupational Therapy    3. Impaired mobility  -     Ambulatory Referral to Occupational Therapy    4. Closed fracture of neck of left femur with routine healing, subsequent encounter  -     Ambulatory Referral to Occupational Therapy    5. Weakness  -     Ambulatory Referral to Occupational Therapy    Spoke with his PT office and they recommended sending a referral to OT for a WC evaluation  Order faxed  Return for Next scheduled follow up.           "

## 2022-08-12 RX ORDER — ATORVASTATIN CALCIUM 80 MG/1
TABLET, FILM COATED ORAL
Qty: 90 TABLET | Refills: 1 | Status: SHIPPED | OUTPATIENT
Start: 2022-08-12 | End: 2022-12-19 | Stop reason: SDUPTHER

## 2022-08-12 NOTE — TELEPHONE ENCOUNTER
Rx Refill Note  Requested Prescriptions     Pending Prescriptions Disp Refills   • atorvastatin (LIPITOR) 80 MG tablet [Pharmacy Med Name: ATORVASTATIN 80MG TABLETS] 30 tablet      Sig: TAKE 1 TABLET BY MOUTH EVERY NIGHT AT BEDTIME      Last filled:  Last office visit with prescribing clinician: 08/04/2022 - [place]     Next office visit with prescribing clinician: 12/19/2022 April JENIFFER Da Silva MA  08/12/22, 14:09 EDT     Please advise as you have not refilled this before.

## 2022-08-18 ENCOUNTER — PROCEDURE VISIT (OUTPATIENT)
Dept: NEUROLOGY | Facility: CLINIC | Age: 72
End: 2022-08-18

## 2022-08-18 VITALS — HEIGHT: 72 IN | WEIGHT: 123 LBS | BODY MASS INDEX: 16.66 KG/M2

## 2022-08-18 DIAGNOSIS — I69.354 HEMIPLEGIA AND HEMIPARESIS FOLLOWING CEREBRAL INFARCTION AFFECTING LEFT NON-DOMINANT SIDE: ICD-10-CM

## 2022-08-18 DIAGNOSIS — I69.398 SPASTICITY AS LATE EFFECT OF CEREBROVASCULAR ACCIDENT (CVA): Primary | ICD-10-CM

## 2022-08-18 DIAGNOSIS — R25.2 SPASTICITY AS LATE EFFECT OF CEREBROVASCULAR ACCIDENT (CVA): Primary | ICD-10-CM

## 2022-08-18 PROCEDURE — 64644 CHEMODENERV 1 EXTREM 5/> MUS: CPT | Performed by: PSYCHIATRY & NEUROLOGY

## 2022-08-18 NOTE — PROGRESS NOTES
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  Patient has developed spasticity of left upper extremity involving DIP, PIP, FDP, biceps.  There is also spasticity of left foot involving inversion of the left foot, plantar flexion of left foot.     Botox Procedure Note     The risks and benefits were discussed with the patient. The patient was given the opportunity to ask questions. Informed consent form was signed.     Onabotulinumtoxin A was reconstituted with 0.9% normal saline. All injections sites were prepped with alcohol swab.     Following muscles were injected:     Bicep -75 units at 3 sites.  Brachialis: 25 units  Brachioradialis: 25 units  FCR/flexor carpi radialis: 25 units  FCU/flexor carpi ulnaris: 25 units  FDP/flexor digitorum profundus-25 units  FDS/flexor digitorum superficialis 25 units     Gastrocnemius -75units at 3 sites  Soleus- 50 units at 2 sites  Tibialis posterior-50 units at 2 sites     The patient tolerated the procedure well. There were no immediate complications. The patient will follow up in 12 weeks for next injection.    Total amount of onabotulinumtoxin A injected was 400 units with 0  units wasted.      Note: He did feel reduced muscle tone after the first injection.  I am expecting better response with further Botox treatment.  I had like to inject total of 600 units on the next visit.  I will see him back in clinic in 12 weeks for next injection.

## 2022-08-23 RX ORDER — CLOPIDOGREL BISULFATE 75 MG/1
75 TABLET ORAL DAILY
Qty: 90 TABLET | Refills: 1 | Status: SHIPPED | OUTPATIENT
Start: 2022-08-23 | End: 2022-12-19 | Stop reason: SDUPTHER

## 2022-08-23 NOTE — TELEPHONE ENCOUNTER
Pt was a new pt to [facility] starting on 6/23/22 and so Kurt had not written before.     His next appt is 12/19/22    Will you please send in a rx so he is not out of medication?

## 2022-08-23 NOTE — TELEPHONE ENCOUNTER
Caller: Faustina Cotto Jr.    Relationship: Self    Best call back number: [phone]    Requested Prescriptions:   Requested Prescriptions     Pending Prescriptions Disp Refills   • clopidogrel (PLAVIX) 75 MG tablet 90 tablet 1     Sig: Take 1 tablet by mouth Daily.        Pharmacy where request should be sent: [id number] [place] [id number] [address]  AT [facility] [phone] [facility] [phone] FX     Additional details provided by patient: FAUSTINA HAS NONE LEFT    Does the patient have less than a 3 day supply:  [x] Yes  [] No    Stormy Rodriguez Rep   08/23/22 13:24 EDT

## 2022-08-24 RX ORDER — CLOPIDOGREL BISULFATE 75 MG/1
TABLET ORAL
Qty: 90 TABLET | Refills: 1 | OUTPATIENT
Start: 2022-08-24

## 2022-08-24 NOTE — TELEPHONE ENCOUNTER
A user error has taken place: encounter opened in error, closed for administrative reasons.    
PAST MEDICAL HISTORY:  Asthma     Chronic nasal congestion     ETD (Eustachian tube dysfunction), bilateral     Febrile seizure     Hypertrophy of tonsils with hypertrophy of adenoids     Migraine     Mild intermittent reactive airway disease without complication     Polydipsia Evaluated by endocrinology in 2/2018 at [facility]    Sleep disorder breathing     Snoring

## 2022-08-29 RX ORDER — ESCITALOPRAM OXALATE 10 MG/1
TABLET ORAL
Qty: 45 TABLET | Refills: 1 | Status: SHIPPED | OUTPATIENT
Start: 2022-08-29 | End: 2022-11-01 | Stop reason: SDUPTHER

## 2022-08-29 NOTE — TELEPHONE ENCOUNTER
Rx Refill Note  Requested Prescriptions     Pending Prescriptions Disp Refills   • escitalopram (LEXAPRO) 10 MG tablet [Pharmacy Med Name: ESCITALOPRAM 10MG TABLETS] 15 tablet 0     Sig: TAKE 1/2 TABLET BY MOUTH DAILY      Last filled: 07/22/2022  Last office visit with prescribing clinician: 08/04/2022 - Laura BALDWIN      Next office visit with prescribing clinician: 12/19/2022 April JENIFFER Da Silva MA  08/29/22, 08:31 EDT

## 2022-09-02 DIAGNOSIS — N40.1 BENIGN PROSTATIC HYPERPLASIA WITH LOWER URINARY TRACT SYMPTOMS, SYMPTOM DETAILS UNSPECIFIED: ICD-10-CM

## 2022-09-02 RX ORDER — FINASTERIDE 5 MG/1
5 TABLET, FILM COATED ORAL DAILY
Qty: 90 TABLET | Refills: 1 | Status: SHIPPED | OUTPATIENT
Start: 2022-09-02 | End: 2022-12-19 | Stop reason: SDUPTHER

## 2022-09-02 NOTE — TELEPHONE ENCOUNTER
Caller: Obed Cotto Jr.    Relationship: Self    Best call back number: [phone]    Requested Prescriptions:   Requested Prescriptions     Pending Prescriptions Disp Refills   • finasteride (PROSCAR) 5 MG tablet 90 tablet 1     Sig: Take 1 tablet by mouth Daily.        Pharmacy where request should be sent: [facility] #[id number] [address] AT [facility] [phone] [facility] [phone] FX     Additional details provided by patient: PATIENT IS OUT OF THE MEDICATION AND HAD TO BE GIVEN A 3 DAY SUPPLY    Does the patient have less than a 3 day supply:  [x] Yes  [] No    Stormy Bhat Rep   09/02/22 09:14 EDT

## 2022-09-06 ENCOUNTER — TELEPHONE (OUTPATIENT)
Dept: INTERNAL MEDICINE | Facility: CLINIC | Age: 72
End: 2022-09-06

## 2022-09-06 NOTE — TELEPHONE ENCOUNTER
Pat from [facility] called to check on the paperwork for pt and would like to get a call back at [phone].    Also the fax number is [phone].    Please advise.

## 2022-09-12 ENCOUNTER — TELEPHONE (OUTPATIENT)
Dept: INTERNAL MEDICINE | Facility: CLINIC | Age: 72
End: 2022-09-12

## 2022-09-14 ENCOUNTER — TELEPHONE (OUTPATIENT)
Dept: INTERNAL MEDICINE | Facility: CLINIC | Age: 72
End: 2022-09-14

## 2022-09-14 NOTE — TELEPHONE ENCOUNTER
Caller: Obed Cotto Jr.    Relationship: Self    Best call back number: [phone]    What orders are you requesting (i.e. lab or imaging): HOME HEALTH    In what timeframe would the patient need to come in: FROM 9/30/22- 10/08/2022    Where will you receive your lab/imaging services: IN HOME     Additional notes: THE PATIENT STATES THAT HIS SON THAT IS HIS CARETAKER WILL BE OUT OF TOWN FORM 09/30/2022 TO 10/08/2022 HE STATES THAT HE WOULD LIKE TO HAVE SOMEONE COME TO HIS HOME TO HELP HIM PREPARE MEALS AND TAKE MEDICATION PLEASE CALL THE PATIENT TO LET HIM KNOW IF THAT CAN BE DONE

## 2022-09-19 ENCOUNTER — TELEPHONE (OUTPATIENT)
Dept: INTERNAL MEDICINE | Facility: CLINIC | Age: 72
End: 2022-09-19

## 2022-09-19 NOTE — TELEPHONE ENCOUNTER
Caller: Obed Cotto Jr.    Relationship: Self    Best call back number: [phone]    What orders are you requesting (i.e. lab or imaging): HOME HEALTH NURSE    In what timeframe would the patient need to come in: ASAP    Where will you receive your lab/imaging services: IN-HOME    Additional notes: PATIENT STATES HE IS ASKING FOR HOME HEALTH NURSE TO COME BETWEEN 30TH AND THE 8TH AS PRIMARY CAREGIVER IS GOING ON VACATION

## 2022-09-19 NOTE — TELEPHONE ENCOUNTER
Advised pt that Laura sent msg to referral coordinator to check to see if  offers this and we would let him know as soon as we find out. Pt verbalized understanding

## 2022-09-20 ENCOUNTER — TELEPHONE (OUTPATIENT)
Dept: INTERNAL MEDICINE | Facility: CLINIC | Age: 72
End: 2022-09-20

## 2022-09-20 NOTE — TELEPHONE ENCOUNTER
[facility] called regarding patient orders.  She stated that she needed the ICD code for 'weakness and impaired mobility'...she stated that we had written it out but she needed the code.    She also said there appeared to be a place where something had been crossed out and corrected.  She needs that crossed out section initialed and dated.   Once corrections are made it needs ti be re-sent.

## 2022-09-20 NOTE — TELEPHONE ENCOUNTER
Caller: EVA - [facility]    Relationship to patient: Other    Best call back number:[phone]    Patient is needing: EVA FROM [facility] STATED THAT THE DOCTOR NOTES FROM 8/4/22 WERE NOT SIGNED BY LEATHA. THEY ARE NEEDING THE SIGNED COPY OF THESE NOTES OR IT CAN BE FOR TODAY SO THAT THEY CAN SEND THE INFORMATION OFF TO INSURANCE FOR THE PATIENT'S WHEELCHAIR. PLEASE ADVISE.

## 2022-09-22 NOTE — TELEPHONE ENCOUNTER
After Ree calling all  agencies, they do not provide this kind of services. But advised pt that there is an agency called [employer] that might provide this. Gave him their phone number of [phone]. Pt verbalized understanding and will give them a call

## 2022-09-23 ENCOUNTER — TELEPHONE (OUTPATIENT)
Dept: INTERNAL MEDICINE | Facility: CLINIC | Age: 72
End: 2022-09-23

## 2022-09-23 ENCOUNTER — DOCUMENTATION (OUTPATIENT)
Dept: NEUROLOGY | Facility: CLINIC | Age: 72
End: 2022-09-23

## 2022-09-23 NOTE — TELEPHONE ENCOUNTER
Provider: NICKOLAS     Caller: FAUSTINA CORTES     Phone Number: [phone]    Reason for Call: PATIENT STATED THAT HE WAS TOLD BY THE [facility] THAT HIS INSURANCE IS NOT ACCEPTED.  THE PATIENT IS ASKING FOR ALTERNATIVES.

## 2022-09-23 NOTE — TELEPHONE ENCOUNTER
[facility] had given me [facility] number and spoke with them and they are not able to come every day, she said at the most they would only come in 1-2 times a week and no longer than 30 min each time. Pt needed someone to be able to come in every day to help with preparing food and eating and medication mgt. I apologized that those were the options that we had. Pt was asked if he had any other family to help and unfortunately he only has his son and daughter-in-law to help. But pt was understanding and had no further questions

## 2022-09-30 RX ORDER — FAMOTIDINE 20 MG/1
TABLET, FILM COATED ORAL
Qty: 60 TABLET | Refills: 2 | Status: SHIPPED | OUTPATIENT
Start: 2022-09-30 | End: 2023-02-09

## 2022-10-26 RX ORDER — LISINOPRIL 10 MG/1
10 TABLET ORAL DAILY
Qty: 30 TABLET | Refills: 2 | Status: SHIPPED | OUTPATIENT
Start: 2022-10-26 | End: 2022-12-19 | Stop reason: SDUPTHER

## 2022-11-01 NOTE — TELEPHONE ENCOUNTER
LV: 08/04/2022  NV: 12/19/2022  Laura, please review medication change listed below before sending to the pharmacy.

## 2022-11-01 NOTE — TELEPHONE ENCOUNTER
Caller: Obed Cotto Jr.    Relationship: Self    Best call back number: [phone]    Requested Prescriptions:   Requested Prescriptions     Pending Prescriptions Disp Refills   • escitalopram (LEXAPRO) 10 MG tablet 45 tablet 1     Sig: Take 0.5 tablets by mouth Daily.        Pharm[facility] where request [facility] be sent: [id number] [place] [id number] [facility] AT [facility] [phone] [facility] [phone]      Additional details provided by patient: PATIENT STATED THAT HIS SON, HAS BEEN GIVING HIM A WHOLE PILL INSTEAD OF A HALF OF A PILL, CAUSING THE PATIENT TO RUN OUT OF THIS MEDICATION EARLY.    Does the patient have less than a 3 day supply:  [x] Yes  [] No    Stormy Kee Rep   11/01/22 09:37 EDT

## 2022-11-02 NOTE — TELEPHONE ENCOUNTER
Caller: Obed Cotto Jr.    Relationship: Self    Best call back number: [phone]    What is the best time to reach you: ANY TIME    Who are you requesting to speak with (clinical staff, provider,  specific staff member): NURSE    Do you know the name of the person who called: SELF    What was the call regarding: PATIENT TOOK LAST PILL YESTERDAY. PATIENT ASKED SON ABOUT MEDICATION AND SON HAS BEEN GIVING 1/2 PILL, SO PATIENT BELIEVES PHARMACY SHORTED HIM ON PILLS AND ONLY GAVE 30 INSTEAD OF 45. PHARMACY STATES CAN NOT REFILL FOR ANOTHER MONTH UNLESS EARLY REFILL WOULD BE OK'D. PLEASE ADVISE AND CALL PATIENT    Do you require a callback: YES

## 2022-11-03 RX ORDER — ESCITALOPRAM OXALATE 10 MG/1
5 TABLET ORAL DAILY
Qty: 45 TABLET | Refills: 1 | Status: SHIPPED | OUTPATIENT
Start: 2022-11-03 | End: 2022-12-19 | Stop reason: SDUPTHER

## 2022-11-08 ENCOUNTER — TELEPHONE (OUTPATIENT)
Dept: NEUROLOGY | Facility: CLINIC | Age: 72
End: 2022-11-08

## 2022-11-08 NOTE — TELEPHONE ENCOUNTER
Spoke with patient regarding his outstanding balance. He does not wish to continue getting botox because he cannot afford it. I offered to change his upcoming appointment to a regular office visit to discuss options with Dr. Bloom. He voiced understanding, but was hesitant due to costs.

## 2022-11-18 ENCOUNTER — OFFICE VISIT (OUTPATIENT)
Dept: NEUROLOGY | Facility: CLINIC | Age: 72
End: 2022-11-18

## 2022-11-18 VITALS
WEIGHT: 123.02 LBS | BODY MASS INDEX: 16.66 KG/M2 | HEIGHT: 72 IN | OXYGEN SATURATION: 97 % | SYSTOLIC BLOOD PRESSURE: 140 MMHG | HEART RATE: 65 BPM | DIASTOLIC BLOOD PRESSURE: 54 MMHG

## 2022-11-18 DIAGNOSIS — I63.81 LACUNAR STROKE: ICD-10-CM

## 2022-11-18 DIAGNOSIS — I69.398 SPASTICITY AS LATE EFFECT OF CEREBROVASCULAR ACCIDENT (CVA): Primary | ICD-10-CM

## 2022-11-18 DIAGNOSIS — I69.354 HEMIPLEGIA AND HEMIPARESIS FOLLOWING CEREBRAL INFARCTION AFFECTING LEFT NON-DOMINANT SIDE: ICD-10-CM

## 2022-11-18 DIAGNOSIS — R25.2 SPASTICITY AS LATE EFFECT OF CEREBROVASCULAR ACCIDENT (CVA): Primary | ICD-10-CM

## 2022-11-18 DIAGNOSIS — R25.2 LEG CRAMPS: ICD-10-CM

## 2022-11-18 PROCEDURE — 99214 OFFICE O/P EST MOD 30 MIN: CPT | Performed by: PSYCHIATRY & NEUROLOGY

## 2022-11-18 RX ORDER — BACLOFEN 10 MG/1
10 TABLET ORAL 3 TIMES DAILY
Qty: 90 TABLET | Refills: 3 | Status: SHIPPED | OUTPATIENT
Start: 2022-11-18

## 2022-11-18 NOTE — PROGRESS NOTES
Subjective:    CC: Obed Cotto Jr. is in clinic today for follow up for postop spasticity involving left upper and lower extremity.    HPI:  Follow-up: 11/18/2022: He is in clinic for regular follow-up.  He had his Botox 3 months ago.  He reports that Botox did help but is costing him a lot of money so he would like to stop Botox and instead consider a different treatment.  He was on baclofen in the past which he does not remember why it was stopped.  He was on 10 mg 3 times daily dose which did help some.  He does not remember if it made him sleepy or not.    The following portions of the patient's history were reviewed and updated as of 11/18/2022: allergies, social history and problem list.       Current Outpatient Medications:   •  aspirin (aspirin) 81 MG EC tablet, Take 1 tablet by mouth Daily., Disp: 30 tablet, Rfl: 2  •  atorvastatin (LIPITOR) 80 MG tablet, TAKE 1 TABLET BY MOUTH EVERY NIGHT AT BEDTIME, Disp: 90 tablet, Rfl: 1  •  baclofen (LIORESAL) 10 MG tablet, Take 1 tablet by mouth 3 (Three) Times a Day., Disp: 90 tablet, Rfl: 3  •  clopidogrel (PLAVIX) 75 MG tablet, Take 1 tablet by mouth Daily., Disp: 90 tablet, Rfl: 1  •  docusate sodium (Colace) 100 MG capsule, Take 1 capsule by mouth 2 (Two) Times a Day., Disp: 60 capsule, Rfl: 5  •  escitalopram (LEXAPRO) 10 MG tablet, Take 0.5 tablets by mouth Daily., Disp: 45 tablet, Rfl: 1  •  famotidine (PEPCID) 20 MG tablet, TAKE 1 TABLET BY MOUTH TWICE DAILY, Disp: 60 tablet, Rfl: 2  •  ferrous sulfate 325 (65 FE) MG EC tablet, Take 1 tablet by mouth 2 (Two) Times a Day With Meals., Disp: 60 tablet, Rfl: 11  •  finasteride (PROSCAR) 5 MG tablet, Take 1 tablet by mouth Daily., Disp: 90 tablet, Rfl: 1  •  lisinopril (PRINIVIL,ZESTRIL) 10 MG tablet, TAKE 1 TABLET BY MOUTH DAILY, Disp: 30 tablet, Rfl: 2  •  metoprolol succinate XL (TOPROL-XL) 25 MG 24 hr tablet, Take 1 tablet by mouth Daily., Disp: 30 tablet, Rfl: 5  •  tamsulosin (FLOMAX) 0.4 MG capsule 24 hr  "capsule, Take 1 capsule by mouth Every Night., Disp: 90 capsule, Rfl: 1   Past Medical History:   Diagnosis Date   • Acute ischemic stroke (HCC) 05/30/2021     posterior limb of the right internal capsule   • Arthritis    • BPH with elevated PSA    • Chronic bronchitis (HCC)    • Closed fracture of left hip with routine healing 11/30/2021   • Hyperlipidemia    • Hypertension    • Lacunar infarction (HCC)    • Noncompliance with medication regimen     pt states he never took his metoprolol   • Prediabetes       Past Surgical History:   Procedure Laterality Date   • FINGER SURGERY  1984    R ring finger, partial amputation repair   • ORIF HIP FRACTURE Left 12/02/2021    Procedure: HIP OPEN REDUCTION INTERNAL FIXATION WITH FEMORAL NECK SYSTEM LEFT;  Surgeon: Aditya Ruano MD;  Location: [facility];  Service: Orthopedics;  Laterality: Left;      Family History   Problem Relation Age of Onset   • Lymphoma Mother    • Lung disease Father    • No Known Problems Son    • Dementia Maternal Grandfather         Review of Systems  Objective:    /54   Pulse 65   Ht 182.9 cm (72.01\")   Wt 55.8 kg (123 lb 0.3 oz)   SpO2 97%   BMI 16.68 kg/m²     Neurology Exam:  General apperance: NAD.     Mental status: Alert, awake and oriented to time place and person.    Recent and Remote memory: Can recall 3/3 objects at 5 minutes. Can recall historical events.     Attention span and Concentration: Serial 7s: Normal.     Fund of knowledge:  Normal.     Language and Speech: No aphasia or dysarthria.    Naming , Repitition and Comprehension:  Can name objects, repeat a sentence and follow commands. Speech is clear and fluent with good repetition, comprehension, and naming.    CN II to XII: Intact.    Opthalmoscopic Exam: No papilledema.    Motor:  Right UE muscle strength 5/5. Normal tone.     Left UE muscle strength 3/5.  Moderately increased tone.     Right LE muscle strength5/5. Normal tone.     Left LE muscle strength 3/5.  " Mild to moderately increased tone.    Sensory: Normal light touch, vibration and pinprick sensation bilaterally.    DTRs: 2+ in right upper and lower extremity, 3+ in left upper and lower extremity.    Babinski: Positive on the left.    Co-ordination: Normal finger-to-nose, heel to shin B/L.    Rhomberg: Negative.    Gait: Walks slowly with a walker.    Cardiovascular: Regular rate and rhythm without murmur, gallop or rub.    Assessment and Plan:  1. 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 Hemiplegia and hemiparesis following cerebral infarction affecting left non-dominant side (HCC)   3. Lacunar stroke (HCC)  4. Leg cramps  -Patient with poststroke spasticity involving left upper and lower extremity.  He was having a good response to Botox for poststroke spasticity but insurance is not covering Botox and its costing him a lot of money.  He would like to stop Botox instead start muscle relaxer.  He was on baclofen in the past it will be restarted 10 mg 3 times daily dose.  I have advised him to call office with response in 3 to 4 weeks and if no side effects then dose can be increased further a little bit.  Continue with aspirin 81 mg daily, Lipitor 80 mg daily for secondary stroke prevention and I will see him back in clinic in 6 months for follow-up.  - baclofen (LIORESAL) 10 MG tablet; Take 1 tablet by mouth 3 (Three) Times a Day.  Dispense: 90 tablet; Refill: 3       I spent 30 minutes in patient care: Reviewing records prior to the visit, entering orders and documentation and spent more than duran 50% of this time face-to-face in management, instructions and education regarding above mentioned diagnosis and also on counseling and discussing about taking medication regularly, possible side effects with medication use, importance of good sleep hygiene, good hydration and regular exercise.    Return in about 6 months (around 5/18/2023).

## 2022-12-07 ENCOUNTER — OFFICE VISIT (OUTPATIENT)
Dept: ORTHOPEDIC SURGERY | Facility: CLINIC | Age: 72
End: 2022-12-07

## 2022-12-07 VITALS
SYSTOLIC BLOOD PRESSURE: 130 MMHG | DIASTOLIC BLOOD PRESSURE: 70 MMHG | HEIGHT: 72 IN | BODY MASS INDEX: 16.66 KG/M2 | WEIGHT: 123.02 LBS

## 2022-12-07 DIAGNOSIS — Z87.81 STATUS POST OPEN REDUCTION AND INTERNAL FIXATION (ORIF) OF FRACTURE: Primary | ICD-10-CM

## 2022-12-07 DIAGNOSIS — Z98.890 STATUS POST OPEN REDUCTION AND INTERNAL FIXATION (ORIF) OF FRACTURE: Primary | ICD-10-CM

## 2022-12-07 PROCEDURE — 99213 OFFICE O/P EST LOW 20 MIN: CPT | Performed by: PHYSICIAN ASSISTANT

## 2022-12-07 RX ORDER — FERROUS SULFATE 325(65) MG
1 TABLET ORAL 2 TIMES DAILY WITH MEALS
COMMUNITY
Start: 2022-10-12

## 2022-12-07 NOTE — PROGRESS NOTES
[facility]        Subjective     Follow-up (6 month follow up; 12 months s/p Hip Open Reduction Internal Fixation With Femoral Neck System Left 21)      REINALDO Cotto Jr. is a 72 y.o. male. Patient returns today for 1 year f/up of his left hip.  He reports no pain in the hip.  He uses a walker at home.  He has an electric chair for outside of the home. No complaints     Past Medical History:   Diagnosis Date   • Acute ischemic stroke (HCC) 2021     posterior limb of the right internal capsule   • Arthritis    • BPH with elevated PSA    • Chronic bronchitis (HCC)    • Closed fracture of left hip with routine healing 2021   • Hyperlipidemia    • Hypertension    • Lacunar infarction (HCC)    • Noncompliance with medication regimen     pt states he never took his metoprolol   • Prediabetes       Past Surgical History:   Procedure Laterality Date   • FINGER SURGERY      R ring finger, partial amputation repair   • ORIF HIP FRACTURE Left 2021    Procedure: HIP OPEN REDUCTION INTERNAL FIXATION WITH FEMORAL NECK SYSTEM LEFT;  Surgeon: Aditya Ruano MD;  Location: [facility];  Service: Orthopedics;  Laterality: Left;      Family History   Problem Relation Age of Onset   • Lymphoma Mother    • Lung disease Father    • No Known Problems Son    • Dementia Maternal Grandfather      Social History     Socioeconomic History   • Marital status: Single   Tobacco Use   • Smoking status: Former     Packs/day: 1.00     Years: 48.00     Pack years: 48.00     Types: Cigarettes     Quit date: 2021     Years since quittin.5   • Smokeless tobacco: Never   Vaping Use   • Vaping Use: Never used   Substance and Sexual Activity   • Alcohol use: No   • Drug use: No   • Sexual activity: Defer     Comment:       Current Outpatient Medications on File Prior to Visit   Medication Sig Dispense Refill   • aspirin (aspirin) 81 MG EC tablet Take 1 tablet by mouth Daily. 30  "tablet 2   • atorvastatin (LIPITOR) 80 MG tablet TAKE 1 TABLET BY MOUTH EVERY NIGHT AT BEDTIME 90 tablet 1   • baclofen (LIORESAL) 10 MG tablet Take 1 tablet by mouth 3 (Three) Times a Day. 90 tablet 3   • clopidogrel (PLAVIX) 75 MG tablet Take 1 tablet by mouth Daily. 90 tablet 1   • docusate sodium (Colace) 100 MG capsule Take 1 capsule by mouth 2 (Two) Times a Day. 60 capsule 5   • escitalopram (LEXAPRO) 10 MG tablet Take 0.5 tablets by mouth Daily. 45 tablet 1   • famotidine (PEPCID) 20 MG tablet TAKE 1 TABLET BY MOUTH TWICE DAILY 60 tablet 2   • FeroSul 325 (65 Fe) MG tablet Take 1 tablet by mouth 2 (Two) Times a Day With Meals.     • ferrous sulfate 325 (65 FE) MG EC tablet Take 1 tablet by mouth 2 (Two) Times a Day With Meals. 60 tablet 11   • finasteride (PROSCAR) 5 MG tablet Take 1 tablet by mouth Daily. 90 tablet 1   • lisinopril (PRINIVIL,ZESTRIL) 10 MG tablet TAKE 1 TABLET BY MOUTH DAILY 30 tablet 2   • metoprolol succinate XL (TOPROL-XL) 25 MG 24 hr tablet Take 1 tablet by mouth Daily. 30 tablet 5   • tamsulosin (FLOMAX) 0.4 MG capsule 24 hr capsule Take 1 capsule by mouth Every Night. 90 capsule 1     No current facility-administered medications on file prior to visit.      Allergies   Allergen Reactions   • Penicillins Hives          Review of Systems     I reviewed the patient's chief complaint, history of present illness, review of systems, past medical history, surgical history, family history, social history, medications and allergy list.        Objective      Physical Exam  /70   Ht 182.9 cm (72.01\")   Wt 55.8 kg (123 lb 0.3 oz)   BMI 16.68 kg/m²     Body mass index is 16.68 kg/m².    General  Mental Status - alert  General Appearance - cooperative, well groomed, not in acute distress  Orientation - Oriented X3  Build & Nutrition - well developed and well nourished  Posture - normal posture  Gait - in wheelchair       Ortho Exam  Left hip exam:  No pain with hip motion.  Preserved " motion.  NVI distally    Imaging/Studies  Imaging Results (Last 24 Hours)     Procedure Component Value Units Date/Time    XR Hip With or Without Pelvis 2 - 3 View Left [[id number]] Resulted: 12/07/22 1049     Updated: 12/07/22 1050    Narrative:      Left Hip Radiographs  Indication: left hip pain  Views: low AP pelvis and lateral of the left hip    Comparison: 6/14/2022 and 12/2/2021    Findings:   Healed femoral neck fracture, no signs of avascular necrosis, with no   change in alignment.  No unusual bony features.  Osteopenia unchanged.            Assessment    Assessment:  1. Status post open reduction and internal fixation (ORIF) of fracture          Plan:  1. Recommend over-the-counter medication as needed for discomfort  2. Doing well 1 year s/p ORIF left femoral neck fracture.  I reviewed today's x-rays and clinical findings with the patient.  He is not having any pain or problems.  RTC prn.    Patient history, diagnosis and treatment plan discussed with Dr. Ruano.          Amparo Wallace PA-C  12/08/22  10:30 EST

## 2022-12-19 ENCOUNTER — OFFICE VISIT (OUTPATIENT)
Dept: INTERNAL MEDICINE | Facility: CLINIC | Age: 72
End: 2022-12-19

## 2022-12-19 VITALS
TEMPERATURE: 97.3 F | DIASTOLIC BLOOD PRESSURE: 60 MMHG | BODY MASS INDEX: 16.68 KG/M2 | HEIGHT: 72 IN | SYSTOLIC BLOOD PRESSURE: 128 MMHG | OXYGEN SATURATION: 96 % | HEART RATE: 68 BPM | RESPIRATION RATE: 24 BRPM

## 2022-12-19 DIAGNOSIS — G81.94 LEFT HEMIPLEGIA: ICD-10-CM

## 2022-12-19 DIAGNOSIS — I10 ESSENTIAL HYPERTENSION: ICD-10-CM

## 2022-12-19 DIAGNOSIS — N40.1 BENIGN PROSTATIC HYPERPLASIA WITH LOWER URINARY TRACT SYMPTOMS, SYMPTOM DETAILS UNSPECIFIED: ICD-10-CM

## 2022-12-19 DIAGNOSIS — Z86.73 HISTORY OF CVA (CEREBROVASCULAR ACCIDENT): Primary | ICD-10-CM

## 2022-12-19 DIAGNOSIS — E55.9 VITAMIN D DEFICIENCY, UNSPECIFIED: ICD-10-CM

## 2022-12-19 DIAGNOSIS — E78.2 MIXED HYPERLIPIDEMIA: ICD-10-CM

## 2022-12-19 PROCEDURE — 99214 OFFICE O/P EST MOD 30 MIN: CPT | Performed by: NURSE PRACTITIONER

## 2022-12-19 RX ORDER — METOPROLOL SUCCINATE 25 MG/1
25 TABLET, EXTENDED RELEASE ORAL DAILY
Qty: 90 TABLET | Refills: 1 | Status: SHIPPED | OUTPATIENT
Start: 2022-12-19

## 2022-12-19 RX ORDER — ATORVASTATIN CALCIUM 80 MG/1
80 TABLET, FILM COATED ORAL
Qty: 90 TABLET | Refills: 1 | Status: SHIPPED | OUTPATIENT
Start: 2022-12-19 | End: 2023-02-07

## 2022-12-19 RX ORDER — TAMSULOSIN HYDROCHLORIDE 0.4 MG/1
1 CAPSULE ORAL NIGHTLY
Qty: 90 CAPSULE | Refills: 1 | Status: SHIPPED | OUTPATIENT
Start: 2022-12-19

## 2022-12-19 RX ORDER — LISINOPRIL 10 MG/1
10 TABLET ORAL DAILY
Qty: 90 TABLET | Refills: 1 | Status: SHIPPED | OUTPATIENT
Start: 2022-12-19 | End: 2023-01-31

## 2022-12-19 RX ORDER — FINASTERIDE 5 MG/1
5 TABLET, FILM COATED ORAL DAILY
Qty: 90 TABLET | Refills: 1 | Status: SHIPPED | OUTPATIENT
Start: 2022-12-19 | End: 2023-02-27

## 2022-12-19 RX ORDER — ESCITALOPRAM OXALATE 10 MG/1
5 TABLET ORAL DAILY
Qty: 45 TABLET | Refills: 1 | Status: SHIPPED | OUTPATIENT
Start: 2022-12-19

## 2022-12-19 RX ORDER — CLOPIDOGREL BISULFATE 75 MG/1
75 TABLET ORAL DAILY
Qty: 90 TABLET | Refills: 1 | Status: SHIPPED | OUTPATIENT
Start: 2022-12-19

## 2022-12-19 NOTE — PROGRESS NOTES
Subjective   Obed Cotto Jr. is a 72 y.o. male    Chief Complaint   Patient presents with   • Hypertension     6 month f/u   • Hyperlipidemia     6 month f/u   • Depression     6 month f/u   • Cerebrovascular Accident     6 month f/u     History of Present Illness     CVA -patient suffered an acute ischemic stroke on 5/30/2021.  This is left him with residual weakness of the left side.  He suffered a subsequent fall in November 2021 that resulted in a closed fracture of the left hip.    Had a recent f/u with neurology.  Was doing Botox for LLE spasticity, but insurance would not pay for this so he was changed back to a muscle relaxer.       Depression-chronic; current regimen is Lexapro 10 mg p.o. daily     Hypertension-chronic; current regimen is lisinopril 10 mg p.o. daily, metoprolol XL 25 mg daily.     Hyperlipidemia-chronic; current regimen is Lipitor 80 mg p.o. daily    COVID-4/10/2021 (J&J); declines further  Flu shot-10/2022  Prevnar 13 3/1/2019  Pneumovax 23 2/23/2017  Diabetic eye exam 7/1/2021  Low-dose lung cancer screening 12/3/2021; declines today    The following portions of the patient's history were reviewed and updated as appropriate: allergies, current medications, past family history, past medical history, past social history, past surgical history and problem list.    Current Outpatient Medications:   •  aspirin (aspirin) 81 MG EC tablet, Take 1 tablet by mouth Daily., Disp: 30 tablet, Rfl: 2  •  atorvastatin (LIPITOR) 80 MG tablet, Take 1 tablet by mouth every night at bedtime., Disp: 90 tablet, Rfl: 1  •  baclofen (LIORESAL) 10 MG tablet, Take 1 tablet by mouth 3 (Three) Times a Day., Disp: 90 tablet, Rfl: 3  •  clopidogrel (PLAVIX) 75 MG tablet, Take 1 tablet by mouth Daily., Disp: 90 tablet, Rfl: 1  •  docusate sodium (Colace) 100 MG capsule, Take 1 capsule by mouth 2 (Two) Times a Day., Disp: 60 capsule, Rfl: 5  •  escitalopram (LEXAPRO) 10 MG tablet, Take 0.5 tablets by mouth Daily.,  Disp: 45 tablet, Rfl: 1  •  famotidine (PEPCID) 20 MG tablet, TAKE 1 TABLET BY MOUTH TWICE DAILY, Disp: 60 tablet, Rfl: 2  •  FeroSul 325 (65 Fe) MG tablet, Take 1 tablet by mouth 2 (Two) Times a Day With Meals., Disp: , Rfl:   •  ferrous sulfate 325 (65 FE) MG EC tablet, Take 1 tablet by mouth 2 (Two) Times a Day With Meals., Disp: 60 tablet, Rfl: 11  •  finasteride (PROSCAR) 5 MG tablet, Take 1 tablet by mouth Daily., Disp: 90 tablet, Rfl: 1  •  lisinopril (PRINIVIL,ZESTRIL) 10 MG tablet, Take 1 tablet by mouth Daily., Disp: 90 tablet, Rfl: 1  •  metoprolol succinate XL (TOPROL-XL) 25 MG 24 hr tablet, Take 1 tablet by mouth Daily., Disp: 90 tablet, Rfl: 1  •  tamsulosin (FLOMAX) 0.4 MG capsule 24 hr capsule, Take 1 capsule by mouth Every Night., Disp: 90 capsule, Rfl: 1     Review of Systems   Constitutional: Negative for chills, fatigue and fever.   Respiratory: Negative for cough, chest tightness and shortness of breath.    Cardiovascular: Negative for chest pain.   Gastrointestinal: Negative for abdominal pain, diarrhea, nausea and vomiting.   Endocrine: Negative for cold intolerance and heat intolerance.   Musculoskeletal: Positive for gait problem and myalgias. Negative for arthralgias.   Neurological: Positive for weakness. Negative for dizziness.       Objective   Physical Exam  Constitutional:       Appearance: He is well-developed.   HENT:      Head: Normocephalic and atraumatic.   Eyes:      Conjunctiva/sclera: Conjunctivae normal.      Pupils: Pupils are equal, round, and reactive to light.   Cardiovascular:      Rate and Rhythm: Normal rate and regular rhythm.      Heart sounds: Normal heart sounds.   Pulmonary:      Effort: Pulmonary effort is normal.      Breath sounds: Normal breath sounds.   Abdominal:      General: Bowel sounds are normal.      Palpations: Abdomen is soft.   Musculoskeletal:         General: Normal range of motion.      Cervical back: Normal range of motion.   Skin:     General:  "Skin is warm and dry.   Neurological:      Mental Status: He is alert and oriented to person, place, and time.      Motor: Weakness, atrophy and abnormal muscle tone (left sided spasticity) present.      Gait: Gait abnormal.      Deep Tendon Reflexes: Reflexes are normal and symmetric.   Psychiatric:         Behavior: Behavior normal.         Thought Content: Thought content normal.         Judgment: Judgment normal.       Vitals:    12/19/22 1325   BP: 128/60   Cuff Size: Adult   Pulse: 68   Resp: 24   Temp: 97.3 °F (36.3 °C)   TempSrc: Infrared   SpO2: 96%   Height: 182.9 cm (72\")         Assessment & Plan   Diagnoses and all orders for this visit:    1. History of CVA (cerebrovascular accident) (Primary)  -     atorvastatin (LIPITOR) 80 MG tablet; Take 1 tablet by mouth every night at bedtime.  Dispense: 90 tablet; Refill: 1  -     clopidogrel (PLAVIX) 75 MG tablet; Take 1 tablet by mouth Daily.  Dispense: 90 tablet; Refill: 1  -     lisinopril (PRINIVIL,ZESTRIL) 10 MG tablet; Take 1 tablet by mouth Daily.  Dispense: 90 tablet; Refill: 1  -     metoprolol succinate XL (TOPROL-XL) 25 MG 24 hr tablet; Take 1 tablet by mouth Daily.  Dispense: 90 tablet; Refill: 1  -     CBC & Differential; Future  -     Comprehensive Metabolic Panel; Future  -     Lipid Panel; Future  -     TSH; Future  -     Vitamin B12; Future  -     Vitamin D,25-Hydroxy; Future    2. Benign prostatic hyperplasia with lower urinary tract symptoms, symptom details unspecified  -     finasteride (PROSCAR) 5 MG tablet; Take 1 tablet by mouth Daily.  Dispense: 90 tablet; Refill: 1  -     tamsulosin (FLOMAX) 0.4 MG capsule 24 hr capsule; Take 1 capsule by mouth Every Night.  Dispense: 90 capsule; Refill: 1  -     CBC & Differential; Future  -     Comprehensive Metabolic Panel; Future  -     Lipid Panel; Future  -     TSH; Future  -     Vitamin B12; Future  -     Vitamin D,25-Hydroxy; Future    3. Left hemiplegia (HCC)  -     CBC & Differential; " Future  -     Comprehensive Metabolic Panel; Future  -     Lipid Panel; Future  -     TSH; Future  -     Vitamin B12; Future  -     Vitamin D,25-Hydroxy; Future    4. Essential hypertension  -     lisinopril (PRINIVIL,ZESTRIL) 10 MG tablet; Take 1 tablet by mouth Daily.  Dispense: 90 tablet; Refill: 1  -     metoprolol succinate XL (TOPROL-XL) 25 MG 24 hr tablet; Take 1 tablet by mouth Daily.  Dispense: 90 tablet; Refill: 1  -     CBC & Differential; Future  -     Comprehensive Metabolic Panel; Future  -     Lipid Panel; Future  -     TSH; Future  -     Vitamin B12; Future  -     Vitamin D,25-Hydroxy; Future    5. Mixed hyperlipidemia  -     atorvastatin (LIPITOR) 80 MG tablet; Take 1 tablet by mouth every night at bedtime.  Dispense: 90 tablet; Refill: 1  -     CBC & Differential; Future  -     Comprehensive Metabolic Panel; Future  -     Lipid Panel; Future  -     TSH; Future  -     Vitamin B12; Future  -     Vitamin D,25-Hydroxy; Future    6. Vitamin D deficiency, unspecified   -     CBC & Differential; Future  -     Comprehensive Metabolic Panel; Future  -     Lipid Panel; Future  -     TSH; Future  -     Vitamin B12; Future  -     Vitamin D,25-Hydroxy; Future    Other orders  -     escitalopram (LEXAPRO) 10 MG tablet; Take 0.5 tablets by mouth Daily.  Dispense: 45 tablet; Refill: 1    Meds refilled w/o changes  Will ck labs  Declines COVID Booster, he is suspicious that his J&J shot caused his CVA  Return in about 6 months (around 6/19/2023) for Medicare Wellness.

## 2023-01-06 ENCOUNTER — TELEPHONE (OUTPATIENT)
Dept: INTERNAL MEDICINE | Facility: CLINIC | Age: 73
End: 2023-01-06
Payer: MEDICARE

## 2023-01-06 NOTE — TELEPHONE ENCOUNTER
Pt brought in paper work from [employer] to have filled out. He has completed his part of the paper work and is on my desk and the paper work for PCP to complete is on your desk.. Once completed I can fax to [employer].

## 2023-01-18 DIAGNOSIS — Z86.73 HISTORY OF CVA (CEREBROVASCULAR ACCIDENT): ICD-10-CM

## 2023-01-18 DIAGNOSIS — I10 ESSENTIAL HYPERTENSION: ICD-10-CM

## 2023-01-18 RX ORDER — METOPROLOL SUCCINATE 25 MG/1
25 TABLET, EXTENDED RELEASE ORAL DAILY
Qty: 30 TABLET | OUTPATIENT
Start: 2023-01-18

## 2023-01-31 DIAGNOSIS — Z86.73 HISTORY OF CVA (CEREBROVASCULAR ACCIDENT): ICD-10-CM

## 2023-01-31 DIAGNOSIS — I10 ESSENTIAL HYPERTENSION: ICD-10-CM

## 2023-01-31 RX ORDER — LISINOPRIL 10 MG/1
10 TABLET ORAL DAILY
Qty: 30 TABLET | Refills: 5 | Status: SHIPPED | OUTPATIENT
Start: 2023-01-31

## 2023-02-07 DIAGNOSIS — Z86.73 HISTORY OF CVA (CEREBROVASCULAR ACCIDENT): ICD-10-CM

## 2023-02-07 DIAGNOSIS — E78.2 MIXED HYPERLIPIDEMIA: ICD-10-CM

## 2023-02-07 RX ORDER — ATORVASTATIN CALCIUM 80 MG/1
80 TABLET, FILM COATED ORAL
Qty: 90 TABLET | Refills: 1 | Status: SHIPPED | OUTPATIENT
Start: 2023-02-07

## 2023-02-09 RX ORDER — FAMOTIDINE 20 MG/1
TABLET, FILM COATED ORAL
Qty: 60 TABLET | Refills: 2 | Status: SHIPPED | OUTPATIENT
Start: 2023-02-09

## 2023-02-10 ENCOUNTER — TELEPHONE (OUTPATIENT)
Dept: INTERNAL MEDICINE | Facility: CLINIC | Age: 73
End: 2023-02-10

## 2023-02-10 NOTE — TELEPHONE ENCOUNTER
Caller: Obed Cotto Jr.    Relationship: Self    Best call back number: [phone]    What form or medical record are you requesting: [employer]    PATIENT DROPPED OFF THIS PAPERWORK A MONTH AGO AND HE CALLED TODAY TO ASK FOR TRANSPORTATION FOR HIS APPOINTMENT ON Tuesday AND THEY WOULD NOT SCHEDULE IT. THEY TOLD HIM THEY RECEIVED THE DR NOTES AND SIGNATURE BUT WE DID NOT SEND HIS PAPERS THAT HE TURNED IN WITH HIS SIGNATURE. THEY NEED THE WHOLE PACKET.    PLEASE CALL PATIENT WHEN THIS IS SENT IN.

## 2023-02-13 NOTE — TELEPHONE ENCOUNTER
Spoke with pt and advised unfortunately we only have PCP portion of paperwork that was scanned in. Since pt has a very hard time with mobility, advised that he could do his portion online. He states he isn't good with computer/onlline stuff and wasn't sure how to do so.    Advised him that I would call them and see if we could get it set up for him to do and could help him and walk him through it.     Called and spoke with Billie and they said all he would have to do is call them, they will help him with a log in and after that we could help him complete.     LVM that if he could do this for us and then when they give him the username and he would provide a password, I could then do this for him while I have him on the phone. Office number provided to give a call back to let us know login username and password.     Billie phone number [phone]

## 2023-02-14 ENCOUNTER — LAB (OUTPATIENT)
Dept: INTERNAL MEDICINE | Facility: CLINIC | Age: 73
End: 2023-02-14
Payer: MEDICARE

## 2023-02-14 ENCOUNTER — OFFICE VISIT (OUTPATIENT)
Dept: INTERNAL MEDICINE | Facility: CLINIC | Age: 73
End: 2023-02-14
Payer: MEDICARE

## 2023-02-14 VITALS
OXYGEN SATURATION: 99 % | TEMPERATURE: 97.8 F | HEIGHT: 72 IN | SYSTOLIC BLOOD PRESSURE: 134 MMHG | DIASTOLIC BLOOD PRESSURE: 70 MMHG | WEIGHT: 127.6 LBS | RESPIRATION RATE: 20 BRPM | BODY MASS INDEX: 17.28 KG/M2 | HEART RATE: 64 BPM

## 2023-02-14 DIAGNOSIS — R06.02 SHORTNESS OF BREATH: ICD-10-CM

## 2023-02-14 DIAGNOSIS — E55.9 VITAMIN D DEFICIENCY, UNSPECIFIED: ICD-10-CM

## 2023-02-14 DIAGNOSIS — R19.7 DIARRHEA, UNSPECIFIED TYPE: Primary | ICD-10-CM

## 2023-02-14 DIAGNOSIS — G81.94 LEFT HEMIPLEGIA: ICD-10-CM

## 2023-02-14 DIAGNOSIS — N40.1 BENIGN PROSTATIC HYPERPLASIA WITH LOWER URINARY TRACT SYMPTOMS, SYMPTOM DETAILS UNSPECIFIED: ICD-10-CM

## 2023-02-14 DIAGNOSIS — E78.2 MIXED HYPERLIPIDEMIA: ICD-10-CM

## 2023-02-14 DIAGNOSIS — Z86.73 HISTORY OF CVA (CEREBROVASCULAR ACCIDENT): ICD-10-CM

## 2023-02-14 DIAGNOSIS — R60.0 LOCALIZED EDEMA: ICD-10-CM

## 2023-02-14 DIAGNOSIS — I10 ESSENTIAL HYPERTENSION: ICD-10-CM

## 2023-02-14 DIAGNOSIS — Z86.73 HISTORY OF STROKE: ICD-10-CM

## 2023-02-14 PROCEDURE — 83880 ASSAY OF NATRIURETIC PEPTIDE: CPT | Performed by: NURSE PRACTITIONER

## 2023-02-14 PROCEDURE — 3075F SYST BP GE 130 - 139MM HG: CPT | Performed by: NURSE PRACTITIONER

## 2023-02-14 PROCEDURE — 99213 OFFICE O/P EST LOW 20 MIN: CPT | Performed by: NURSE PRACTITIONER

## 2023-02-14 PROCEDURE — 36415 COLL VENOUS BLD VENIPUNCTURE: CPT | Performed by: NURSE PRACTITIONER

## 2023-02-14 PROCEDURE — 93000 ELECTROCARDIOGRAM COMPLETE: CPT | Performed by: NURSE PRACTITIONER

## 2023-02-14 PROCEDURE — 3078F DIAST BP <80 MM HG: CPT | Performed by: NURSE PRACTITIONER

## 2023-02-15 LAB
25(OH)D3+25(OH)D2 SERPL-MCNC: 12.7 NG/ML (ref 30–100)
ALBUMIN SERPL-MCNC: 4.2 G/DL (ref 3.5–5.2)
ALBUMIN/GLOB SERPL: 2.1 G/DL
ALP SERPL-CCNC: 125 U/L (ref 39–117)
ALT SERPL-CCNC: 32 U/L (ref 1–41)
AST SERPL-CCNC: 24 U/L (ref 1–40)
BASOPHILS # BLD AUTO: 0.03 10*3/MM3 (ref 0–0.2)
BASOPHILS NFR BLD AUTO: 0.3 % (ref 0–1.5)
BILIRUB SERPL-MCNC: 0.6 MG/DL (ref 0–1.2)
BUN SERPL-MCNC: 16 MG/DL (ref 8–23)
BUN/CREAT SERPL: 20.8 (ref 7–25)
CALCIUM SERPL-MCNC: 10.3 MG/DL (ref 8.6–10.5)
CHLORIDE SERPL-SCNC: 109 MMOL/L (ref 98–107)
CHOLEST SERPL-MCNC: 76 MG/DL (ref 0–200)
CO2 SERPL-SCNC: 26.7 MMOL/L (ref 22–29)
CREAT SERPL-MCNC: 0.77 MG/DL (ref 0.76–1.27)
EGFRCR SERPLBLD CKD-EPI 2021: 94.5 ML/MIN/1.73
EOSINOPHIL # BLD AUTO: 0.4 10*3/MM3 (ref 0–0.4)
EOSINOPHIL NFR BLD AUTO: 4.1 % (ref 0.3–6.2)
ERYTHROCYTE [DISTWIDTH] IN BLOOD BY AUTOMATED COUNT: 11.9 % (ref 12.3–15.4)
GLOBULIN SER CALC-MCNC: 2 GM/DL
GLUCOSE SERPL-MCNC: 94 MG/DL (ref 65–99)
HCT VFR BLD AUTO: 31.7 % (ref 37.5–51)
HDLC SERPL-MCNC: 41 MG/DL (ref 40–60)
HGB BLD-MCNC: 10.3 G/DL (ref 13–17.7)
IMM GRANULOCYTES # BLD AUTO: 0.04 10*3/MM3 (ref 0–0.05)
IMM GRANULOCYTES NFR BLD AUTO: 0.4 % (ref 0–0.5)
LDLC SERPL CALC-MCNC: 21 MG/DL (ref 0–100)
LYMPHOCYTES # BLD AUTO: 2.79 10*3/MM3 (ref 0.7–3.1)
LYMPHOCYTES NFR BLD AUTO: 28.3 % (ref 19.6–45.3)
MCH RBC QN AUTO: 32.7 PG (ref 26.6–33)
MCHC RBC AUTO-ENTMCNC: 32.5 G/DL (ref 31.5–35.7)
MCV RBC AUTO: 100.6 FL (ref 79–97)
MONOCYTES # BLD AUTO: 0.7 10*3/MM3 (ref 0.1–0.9)
MONOCYTES NFR BLD AUTO: 7.1 % (ref 5–12)
NEUTROPHILS # BLD AUTO: 5.89 10*3/MM3 (ref 1.7–7)
NEUTROPHILS NFR BLD AUTO: 59.8 % (ref 42.7–76)
NRBC BLD AUTO-RTO: 0 /100 WBC (ref 0–0.2)
NT-PROBNP SERPL-MCNC: 594 PG/ML (ref 0–900)
PLATELET # BLD AUTO: 238 10*3/MM3 (ref 140–450)
POTASSIUM SERPL-SCNC: 4.8 MMOL/L (ref 3.5–5.2)
PROT SERPL-MCNC: 6.2 G/DL (ref 6–8.5)
RBC # BLD AUTO: 3.15 10*6/MM3 (ref 4.14–5.8)
SODIUM SERPL-SCNC: 143 MMOL/L (ref 136–145)
TRIGL SERPL-MCNC: 57 MG/DL (ref 0–150)
TSH SERPL DL<=0.005 MIU/L-ACNC: 0.92 UIU/ML (ref 0.27–4.2)
VIT B12 SERPL-MCNC: 670 PG/ML (ref 211–946)
VLDLC SERPL CALC-MCNC: 14 MG/DL (ref 5–40)
WBC # BLD AUTO: 9.85 10*3/MM3 (ref 3.4–10.8)

## 2023-02-24 ENCOUNTER — TELEPHONE (OUTPATIENT)
Dept: INTERNAL MEDICINE | Facility: CLINIC | Age: 73
End: 2023-02-24
Payer: MEDICARE

## 2023-02-24 DIAGNOSIS — Z86.73 HISTORY OF CVA (CEREBROVASCULAR ACCIDENT): ICD-10-CM

## 2023-02-24 RX ORDER — CLOPIDOGREL BISULFATE 75 MG/1
75 TABLET ORAL DAILY
Qty: 90 TABLET | Refills: 1 | OUTPATIENT
Start: 2023-02-24

## 2023-02-24 NOTE — TELEPHONE ENCOUNTER
Caller: Obed Cotto Jr.    Relationship: Self    Best call back number: [phone]    What form or medical record are you requesting: LAST LAB RESULTS    Who is requesting this form or medical record from you: PATIENT    How would you like to receive the form or medical records (pick-up, mail, fax):   If mail, what is the address: TO HOME ADDRESS    Timeframe paperwork needed: ASAP    Additional notes: PATIENT WOULD LIKE A COPY OF LAB RESULTS MAILED TO HOME ADDRESS.

## 2023-02-24 NOTE — TELEPHONE ENCOUNTER
Caller: Obed Cotto Jr.    Relationship: Self    Best call back number: [phone]    Requested Prescriptions:   Requested Prescriptions     Pending Prescriptions Disp Refills   • clopidogrel (PLAVIX) 75 MG tablet 90 tablet 1     Sig: Take 1 tablet by mouth Daily.      [facility] be sent[id number] [place] [id number]  AT [facility] [phone] [facility] [phone] FX     Does the patient have less than a 3 day supply:  [x] Yes  [] No    Would you like a call back once the refill request has been completed: [x] Yes [] No    If the office needs to give you a call back, can they leave a voicemail: [x] Yes [] No    Stormy Nava Rep   02/24/23 12:33 EST

## 2023-02-24 NOTE — TELEPHONE ENCOUNTER
Called pt to inform that he should not be out of medication and to call the pharmacy to . Pt  Expressed understanding

## 2023-02-27 DIAGNOSIS — N40.1 BENIGN PROSTATIC HYPERPLASIA WITH LOWER URINARY TRACT SYMPTOMS, SYMPTOM DETAILS UNSPECIFIED: ICD-10-CM

## 2023-02-27 RX ORDER — FINASTERIDE 5 MG/1
5 TABLET, FILM COATED ORAL DAILY
Qty: 90 TABLET | Refills: 1 | Status: SHIPPED | OUTPATIENT
Start: 2023-02-27

## 2023-03-15 DIAGNOSIS — D64.9 ANEMIA, UNSPECIFIED TYPE: Primary | ICD-10-CM

## 2023-03-15 RX ORDER — CHOLECALCIFEROL (VITAMIN D3) 1250 MCG
50000 CAPSULE ORAL
Qty: 8 CAPSULE | Refills: 0 | Status: SHIPPED | OUTPATIENT
Start: 2023-03-15 | End: 2023-05-04

## 2023-03-16 ENCOUNTER — TELEPHONE (OUTPATIENT)
Dept: INTERNAL MEDICINE | Facility: CLINIC | Age: 73
End: 2023-03-16
Payer: MEDICARE

## 2023-03-16 NOTE — TELEPHONE ENCOUNTER
Called patient to inform him of results, verbally understood no further questions.     ----- Message from CHANG Perez sent at 3/15/2023  7:59 PM EDT -----  Please let patient know that his vitamin D is very low.  I am sending in prescription strength vitamin D that he will take once a week for 8 weeks.  He will then need to  OTC vitamin D3 and take 5000 units daily.    His blood count has dropped since last check and he is anemic.  I would like to check some additional labs.  Orders are in the computer and he can stop by anytime to have these done.  He does not need to be fasting.    All other labs are stable and within acceptable limits.

## 2023-04-05 ENCOUNTER — HOSPITAL ENCOUNTER (OUTPATIENT)
Dept: CARDIOLOGY | Facility: HOSPITAL | Age: 73
Discharge: HOME | End: 2023-04-05
Admitting: NURSE PRACTITIONER
Payer: MEDICARE

## 2023-04-05 VITALS — BODY MASS INDEX: 17.2 KG/M2 | HEIGHT: 72 IN | WEIGHT: 127 LBS

## 2023-04-05 DIAGNOSIS — R06.02 SHORTNESS OF BREATH: ICD-10-CM

## 2023-04-05 DIAGNOSIS — Z86.73 HISTORY OF STROKE: ICD-10-CM

## 2023-04-05 DIAGNOSIS — R60.0 LOCALIZED EDEMA: ICD-10-CM

## 2023-04-05 LAB

## 2023-04-05 PROCEDURE — 93970 EXTREMITY STUDY: CPT | Performed by: INTERNAL MEDICINE

## 2023-04-05 PROCEDURE — 93970 EXTREMITY STUDY: CPT

## 2023-04-06 ENCOUNTER — TELEPHONE (OUTPATIENT)
Dept: INTERNAL MEDICINE | Facility: CLINIC | Age: 73
End: 2023-04-06
Payer: MEDICARE

## 2023-04-12 NOTE — TELEPHONE ENCOUNTER
Last Office Visit: 2/14/23  Next Office Visit: 6/26/23    Labs completed in the past 6 months? yes  Labs completed in the past year? yes    Please review pended refill request for any changes needed on refills or quantities. Thank you!

## 2023-04-13 RX ORDER — FERROUS SULFATE 325(65) MG
TABLET ORAL
Qty: 180 TABLET | Refills: 1 | Status: SHIPPED | OUTPATIENT
Start: 2023-04-13

## 2023-04-13 NOTE — TELEPHONE ENCOUNTER
Caller: Obed Cotto Jr.    Relationship: Self    Best call back number: [phone]    Requested Prescriptions:   Requested Prescriptions     Pending Prescriptions Disp Refills   • FeroSul 325 (65 Fe) MG tablet [Pharmacy Med Name: FERROUS SULFATE 325MG (5GR) TABS] 60 tablet      Sig: TAKE 1 TABLET BY MOUTH TWICE DAILY WITH MEALS        Pharmacy where request should be sent: [id number] [address]  AT [facility] [phone] [facility] [phone]      Last office visit with prescribing clinician: 2/14/2023   Last telemedicine visit with prescribing clinician: 6/26/2023   Next office visit with prescribing clinician: 6/26/2023     Additional details provided by patient: PATIENT IS OUT OF THE MEDICATION     Does the patient have less than a 3 day supply:  [x] Yes  [] No    Would you like a call back once the refill request has been completed: [x] Yes [] No    If the office needs to give you a call back, can they leave a voicemail: [x] Yes [] No    Stormy Bhat Rep   04/13/23 10:16 EDT

## 2023-04-14 NOTE — TELEPHONE ENCOUNTER
Called and spoke with patient, informed him that refill for medication was sent to the pharmacy. He picked up prescription yesterday.

## 2023-04-17 ENCOUNTER — HOSPITAL ENCOUNTER (OUTPATIENT)
Dept: CARDIOLOGY | Facility: HOSPITAL | Age: 73
Discharge: HOME | End: 2023-04-17
Admitting: NURSE PRACTITIONER
Payer: MEDICARE

## 2023-04-17 DIAGNOSIS — R60.0 LOCALIZED EDEMA: ICD-10-CM

## 2023-04-17 DIAGNOSIS — R06.02 SHORTNESS OF BREATH: ICD-10-CM

## 2023-04-17 DIAGNOSIS — Z86.73 HISTORY OF STROKE: ICD-10-CM

## 2023-04-17 LAB
BH CV ECHO MEAS - AO MAX PG: 5.8 MMHG
BH CV ECHO MEAS - AO MEAN PG: 3 MMHG
BH CV ECHO MEAS - AO ROOT DIAM: 2.5 CM
BH CV ECHO MEAS - AO V2 MAX: 120 CM/SEC
BH CV ECHO MEAS - AO V2 VTI: 32.1 CM
BH CV ECHO MEAS - AVA(I,D): 2.03 CM2
BH CV ECHO MEAS - EDV(CUBED): 166.4 ML
BH CV ECHO MEAS - EDV(MOD-SP2): 68.5 ML
BH CV ECHO MEAS - EDV(MOD-SP4): 88.3 ML
BH CV ECHO MEAS - EF(MOD-BP): 62.3 %
BH CV ECHO MEAS - EF(MOD-SP2): 69.6 %
BH CV ECHO MEAS - EF(MOD-SP4): 55.3 %
BH CV ECHO MEAS - ESV(CUBED): 29.8 ML
BH CV ECHO MEAS - ESV(MOD-SP2): 20.8 ML
BH CV ECHO MEAS - ESV(MOD-SP4): 39.5 ML
BH CV ECHO MEAS - FS: 43.6 %
BH CV ECHO MEAS - IVS/LVPW: 1 CM
BH CV ECHO MEAS - IVSD: 0.9 CM
BH CV ECHO MEAS - LA DIMENSION: 3.8 CM
BH CV ECHO MEAS - LAT PEAK E' VEL: 12 CM/SEC
BH CV ECHO MEAS - LV DIASTOLIC VOL/BSA (35-75): 50.3 CM2
BH CV ECHO MEAS - LV MASS(C)D: 185.8 GRAMS
BH CV ECHO MEAS - LV MAX PG: 2.9 MMHG
BH CV ECHO MEAS - LV MEAN PG: 1 MMHG
BH CV ECHO MEAS - LV SYSTOLIC VOL/BSA (12-30): 22.5 CM2
BH CV ECHO MEAS - LV V1 MAX: 85.1 CM/SEC
BH CV ECHO MEAS - LV V1 VTI: 20.7 CM
BH CV ECHO MEAS - LVIDD: 5.5 CM
BH CV ECHO MEAS - LVIDS: 3.1 CM
BH CV ECHO MEAS - LVOT AREA: 3.1 CM2
BH CV ECHO MEAS - LVOT DIAM: 2 CM
BH CV ECHO MEAS - LVPWD: 0.9 CM
BH CV ECHO MEAS - MED PEAK E' VEL: 10.6 CM/SEC
BH CV ECHO MEAS - MV A MAX VEL: 88.2 CM/SEC
BH CV ECHO MEAS - MV DEC SLOPE: 375 CM/SEC2
BH CV ECHO MEAS - MV DEC TIME: 0.21 MSEC
BH CV ECHO MEAS - MV E MAX VEL: 99.8 CM/SEC
BH CV ECHO MEAS - MV E/A: 1.13
BH CV ECHO MEAS - MV MAX PG: 4.9 MMHG
BH CV ECHO MEAS - MV MEAN PG: 2 MMHG
BH CV ECHO MEAS - MV P1/2T: 87.5 MSEC
BH CV ECHO MEAS - MV V2 VTI: 45.1 CM
BH CV ECHO MEAS - MVA(P1/2T): 2.5 CM2
BH CV ECHO MEAS - MVA(VTI): 1.44 CM2
BH CV ECHO MEAS - PA ACC TIME: 0.13 SEC
BH CV ECHO MEAS - PA PR(ACCEL): 20.5 MMHG
BH CV ECHO MEAS - RAP SYSTOLE: 3 MMHG
BH CV ECHO MEAS - RVSP: 13.8 MMHG
BH CV ECHO MEAS - SI(MOD-SP2): 27.2 ML/M2
BH CV ECHO MEAS - SI(MOD-SP4): 27.8 ML/M2
BH CV ECHO MEAS - SV(LVOT): 65 ML
BH CV ECHO MEAS - SV(MOD-SP2): 47.7 ML
BH CV ECHO MEAS - SV(MOD-SP4): 48.8 ML
BH CV ECHO MEAS - TAPSE (>1.6): 3.5 CM
BH CV ECHO MEAS - TR MAX PG: 10.8 MMHG
BH CV ECHO MEAS - TR MAX VEL: 164 CM/SEC
BH CV ECHO MEASUREMENTS AVERAGE E/E' RATIO: 8.83
BH CV XLRA - RV BASE: 3 CM
BH CV XLRA - RV LENGTH: 7.8 CM
BH CV XLRA - RV MID: 2.5 CM
BH CV XLRA - TDI S': 16.5 CM/SEC
LEFT ATRIUM VOLUME INDEX: 28.2 ML/M2
MAXIMAL PREDICTED HEART RATE: 146 BPM
STRESS TARGET HR: 124 BPM

## 2023-04-17 PROCEDURE — 93306 TTE W/DOPPLER COMPLETE: CPT

## 2023-04-17 PROCEDURE — 93306 TTE W/DOPPLER COMPLETE: CPT | Performed by: INTERNAL MEDICINE

## 2023-04-21 ENCOUNTER — TELEPHONE (OUTPATIENT)
Dept: INTERNAL MEDICINE | Facility: CLINIC | Age: 73
End: 2023-04-21
Payer: MEDICARE

## 2023-04-21 NOTE — TELEPHONE ENCOUNTER
Caller: Faustina Cotto Jr.    Relationship: Self    Best call back number: [phone]    Caller requesting test results: FAUSTINA    What test was performed: ECHOCARDIAGRAM    When was the test performed: 4/17/23    Where was the test performed: [facility]

## 2023-04-21 NOTE — TELEPHONE ENCOUNTER
Called and spoke with patient, informed him that Laura hasn't reviewed the results yet but as soon as she does we will call with some information. He verbalized understanding and had no further questions.

## 2023-04-25 ENCOUNTER — TELEPHONE (OUTPATIENT)
Dept: INTERNAL MEDICINE | Facility: CLINIC | Age: 73
End: 2023-04-25
Payer: MEDICARE

## 2023-04-25 DIAGNOSIS — I34.0 MITRAL VALVE INSUFFICIENCY, UNSPECIFIED ETIOLOGY: ICD-10-CM

## 2023-04-25 DIAGNOSIS — R93.1 ABNORMAL ECHOCARDIOGRAM: Primary | ICD-10-CM

## 2023-04-25 DIAGNOSIS — I34.1 MVP (MITRAL VALVE PROLAPSE): ICD-10-CM

## 2023-04-25 DIAGNOSIS — I35.8 AORTIC VALVE SCLEROSIS: ICD-10-CM

## 2023-04-25 NOTE — TELEPHONE ENCOUNTER
Caller: Obed Cotto Jr.    Relationship: Self    Best call back number:[phone]    What is the best time to reach you: ANYTIME    Who are you requesting to speak with (clinical staff, provider,  specific staff member): PCP/MA      What was the call regarding: PATIENT REQUESTIG A CALLBACK WITH RESULTS FROM [facility] DONE ON 4/17/23    Do you require a callback: YES

## 2023-04-28 NOTE — TELEPHONE ENCOUNTER
Echo shows  that his heart function is within normal limits.  His aortic valve is stiff (sclerosis) and there is regurgitation (back flow).  He has mitral valve prolapse and moderate regurg there as well.  There is also a small amount of fluid around the heart, which is known as pericarditis.  Does he see cardiology?  If not, I do want to refer him.

## 2023-04-28 NOTE — TELEPHONE ENCOUNTER
Spoke with pt and and gave him results. Verbalized good understanding and appreciation, Pt does not see Cardiology but is open to a referral.

## 2023-05-11 RX ORDER — FAMOTIDINE 20 MG/1
TABLET, FILM COATED ORAL
Qty: 60 TABLET | Refills: 2 | Status: SHIPPED | OUTPATIENT
Start: 2023-05-11

## 2023-05-12 DIAGNOSIS — R25.2 LEG CRAMPS: ICD-10-CM

## 2023-05-13 DIAGNOSIS — R25.2 LEG CRAMPS: ICD-10-CM

## 2023-05-15 RX ORDER — BACLOFEN 10 MG/1
TABLET ORAL
Qty: 90 TABLET | Refills: 3 | OUTPATIENT
Start: 2023-05-15

## 2023-05-15 RX ORDER — BACLOFEN 10 MG/1
TABLET ORAL
Qty: 90 TABLET | Refills: 3 | Status: SHIPPED | OUTPATIENT
Start: 2023-05-15 | End: 2023-05-18 | Stop reason: SDUPTHER

## 2023-05-15 NOTE — TELEPHONE ENCOUNTER
Rx Refill Note  Requested Prescriptions     Pending Prescriptions Disp Refills   • baclofen (LIORESAL) 10 MG tablet [Pharmacy Med Name: BACLOFEN 10MG TABLETS] 90 tablet 3     Sig: TAKE 1 TABLET BY MOUTH THREE TIMES DAILY      Last filled:11/18/23 with 3 refills. Sent  Last office visit with prescribing clinician: 11/18/2022      Next office visit with prescribing clinician: 5/18/2023     Jaden Hernandez MA  05/15/23, 08:27 EDT

## 2023-05-15 NOTE — PROGRESS NOTES
[facility] Cardiology   Consult  Obed Cotto Jr.  1950    VISIT DATE:  05/16/23    PCP:   Laura Hicks, APRN  [address] [facility] [place] [address]  [facility] [id number]        CC:  New Patient       Problem List:  1.  Mitral valve problem  2.  History of stroke  3.  Hypertension    Echo April 2023  •  Left ventricular ejection fraction appears to be 61 - 65%  •  The aortic valve exhibits sclerosis  •  Trace aortic valve regurgitation is present  •  There is mitral valve prolapse of the posterior mitral leaflet. Moderate mitral valve regurgitation is present  •  There is a trivial pericardial effusion           History of Present Illness:  Obed Cotto Jr.  Is a 73 y.o. male with pertinent cardiac history detailed above.  Patient referred to follow-up on mitral valve prolapse noted on his echocardiogram.  Currently has no complaints of palpitations or dyspnea.  He does have some mild lower extremity edema.  He also has noticed his blood pressures been running on the higher side.  He has no complaints of chest pain.  He had a right internal capsule stroke in 2021 and since that time has had left-sided weakness.  He continues on aspirin and Plavix.  He follows with neurology next appointment is later this week.  No other new complaints today.      Patient Active Problem List    Diagnosis Date Noted   • Vitamin D deficiency, unspecified  03/21/2022   • Severe malnutrition (CMS/HCC) 12/07/2021   • Closed fracture of neck of left femur 12/01/2021   • Leg cramps 07/09/2021   • Left hemiplegia 07/09/2021   • History of tobacco abuse 07/09/2021   • Dysarthria 07/08/2021   • Dysphagia 07/08/2021     Note Last Updated: 10/5/2021     09/30/2021-regular textures, thin liquids.  He needs to obtain an upright posture during and after eating.  Small bites of food and sips of liquid.  Small sip and focus on bolus before and as swallowing.  Straws are okay.  Meds whole with applesauce and pudding.     • Impaired  mobility 2021   • Memory impairment 2021   • Stroke 2021     Note Last Updated: 2021- MRI showed small area of infarct in the posterior limb of the right internal capsule.     • Essential hypertension 2020   • BPH (benign prostatic hyperplasia)    • BPH with elevated PSA    • Leukocytosis 2018   • Prediabetes 2018   • Positive colorectal cancer screening using DNA-based stool test 2017   • Urinary retention 10/24/2016       Allergies   Allergen Reactions   • Penicillins Hives       Social History     Socioeconomic History   • Marital status: Single   Tobacco Use   • Smoking status: Former     Packs/day: 1.00     Years: 48.00     Pack years: 48.00     Types: Cigarettes     Quit date: 2021     Years since quittin.9   • Smokeless tobacco: Never   Vaping Use   • Vaping Use: Never used   Substance and Sexual Activity   • Alcohol use: No   • Drug use: No   • Sexual activity: Not Currently     Comment:        Family History   Problem Relation Age of Onset   • Lymphoma Mother    • Lung disease Father    • No Known Problems Son    • Dementia Maternal Grandfather        Current Medications:    Current Outpatient Medications:   •  aspirin (aspirin) 81 MG EC tablet, Take 1 tablet by mouth Daily., Disp: 30 tablet, Rfl: 2  •  atorvastatin (LIPITOR) 80 MG tablet, TAKE 1 TABLET BY MOUTH EVERY NIGHT AT BEDTIME, Disp: 90 tablet, Rfl: 1  •  baclofen (LIORESAL) 10 MG tablet, TAKE 1 TABLET BY MOUTH THREE TIMES DAILY, Disp: 90 tablet, Rfl: 3  •  clopidogrel (PLAVIX) 75 MG tablet, Take 1 tablet by mouth Daily., Disp: 90 tablet, Rfl: 1  •  docusate sodium (Colace) 100 MG capsule, Take 1 capsule by mouth 2 (Two) Times a Day., Disp: 60 capsule, Rfl: 5  •  escitalopram (LEXAPRO) 10 MG tablet, Take 0.5 tablets by mouth Daily., Disp: 45 tablet, Rfl: 1  •  famotidine (PEPCID) 20 MG tablet, TAKE 1 TABLET BY MOUTH TWICE DAILY, Disp: 60 tablet, Rfl: 2  •  FeroSul 325 (65 Fe) MG  "tablet, TAKE 1 TABLET BY MOUTH TWICE DAILY WITH MEALS, Disp: 180 tablet, Rfl: 1  •  finasteride (PROSCAR) 5 MG tablet, TAKE 1 TABLET BY MOUTH DAILY, Disp: 90 tablet, Rfl: 1  •  lisinopril (PRINIVIL,ZESTRIL) 10 MG tablet, TAKE 1 TABLET BY MOUTH DAILY, Disp: 30 tablet, Rfl: 5  •  metoprolol succinate XL (TOPROL-XL) 25 MG 24 hr tablet, Take 1 tablet by mouth Daily., Disp: 90 tablet, Rfl: 1  •  tamsulosin (FLOMAX) 0.4 MG capsule 24 hr capsule, Take 1 capsule by mouth Every Night., Disp: 90 capsule, Rfl: 1  •  chlorthalidone (HYGROTON) 25 MG tablet, Take 1 tablet by mouth Daily., Disp: 30 tablet, Rfl: 5     Review of Systems   Cardiovascular: Positive for leg swelling. Negative for chest pain, dyspnea on exertion, irregular heartbeat and palpitations.   Neurological:        Left-sided weakness posterior       Vitals:    05/16/23 1301   BP: 161/52   BP Location: Right arm   Patient Position: Sitting   Cuff Size: Adult   Pulse: 57   SpO2: 97%   Weight: 57.6 kg (127 lb)   Height: 182.9 cm (72\")       Physical Exam  Constitutional:       Appearance: Normal appearance.   Cardiovascular:      Rate and Rhythm: Normal rate and regular rhythm.      Pulses: Normal pulses.      Heart sounds: Normal heart sounds.   Pulmonary:      Effort: Pulmonary effort is normal.      Breath sounds: Normal breath sounds.   Musculoskeletal:      Right lower leg: No edema.      Left lower leg: No edema.   Neurological:      General: No focal deficit present.      Mental Status: He is alert.         Diagnostic Data:  Procedures  Lab Results   Component Value Date    CHLPL 76 02/14/2023    TRIG 57 02/14/2023    HDL 41 02/14/2023     Lab Results   Component Value Date    GLUCOSE 94 02/14/2023    BUN 16 02/14/2023    CREATININE 0.77 02/14/2023     02/14/2023    K 4.8 02/14/2023     (H) 02/14/2023    CO2 26.7 02/14/2023     Lab Results   Component Value Date    HGBA1C 5.40 03/21/2022     Lab Results   Component Value Date    WBC 9.85 " 02/14/2023    HGB 10.3 (L) 02/14/2023    HCT 31.7 (L) 02/14/2023     02/14/2023       Assessment:   Diagnosis Plan   1. Essential hypertension  Basic Metabolic Panel          Plan:      1.  Mitral valve prolapse  -Most recent EKG sinus bradycardia with no ectopy  -Currently with moderate mitral valve regurgitation does not require any specific intervention at this time, will continue to follow    2.  Hypertenson  -Continue lisinopril  -Add chlorthalidone 25 mg daily for treatment of hypertension and edema check follow-up BMP    3.  History of prior stroke  -right internal capusle  -Previous CTA with no significant carotid artery disease  -LDL well controlled 21   -He is on aspirin, Plavix, atorvastatin  -Follows with neurology        Advance Care Planning   ACP discussion was held with the patient during this visit. Patient does not have an advance directive, declines further assistance.       Karan Alvarado MD [facility]

## 2023-05-16 ENCOUNTER — OFFICE VISIT (OUTPATIENT)
Dept: CARDIOLOGY | Facility: CLINIC | Age: 73
End: 2023-05-16
Payer: MEDICARE

## 2023-05-16 VITALS
HEART RATE: 57 BPM | DIASTOLIC BLOOD PRESSURE: 52 MMHG | WEIGHT: 127 LBS | HEIGHT: 72 IN | SYSTOLIC BLOOD PRESSURE: 161 MMHG | OXYGEN SATURATION: 97 % | BODY MASS INDEX: 17.2 KG/M2

## 2023-05-16 DIAGNOSIS — I10 ESSENTIAL HYPERTENSION: Primary | ICD-10-CM

## 2023-05-16 RX ORDER — CHLORTHALIDONE 25 MG/1
25 TABLET ORAL DAILY
Qty: 30 TABLET | Refills: 5 | Status: SHIPPED | OUTPATIENT
Start: 2023-05-16

## 2023-05-18 ENCOUNTER — OFFICE VISIT (OUTPATIENT)
Dept: NEUROLOGY | Facility: CLINIC | Age: 73
End: 2023-05-18
Payer: MEDICARE

## 2023-05-18 VITALS
OXYGEN SATURATION: 98 % | BODY MASS INDEX: 17.2 KG/M2 | SYSTOLIC BLOOD PRESSURE: 120 MMHG | DIASTOLIC BLOOD PRESSURE: 56 MMHG | HEART RATE: 51 BPM | HEIGHT: 72 IN | WEIGHT: 127 LBS

## 2023-05-18 DIAGNOSIS — I63.81 LACUNAR STROKE: ICD-10-CM

## 2023-05-18 DIAGNOSIS — R25.2 SPASTICITY AS LATE EFFECT OF CEREBROVASCULAR ACCIDENT (CVA): Primary | ICD-10-CM

## 2023-05-18 DIAGNOSIS — R25.2 LEG CRAMPS: ICD-10-CM

## 2023-05-18 DIAGNOSIS — I69.398 SPASTICITY AS LATE EFFECT OF CEREBROVASCULAR ACCIDENT (CVA): Primary | ICD-10-CM

## 2023-05-18 PROCEDURE — 1159F MED LIST DOCD IN RCRD: CPT | Performed by: PSYCHIATRY & NEUROLOGY

## 2023-05-18 PROCEDURE — 3074F SYST BP LT 130 MM HG: CPT | Performed by: PSYCHIATRY & NEUROLOGY

## 2023-05-18 PROCEDURE — 99214 OFFICE O/P EST MOD 30 MIN: CPT | Performed by: PSYCHIATRY & NEUROLOGY

## 2023-05-18 PROCEDURE — 3078F DIAST BP <80 MM HG: CPT | Performed by: PSYCHIATRY & NEUROLOGY

## 2023-05-18 PROCEDURE — 1160F RVW MEDS BY RX/DR IN RCRD: CPT | Performed by: PSYCHIATRY & NEUROLOGY

## 2023-05-18 RX ORDER — BACLOFEN 10 MG/1
TABLET ORAL
COMMUNITY
Start: 2023-03-17 | End: 2023-05-18

## 2023-05-18 RX ORDER — BACLOFEN 10 MG/1
15 TABLET ORAL 3 TIMES DAILY
Qty: 135 TABLET | Refills: 6 | Status: SHIPPED | OUTPATIENT
Start: 2023-05-18 | End: 2023-06-17

## 2023-05-18 NOTE — PROGRESS NOTES
Subjective:    CC: Obed Cotto Jr. is in clinic today for follow up for post stroke spasticity involving left upper and lower extremity.    HPI:  Follow-up: 11/18/2022: He is in clinic for regular follow-up.  He had his Botox 3 months ago.  He reports that Botox did help but is costing him a lot of money so he would like to stop Botox and instead consider a different treatment.  He was on baclofen in the past which he does not remember why it was stopped.  He was on 10 mg 3 times daily dose which did help some.  He does not remember if it made him sleepy or not.    Follow-up: 5/18/2023: He is in clinic for regular follow-up.  Since his last visit in November 2022, he reports that he has been taking baclofen 10 mg 3 times daily with minimal improvement in left upper and lower extremity spasticity.  He denies any side effects with baclofen use.  No new or recurrent strokelike symptoms.          The following portions of the patient's history were reviewed and updated as of 05/18/2023: allergies, social history and problem list.       Current Outpatient Medications:   •  aspirin (aspirin) 81 MG EC tablet, Take 1 tablet by mouth Daily., Disp: 30 tablet, Rfl: 2  •  atorvastatin (LIPITOR) 80 MG tablet, TAKE 1 TABLET BY MOUTH EVERY NIGHT AT BEDTIME, Disp: 90 tablet, Rfl: 1  •  baclofen (LIORESAL) 10 MG tablet, Take 1.5 tablets by mouth 3 (Three) Times a Day for 30 days., Disp: 135 tablet, Rfl: 6  •  chlorthalidone (HYGROTON) 25 MG tablet, Take 1 tablet by mouth Daily., Disp: 30 tablet, Rfl: 5  •  clopidogrel (PLAVIX) 75 MG tablet, Take 1 tablet by mouth Daily., Disp: 90 tablet, Rfl: 1  •  docusate sodium (Colace) 100 MG capsule, Take 1 capsule by mouth 2 (Two) Times a Day., Disp: 60 capsule, Rfl: 5  •  escitalopram (LEXAPRO) 10 MG tablet, Take 0.5 tablets by mouth Daily., Disp: 45 tablet, Rfl: 1  •  famotidine (PEPCID) 20 MG tablet, TAKE 1 TABLET BY MOUTH TWICE DAILY, Disp: 60 tablet, Rfl: 2  •  FeroSul 325 (65 Fe) MG  "tablet, TAKE 1 TABLET BY MOUTH TWICE DAILY WITH MEALS, Disp: 180 tablet, Rfl: 1  •  finasteride (PROSCAR) 5 MG tablet, TAKE 1 TABLET BY MOUTH DAILY, Disp: 90 tablet, Rfl: 1  •  lisinopril (PRINIVIL,ZESTRIL) 10 MG tablet, TAKE 1 TABLET BY MOUTH DAILY, Disp: 30 tablet, Rfl: 5  •  metoprolol succinate XL (TOPROL-XL) 25 MG 24 hr tablet, Take 1 tablet by mouth Daily., Disp: 90 tablet, Rfl: 1  •  tamsulosin (FLOMAX) 0.4 MG capsule 24 hr capsule, Take 1 capsule by mouth Every Night., Disp: 90 capsule, Rfl: 1   Past Medical History:   Diagnosis Date   • Acute ischemic stroke 05/30/2021     posterior limb of the right internal capsule   • Arthritis    • BPH with elevated PSA    • Chronic bronchitis    • Closed fracture of left hip with routine healing 11/30/2021   • Hyperlipidemia    • Hypertension    • Lacunar infarction    • Noncompliance with medication regimen     pt states he never took his metoprolol   • Prediabetes       Past Surgical History:   Procedure Laterality Date   • FINGER SURGERY  1984    R ring finger, partial amputation repair   • FRACTURE SURGERY  11 30 2022   • ORIF HIP FRACTURE Left 12/02/2021    Procedure: HIP OPEN REDUCTION INTERNAL FIXATION WITH FEMORAL NECK SYSTEM LEFT;  Surgeon: Aditya Ruano MD;  Location: [facility];  Service: Orthopedics;  Laterality: Left;      Family History   Problem Relation Age of Onset   • Lymphoma Mother    • Lung disease Father    • No Known Problems Son    • Dementia Maternal Grandfather         Review of Systems  Objective:    /56   Pulse 51   Ht 182.9 cm (72\")   Wt 57.6 kg (127 lb)   SpO2 98%   BMI 17.22 kg/m²     Neurology Exam:  General apperance: NAD.     Mental status: Alert, awake and oriented to time place and person.    Recent and Remote memory: Can recall 3/3 objects at 5 minutes. Can recall historical events.     Attention span and Concentration: Serial 7s: Normal.     Fund of knowledge:  Normal.     Language and Speech: No aphasia or " dysarthria.    Naming , Repitition and Comprehension:  Can name objects, repeat a sentence and follow commands. Speech is clear and fluent with good repetition, comprehension, and naming.    CN II to XII: Intact.    Opthalmoscopic Exam: No papilledema.    Motor:  Right UE muscle strength 5/5. Normal tone.     Left UE muscle strength 4-/5.  Moderately Increased Tone.     Right LE muscle strength5/5. Normal tone.     Left LE muscle strength 4-/5.  Moderately increased tone.    Sensory: Normal light touch, vibration and pinprick sensation bilaterally.    DTRs: 2+ bilaterally.    Babinski: Negative bilaterally.    Co-ordination: Normal finger-to-nose, heel to shin B/L.    Rhomberg: Negative.    Gait: Normal.    Cardiovascular: Regular rate and rhythm without murmur, gallop or rub.    Assessment and Plan:  1. 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 Lacunar stroke  3. Leg cramps  -Patient with history of right cerebral hemisphere stroke with resultant spastic hemiplegia on the left.  In the past, he was getting Botox to help with spasticity and it was working really well but insurance denied Botox eventually.  He was started on baclofen 10 mg 3 times daily on last visit to which she has had some response.  Since he denies any side effects, I will increase his dose to 50 mg 3 times daily for better therapeutic effect.  Continue with aspirin, high-dose statin for secondary dose prevention and I will see him back in clinic in 6 months for follow-up.    - baclofen (LIORESAL) 10 MG tablet; Take 1.5 tablets by mouth 3 (Three) Times a Day for 30 days.  Dispense: 135 tablet; Refill: 6     I spent 30 minutes in patient care: Reviewing records prior to the visit, entering orders and documentation and spent more than duran 50% of this time face-to-face in management, instructions and education regarding above mentioned diagnosis and also on counseling and discussing about taking medication regularly, possible  side effects with medication use, importance of good sleep hygiene, good hydration and regular exercise.    Return in about 6 months (around 11/18/2023).       Note to patient: The 21st Century Cures Act makes medical notes like these available to patients in the interest of transparency. However, be advised this is a medical document. It is intended as peer to peer communication. It is written in medical language and may contain abbreviations or verbiage that are unfamiliar. It may appear blunt or direct. Medical documents are intended to carry relevant information, facts as evident, and the clinical opinion of the physician.

## 2023-05-25 ENCOUNTER — TELEPHONE (OUTPATIENT)
Dept: NEUROLOGY | Facility: CLINIC | Age: 73
End: 2023-05-25
Payer: MEDICARE

## 2023-05-25 DIAGNOSIS — R25.2 LEG CRAMPS: ICD-10-CM

## 2023-05-25 RX ORDER — BACLOFEN 10 MG/1
15 TABLET ORAL 3 TIMES DAILY
Qty: 135 TABLET | Refills: 6 | Status: CANCELLED | OUTPATIENT
Start: 2023-05-25 | End: 2023-06-24

## 2023-05-25 NOTE — TELEPHONE ENCOUNTER
Spoke to Obed and informed him that the script he picked up on May 16th should last for 20 days at the increased dose and that he should be spliting the 10mg tablets in half so he has them for 1.5 tablets 3 times daily. Also informed him that I spoke to the pharmacy and insurance stated they would authorize another fill anytime after Fabiana first. Patient states understanding.

## 2023-05-25 NOTE — TELEPHONE ENCOUNTER
Caller: FAUSTINA Huffman call back number: [phone]    Requested Prescriptions: BACLOFEN  10 MG 1.5 TAB 3XDAY   Requested Prescriptions      No prescriptions requested or ordered in this encounter        Pharmacy where request should be sent: [id number] [address] AT [facility] - [phone] [facility] [phone]   [phone]     Last office visit with prescribing clinician: 5/18/2023   Last telemedicine visit with prescriPT tess clinician: Visit date not found   Next office visit with prescribing clinician: 11/21/2023     Additional details provided by patient: PT WAS TOLD BY PHARMACY THEY CAN'T FILL RX IT WAS TO SOON? YOU CHANGED DOSAGE ? NOT SURE PHARMACY SAW?     Does the patient have less than a 3 day supply:  [x] Yes  [] No    Would you like a call back once the refill request has been completed: [] Yes [] No    If the office needs to give you a call back, can they leave a voicemail: [] Yes [] No    PLEASE ADVISE.     Stormy Shankar Rep   05/25/23 13:09 EDT

## 2023-08-06 DIAGNOSIS — Z86.73 HISTORY OF CVA (CEREBROVASCULAR ACCIDENT): ICD-10-CM

## 2023-08-06 DIAGNOSIS — E78.2 MIXED HYPERLIPIDEMIA: ICD-10-CM

## 2023-08-06 DIAGNOSIS — I10 ESSENTIAL HYPERTENSION: ICD-10-CM

## 2023-08-08 RX ORDER — ATORVASTATIN CALCIUM 80 MG/1
80 TABLET, FILM COATED ORAL
Qty: 90 TABLET | Refills: 0 | Status: SHIPPED | OUTPATIENT
Start: 2023-08-08

## 2023-08-08 RX ORDER — LISINOPRIL 10 MG/1
10 TABLET ORAL DAILY
Qty: 90 TABLET | Refills: 0 | Status: SHIPPED | OUTPATIENT
Start: 2023-08-08

## 2023-08-16 DIAGNOSIS — I10 ESSENTIAL HYPERTENSION: ICD-10-CM

## 2023-08-16 DIAGNOSIS — Z86.73 HISTORY OF CVA (CEREBROVASCULAR ACCIDENT): ICD-10-CM

## 2023-08-16 RX ORDER — METOPROLOL SUCCINATE 25 MG/1
25 TABLET, EXTENDED RELEASE ORAL DAILY
Qty: 90 TABLET | Refills: 0 | Status: SHIPPED | OUTPATIENT
Start: 2023-08-16 | End: 2023-08-21 | Stop reason: SDUPTHER

## 2023-08-18 DIAGNOSIS — N40.1 BENIGN PROSTATIC HYPERPLASIA WITH LOWER URINARY TRACT SYMPTOMS, SYMPTOM DETAILS UNSPECIFIED: ICD-10-CM

## 2023-08-18 DIAGNOSIS — Z86.73 HISTORY OF CVA (CEREBROVASCULAR ACCIDENT): ICD-10-CM

## 2023-08-18 RX ORDER — TAMSULOSIN HYDROCHLORIDE 0.4 MG/1
1 CAPSULE ORAL NIGHTLY
Qty: 90 CAPSULE | OUTPATIENT
Start: 2023-08-18

## 2023-08-18 RX ORDER — TAMSULOSIN HYDROCHLORIDE 0.4 MG/1
1 CAPSULE ORAL NIGHTLY
Qty: 30 CAPSULE | Refills: 0 | Status: SHIPPED | OUTPATIENT
Start: 2023-08-18

## 2023-08-18 RX ORDER — CLOPIDOGREL BISULFATE 75 MG/1
75 TABLET ORAL DAILY
Qty: 90 TABLET | OUTPATIENT
Start: 2023-08-18

## 2023-08-18 RX ORDER — CLOPIDOGREL BISULFATE 75 MG/1
75 TABLET ORAL DAILY
Qty: 30 TABLET | Refills: 0 | Status: SHIPPED | OUTPATIENT
Start: 2023-08-18

## 2023-08-18 NOTE — TELEPHONE ENCOUNTER
Hub staff attempted to follow warm transfer process and was unsuccessful     Caller: Obed Cotto Jr.    Relationship to patient: Self    Best call back number: [phone]    Patient is needing: TO KNOW IF HE NEEDS TO BE FASTING FOR HIS APPOINTMENT ON MONDAY [id number]

## 2023-08-21 ENCOUNTER — OFFICE VISIT (OUTPATIENT)
Dept: INTERNAL MEDICINE | Facility: CLINIC | Age: 73
End: 2023-08-21
Payer: MEDICARE

## 2023-08-21 ENCOUNTER — LAB (OUTPATIENT)
Dept: INTERNAL MEDICINE | Facility: CLINIC | Age: 73
End: 2023-08-21
Payer: MEDICARE

## 2023-08-21 VITALS
BODY MASS INDEX: 17.44 KG/M2 | HEIGHT: 72 IN | SYSTOLIC BLOOD PRESSURE: 115 MMHG | WEIGHT: 128.8 LBS | TEMPERATURE: 96.9 F | DIASTOLIC BLOOD PRESSURE: 60 MMHG | HEART RATE: 65 BPM | OXYGEN SATURATION: 95 %

## 2023-08-21 DIAGNOSIS — Z00.00 MEDICARE ANNUAL WELLNESS VISIT, SUBSEQUENT: ICD-10-CM

## 2023-08-21 DIAGNOSIS — I10 ESSENTIAL HYPERTENSION: ICD-10-CM

## 2023-08-21 DIAGNOSIS — Z74.09 IMPAIRED MOBILITY: ICD-10-CM

## 2023-08-21 DIAGNOSIS — G81.94 LEFT HEMIPLEGIA: ICD-10-CM

## 2023-08-21 DIAGNOSIS — Z00.00 MEDICARE ANNUAL WELLNESS VISIT, SUBSEQUENT: Primary | ICD-10-CM

## 2023-08-21 DIAGNOSIS — Z86.73 HISTORY OF CVA (CEREBROVASCULAR ACCIDENT): ICD-10-CM

## 2023-08-21 DIAGNOSIS — D64.9 ANEMIA, UNSPECIFIED TYPE: ICD-10-CM

## 2023-08-21 DIAGNOSIS — Z12.5 SCREENING FOR PROSTATE CANCER: ICD-10-CM

## 2023-08-21 DIAGNOSIS — E55.9 VITAMIN D DEFICIENCY, UNSPECIFIED: ICD-10-CM

## 2023-08-21 DIAGNOSIS — Z00.00 ROUTINE GENERAL MEDICAL EXAMINATION AT A HEALTH CARE FACILITY: ICD-10-CM

## 2023-08-21 PROCEDURE — 96160 PT-FOCUSED HLTH RISK ASSMT: CPT | Performed by: NURSE PRACTITIONER

## 2023-08-21 PROCEDURE — 99214 OFFICE O/P EST MOD 30 MIN: CPT | Performed by: NURSE PRACTITIONER

## 2023-08-21 PROCEDURE — 99397 PER PM REEVAL EST PAT 65+ YR: CPT | Performed by: NURSE PRACTITIONER

## 2023-08-21 PROCEDURE — G0439 PPPS, SUBSEQ VISIT: HCPCS | Performed by: NURSE PRACTITIONER

## 2023-08-21 PROCEDURE — 3074F SYST BP LT 130 MM HG: CPT | Performed by: NURSE PRACTITIONER

## 2023-08-21 PROCEDURE — 36415 COLL VENOUS BLD VENIPUNCTURE: CPT | Performed by: NURSE PRACTITIONER

## 2023-08-21 PROCEDURE — 1159F MED LIST DOCD IN RCRD: CPT | Performed by: NURSE PRACTITIONER

## 2023-08-21 PROCEDURE — 1160F RVW MEDS BY RX/DR IN RCRD: CPT | Performed by: NURSE PRACTITIONER

## 2023-08-21 PROCEDURE — 3078F DIAST BP <80 MM HG: CPT | Performed by: NURSE PRACTITIONER

## 2023-08-21 RX ORDER — BACLOFEN 10 MG/1
TABLET ORAL
COMMUNITY
Start: 2023-08-08

## 2023-08-21 RX ORDER — METOPROLOL SUCCINATE 25 MG/1
12.5 TABLET, EXTENDED RELEASE ORAL DAILY
Qty: 45 TABLET | Refills: 1 | Status: SHIPPED | OUTPATIENT
Start: 2023-08-21

## 2023-08-21 NOTE — PROGRESS NOTES
The ABCs of the Annual Wellness Visit  Subsequent Medicare Wellness Visit    Subjective    Obed Cotto Jr. is a 73 y.o. male who presents for a Subsequent Medicare Wellness Visit.    The following portions of the patient's history were reviewed and   updated as appropriate: allergies, current medications, past family history, past medical history, past social history, past surgical history, and problem list.    Compared to one year ago, the patient feels his physical   health is worse.    Compared to one year ago, the patient feels his mental   health is worse.    Recent Hospitalizations:  He was not admitted to the hospital during the last year.       Current Medical Providers:  Patient Care Team:  Laura Hicks APRN as PCP - General (Family Medicine)  Dieudonne Hartman MD as Consulting Physician (Urology)    Outpatient Medications Prior to Visit   Medication Sig Dispense Refill    aspirin (aspirin) 81 MG EC tablet Take 1 tablet by mouth Daily. 30 tablet 2    atorvastatin (LIPITOR) 80 MG tablet TAKE 1 TABLET BY MOUTH EVERY NIGHT AT BEDTIME 90 tablet 0    baclofen (LIORESAL) 10 MG tablet TAKE 1 AND 1/2 TABLETS BY MOUTH THREE TIMES DAILY      chlorthalidone (HYGROTON) 25 MG tablet Take 1 tablet by mouth Daily. 30 tablet 5    clopidogrel (PLAVIX) 75 MG tablet TAKE 1 TABLET BY MOUTH DAILY 30 tablet 0    docusate sodium (Colace) 100 MG capsule Take 1 capsule by mouth 2 (Two) Times a Day. 60 capsule 5    escitalopram (LEXAPRO) 10 MG tablet Take 0.5 tablets by mouth Daily. 45 tablet 1    famotidine (PEPCID) 20 MG tablet TAKE 1 TABLET BY MOUTH TWICE DAILY 60 tablet 2    FeroSul 325 (65 Fe) MG tablet TAKE 1 TABLET BY MOUTH TWICE DAILY WITH MEALS 180 tablet 1    finasteride (PROSCAR) 5 MG tablet TAKE 1 TABLET BY MOUTH DAILY 90 tablet 1    lisinopril (PRINIVIL,ZESTRIL) 10 MG tablet TAKE 1 TABLET BY MOUTH DAILY 90 tablet 0    tamsulosin (FLOMAX) 0.4 MG capsule 24 hr capsule TAKE 1 CAPSULE BY MOUTH EVERY NIGHT 30  "capsule 0    metoprolol succinate XL (TOPROL-XL) 25 MG 24 hr tablet TAKE 1 TABLET BY MOUTH DAILY 90 tablet 0     No facility-administered medications prior to visit.       No opioid medication identified on active medication list. I have reviewed chart for other potential  high risk medication/s and harmful drug interactions in the elderly.        Aspirin is on active medication list. Aspirin use is indicated based on review of current medical condition/s. Pros and cons of this therapy have been discussed today. Benefits of this medication outweigh potential harm.  Patient has been encouraged to continue taking this medication.  .      Patient Active Problem List   Diagnosis    Urinary retention    Positive colorectal cancer screening using DNA-based stool test    Leukocytosis    Prediabetes    BPH (benign prostatic hyperplasia)    BPH with elevated PSA    Essential hypertension    Stroke    Dysarthria    Dysphagia    Impaired mobility    Memory impairment    Leg cramps    Left hemiplegia    History of tobacco abuse    Closed fracture of neck of left femur    Severe malnutrition    Vitamin D deficiency, unspecified      Advance Care Planning   Advance Care Planning     Advance Directive is not on file.  ACP discussion was held with the patient during this visit. Patient does not have an advance directive, information provided.     Objective    Vitals:    08/21/23 1256   BP: 115/60   Pulse: 65   Temp: 96.9 °F (36.1 °C)   SpO2: 95%   Weight: 58.4 kg (128 lb 12.8 oz)   Height: 182.9 cm (72.01\")     Estimated body mass index is 17.46 kg/m² as calculated from the following:    Height as of this encounter: 182.9 cm (72.01\").    Weight as of this encounter: 58.4 kg (128 lb 12.8 oz).    BMI is below normal parameters (malnutrition). Recommendations: discussed measures to safely increase caloric intake      Does the patient have evidence of cognitive impairment? No          HEALTH RISK ASSESSMENT    Smoking Status:  Social " History     Tobacco Use   Smoking Status Former    Packs/day: 1.00    Years: 48.00    Pack years: 48.00    Types: Cigarettes    Quit date: 2021    Years since quittin.2   Smokeless Tobacco Never     Alcohol Consumption:  Social History     Substance and Sexual Activity   Alcohol Use No     Fall Risk Screen:    KAIN Fall Risk Assessment was completed, and patient is at LOW risk for falls.Assessment completed on:2023    Depression Screenin/21/2023    12:54 PM   PHQ-2/PHQ-9 Depression Screening   Little Interest or Pleasure in Doing Things 3-->nearly every day   Feeling Down, Depressed or Hopeless 3-->nearly every day   Trouble Falling or Staying Asleep, or Sleeping Too Much 0-->not at all   Feeling Tired or Having Little Energy 2-->more than half the days   Poor Appetite or Overeating 0-->not at all   Feeling Bad about Yourself - or that You are a Failure or Have Let Yourself or Your Family Down 2-->more than half the days   Trouble Concentrating on Things, Such as Reading the Newspaper or Watching Television 0-->not at all   Moving or Speaking So Slowly that Other People Could Have Noticed? Or the Opposite - Being So Fidgety 1-->several days   Thoughts that You Would be Better Off Dead or of Hurting Yourself in Some Way 0-->not at all   PHQ-9: Brief Depression Severity Measure Score 11   If You Checked Off Any Problems, How Difficult Have These Problems Made It For You to Do Your Work, Take Care of Things at Home, or Get Along with Other People? not difficult at all       Health Habits and Functional and Cognitive Screenin/21/2023    12:57 PM   Functional & Cognitive Status   Do you have difficulty preparing food and eating? Yes   Do you have difficulty bathing yourself, getting dressed or grooming yourself? Yes   Do you have difficulty using the toilet? Yes   Do you have difficulty moving around from place to place? Yes   Do you have trouble with steps or getting out of a bed or a  chair? Yes   Current Diet Frequent Junk Food   Dental Exam Up to date   Eye Exam Up to date   Exercise (times per week) 0 times per week   Current Exercises Include No Regular Exercise   Do you need help using the phone?  No   Are you deaf or do you have serious difficulty hearing?  No   Do you need help to go to places out of walking distance? Yes   Do you need help shopping? Yes   Do you need help preparing meals?  Yes   Do you need help with housework?  Yes   Do you need help with laundry? Yes   Do you need help taking your medications? No   Do you need help managing money? No   Do you ever drive or ride in a car without wearing a seat belt? No   Have you felt unusual stress, anger or loneliness in the last month? No   Who do you live with? Child   If you need help, do you have trouble finding someone available to you? No   Have you been bothered in the last four weeks by sexual problems? No   Do you have difficulty concentrating, remembering or making decisions? No       Age-appropriate Screening Schedule:  Refer to the list below for future screening recommendations based on patient's age, sex and/or medical conditions. Orders for these recommended tests are listed in the plan section. The patient has been provided with a written plan.    Health Maintenance   Topic Date Due    URINE MICROALBUMIN  Never done    TDAP/TD VACCINES (1 - Tdap) Never done    DIABETIC FOOT EXAM  Never done    AAA SCREEN (ONE-TIME)  Never done    ZOSTER VACCINE (2 of 2) 07/18/2017    DIABETIC EYE EXAM  07/01/2023    LUNG CANCER SCREENING  08/21/2024 (Originally 12/3/2022)    COLORECTAL CANCER SCREENING  08/22/2024 (Originally 1950)    COVID-19 Vaccine (2 - Booster for Fabian series) 08/23/2024 (Originally 6/5/2021)    INFLUENZA VACCINE  10/01/2023    LIPID PANEL  02/14/2024    ANNUAL WELLNESS VISIT  08/21/2024    HEPATITIS C SCREENING  Completed    Pneumococcal Vaccine 65+  Completed    HEMOGLOBIN A1C  Discontinued                   CMS Preventative Services Quick Reference  Risk Factors Identified During Encounter  Inactivity/Sedentary:  unable to exercise due to mobility limitations  The above risks/problems have been discussed with the patient.  Pertinent information has been shared with the patient in the After Visit Summary.  An After Visit Summary and PPPS were made available to the patient.    Follow Up:   Next Medicare Wellness visit to be scheduled in 1 year.       Additional E&M Note during same encounter follows:  Patient has multiple medical problems which are significant and separately identifiable that require additional work above and beyond the Medicare Wellness Visit.      Chief Complaint  Medicare Wellness-subsequent (Patient in clinic for annual medicare wellness, patient wants to discuss medication to help him go to the restroom.)    Subjective        HPI  Obed Cotto Jr. is also being seen today for     CVA -patient suffered an acute ischemic stroke on 5/30/2021.  This is left him with residual weakness of the left side.  He suffered a subsequent fall in November 2021 that resulted in a closed fracture of the left hip.     Had a recent f/u with neurology.  Was doing Botox for LLE spasticity, but insurance would not pay for this so he was changed back to a muscle relaxer.       Depression-chronic; current regimen is Lexapro 10 mg p.o. daily     Hypertension-chronic; current regimen is lisinopril 10 mg p.o. daily, metoprolol XL 25 mg daily and chlorthalidone 25 mg.  He is feeling dizzy and lightheaded with standing.  BP and HR are low today in office.       Hyperlipidemia-chronic; current regimen is Lipitor 80 mg p.o. daily     COVID-4/10/2021 ([facility]); declines further  Flu shot-10/2022  Prevnar 13 3/1/2019  Pneumovax 23 2/23/2017  Diabetic eye exam 7/1/2021  Low-dose lung cancer screening 12/3/2021; declines today    Review of Systems   Neurological:  Positive for dizziness (with standing) and light-headedness.   All  "other systems reviewed and are negative.    Objective   Vital Signs:  /60   Pulse 65   Temp 96.9 °F (36.1 °C)   Ht 182.9 cm (72.01\")   Wt 58.4 kg (128 lb 12.8 oz)   SpO2 95%   BMI 17.46 kg/m²     Physical Exam  Constitutional:       Appearance: He is well-developed.   HENT:      Head: Normocephalic and atraumatic.   Eyes:      Conjunctiva/sclera: Conjunctivae normal.      Pupils: Pupils are equal, round, and reactive to light.   Cardiovascular:      Rate and Rhythm: Normal rate and regular rhythm.      Heart sounds: Normal heart sounds.   Pulmonary:      Effort: Pulmonary effort is normal.      Breath sounds: Normal breath sounds.   Abdominal:      General: Bowel sounds are normal.      Palpations: Abdomen is soft.   Musculoskeletal:         General: Normal range of motion.      Cervical back: Normal range of motion.   Skin:     General: Skin is warm and dry.   Neurological:      Mental Status: He is alert and oriented to person, place, and time.      Gait: Gait abnormal.      Deep Tendon Reflexes: Reflexes are normal and symmetric.      Comments: In power wheelchair   Psychiatric:         Behavior: Behavior normal.         Thought Content: Thought content normal.         Judgment: Judgment normal.                       Assessment and Plan   Diagnoses and all orders for this visit:    1. Medicare annual wellness visit, subsequent (Primary)  -     CBC & Differential; Future  -     Comprehensive Metabolic Panel; Future  -     Lipid Panel; Future  -     TSH; Future  -     Vitamin B12; Future  -     Vitamin D,25-Hydroxy; Future  -     PSA Screen; Future    2. Routine general medical examination at a health care facility  -     CBC & Differential; Future  -     Comprehensive Metabolic Panel; Future  -     Lipid Panel; Future  -     TSH; Future  -     Vitamin B12; Future  -     Vitamin D,25-Hydroxy; Future  -     PSA Screen; Future    3. Essential hypertension  -     CBC & Differential; Future  -     " Comprehensive Metabolic Panel; Future  -     Lipid Panel; Future  -     TSH; Future  -     Vitamin B12; Future  -     Vitamin D,25-Hydroxy; Future  -     PSA Screen; Future  -     metoprolol succinate XL (TOPROL-XL) 25 MG 24 hr tablet; Take 0.5 tablets by mouth Daily.  Dispense: 45 tablet; Refill: 1    4. Vitamin D deficiency, unspecified   -     CBC & Differential; Future  -     Comprehensive Metabolic Panel; Future  -     Lipid Panel; Future  -     TSH; Future  -     Vitamin B12; Future  -     Vitamin D,25-Hydroxy; Future  -     PSA Screen; Future    5. Left hemiplegia  -     CBC & Differential; Future  -     Comprehensive Metabolic Panel; Future  -     Lipid Panel; Future  -     TSH; Future  -     Vitamin B12; Future  -     Vitamin D,25-Hydroxy; Future  -     PSA Screen; Future    6. Impaired mobility  -     CBC & Differential; Future  -     Comprehensive Metabolic Panel; Future  -     Lipid Panel; Future  -     TSH; Future  -     Vitamin B12; Future  -     Vitamin D,25-Hydroxy; Future  -     PSA Screen; Future    7. History of CVA (cerebrovascular accident)  -     CBC & Differential; Future  -     Comprehensive Metabolic Panel; Future  -     Lipid Panel; Future  -     TSH; Future  -     Vitamin B12; Future  -     Vitamin D,25-Hydroxy; Future  -     PSA Screen; Future  -     metoprolol succinate XL (TOPROL-XL) 25 MG 24 hr tablet; Take 0.5 tablets by mouth Daily.  Dispense: 45 tablet; Refill: 1    8. Screening for prostate cancer  -     CBC & Differential; Future  -     Comprehensive Metabolic Panel; Future  -     Lipid Panel; Future  -     TSH; Future  -     Vitamin B12; Future  -     Vitamin D,25-Hydroxy; Future  -     PSA Screen; Future      Decrease Metoprolol to 12.5 mg daily due to dizziness  Pt declines further work up for dizziness at this time  Labs sent  Counseling - diet and exercise           Follow Up   Return in about 6 months (around 2/21/2024) for f/u.  Patient was given instructions and counseling  regarding his condition or for health maintenance advice. Please see specific information pulled into the AVS if appropriate.

## 2023-08-22 LAB
25(OH)D3+25(OH)D2 SERPL-MCNC: 48.2 NG/ML (ref 30–100)
ALBUMIN SERPL-MCNC: 4.1 G/DL (ref 3.5–5.2)
ALBUMIN/GLOB SERPL: 2.1 G/DL
ALP SERPL-CCNC: 120 U/L (ref 39–117)
ALT SERPL-CCNC: 31 U/L (ref 1–41)
AST SERPL-CCNC: 21 U/L (ref 1–40)
BASOPHILS # BLD AUTO: 0.06 10*3/MM3 (ref 0–0.2)
BASOPHILS NFR BLD AUTO: 0.5 % (ref 0–1.5)
BILIRUB SERPL-MCNC: 0.5 MG/DL (ref 0–1.2)
BUN SERPL-MCNC: 17 MG/DL (ref 8–23)
BUN/CREAT SERPL: 18.1 (ref 7–25)
CALCIUM SERPL-MCNC: 10.8 MG/DL (ref 8.6–10.5)
CHLORIDE SERPL-SCNC: 107 MMOL/L (ref 98–107)
CHOLEST SERPL-MCNC: 77 MG/DL (ref 0–200)
CO2 SERPL-SCNC: 25.6 MMOL/L (ref 22–29)
CREAT SERPL-MCNC: 0.94 MG/DL (ref 0.76–1.27)
EGFRCR SERPLBLD CKD-EPI 2021: 85.1 ML/MIN/1.73
EOSINOPHIL # BLD AUTO: 0.27 10*3/MM3 (ref 0–0.4)
EOSINOPHIL NFR BLD AUTO: 2.3 % (ref 0.3–6.2)
ERYTHROCYTE [DISTWIDTH] IN BLOOD BY AUTOMATED COUNT: 11.6 % (ref 12.3–15.4)
FERRITIN SERPL-MCNC: 517 NG/ML (ref 30–400)
FOLATE SERPL-MCNC: 11.3 NG/ML (ref 4.78–24.2)
GLOBULIN SER CALC-MCNC: 2 GM/DL
GLUCOSE SERPL-MCNC: 97 MG/DL (ref 65–99)
HCT VFR BLD AUTO: 33.2 % (ref 37.5–51)
HDLC SERPL-MCNC: 34 MG/DL (ref 40–60)
HGB BLD-MCNC: 11.3 G/DL (ref 13–17.7)
IMM GRANULOCYTES # BLD AUTO: 0.05 10*3/MM3 (ref 0–0.05)
IMM GRANULOCYTES NFR BLD AUTO: 0.4 % (ref 0–0.5)
IRON SATN MFR SERPL: 37 % (ref 20–50)
IRON SERPL-MCNC: 103 MCG/DL (ref 59–158)
LDLC SERPL CALC-MCNC: 28 MG/DL (ref 0–100)
LYMPHOCYTES # BLD AUTO: 2.46 10*3/MM3 (ref 0.7–3.1)
LYMPHOCYTES NFR BLD AUTO: 20.9 % (ref 19.6–45.3)
MCH RBC QN AUTO: 33.5 PG (ref 26.6–33)
MCHC RBC AUTO-ENTMCNC: 34 G/DL (ref 31.5–35.7)
MCV RBC AUTO: 98.5 FL (ref 79–97)
MONOCYTES # BLD AUTO: 0.74 10*3/MM3 (ref 0.1–0.9)
MONOCYTES NFR BLD AUTO: 6.3 % (ref 5–12)
NEUTROPHILS # BLD AUTO: 8.19 10*3/MM3 (ref 1.7–7)
NEUTROPHILS NFR BLD AUTO: 69.6 % (ref 42.7–76)
NRBC BLD AUTO-RTO: 0 /100 WBC (ref 0–0.2)
PLATELET # BLD AUTO: 247 10*3/MM3 (ref 140–450)
POTASSIUM SERPL-SCNC: 4.9 MMOL/L (ref 3.5–5.2)
PROT SERPL-MCNC: 6.1 G/DL (ref 6–8.5)
PSA SERPL-MCNC: 3.27 NG/ML (ref 0–4)
RBC # BLD AUTO: 3.37 10*6/MM3 (ref 4.14–5.8)
SODIUM SERPL-SCNC: 140 MMOL/L (ref 136–145)
TIBC SERPL-MCNC: 277 MCG/DL
TRIGL SERPL-MCNC: 66 MG/DL (ref 0–150)
TSH SERPL DL<=0.005 MIU/L-ACNC: 1.34 UIU/ML (ref 0.27–4.2)
UIBC SERPL-MCNC: 174 MCG/DL (ref 112–346)
VIT B12 SERPL-MCNC: 594 PG/ML (ref 211–946)
VLDLC SERPL CALC-MCNC: 15 MG/DL (ref 5–40)
WBC # BLD AUTO: 11.77 10*3/MM3 (ref 3.4–10.8)

## 2023-08-23 RX ORDER — ESCITALOPRAM OXALATE 10 MG/1
TABLET ORAL
Qty: 45 TABLET | Refills: 1 | Status: SHIPPED | OUTPATIENT
Start: 2023-08-23

## 2023-08-28 DIAGNOSIS — N40.1 BENIGN PROSTATIC HYPERPLASIA WITH LOWER URINARY TRACT SYMPTOMS, SYMPTOM DETAILS UNSPECIFIED: ICD-10-CM

## 2023-08-28 RX ORDER — FINASTERIDE 5 MG/1
5 TABLET, FILM COATED ORAL DAILY
Qty: 90 TABLET | Refills: 1 | Status: SHIPPED | OUTPATIENT
Start: 2023-08-28

## 2023-08-28 NOTE — TELEPHONE ENCOUNTER
Caller: Obed Cotto Jr.    Relationship: Self    Best call back number:[phone]     Requested Prescriptions:   Requested Prescriptions     Pending Prescriptions Disp Refills    finasteride (PROSCAR) 5 MG tablet 90 tablet 1     Sig: Take 1 tablet by mouth Daily.        Pharmacy where request should be sent: [facility] #[id number] [place] [id number] [facility] AT [facility] - [phone] [facility] [phone]      Last office visit with prescribing clinician: 8/21/2023   Last telemedicine visit with prescribing clinician: Visit date not found   Next office visit with prescribing clinician: 2/22/2024     PATIENT REQUEST CALLBACK REGARDING BLOOD WORK FROM LAST MONDAY    Does the patient have less than a 3 day supply:  [x] Yes  [] No    Would you like a call back once the refill request has been completed: [] Yes [x] No    If the office needs to give you a call back, can they leave a voicemail: [] Yes [x] No    Stormy Hardin Rep   08/28/23 15:59 EDT

## 2023-08-29 DIAGNOSIS — N40.1 BENIGN PROSTATIC HYPERPLASIA WITH LOWER URINARY TRACT SYMPTOMS, SYMPTOM DETAILS UNSPECIFIED: ICD-10-CM

## 2023-08-30 RX ORDER — FAMOTIDINE 20 MG/1
TABLET, FILM COATED ORAL
Qty: 60 TABLET | Refills: 2 | Status: SHIPPED | OUTPATIENT
Start: 2023-08-30

## 2023-09-08 NOTE — TELEPHONE ENCOUNTER
CORAZONM for patient that new prescription was sent to his pharmacy per Dr. LAUREN.  
Garima sent to the pharmacy.  
LEVOFLOXACIN WAS SENT IN YESTERDAY BUT WHEN PATIENT WENT TO  IT HAS TO MANY SIDE EFFECTS. WORRIED ABOUT TAKING IT AS IT COULD CAUSE SO MANY OTHER PROBLEMS. WOULD LIKE DR LAUREN TO SEND IN SOMETHING DIFFERENT.     PLEASE CALL PATIENT WHEN SENT IN.  
weight-bearing as tolerated
No

## 2023-09-15 DIAGNOSIS — N40.1 BENIGN PROSTATIC HYPERPLASIA WITH LOWER URINARY TRACT SYMPTOMS, SYMPTOM DETAILS UNSPECIFIED: ICD-10-CM

## 2023-09-15 DIAGNOSIS — Z86.73 HISTORY OF CVA (CEREBROVASCULAR ACCIDENT): ICD-10-CM

## 2023-09-15 RX ORDER — CLOPIDOGREL BISULFATE 75 MG/1
75 TABLET ORAL DAILY
Qty: 90 TABLET | Refills: 0 | Status: SHIPPED | OUTPATIENT
Start: 2023-09-15

## 2023-09-15 RX ORDER — TAMSULOSIN HYDROCHLORIDE 0.4 MG/1
1 CAPSULE ORAL NIGHTLY
Qty: 90 CAPSULE | Refills: 0 | Status: SHIPPED | OUTPATIENT
Start: 2023-09-15

## 2023-10-03 ENCOUNTER — TELEPHONE (OUTPATIENT)
Dept: INTERNAL MEDICINE | Facility: CLINIC | Age: 73
End: 2023-10-03

## 2023-10-03 DIAGNOSIS — E83.52 HYPERCALCEMIA: Primary | ICD-10-CM

## 2023-10-03 NOTE — TELEPHONE ENCOUNTER
Name: Obed Cotto Jr.  Relationship: Self  Best Callback Number: [phone]  HUB PROVIDED THE RELAY MESSAGE FROM THE OFFICE  PATIENT CALLED BACK AND THE MESSAGE HAS BEEN RELAYED  ADDITIONAL INFORMATION:

## 2023-10-03 NOTE — TELEPHONE ENCOUNTER
----- Message from CHANG Perez sent at 10/3/2023  1:03 PM EDT -----  Please let patient know that his calcium is slightly elevated.  I would like to check some additional labs.    Anemia is stable.    All other labs are stable and within acceptable limits.

## 2023-10-03 NOTE — TELEPHONE ENCOUNTER
Called and lvm for patient to return call, office number provided.      HUB TO RELAY:     ----- Message from CHANG Perez sent at 10/3/2023  1:03 PM EDT -----  Please let patient know that his calcium is slightly elevated.  I would like to check some additional labs.     Anemia is stable.     All other labs are stable and within acceptable limits.       MARTHA Brand

## 2023-10-10 RX ORDER — FERROUS SULFATE 325(65) MG
TABLET ORAL
Qty: 180 TABLET | Refills: 1 | Status: SHIPPED | OUTPATIENT
Start: 2023-10-10

## 2023-10-23 ENCOUNTER — CLINICAL SUPPORT (OUTPATIENT)
Dept: INTERNAL MEDICINE | Facility: CLINIC | Age: 73
End: 2023-10-23
Payer: MEDICARE

## 2023-10-23 DIAGNOSIS — Z23 NEEDS FLU SHOT: Primary | ICD-10-CM

## 2023-10-23 PROCEDURE — 90662 IIV NO PRSV INCREASED AG IM: CPT | Performed by: NURSE PRACTITIONER

## 2023-10-23 PROCEDURE — G0008 ADMIN INFLUENZA VIRUS VAC: HCPCS | Performed by: NURSE PRACTITIONER

## 2023-10-31 ENCOUNTER — TELEPHONE (OUTPATIENT)
Dept: INTERNAL MEDICINE | Facility: CLINIC | Age: 73
End: 2023-10-31
Payer: MEDICARE

## 2023-10-31 NOTE — TELEPHONE ENCOUNTER
Patient states that he was referral to go to [facility] ([address]).  Phone [phone].  Patient states that this one is closest to his home.

## 2023-10-31 NOTE — TELEPHONE ENCOUNTER
Caller: Obed Cotto Jr.    Relationship: Self    Best call back number: [phone]     What is the medical concern/diagnosis: PATIENTS CURRENT CARDIOLOGIST IS NO LONGER IN NETWORK WITH PATIENTS INSURANCE     What specialty or service is being requested: CARDIOLOGIST     Any additional details: PATIENT IS NOT SURE WHO TO GET REFERRED TO BUT HE WOULD JUST LIKE SOMEONE WHO IS IN NETWORK WITH HIS INSURANCE.

## 2023-10-31 NOTE — TELEPHONE ENCOUNTER
Patient is going to call his insurance and see who he is in network with and call the office back to let us know who to refer to

## 2023-11-06 RX ORDER — CHLORTHALIDONE 25 MG/1
25 TABLET ORAL DAILY
Qty: 30 TABLET | Refills: 5 | Status: SHIPPED | OUTPATIENT
Start: 2023-11-06 | End: 2023-11-08 | Stop reason: SDUPTHER

## 2023-11-06 NOTE — TELEPHONE ENCOUNTER
Caller: Obed Cotto Jr.    Relationship: Self    Best call back number: [phone]     Requested Prescriptions:   Requested Prescriptions     Pending Prescriptions Disp Refills    chlorthalidone (HYGROTON) 25 MG tablet 30 tablet 5     Sig: Take 1 tablet by mouth Daily.        Pharmacy where request should be sent: [facility] #[id number] [address] AT [facility] - [phone] [facility] [phone] [place]     Last office visit with prescribing clinician: 8/21/2023   Last telemedicine visit with prescribing clinician: Visit date not found   Next office visit with prescribing clinician: 2/22/2024     Additional details provided by patient: 4-5 TABLETS LEFT. PATIENT STATES DUE TO CARDIOLOGY NO LONGER BEING IN NETWORK WITH [employer] HE WILL NEED TO ASK THAT PCP TAKE OVER FILLING THIS MEDICATION. PATIENT STATES HE HAS AN APPOINTMENT SCHEDULED TO ESTABLISH CARE WITH A NEW CARDIOLOGIST, DR. TYLER MONTELONGO, ON 2/27/24.    Does the patient have less than a 3 day supply:  [] Yes  [x] No    Would you like a call back once the refill request has been completed: [] Yes [x] No    If the office needs to give you a call back, can they leave a voicemail: [] Yes [x] No    Stormy Martinez Rep   11/06/23 13:12 EST

## 2023-11-07 NOTE — TELEPHONE ENCOUNTER
Caller: Obed Cotto Jr.    Relationship: Self    Best call back number: [phone]    What was the call regarding: PATIENT STATES THAT HE HAS AN APPOINTMENT WITH DR. TYLER MONTELONGO ON 11/29/23 AND STILL NEEDS A REFERRAL FOR NEUROLOGIST BEFORE 11/21/23. PLEASE ADVISE

## 2023-11-08 ENCOUNTER — TELEPHONE (OUTPATIENT)
Dept: INTERNAL MEDICINE | Facility: CLINIC | Age: 73
End: 2023-11-08
Payer: MEDICARE

## 2023-11-08 RX ORDER — CHLORTHALIDONE 25 MG/1
25 TABLET ORAL DAILY
Qty: 30 TABLET | Refills: 5 | Status: SHIPPED | OUTPATIENT
Start: 2023-11-08

## 2023-11-08 NOTE — TELEPHONE ENCOUNTER
Patient called to say that he found a provider for his neurology referral that is in his network.  Provider is Dr Donell Sesay ([address])  Phone number is [phone].  Patient would like referral sent to this provider.

## 2023-11-10 ENCOUNTER — TELEPHONE (OUTPATIENT)
Dept: INTERNAL MEDICINE | Facility: CLINIC | Age: 73
End: 2023-11-10

## 2023-11-10 NOTE — TELEPHONE ENCOUNTER
Caller: Faustina Cotto Jr.    Relationship: Self    Best call back number: [phone]     What is the best time to reach you: ANY    Who are you requesting to speak with (clinical staff, provider,  specific staff member): CLINICAL STAFF/REFERRAL COORDINATOR    Do you know the name of the person who called: FAUSTINA    What was the call regarding: PATIENT IS REQUESTING A CALL BACK WITH CLARIFICATION ON WHERE HIS NEW REFERRALS FOR NEUROLOGY AND CARDIOLOGY HAVE BEEN SENT TO.    HE STATED THAT HE CONFIRMED WITH HIS CURRENT PROVIDERS THAT THEY DO NOT ACCEPT HIS [employer] INSURANCE AND WAS TOLD THAT [facility] WOULD BE MAKING NEW REFERRALS FOR PROVIDERS THAT TAKE HIS [employer] MEDICARE.    PATIENT HAS AN UPCOMING APPOINTMENT WITH NEUROLOGY ON 11/21/23 AND IS NEEDING DIRECTION ON WHETHER OR NOT TO KEEP THAT APPOINTMENT.    PLEASE ADVISE

## 2023-11-14 ENCOUNTER — TELEPHONE (OUTPATIENT)
Dept: INTERNAL MEDICINE | Facility: CLINIC | Age: 73
End: 2023-11-14
Payer: MEDICARE

## 2023-11-14 DIAGNOSIS — Z86.73 HISTORY OF STROKE: ICD-10-CM

## 2023-11-14 DIAGNOSIS — I10 PRIMARY HYPERTENSION: ICD-10-CM

## 2023-11-14 DIAGNOSIS — Z86.73 HISTORY OF CVA (CEREBROVASCULAR ACCIDENT): ICD-10-CM

## 2023-11-14 DIAGNOSIS — I10 ESSENTIAL HYPERTENSION: ICD-10-CM

## 2023-11-14 DIAGNOSIS — I34.1 MVP (MITRAL VALVE PROLAPSE): Primary | ICD-10-CM

## 2023-11-14 RX ORDER — ESCITALOPRAM OXALATE 10 MG/1
TABLET ORAL
Qty: 45 TABLET | Refills: 1 | Status: SHIPPED | OUTPATIENT
Start: 2023-11-14

## 2023-11-14 RX ORDER — METOPROLOL SUCCINATE 25 MG/1
25 TABLET, EXTENDED RELEASE ORAL DAILY
Qty: 90 TABLET | Refills: 0 | Status: SHIPPED | OUTPATIENT
Start: 2023-11-14

## 2023-11-14 NOTE — TELEPHONE ENCOUNTER
Hub staff attempted to follow warm transfer process and was unsuccessful     Caller: Obed Cotto Jr.    Relationship to patient: Self    Best call back number: [phone]    Patient is needing: AN UPDATE ON PENDING REFERRALS

## 2023-11-15 NOTE — TELEPHONE ENCOUNTER
Spoke with patient. Patient is needing new referrals for Cardio and Neuro as [employer] [facility] is OON with his HMO.    Patient is requesting referral to Dr Aditya Smith for cardiology and Dr Donell Sesay for Neurology

## 2023-11-17 ENCOUNTER — TELEPHONE (OUTPATIENT)
Dept: INTERNAL MEDICINE | Facility: CLINIC | Age: 73
End: 2023-11-17
Payer: MEDICARE

## 2023-11-17 NOTE — TELEPHONE ENCOUNTER
Spoke with patient and informed him the referrals are in and being worked. Should hear from offices for scheduling soon

## 2023-11-17 NOTE — TELEPHONE ENCOUNTER
Hub staff attempted to follow warm transfer process and was unsuccessful     Caller: Obed Cotto Jr.    Relationship to patient: Self    Best call back number: [phone]    Patient is needing: UPDATE ON REFERRALS

## 2023-11-20 ENCOUNTER — TELEPHONE (OUTPATIENT)
Dept: INTERNAL MEDICINE | Facility: CLINIC | Age: 73
End: 2023-11-20
Payer: MEDICARE

## 2023-11-20 NOTE — TELEPHONE ENCOUNTER
Caller: Obed Cotto Jr.    Relationship: Self    Best call back number: [phone]     Which medication are you concerned about: METOPROLO     Who prescribed you this medication: LEATHA CANO    When did you start taking this medication: UNSURE     What are your concerns: THOUGHT HE WAS SUPPOSED TO BE TAKING JUST HALF A PILL A DAY. WANTS TO KNOW IF SHOULD BE 1 A DAY OR 1/2 DAILY     How long have you had these concerns: SINCE SEEING THE INSTRUCTION S       medical attention.”

## 2023-11-21 ENCOUNTER — TELEPHONE (OUTPATIENT)
Dept: INTERNAL MEDICINE | Facility: CLINIC | Age: 73
End: 2023-11-21

## 2023-11-21 NOTE — TELEPHONE ENCOUNTER
Caller: Obed Cotto Jr.    Relationship: Self    Best call back number: [phone]     What was the call regarding: NEUROLOGY REFERRAL, PATIENT CONTACTED THE OFFICE AND THEY TOLD HIM THEY HAD NO INFORMATION ABOUT HIM OR A REFERRAL. PATIENT IS TO SEE DR SHANIA TYALOR.     PLEASE CONTACT THE  [facility] [phone]    Is it okay if the provider responds through MyChart:

## 2023-11-27 RX ORDER — FAMOTIDINE 20 MG/1
TABLET, FILM COATED ORAL
Qty: 60 TABLET | Refills: 2 | Status: SHIPPED | OUTPATIENT
Start: 2023-11-27

## 2023-11-27 RX ORDER — BACLOFEN 10 MG/1
TABLET ORAL
Qty: 135 TABLET | Refills: 2 | Status: SHIPPED | OUTPATIENT
Start: 2023-11-27

## 2023-11-27 NOTE — TELEPHONE ENCOUNTER
Caller: Obed Cotto Jr.    Relationship: Self    Best call back number: [phone]     Requested Prescriptions:   Requested Prescriptions     Pending Prescriptions Disp Refills    baclofen (LIORESAL) 10 MG tablet          Pharmacy where request should be sent: [facility] #[id number] [place] [id number]  AT [facility] & [address] [facility] - [phone] [facility] [phone]      Last office visit with prescribing clinician: 8/21/2023   Last telemedicine visit with prescribing clinician: Visit date not found   Next office visit with prescribing clinician: 2/22/2024     Additional details provided by patient: PATIENT ASK FOR WRONG MEDICATION EARLIER, DO NOT FILL.  DR PARRISH WAS ORGINALLY GIVING THE PRESCRIPTION BUT THAT DOCTOR NO LONGER ON HIS INSURANCE.  WOULD LIKE REFILL UNTIL HE CAN SEE ANOTHER DOCTOR.      Does the patient have less than a 3 day supply:  [] Yes  [x] No    Would you like a call back once the refill request has been completed: [] Yes [x] No    If the office needs to give you a call back, can they leave a voicemail: [] Yes [x] No    Laura Barfield   11/27/23 12:05 EST

## 2023-12-15 DIAGNOSIS — N40.1 BENIGN PROSTATIC HYPERPLASIA WITH LOWER URINARY TRACT SYMPTOMS, SYMPTOM DETAILS UNSPECIFIED: ICD-10-CM

## 2023-12-15 DIAGNOSIS — Z86.73 HISTORY OF CVA (CEREBROVASCULAR ACCIDENT): ICD-10-CM

## 2023-12-15 RX ORDER — TAMSULOSIN HYDROCHLORIDE 0.4 MG/1
1 CAPSULE ORAL NIGHTLY
Qty: 90 CAPSULE | Refills: 0 | Status: SHIPPED | OUTPATIENT
Start: 2023-12-15

## 2023-12-15 RX ORDER — CLOPIDOGREL BISULFATE 75 MG/1
75 TABLET ORAL DAILY
Qty: 90 TABLET | Refills: 0 | Status: SHIPPED | OUTPATIENT
Start: 2023-12-15

## 2024-01-11 RX ORDER — BACLOFEN 10 MG/1
TABLET ORAL
Qty: 135 TABLET | Refills: 2 | OUTPATIENT
Start: 2024-01-11

## 2024-01-11 NOTE — TELEPHONE ENCOUNTER
Caller: Obed Cotto Jr.    Relationship: Self    Best call back number: [phone]     Requested Prescriptions:   Requested Prescriptions     Pending Prescriptions Disp Refills    baclofen (LIORESAL) 10 MG tablet 135 tablet 2     Sig: Take 1 and 1/2 tablets PO TID        Pharmacy where request should be sent: [facility] #[id number] [address] AT [facility] [phone] [facility] [phone]      Last office visit with prescribing clinician: 8/21/2023   Last telemedicine visit with prescribing clinician: Visit date not found   Next office visit with prescribing clinician: 2/22/2024     Additional details provided by patient: DOCTOR THAT USUALLY PRESCRIBES THIS IS OUT OF NETWORK WITH PATIENT.     Does the patient have less than a 3 day supply:  [x] Yes  [] No    Would you like a call back once the refill request has been completed: [] Yes [x] No    If the office needs to give you a call back, can they leave a voicemail: [] Yes [x] No    Stormy Schwartz Rep   01/11/24 10:26 EST

## 2024-01-11 NOTE — TELEPHONE ENCOUNTER
Rx Refill Note  Requested Prescriptions     Pending Prescriptions Disp Refills    baclofen (LIORESAL) 10 MG tablet [Pharmacy Med Name: BACLOFEN 10MG TABLETS] 135 tablet 2     Sig: TAKE 1 AND 1/2 TABLETS BY MOUTH THREE TIMES DAILY      Last filled:   Last office visit with prescribing clinician: 5/18/2023      Next office visit with prescribing clinician: Visit date not found     [facility]apt[facility] is OON with pt's insurance and referrals have been placed by PCP for Dr Sesay in neurology. Pt last got rx for PCP, should have at least another refill on file at pharmacy.       Williams Anderson MA  01/11/24, 08:34 EST

## 2024-01-11 NOTE — TELEPHONE ENCOUNTER
Caller: Obed Cotto Jr.    Relationship: Self    Best call back number: [phone]    Which medication are you concerned about: baclofen (LIORESAL) 10 MG tablet     What are your concerns:  PATIENT ADVISED TO DISREGARD REFILL REQUEST, HIS SON HAS HAS PICKED UP HIS MEDICATION AND HE HAS ENOUGH TO LAST UNTIL [id number]

## 2024-02-14 DIAGNOSIS — Z86.73 HISTORY OF CVA (CEREBROVASCULAR ACCIDENT): ICD-10-CM

## 2024-02-14 DIAGNOSIS — I10 ESSENTIAL HYPERTENSION: ICD-10-CM

## 2024-02-16 RX ORDER — METOPROLOL SUCCINATE 25 MG/1
25 TABLET, EXTENDED RELEASE ORAL DAILY
Qty: 90 TABLET | Refills: 0 | Status: SHIPPED | OUTPATIENT
Start: 2024-02-16

## 2024-02-22 ENCOUNTER — LAB (OUTPATIENT)
Dept: INTERNAL MEDICINE | Facility: CLINIC | Age: 74
End: 2024-02-22
Payer: MEDICARE

## 2024-02-22 ENCOUNTER — OFFICE VISIT (OUTPATIENT)
Dept: INTERNAL MEDICINE | Facility: CLINIC | Age: 74
End: 2024-02-22
Payer: MEDICARE

## 2024-02-22 VITALS
HEIGHT: 72 IN | SYSTOLIC BLOOD PRESSURE: 130 MMHG | TEMPERATURE: 96.9 F | BODY MASS INDEX: 17.34 KG/M2 | DIASTOLIC BLOOD PRESSURE: 60 MMHG | HEART RATE: 84 BPM | WEIGHT: 128 LBS | OXYGEN SATURATION: 97 %

## 2024-02-22 DIAGNOSIS — E83.52 HYPERCALCEMIA: ICD-10-CM

## 2024-02-22 DIAGNOSIS — R47.1 DYSARTHRIA: ICD-10-CM

## 2024-02-22 DIAGNOSIS — Z86.73 HISTORY OF STROKE: ICD-10-CM

## 2024-02-22 DIAGNOSIS — G81.94 LEFT HEMIPLEGIA: ICD-10-CM

## 2024-02-22 DIAGNOSIS — H61.23 BILATERAL IMPACTED CERUMEN: ICD-10-CM

## 2024-02-22 DIAGNOSIS — Z86.73 HISTORY OF STROKE: Primary | ICD-10-CM

## 2024-02-22 DIAGNOSIS — I10 ESSENTIAL HYPERTENSION: ICD-10-CM

## 2024-02-22 DIAGNOSIS — E55.9 VITAMIN D DEFICIENCY, UNSPECIFIED: ICD-10-CM

## 2024-02-22 DIAGNOSIS — Z74.09 IMPAIRED MOBILITY: ICD-10-CM

## 2024-02-22 DIAGNOSIS — R73.03 PREDIABETES: ICD-10-CM

## 2024-02-22 PROCEDURE — 36415 COLL VENOUS BLD VENIPUNCTURE: CPT | Performed by: NURSE PRACTITIONER

## 2024-02-22 RX ORDER — BACLOFEN 20 MG/1
1 TABLET ORAL 3 TIMES DAILY
COMMUNITY
Start: 2024-02-05

## 2024-02-22 NOTE — PROGRESS NOTES
Subjective   Obed Cotto Jr. is a 73 y.o. male    Chief Complaint   Patient presents with    Hyperlipidemia    Ear Fullness    Edema     History of Present Illness     CVA -patient suffered an acute ischemic stroke on 5/30/2021.  This is left him with residual weakness of the left side.  He suffered a subsequent fall in November 2021 that resulted in a closed fracture of the left hip.     Had a recent f/u with neurology.  Was doing Botox for LLE spasticity, but insurance would not pay for this so he was changed back to a muscle relaxer.  States that is leg is getting tighter and tighter and his mobility has gotten worse.       Depression-chronic; current regimen is Lexapro 10 mg p.o. daily     Hypertension-chronic; current regimen is lisinopril 10 mg p.o. daily, metoprolol XL 25 mg daily and chlorthalidone 25 mg.  He is feeling dizzy and lightheaded with standing.  BP and HR are low today in office.       Hyperlipidemia-chronic; current regimen is Lipitor 80 mg p.o. daily.  He is fasting for labs     COVID-4/10/2021 ([facility]); declines further  Flu shot-10/2022  Prevnar 13 3/1/2019  Pneumovax 23 2/23/2017  Diabetic eye exam 7/1/2021  Low-dose lung cancer screening 12/3/2021; declines today  Colon - declines    The following portions of the patient's history were reviewed and updated as appropriate: allergies, current medications, past family history, past medical history, past social history, past surgical history, and problem list.    Current Outpatient Medications:     aspirin (aspirin) 81 MG EC tablet, Take 1 tablet by mouth Daily., Disp: 30 tablet, Rfl: 2    atorvastatin (LIPITOR) 80 MG tablet, TAKE 1 TABLET BY MOUTH EVERY NIGHT AT BEDTIME, Disp: 90 tablet, Rfl: 0    baclofen (LIORESAL) 20 MG tablet, Take 1 tablet by mouth 3 times a day., Disp: , Rfl:     chlorthalidone (HYGROTON) 25 MG tablet, Take 1 tablet by mouth Daily., Disp: 30 tablet, Rfl: 5    clopidogrel (PLAVIX) 75 MG tablet, TAKE 1 TABLET BY MOUTH DAILY,  Disp: 90 tablet, Rfl: 0    docusate sodium (Colace) 100 MG capsule, Take 1 capsule by mouth 2 (Two) Times a Day., Disp: 60 capsule, Rfl: 5    escitalopram (LEXAPRO) 10 MG tablet, TAKE 1/2 TABLET BY MOUTH DAILY, Disp: 45 tablet, Rfl: 1    famotidine (PEPCID) 20 MG tablet, TAKE 1 TABLET BY MOUTH TWICE DAILY, Disp: 60 tablet, Rfl: 2    FeroSul 325 (65 Fe) MG tablet, TAKE 1 TABLET BY MOUTH TWICE DAILY WITH MEALS, Disp: 180 tablet, Rfl: 1    finasteride (PROSCAR) 5 MG tablet, Take 1 tablet by mouth Daily., Disp: 90 tablet, Rfl: 1    lisinopril (PRINIVIL,ZESTRIL) 10 MG tablet, TAKE 1 TABLET BY MOUTH DAILY, Disp: 90 tablet, Rfl: 0    metoprolol succinate XL (TOPROL-XL) 25 MG 24 hr tablet, TAKE 1 TABLET BY MOUTH DAILY, Disp: 90 tablet, Rfl: 0    tamsulosin (FLOMAX) 0.4 MG capsule 24 hr capsule, TAKE 1 CAPSULE BY MOUTH EVERY NIGHT, Disp: 90 capsule, Rfl: 0     Review of Systems   Constitutional:  Negative for chills, fatigue and fever.   Respiratory:  Negative for cough, chest tightness and shortness of breath.    Cardiovascular:  Negative for chest pain.   Gastrointestinal:  Negative for abdominal pain, diarrhea, nausea and vomiting.   Endocrine: Negative for cold intolerance and heat intolerance.   Musculoskeletal:  Positive for arthralgias, gait problem and myalgias.   Neurological:  Positive for weakness. Negative for dizziness.       Objective   Physical Exam  Constitutional:       Appearance: He is well-developed.   HENT:      Head: Normocephalic and atraumatic.   Eyes:      Conjunctiva/sclera: Conjunctivae normal.      Pupils: Pupils are equal, round, and reactive to light.   Cardiovascular:      Rate and Rhythm: Normal rate and regular rhythm.      Heart sounds: Normal heart sounds.   Pulmonary:      Effort: Pulmonary effort is normal.   Abdominal:      General: Bowel sounds are normal.      Palpations: Abdomen is soft.   Musculoskeletal:         General: Normal range of motion.      Cervical back: Normal range of  "motion.   Skin:     General: Skin is warm and dry.   Neurological:      Mental Status: He is alert and oriented to person, place, and time.      Sensory: Sensory deficit present.      Motor: Weakness present.      Gait: Gait abnormal.      Deep Tendon Reflexes: Reflexes are normal and symmetric.   Psychiatric:         Behavior: Behavior normal.         Thought Content: Thought content normal.         Judgment: Judgment normal.       Vitals:    02/22/24 1115   BP: 130/60   Pulse: 84   Temp: 96.9 °F (36.1 °C)   SpO2: 97%   Weight: 58.1 kg (128 lb)   Height: 182.9 cm (72.01\")     Ear Cerumen Removal    Date/Time: 2/22/2024 2:25 PM    Performed by: Laura Hicks APRN  Authorized by: Laura Hicks APRN    Anesthesia:  Local Anesthetic: none  Location details: left ear and right ear  Patient tolerance: patient tolerated the procedure well with no immediate complications  Procedure type: irrigation   Sedation:  Patient sedated: no              Assessment & Plan   Diagnoses and all orders for this visit:    1. History of stroke (Primary)  -     Ambulatory Referral to Physical Therapy Evaluate and treat  -     CBC & Differential; Future  -     Comprehensive Metabolic Panel; Future  -     Lipid Panel; Future  -     TSH; Future  -     Vitamin B12; Future  -     Vitamin D,25-Hydroxy; Future    2. Impaired mobility  -     Ambulatory Referral to Physical Therapy Evaluate and treat  -     CBC & Differential; Future  -     Comprehensive Metabolic Panel; Future  -     Lipid Panel; Future  -     TSH; Future  -     Vitamin B12; Future  -     Vitamin D,25-Hydroxy; Future    3. Left hemiplegia  -     Ambulatory Referral to Physical Therapy Evaluate and treat  -     CBC & Differential; Future  -     Comprehensive Metabolic Panel; Future  -     Lipid Panel; Future  -     TSH; Future  -     Vitamin B12; Future  -     Vitamin D,25-Hydroxy; Future    4. Dysarthria  -     Ambulatory Referral to Physical Therapy Evaluate and " treat  -     CBC & Differential; Future  -     Comprehensive Metabolic Panel; Future  -     Lipid Panel; Future  -     TSH; Future  -     Vitamin B12; Future  -     Vitamin D,25-Hydroxy; Future    5. Essential hypertension  -     CBC & Differential; Future  -     Comprehensive Metabolic Panel; Future  -     Lipid Panel; Future  -     TSH; Future  -     Vitamin B12; Future  -     Vitamin D,25-Hydroxy; Future    6. Prediabetes  -     CBC & Differential; Future  -     Comprehensive Metabolic Panel; Future  -     Lipid Panel; Future  -     TSH; Future  -     Vitamin B12; Future  -     Vitamin D,25-Hydroxy; Future    7. Vitamin D deficiency, unspecified   -     CBC & Differential; Future  -     Comprehensive Metabolic Panel; Future  -     Lipid Panel; Future  -     TSH; Future  -     Vitamin B12; Future  -     Vitamin D,25-Hydroxy; Future    8. Bilateral impacted cerumen  -     Ear Cerumen Removal      Labs sent  Ears irrigated bilaterally with success  Referred back to PT   Return in about 6 months (around 8/22/2024) for Medicare Wellness, collect labs today.

## 2024-02-23 LAB
25(OH)D3+25(OH)D2 SERPL-MCNC: 22.2 NG/ML (ref 30–100)
ALBUMIN SERPL-MCNC: 3.9 G/DL (ref 3.8–4.8)
ALBUMIN/GLOB SERPL: 1.6 {RATIO} (ref 1.2–2.2)
ALP SERPL-CCNC: 129 IU/L (ref 44–121)
ALT SERPL-CCNC: 50 IU/L (ref 0–44)
AST SERPL-CCNC: 35 IU/L (ref 0–40)
BASOPHILS # BLD AUTO: 0.1 X10E3/UL (ref 0–0.2)
BASOPHILS NFR BLD AUTO: 1 %
BILIRUB SERPL-MCNC: 0.5 MG/DL (ref 0–1.2)
BUN SERPL-MCNC: 18 MG/DL (ref 8–27)
BUN/CREAT SERPL: 18 (ref 10–24)
CA-I SERPL ISE-MCNC: 5 MG/DL (ref 4.5–5.6)
CALCIUM SERPL-MCNC: 9.3 MG/DL (ref 8.6–10.2)
CHLORIDE SERPL-SCNC: 108 MMOL/L (ref 96–106)
CHOLEST SERPL-MCNC: 87 MG/DL (ref 100–199)
CO2 SERPL-SCNC: 21 MMOL/L (ref 20–29)
CREAT SERPL-MCNC: 0.98 MG/DL (ref 0.76–1.27)
EGFRCR SERPLBLD CKD-EPI 2021: 81 ML/MIN/1.73
EOSINOPHIL # BLD AUTO: 0.4 X10E3/UL (ref 0–0.4)
EOSINOPHIL NFR BLD AUTO: 3 %
ERYTHROCYTE [DISTWIDTH] IN BLOOD BY AUTOMATED COUNT: 12.1 % (ref 11.6–15.4)
GLOBULIN SER CALC-MCNC: 2.4 G/DL (ref 1.5–4.5)
GLUCOSE SERPL-MCNC: 92 MG/DL (ref 70–99)
HCT VFR BLD AUTO: 34.9 % (ref 37.5–51)
HDLC SERPL-MCNC: 40 MG/DL
HGB BLD-MCNC: 11.5 G/DL (ref 13–17.7)
IMM GRANULOCYTES # BLD AUTO: 0.1 X10E3/UL (ref 0–0.1)
IMM GRANULOCYTES NFR BLD AUTO: 1 %
INTACT PTH: NORMAL
LDLC SERPL CALC-MCNC: 33 MG/DL (ref 0–99)
LYMPHOCYTES # BLD AUTO: 3 X10E3/UL (ref 0.7–3.1)
LYMPHOCYTES NFR BLD AUTO: 29 %
MCH RBC QN AUTO: 32.1 PG (ref 26.6–33)
MCHC RBC AUTO-ENTMCNC: 33 G/DL (ref 31.5–35.7)
MCV RBC AUTO: 98 FL (ref 79–97)
MONOCYTES # BLD AUTO: 0.7 X10E3/UL (ref 0.1–0.9)
MONOCYTES NFR BLD AUTO: 7 %
NEUTROPHILS # BLD AUTO: 6.4 X10E3/UL (ref 1.4–7)
NEUTROPHILS NFR BLD AUTO: 59 %
PLATELET # BLD AUTO: 242 X10E3/UL (ref 150–450)
POTASSIUM SERPL-SCNC: 5.9 MMOL/L (ref 3.5–5.2)
PROT SERPL-MCNC: 6.3 G/DL (ref 6–8.5)
PTH-INTACT SERPL-MCNC: 32 PG/ML (ref 15–65)
RBC # BLD AUTO: 3.58 X10E6/UL (ref 4.14–5.8)
SODIUM SERPL-SCNC: 140 MMOL/L (ref 134–144)
TRIGL SERPL-MCNC: 58 MG/DL (ref 0–149)
TSH SERPL DL<=0.005 MIU/L-ACNC: 1.54 UIU/ML (ref 0.45–4.5)
VIT B12 SERPL-MCNC: 657 PG/ML (ref 232–1245)
VLDLC SERPL CALC-MCNC: 14 MG/DL (ref 5–40)
WBC # BLD AUTO: 10.6 X10E3/UL (ref 3.4–10.8)

## 2024-02-23 RX ORDER — FAMOTIDINE 20 MG/1
TABLET, FILM COATED ORAL
Qty: 60 TABLET | Refills: 2 | Status: SHIPPED | OUTPATIENT
Start: 2024-02-23

## 2024-02-25 DIAGNOSIS — N40.1 BENIGN PROSTATIC HYPERPLASIA WITH LOWER URINARY TRACT SYMPTOMS, SYMPTOM DETAILS UNSPECIFIED: ICD-10-CM

## 2024-02-26 ENCOUNTER — TELEPHONE (OUTPATIENT)
Dept: INTERNAL MEDICINE | Facility: CLINIC | Age: 74
End: 2024-02-26
Payer: MEDICARE

## 2024-02-26 DIAGNOSIS — N40.1 BENIGN PROSTATIC HYPERPLASIA WITH LOWER URINARY TRACT SYMPTOMS, SYMPTOM DETAILS UNSPECIFIED: ICD-10-CM

## 2024-02-26 RX ORDER — FINASTERIDE 5 MG/1
5 TABLET, FILM COATED ORAL DAILY
Qty: 90 TABLET | Refills: 1 | OUTPATIENT
Start: 2024-02-26

## 2024-02-26 RX ORDER — FINASTERIDE 5 MG/1
5 TABLET, FILM COATED ORAL DAILY
Qty: 90 TABLET | Refills: 1 | Status: SHIPPED | OUTPATIENT
Start: 2024-02-26

## 2024-02-26 NOTE — TELEPHONE ENCOUNTER
Caller: Obed Cotto Jr.    Relationship: Self    Best call back number: [phone]    Requested Prescriptions:   Requested Prescriptions      No prescriptions requested or ordered in this encounter      finasteride (PROSCAR) 5 MG tablet     Pharmacy where request should be sent: [id number] [place] [id number]  AT [facility] [address] [facility] - [phone] [facility] [phone] FX     Last office visit with prescribing clinician: 2/22/2024   Last telemedicine visit with prescribing clinician: Visit date not found   Next office visit with prescribing clinician: 8/22/2024       Does the patient have less than a 3 day supply:  [x] Yes  [] No    Would you like a call back once the refill request has been completed: [] Yes [x] No    If the office needs to give you a call back, can they leave a voicemail: [] Yes [x] No    Virgie Starkey, PCT   02/26/24 09:57 EST

## 2024-02-26 NOTE — TELEPHONE ENCOUNTER
Last office visit with prescribing clinician: 2/22/2024   Last telemedicine visit with prescribing clinician: Visit date not found   Next office visit with prescribing clinician: 8/22/2024

## 2024-02-27 ENCOUNTER — TELEPHONE (OUTPATIENT)
Dept: INTERNAL MEDICINE | Facility: CLINIC | Age: 74
End: 2024-02-27
Payer: MEDICARE

## 2024-02-27 DIAGNOSIS — G81.94 LEFT HEMIPLEGIA: Primary | ICD-10-CM

## 2024-02-27 DIAGNOSIS — Z86.73 HISTORY OF CVA (CEREBROVASCULAR ACCIDENT): ICD-10-CM

## 2024-02-27 NOTE — TELEPHONE ENCOUNTER
DHIRAJ WITH [facility] CALLING ON BEHALF OF PT.    THEY WOULD LIKE TO REQUEST ADDITIONAL ORDERS FOR OCCUPATIONAL THERAPY TO BE SENT FOR THIS PT.    THOSE ORDERS CAN BE FAXED TO:  [phone]

## 2024-03-08 ENCOUNTER — TELEPHONE (OUTPATIENT)
Dept: INTERNAL MEDICINE | Facility: CLINIC | Age: 74
End: 2024-03-08
Payer: MEDICARE

## 2024-03-08 DIAGNOSIS — E87.5 HYPERKALEMIA: Primary | ICD-10-CM

## 2024-03-08 NOTE — TELEPHONE ENCOUNTER
----- Message from CHANG Perez sent at 3/8/2024 10:31 AM EST -----  Vitamin D is low.  Recommend OTC vitamin D3 4000 units daily.    Potassium was high.  Does he take any extra potassium supplements?  Or is he eating a diet high in potassium rich foods?  We will need to repeat this lab soon.  Orders in the computer.    Other labs are stable and within acceptable limits.

## 2024-03-08 NOTE — TELEPHONE ENCOUNTER
Left patient voicemail letting him know lab results and if he had any questions to please give us a call back. Office number provided.

## 2024-03-16 DIAGNOSIS — N40.1 BENIGN PROSTATIC HYPERPLASIA WITH LOWER URINARY TRACT SYMPTOMS, SYMPTOM DETAILS UNSPECIFIED: ICD-10-CM

## 2024-03-18 RX ORDER — TAMSULOSIN HYDROCHLORIDE 0.4 MG/1
1 CAPSULE ORAL NIGHTLY
Qty: 90 CAPSULE | Refills: 1 | Status: SHIPPED | OUTPATIENT
Start: 2024-03-18

## 2024-03-18 NOTE — TELEPHONE ENCOUNTER
Rx Refill Note  Requested Prescriptions     Pending Prescriptions Disp Refills    tamsulosin (FLOMAX) 0.4 MG capsule 24 hr capsule [Pharmacy Med Name: TAMSULOSIN 0.4MG CAPSULES] 90 capsule 0     Sig: TAKE 1 CAPSULE BY MOUTH EVERY NIGHT      Last office visit with prescribing clinician: 2/22/2024   Last telemedicine visit with prescribing clinician: Visit date not found   Next office visit with prescribing clinician: 8/22/2024       Helena Patricio MA  03/18/24, 08:05 EDT

## 2024-03-19 DIAGNOSIS — Z86.73 HISTORY OF CVA (CEREBROVASCULAR ACCIDENT): ICD-10-CM

## 2024-03-19 RX ORDER — CLOPIDOGREL BISULFATE 75 MG/1
75 TABLET ORAL DAILY
Qty: 90 TABLET | Refills: 0 | Status: SHIPPED | OUTPATIENT
Start: 2024-03-19

## 2024-04-08 RX ORDER — FERROUS SULFATE 325(65) MG
TABLET ORAL
Qty: 180 TABLET | Refills: 1 | Status: SHIPPED | OUTPATIENT
Start: 2024-04-08

## 2024-04-08 NOTE — TELEPHONE ENCOUNTER
Caller: Obed Cotto Jr.    Relationship: Self    Best call back number: [phone]     Requested Prescriptions:   Requested Prescriptions     Pending Prescriptions Disp Refills    FeroSul 325 (65 Fe) MG tablet [Pharmacy Med Name: FERROUS SULFATE 325MG (5GR) TABS] 180 tablet 1     Sig: TAKE 1 TABLET BY MOUTH TWICE DAILY WITH MEALS        Pharmacy where request should be sent: [facility] #[id number] [address]  AT [facility] [phone] [facility] [phone] FX     Last office visit with prescribing clinician: 2/22/2024   Last telemedicine visit with prescribing clinician: Visit date not found   Next office visit with prescribing clinician: 8/22/2024     Additional details provided by patient: PHARMACY HAS NOT HEARD BACK. PLEASE SEND 90 DAY SUPPLY WITH REFILLS    Does the patient have less than a 3 day supply:  [x] Yes  2 DOSES LEFT    Would you like a call back once the refill request has been completed: [] Yes [x] No    If the office needs to give you a call back, can they leave a voicemail: [x] Yes [] No    Stormy Mills Rep   04/08/24 09:48 EDT

## 2024-04-08 NOTE — TELEPHONE ENCOUNTER
Rx Refill Note  Requested Prescriptions     Pending Prescriptions Disp Refills    FeroSul 325 (65 Fe) MG tablet [Pharmacy Med Name: FERROUS SULFATE 325MG (5GR) TABS] 180 tablet 1     Sig: TAKE 1 TABLET BY MOUTH TWICE DAILY WITH MEALS      Last office visit with prescribing clinician: 2/22/2024   Last telemedicine visit with prescribing clinician: Visit date not found   Next office visit with prescribing clinician: 8/22/2024       Helena Patricio MA  04/08/24, 08:08 EDT

## 2024-04-08 NOTE — TELEPHONE ENCOUNTER
Rx Refill Note  Requested Prescriptions     Pending Prescriptions Disp Refills    FeroSul 325 (65 Fe) MG tablet [Pharmacy Med Name: FERROUS SULFATE 325MG (5GR) TABS] 180 tablet 1     Sig: TAKE 1 TABLET BY MOUTH TWICE DAILY WITH MEALS      Last office visit with prescribing clinician: 2/22/2024   Last telemedicine visit with prescribing clinician: Visit date not found   Next office visit with prescribing clinician: 8/22/2024       Helena Patricio MA  04/08/24, 10:14 EDT

## 2024-04-28 DIAGNOSIS — I10 ESSENTIAL HYPERTENSION: ICD-10-CM

## 2024-04-28 DIAGNOSIS — Z86.73 HISTORY OF CVA (CEREBROVASCULAR ACCIDENT): ICD-10-CM

## 2024-04-28 DIAGNOSIS — E78.2 MIXED HYPERLIPIDEMIA: ICD-10-CM

## 2024-04-29 RX ORDER — ATORVASTATIN CALCIUM 80 MG/1
80 TABLET, FILM COATED ORAL
Qty: 90 TABLET | Refills: 0 | Status: SHIPPED | OUTPATIENT
Start: 2024-04-29

## 2024-04-29 RX ORDER — LISINOPRIL 10 MG/1
10 TABLET ORAL DAILY
Qty: 90 TABLET | Refills: 0 | Status: SHIPPED | OUTPATIENT
Start: 2024-04-29

## 2024-04-29 NOTE — TELEPHONE ENCOUNTER
Rx Refill Note  Requested Prescriptions     Pending Prescriptions Disp Refills    lisinopril (PRINIVIL,ZESTRIL) 10 MG tablet [Pharmacy Med Name: LISINOPRIL 10MG TABLETS] 90 tablet 0     Sig: TAKE 1 TABLET BY MOUTH DAILY    atorvastatin (LIPITOR) 80 MG tablet [Pharmacy Med Name: ATORVASTATIN 80MG TABLETS] 90 tablet 0     Sig: TAKE 1 TABLET BY MOUTH EVERY NIGHT AT BEDTIME      Last office visit with prescribing clinician: 2/22/2024   Last telemedicine visit with prescribing clinician: Visit date not found   Next office visit with prescribing clinician: 8/22/2024       Helena Patricio MA  04/29/24, 10:27 EDT

## 2024-05-09 RX ORDER — CHLORTHALIDONE 25 MG/1
25 TABLET ORAL DAILY
Qty: 30 TABLET | Refills: 5 | Status: SHIPPED | OUTPATIENT
Start: 2024-05-09

## 2024-05-13 DIAGNOSIS — I10 ESSENTIAL HYPERTENSION: ICD-10-CM

## 2024-05-13 DIAGNOSIS — Z86.73 HISTORY OF CVA (CEREBROVASCULAR ACCIDENT): ICD-10-CM

## 2024-05-13 RX ORDER — METOPROLOL SUCCINATE 25 MG/1
25 TABLET, EXTENDED RELEASE ORAL DAILY
Qty: 90 TABLET | Refills: 0 | Status: SHIPPED | OUTPATIENT
Start: 2024-05-13

## 2024-05-13 NOTE — TELEPHONE ENCOUNTER
Rx Refill Note  Requested Prescriptions     Pending Prescriptions Disp Refills    metoprolol succinate XL (TOPROL-XL) 25 MG 24 hr tablet [Pharmacy Med Name: METOPROLOL ER SUCCINATE 25MG TABS] 90 tablet 0     Sig: TAKE 1 TABLET BY MOUTH DAILY      Last office visit with prescribing clinician: 2/22/2024   Last telemedicine visit with prescribing clinician: Visit date not found   Next office visit with prescribing clinician: 8/22/2024       Helena Patricio MA  05/13/24, 09:28 EDT

## 2024-05-14 RX ORDER — ESCITALOPRAM OXALATE 10 MG/1
TABLET ORAL
Qty: 45 TABLET | Refills: 0 | Status: SHIPPED | OUTPATIENT
Start: 2024-05-14

## 2024-05-23 RX ORDER — FAMOTIDINE 20 MG/1
TABLET, FILM COATED ORAL
Qty: 60 TABLET | Refills: 2 | Status: SHIPPED | OUTPATIENT
Start: 2024-05-23

## 2024-05-23 RX ORDER — FAMOTIDINE 20 MG/1
20 TABLET, FILM COATED ORAL 2 TIMES DAILY
Qty: 60 TABLET | Refills: 2 | OUTPATIENT
Start: 2024-05-23

## 2024-05-23 NOTE — TELEPHONE ENCOUNTER
Caller: Obed Cotto Jr.    Relationship: Self    Best call back number: [phone]     Requested Prescriptions:   Requested Prescriptions     Pending Prescriptions Disp Refills    famotidine (PEPCID) 20 MG tablet 60 tablet 2     Sig: Take 1 tablet by mouth 2 (Two) Times a Day.        Pharmacy where request should be sent: [facility] #[id number] [address] AT [facility] [phone] [facility] [phone] FX     Last office visit with prescribing clinician: 2/22/2024   Last telemedicine visit with prescribing clinician: Visit date not found   Next office visit with prescribing clinician: 8/22/2024     Additional details provided by patient: PATIENT HAS 2 DAYS LEFT.     Does the patient have less than a 3 day supply:  [x] Yes  [] No    Would you like a call back once the refill request has been completed: [] Yes [x] No    If the office needs to give you a call back, can they leave a voicemail: [] Yes [x] No    Cadance Dunaway, RegSched Rep   05/23/24 09:26 EDT

## 2024-05-23 NOTE — TELEPHONE ENCOUNTER
Rx Refill Note  Requested Prescriptions     Pending Prescriptions Disp Refills    famotidine (PEPCID) 20 MG tablet [Pharmacy Med Name: FAMOTIDINE 20MG TABLETS] 60 tablet 2     Sig: TAKE 1 TABLET BY MOUTH TWICE DAILY      Last office visit with prescribing clinician: 2/22/2024   Last telemedicine visit with prescribing clinician: Visit date not found   Next office visit with prescribing clinician: 8/22/2024       Helena Patricio MA  05/23/24, 08:39 EDT

## 2024-05-23 NOTE — TELEPHONE ENCOUNTER
Rx Refill Note  Requested Prescriptions     Pending Prescriptions Disp Refills    famotidine (PEPCID) 20 MG tablet 60 tablet 2     Sig: Take 1 tablet by mouth 2 (Two) Times a Day.      Last office visit with prescribing clinician: 2/22/2024   Last telemedicine visit with prescribing clinician: Visit date not found   Next office visit with prescribing clinician: 8/22/2024       Helena Patricio MA  05/23/24, 09:54 EDT

## 2024-06-14 ENCOUNTER — TELEPHONE (OUTPATIENT)
Dept: INTERNAL MEDICINE | Facility: CLINIC | Age: 74
End: 2024-06-14
Payer: MEDICARE

## 2024-06-14 NOTE — TELEPHONE ENCOUNTER
Caller: Obed Cotto Jr.    Relationship: Self    Best call back number: [phone]    Equipment requested: ELBOW BRACE    Reason for the request: ELBOW PAIN    Prescribing Provider: NICOLASA OLMOS    Additional information or concerns: PATIENT ADVISED THAT [employer] WILL BE SENDING A FAX TO THE OFFICE FOR NICOLASA OLMOS TO SIGN OFF ON SO HE CAN GET HIS ELBOW BRACE    PLEASE CALL PATIENT WHEN THIS FAX HAS BEEN SENT AND CONFIRMED; PATIENT ALSO THIS NEEDED TO DELIVERED TO [facility] [address] [place] [address] [address]

## 2024-06-14 NOTE — TELEPHONE ENCOUNTER
Caller: AUBREY    Relationship to patient: Other    Best call back number: [phone]     Patient is needing: AUBREY WITH [employer] STATED THAT THEY HAVE SENT 2 FAXES FOR A RANGE OF MOTION DEVICE REQUEST.    SHE STATED THAT THEY FAXED A REQUEST FOR OFFICE NOTES ON 6/5/24 AND A REQUEST FOR OFFICE NOTES + AN ORDER REQUEST ON 6/11/24, BUT HAVE NOT RECEIVED A RESPONSE YET.    PLEASE BE ADVISED

## 2024-06-15 DIAGNOSIS — Z86.73 HISTORY OF CVA (CEREBROVASCULAR ACCIDENT): ICD-10-CM

## 2024-06-17 RX ORDER — CLOPIDOGREL BISULFATE 75 MG/1
75 TABLET ORAL DAILY
Qty: 90 TABLET | Refills: 0 | Status: SHIPPED | OUTPATIENT
Start: 2024-06-17

## 2024-06-17 NOTE — TELEPHONE ENCOUNTER
Rx Refill Note  Requested Prescriptions     Pending Prescriptions Disp Refills    clopidogrel (PLAVIX) 75 MG tablet [Pharmacy Med Name: CLOPIDOGREL 75MG TABLETS] 90 tablet 0     Sig: TAKE 1 TABLET BY MOUTH DAILY      Last office visit with prescribing clinician: 2/22/2024   Last telemedicine visit with prescribing clinician: Visit date not found   Next office visit with prescribing clinician: 8/22/2024       Helena Patricio MA  06/17/24, 09:32 EDT

## 2024-06-19 NOTE — TELEPHONE ENCOUNTER
Patient has been notified form was faxed for the brace with a successful conformation.  Patient verbalized understanding.

## 2024-06-28 ENCOUNTER — TELEPHONE (OUTPATIENT)
Dept: INTERNAL MEDICINE | Facility: CLINIC | Age: 74
End: 2024-06-28
Payer: MEDICARE

## 2024-06-28 DIAGNOSIS — G81.94 LEFT HEMIPLEGIA: ICD-10-CM

## 2024-06-28 DIAGNOSIS — Z86.73 HISTORY OF CVA (CEREBROVASCULAR ACCIDENT): Primary | ICD-10-CM

## 2024-06-28 NOTE — TELEPHONE ENCOUNTER
Caller: Obed Cotto Jr.    Relationship: Self    Best call back number: [phone]    What specialty or service is being requested:     NEUROLOGY - BOTOX    What is the provider, practice or medical service name:     DR CHRISTIAN GONZÁLES    Any additional details:     NEEDS BOTOX AND HIS CURRENT NEUROLOGIST DOES NOT DO THAT

## 2024-07-29 DIAGNOSIS — I10 ESSENTIAL HYPERTENSION: ICD-10-CM

## 2024-07-29 DIAGNOSIS — Z86.73 HISTORY OF CVA (CEREBROVASCULAR ACCIDENT): ICD-10-CM

## 2024-07-29 RX ORDER — LISINOPRIL 10 MG/1
10 TABLET ORAL DAILY
Qty: 90 TABLET | Refills: 1 | Status: SHIPPED | OUTPATIENT
Start: 2024-07-29

## 2024-07-29 NOTE — TELEPHONE ENCOUNTER
Caller: Obed Cotto Jr.    Relationship: Self    Best call back number:[phone]     Requested Prescriptions:   Requested Prescriptions     Pending Prescriptions Disp Refills    lisinopril (PRINIVIL,ZESTRIL) 10 MG tablet 90 tablet 0     Sig: Take 1 tablet by mouth Daily.        Pharmacy where request should be sent: [facility] #[id number] AT [facility] [phone] [facility] [phone] FX     Last office visit with prescribing clinician: 2/22/2024   Last telemedicine visit with prescribing clinician: Visit date not found   Next office visit with prescribing clinician: 8/22/2024     Additional details provided by patient: HAS 2 PILLS LEFT     Does the patient have less than a 3 day supply:  [x] Yes  [] No    Would you like a call back once the refill request has been completed: [] Yes [x] No    If the office needs to give you a call back, can they leave a voicemail: [] Yes [x] No    Connie Hung MA   07/29/24 09:30 EDT         ”

## 2024-07-29 NOTE — TELEPHONE ENCOUNTER
Rx Refill Note  Requested Prescriptions     Pending Prescriptions Disp Refills    lisinopril (PRINIVIL,ZESTRIL) 10 MG tablet 90 tablet 0     Sig: Take 1 tablet by mouth Daily.      Last office visit with prescribing clinician: 2/22/2024   Last telemedicine visit with prescribing clinician: Visit date not found   Next office visit with prescribing clinician: 8/22/2024       Helena Patricio MA  07/29/24, 09:53 EDT

## 2024-07-30 DIAGNOSIS — Z86.73 HISTORY OF CVA (CEREBROVASCULAR ACCIDENT): ICD-10-CM

## 2024-07-30 DIAGNOSIS — E78.2 MIXED HYPERLIPIDEMIA: ICD-10-CM

## 2024-07-30 RX ORDER — ATORVASTATIN CALCIUM 80 MG/1
80 TABLET, FILM COATED ORAL
Qty: 90 TABLET | Refills: 1 | Status: SHIPPED | OUTPATIENT
Start: 2024-07-30

## 2024-07-30 NOTE — TELEPHONE ENCOUNTER
Caller: Obed Cotto Jr.    Relationship: Self    Best call back number: [phone]    Requested Prescriptions:   Requested Prescriptions     Pending Prescriptions Disp Refills    atorvastatin (LIPITOR) 80 MG tablet 90 tablet 0     Sig: Take 1 tablet by mouth every night at bedtime.        Pharmacy where request should be sent:    [facility] [phone]  Last office visit with prescribing clinician: 2/22/2024   Last telemedicine visit with prescribing clinician: Visit date not found   Next office visit with prescribing clinician: 8/22/2024     Additional details provided by patient: PATIENT HAS LESS THAN 3 DAY SUPPLY.    Does the patient have less than a 3 day supply:  [x] Yes  [] No    Would you like a call back once the refill request has been completed: [] Yes [x] No    If the office needs to give you a call back, can they leave a voicemail: [] Yes [x] No    Stormy Griggs Rep   07/30/24 10:43 EDT

## 2024-07-30 NOTE — TELEPHONE ENCOUNTER
Rx Refill Note  Requested Prescriptions     Pending Prescriptions Disp Refills    atorvastatin (LIPITOR) 80 MG tablet 90 tablet 0     Sig: Take 1 tablet by mouth every night at bedtime.      Last office visit with prescribing clinician: 2/22/2024   Last telemedicine visit with prescribing clinician: Visit date not found   Next office visit with prescribing clinician: 8/22/2024       Helena Patricio MA  07/30/24, 11:15 EDT

## 2024-08-19 DIAGNOSIS — N40.1 BENIGN PROSTATIC HYPERPLASIA WITH LOWER URINARY TRACT SYMPTOMS, SYMPTOM DETAILS UNSPECIFIED: ICD-10-CM

## 2024-08-19 RX ORDER — FINASTERIDE 5 MG/1
5 TABLET, FILM COATED ORAL DAILY
Qty: 90 TABLET | Refills: 0 | Status: SHIPPED | OUTPATIENT
Start: 2024-08-19

## 2024-08-19 NOTE — TELEPHONE ENCOUNTER
Rx Refill Note  Requested Prescriptions     Pending Prescriptions Disp Refills    finasteride (PROSCAR) 5 MG tablet [Pharmacy Med Name: FINASTERIDE 5MG TABLETS] 90 tablet 0     Sig: TAKE 1 TABLET BY MOUTH DAILY      Last office visit with prescribing clinician: 2/22/2024     Next office visit with prescribing clinician: 8/22/2024       Jerri May  08/19/24, 07:23 EDT

## 2024-08-22 ENCOUNTER — OFFICE VISIT (OUTPATIENT)
Dept: INTERNAL MEDICINE | Facility: CLINIC | Age: 74
End: 2024-08-22
Payer: MEDICARE

## 2024-08-22 ENCOUNTER — LAB (OUTPATIENT)
Dept: INTERNAL MEDICINE | Facility: CLINIC | Age: 74
End: 2024-08-22
Payer: MEDICARE

## 2024-08-22 VITALS
TEMPERATURE: 98.7 F | BODY MASS INDEX: 17.34 KG/M2 | SYSTOLIC BLOOD PRESSURE: 132 MMHG | RESPIRATION RATE: 16 BRPM | HEIGHT: 72 IN | HEART RATE: 51 BPM | OXYGEN SATURATION: 98 % | WEIGHT: 128 LBS | DIASTOLIC BLOOD PRESSURE: 60 MMHG

## 2024-08-22 DIAGNOSIS — E55.9 VITAMIN D DEFICIENCY, UNSPECIFIED: ICD-10-CM

## 2024-08-22 DIAGNOSIS — Z74.09 IMPAIRED MOBILITY: ICD-10-CM

## 2024-08-22 DIAGNOSIS — G81.94 LEFT HEMIPLEGIA: ICD-10-CM

## 2024-08-22 DIAGNOSIS — I10 ESSENTIAL HYPERTENSION: ICD-10-CM

## 2024-08-22 DIAGNOSIS — Z00.00 MEDICARE ANNUAL WELLNESS VISIT, SUBSEQUENT: Primary | ICD-10-CM

## 2024-08-22 DIAGNOSIS — L60.8 TOENAIL DEFORMITY: ICD-10-CM

## 2024-08-22 DIAGNOSIS — Z00.00 ROUTINE GENERAL MEDICAL EXAMINATION AT A HEALTH CARE FACILITY: ICD-10-CM

## 2024-08-22 DIAGNOSIS — Z00.00 MEDICARE ANNUAL WELLNESS VISIT, SUBSEQUENT: ICD-10-CM

## 2024-08-22 DIAGNOSIS — R73.03 PREDIABETES: ICD-10-CM

## 2024-08-22 DIAGNOSIS — Z86.73 HISTORY OF CVA (CEREBROVASCULAR ACCIDENT): ICD-10-CM

## 2024-08-22 DIAGNOSIS — H69.93 DYSFUNCTION OF BOTH EUSTACHIAN TUBES: ICD-10-CM

## 2024-08-22 LAB
25(OH)D3 SERPL-MCNC: 22 NG/ML (ref 30–100)
ALBUMIN SERPL-MCNC: 3.8 G/DL (ref 3.5–5.2)
ALBUMIN/GLOB SERPL: 1.5 G/DL
ALP SERPL-CCNC: 123 U/L (ref 39–117)
ALT SERPL W P-5'-P-CCNC: 39 U/L (ref 1–41)
ANION GAP SERPL CALCULATED.3IONS-SCNC: 8.4 MMOL/L (ref 5–15)
AST SERPL-CCNC: 31 U/L (ref 1–40)
BILIRUB SERPL-MCNC: 0.4 MG/DL (ref 0–1.2)
BUN SERPL-MCNC: 16 MG/DL (ref 8–23)
BUN/CREAT SERPL: 16.8 (ref 7–25)
CALCIUM SPEC-SCNC: 9.5 MG/DL (ref 8.6–10.5)
CHLORIDE SERPL-SCNC: 108 MMOL/L (ref 98–107)
CHOLEST SERPL-MCNC: 71 MG/DL (ref 0–200)
CO2 SERPL-SCNC: 23.6 MMOL/L (ref 22–29)
CREAT SERPL-MCNC: 0.95 MG/DL (ref 0.76–1.27)
EGFRCR SERPLBLD CKD-EPI 2021: 83.5 ML/MIN/1.73
GLOBULIN UR ELPH-MCNC: 2.6 GM/DL
GLUCOSE SERPL-MCNC: 86 MG/DL (ref 65–99)
HDLC SERPL-MCNC: 30 MG/DL (ref 40–60)
LDLC SERPL CALC-MCNC: 28 MG/DL (ref 0–100)
LDLC/HDLC SERPL: 1.06 {RATIO}
POTASSIUM SERPL-SCNC: 5.6 MMOL/L (ref 3.5–5.2)
PROT SERPL-MCNC: 6.4 G/DL (ref 6–8.5)
SODIUM SERPL-SCNC: 140 MMOL/L (ref 136–145)
TRIGL SERPL-MCNC: 46 MG/DL (ref 0–150)
TSH SERPL DL<=0.05 MIU/L-ACNC: 0.74 UIU/ML (ref 0.27–4.2)
VIT B12 BLD-MCNC: 517 PG/ML (ref 211–946)
VLDLC SERPL-MCNC: 13 MG/DL (ref 5–40)

## 2024-08-22 PROCEDURE — 84443 ASSAY THYROID STIM HORMONE: CPT | Performed by: NURSE PRACTITIONER

## 2024-08-22 PROCEDURE — 80061 LIPID PANEL: CPT | Performed by: NURSE PRACTITIONER

## 2024-08-22 PROCEDURE — 82306 VITAMIN D 25 HYDROXY: CPT | Performed by: NURSE PRACTITIONER

## 2024-08-22 PROCEDURE — 82607 VITAMIN B-12: CPT | Performed by: NURSE PRACTITIONER

## 2024-08-22 PROCEDURE — 80053 COMPREHEN METABOLIC PANEL: CPT | Performed by: NURSE PRACTITIONER

## 2024-08-22 PROCEDURE — 85025 COMPLETE CBC W/AUTO DIFF WBC: CPT | Performed by: NURSE PRACTITIONER

## 2024-08-22 PROCEDURE — 36415 COLL VENOUS BLD VENIPUNCTURE: CPT | Performed by: NURSE PRACTITIONER

## 2024-08-22 RX ORDER — FAMOTIDINE 20 MG/1
TABLET, FILM COATED ORAL
Qty: 60 TABLET | Refills: 2 | Status: SHIPPED | OUTPATIENT
Start: 2024-08-22

## 2024-08-22 RX ORDER — FAMOTIDINE 20 MG/1
20 TABLET, FILM COATED ORAL 2 TIMES DAILY
Qty: 60 TABLET | Refills: 2 | Status: CANCELLED | OUTPATIENT
Start: 2024-08-22

## 2024-08-22 RX ORDER — FLUTICASONE PROPIONATE 50 MCG
2 SPRAY, SUSPENSION (ML) NASAL DAILY
Qty: 16 G | Refills: 11 | Status: SHIPPED | OUTPATIENT
Start: 2024-08-22

## 2024-08-22 NOTE — TELEPHONE ENCOUNTER
Rx Refill Note  Requested Prescriptions     Pending Prescriptions Disp Refills    famotidine (PEPCID) 20 MG tablet [Pharmacy Med Name: FAMOTIDINE 20MG TABLETS] 60 tablet 2     Sig: TAKE 1 TABLET BY MOUTH TWICE DAILY      Last office visit with prescribing clinician: 2/22/2024   Last telemedicine visit with prescribing clinician: Visit date not found   Next office visit with prescribing clinician: 8/22/2024       Helena Patricio MA  08/22/24, 09:46 EDT

## 2024-08-22 NOTE — PATIENT INSTRUCTIONS
Fall Prevention in the Home, Adult  Falls can cause injuries and can happen to people of all ages. There are many things you can do to make your home safer and to help prevent falls.  What actions can I take to prevent falls?  General information  Use good lighting in all rooms. Make sure to:  Replace any light bulbs that burn out.  Turn on the lights in dark areas and use night-lights.  Keep items that you use often in easy-to-reach places. Lower the shelves around your home if needed.  Move furniture so that there are clear paths around it.  Do not use throw rugs or other things on the floor that can make you trip.  If any of your floors are uneven, fix them.  Add color or contrast paint or tape to clearly aditya and help you see:  Grab bars or handrails.  First and last steps of staircases.  Where the edge of each step is.  If you use a ladder or stepladder:  Make sure that it is fully opened. Do not climb a closed ladder.  Make sure the sides of the ladder are locked in place.  Have someone hold the ladder while you use it.  Know where your pets are as you move through your home.  What can I do in the bathroom?         Keep the floor dry. Clean up any water on the floor right away.  Remove soap buildup in the bathtub or shower. Buildup makes bathtubs and showers slippery.  Use non-skid mats or decals on the floor of the bathtub or shower.  Attach bath mats securely with double-sided, non-slip rug tape.  If you need to sit down in the shower, use a non-slip stool.  Install grab bars by the toilet and in the bathtub and shower. Do not use towel bars as grab bars.  What can I do in the bedroom?  Make sure that you have a light by your bed that is easy to reach.  Do not use any sheets or blankets on your bed that hang to the floor.  Have a firm chair or bench with side arms that you can use for support when you get dressed.  What can I do in the kitchen?  Clean up any spills right away.  If you need to reach something  above you, use a step stool with a grab bar.  Keep electrical cords out of the way.  Do not use floor polish or wax that makes floors slippery.  What can I do with my stairs?  Do not leave anything on the stairs.  Make sure that you have a light switch at the top and the bottom of the stairs.  Make sure that there are handrails on both sides of the stairs. Fix handrails that are broken or loose.  Install non-slip stair treads on all your stairs if they do not have carpet.  Avoid having throw rugs at the top or bottom of the stairs.  Choose a carpet that does not hide the edge of the steps on the stairs. Make sure that the carpet is firmly attached to the stairs. Fix carpet that is loose or worn.  What can I do on the outside of my home?  Use bright outdoor lighting.  Fix the edges of walkways and driveways and fix any cracks. Clear paths of anything that can make you trip, such as tools or rocks.  Add color or contrast paint or tape to clearly aditya and help you see anything that might make you trip as you walk through a door, such as a raised step or threshold.  Trim any bushes or trees on paths to your home.  Check to see if handrails are loose or broken and that both sides of all steps have handrails. Install guardrails along the edges of any raised decks and porches.  Have leaves, snow, or ice cleared regularly. Use sand, salt, or ice melter on paths if you live where there is ice and snow during the winter.  Clean up any spills in your garage right away. This includes grease or oil spills.  What other actions can I take?  Review your medicines with your doctor. Some medicines can cause dizziness or changes in blood pressure, which increase your risk of falling.  Wear shoes that:  Have a low heel. Do not wear high heels.  Have rubber bottoms and are closed at the toe.  Feel good on your feet and fit well.  Use tools that help you move around if needed. These include:  Canes.  Walkers.  Scooters.  Crutches.  Ask  your doctor what else you can do to help prevent falls. This may include seeing a physical therapist to learn to do exercises to move better and get stronger.  Where to find more information  Centers for Disease Control and PreventionKAIN: cdc.gov  National [place] on Aging: alina.nih.gov  National [place] on Aging: alina.nih.gov  Contact a doctor if:  You are afraid of falling at home.  You feel weak, drowsy, or dizzy at home.  You fall at home.  Get help right away if you:  Lose consciousness or have trouble moving after a fall.  Have a fall that causes a head injury.  These symptoms may be an emergency. Get help right away. Call 911.  Do not wait to see if the symptoms will go away.  Do not drive yourself to the hospital.  This information is not intended to replace advice given to you by your health care provider. Make sure you discuss any questions you have with your health care provider.  Document Revised: 08/21/2023 Document Reviewed: 08/21/2023  Elsevier Patient Education © 2024 [employer]

## 2024-08-22 NOTE — PROGRESS NOTES
Subjective   The ABCs of the Annual Wellness Visit  Medicare Wellness Visit      Obed Cotto Jr. is a 74 y.o. patient who presents for a Medicare Wellness Visit.    The following portions of the patient's history were reviewed and   updated as appropriate: allergies, current medications, past family history, past medical history, past social history, past surgical history, and problem list.    Compared to one year ago, the patient's physical   health is worse.  Compared to one year ago, the patient's mental   health is the same.    Recent Hospitalizations:  He was not admitted to the hospital during the last year.     Current Medical Providers:  Patient Care Team:  Laura Hicks APRN as PCP - General (Family Medicine)  Dieudonne Hartman MD as Consulting Physician (Urology)    Outpatient Medications Prior to Visit   Medication Sig Dispense Refill    atorvastatin (LIPITOR) 80 MG tablet Take 1 tablet by mouth every night at bedtime. 90 tablet 1    baclofen (LIORESAL) 20 MG tablet Take 1 tablet by mouth 3 times a day.      chlorthalidone (HYGROTON) 25 MG tablet TAKE 1 TABLET BY MOUTH DAILY 30 tablet 5    clopidogrel (PLAVIX) 75 MG tablet TAKE 1 TABLET BY MOUTH DAILY 90 tablet 0    docusate sodium (Colace) 100 MG capsule Take 1 capsule by mouth 2 (Two) Times a Day. 60 capsule 5    escitalopram (LEXAPRO) 10 MG tablet TAKE 1/2 TABLET BY MOUTH DAILY 45 tablet 0    famotidine (PEPCID) 20 MG tablet TAKE 1 TABLET BY MOUTH TWICE DAILY 60 tablet 2    FeroSul 325 (65 Fe) MG tablet TAKE 1 TABLET BY MOUTH TWICE DAILY WITH MEALS 180 tablet 1    finasteride (PROSCAR) 5 MG tablet TAKE 1 TABLET BY MOUTH DAILY 90 tablet 0    lisinopril (PRINIVIL,ZESTRIL) 10 MG tablet Take 1 tablet by mouth Daily. 90 tablet 1    metoprolol succinate XL (TOPROL-XL) 25 MG 24 hr tablet TAKE 1 TABLET BY MOUTH DAILY 90 tablet 0    tamsulosin (FLOMAX) 0.4 MG capsule 24 hr capsule TAKE 1 CAPSULE BY MOUTH EVERY NIGHT 90 capsule 1    aspirin  "(aspirin) 81 MG EC tablet Take 1 tablet by mouth Daily. (Patient not taking: Reported on 8/22/2024) 30 tablet 2     No facility-administered medications prior to visit.     No opioid medication identified on active medication list. I have reviewed chart for other potential  high risk medication/s and harmful drug interactions in the elderly.      Aspirin is on active medication list. Aspirin use is indicated based on review of current medical condition/s. Pros and cons of this therapy have been discussed today. Benefits of this medication outweigh potential harm.  Patient has been encouraged to continue taking this medication.  .      Patient Active Problem List   Diagnosis    Urinary retention    Positive colorectal cancer screening using DNA-based stool test    Leukocytosis    Prediabetes    BPH (benign prostatic hyperplasia)    BPH with elevated PSA    Essential hypertension    Stroke    Dysarthria    Dysphagia    Impaired mobility    Memory impairment    Leg cramps    Left hemiplegia    History of tobacco abuse    Closed fracture of neck of left femur    Severe malnutrition    Vitamin D deficiency, unspecified      Advance Care Planning Advance Directive is not on file.  ACP discussion was held with the patient during this visit. Patient does not have an advance directive, information provided.            Objective   Vitals:    08/22/24 1019   BP: 132/60   BP Location: Left arm   Patient Position: Sitting   Cuff Size: Adult   Pulse: 51   Resp: 16   Temp: 98.7 °F (37.1 °C)   TempSrc: Temporal   SpO2: 98%   Weight: 58.1 kg (128 lb)   Height: 182.9 cm (72.01\")   PainSc: 0-No pain       Estimated body mass index is 17.36 kg/m² as calculated from the following:    Height as of this encounter: 182.9 cm (72.01\").    Weight as of this encounter: 58.1 kg (128 lb).    BMI is below normal parameters (malnutrition). Recommendations: Information on healthy weight added to patient's after visit summary       Does the patient " have evidence of cognitive impairment? No  Lab Results   Component Value Date    TRIG 46 2024    HDL 30 (L) 2024    LDL 28 2024    VLDL 13 2024                                                                                                Health  Risk Assessment    Smoking Status:  Social History     Tobacco Use   Smoking Status Former    Current packs/day: 0.00    Average packs/day: 1 pack/day for 48.0 years (48.0 ttl pk-yrs)    Types: Cigarettes    Start date: 1973    Quit date: 2021    Years since quitting: 3.2   Smokeless Tobacco Never     Alcohol Consumption:  Social History     Substance and Sexual Activity   Alcohol Use No       Fall Risk Screen  STEADI Fall Risk Assessment was completed, and patient is at MODERATE risk for falls. Assessment completed on:2024    Depression Screenin/22/2024    10:10 AM   PHQ-2/PHQ-9 Depression Screening   Little Interest or Pleasure in Doing Things 0-->not at all   Feeling Down, Depressed or Hopeless 0-->not at all   PHQ-9: Brief Depression Severity Measure Score 0     Health Habits and Functional and Cognitive Screenin/22/2024    10:15 AM   Functional & Cognitive Status   Do you have difficulty preparing food and eating? Yes   Do you have difficulty bathing yourself, getting dressed or grooming yourself? Yes   Do you have difficulty using the toilet? Yes   Do you have difficulty moving around from place to place? Yes   Do you have trouble with steps or getting out of a bed or a chair? Yes   Current Diet Well Balanced Diet   Dental Exam Not up to date   Eye Exam Not up to date   Exercise (times per week) 4 times per week   Current Exercises Include Other        Exercise Comment patient does physical therapy.   Do you need help using the phone?  No   Are you deaf or do you have serious difficulty hearing?  No   Do you need help to go to places out of walking distance? Yes   Do you need help shopping? Yes   Do you need help  preparing meals?  Yes   Do you need help with housework?  Yes   Do you need help with laundry? Yes   Do you need help taking your medications? Yes   Do you need help managing money? No   Do you ever drive or ride in a car without wearing a seat belt? No   Have you felt unusual stress, anger or loneliness in the last month? No   Who do you live with? Child   If you need help, do you have trouble finding someone available to you? No   Have you been bothered in the last four weeks by sexual problems? No   Do you have difficulty concentrating, remembering or making decisions? No           Age-appropriate Screening Schedule:  Refer to the list below for future screening recommendations based on patient's age, sex and/or medical conditions. Orders for these recommended tests are listed in the plan section. The patient has been provided with a written plan.    Health Maintenance List  Health Maintenance   Topic Date Due    COLORECTAL CANCER SCREENING  Never done    AAA SCREEN (ONE-TIME)  Never done    ZOSTER VACCINE (2 of 2) 07/18/2017    COVID-19 Vaccine (2 - 2023-24 season) 09/01/2024    INFLUENZA VACCINE  08/01/2024    RSV Vaccine - Adults (1 - 1-dose 60+ series) 02/22/2025 (Originally 3/3/2009)    TDAP/TD VACCINES (1 - Tdap) 08/22/2025 (Originally 3/3/1969)    LUNG CANCER SCREENING  08/22/2025 (Originally 12/3/2022)    ANNUAL WELLNESS VISIT  08/22/2025    LIPID PANEL  08/22/2025    BMI FOLLOWUP  08/22/2025    HEPATITIS C SCREENING  Completed    Pneumococcal Vaccine 65+  Completed                                                                                                                                                CMS Preventative Services Quick Reference  Risk Factors Identified During Encounter  Fall Risk-High or Moderate: Discussed Fall Prevention in the home and Information on Fall Prevention Shared in After Visit Summary  Inactivity/Sedentary:  limited due to being in WC/hemiplegia    The above  risks/problems have been discussed with the patient.  Pertinent information has been shared with the patient in the After Visit Summary.  An After Visit Summary and PPPS were made available to the patient.    Follow Up:   Next Medicare Wellness visit to be scheduled in 1 year.         Additional E&M Note during same encounter follows:  Patient has additional, significant, and separately identifiable condition(s)/problem(s) that require work above and beyond the Medicare Wellness Visit     Chief Complaint  Medicare Wellness-subsequent (Patient would like ears looked at, right ear has been bothering him. /He would also like a referral to see a podiatrist, he has one he would like to see. )    Subjective   HPI  Obed is also being seen today for an annual adult preventative physical exam.  and Obed is also being seen today for additional medical problem/s.    C/O right ear fullness.  Sx's are intermittent.  Slight pain.      CVA -patient suffered an acute ischemic stroke on 5/30/2021.  This is left him with residual weakness of the left side.  He suffered a subsequent fall in November 2021 that resulted in a closed fracture of the left hip.     Had a recent f/u with neurology.  Was doing Botox for LLE spasticity, but insurance would not pay for this so he was changed back to a muscle relaxer.  States that his leg is getting tighter and tighter and his mobility has gotten worse.       Depression-chronic; current regimen is Lexapro 10 mg p.o. daily     Hypertension-chronic; current regimen is lisinopril 10 mg p.o. daily, metoprolol XL 25 mg daily and chlorthalidone 25 mg.  He is feeling dizzy and lightheaded with standing.  BP and HR are low today in office.       Hyperlipidemia-chronic; current regimen is Lipitor 80 mg p.o. daily.  He is fasting for labs    Requests referral to podiatry to help with toenail trimming.     COVID-4/10/2021 ([facility]); declines further  Flu shot-10/2023  Prevnar 13 3/1/2019  Pneumovax 23  "2/23/2017  Diabetic eye exam 7/1/2021  Low-dose lung cancer screening 12/3/2021; declines today  Colon - declines       Review of Systems   HENT:          Bilateral ear fullness   Musculoskeletal:  Positive for myalgias.   Neurological:  Positive for weakness.   All other systems reviewed and are negative.             Objective   Vital Signs:  /60 (BP Location: Left arm, Patient Position: Sitting, Cuff Size: Adult)   Pulse 51   Temp 98.7 °F (37.1 °C) (Temporal)   Resp 16   Ht 182.9 cm (72.01\")   Wt 58.1 kg (128 lb)   SpO2 98%   BMI 17.36 kg/m²   Physical Exam  Constitutional:       Appearance: He is well-developed.   HENT:      Head: Normocephalic and atraumatic.      Right Ear: A middle ear effusion is present.      Left Ear: A middle ear effusion is present.   Eyes:      Conjunctiva/sclera: Conjunctivae normal.      Pupils: Pupils are equal, round, and reactive to light.   Cardiovascular:      Rate and Rhythm: Normal rate and regular rhythm.      Heart sounds: Normal heart sounds.   Pulmonary:      Effort: Pulmonary effort is normal.      Breath sounds: Normal breath sounds.   Abdominal:      General: Bowel sounds are normal.      Palpations: Abdomen is soft.   Musculoskeletal:         General: Normal range of motion.      Cervical back: Normal range of motion.   Skin:     General: Skin is warm and dry.   Neurological:      Mental Status: He is alert and oriented to person, place, and time.      Gait: Gait abnormal (in power WC, left hemiplegia).      Deep Tendon Reflexes: Reflexes are normal and symmetric.   Psychiatric:         Behavior: Behavior normal.         Thought Content: Thought content normal.         Judgment: Judgment normal.                 Assessment and Plan               Medicare annual wellness visit, subsequent    Routine general medical examination at a health care facility    History of CVA (cerebrovascular accident)    Left hemiplegia    Impaired mobility    Essential " hypertension  Hypertension is stable and controlled  Continue current treatment regimen.  Blood pressure will be reassessed in 6 months.  Prediabetes    Vitamin D deficiency, unspecified     Toenail deformity    Dysfunction of both eustachian tubes      Orders Placed This Encounter   Procedures    Comprehensive Metabolic Panel     Standing Status:   Future     Number of Occurrences:   1     Order Specific Question:   Release to patient     Answer:   Routine Release []    Lipid Panel     Standing Status:   Future     Number of Occurrences:   1     Order Specific Question:   Release to patient     Answer:   Routine Release []    TSH     Standing Status:   Future     Number of Occurrences:   1     Order Specific Question:   Release to patient     Answer:   Routine Release []    Vitamin B12     Standing Status:   Future     Number of Occurrences:   1     Order Specific Question:   Release to patient     Answer:   Routine Release []    Vitamin D,25-Hydroxy     Standing Status:   Future     Number of Occurrences:   1     Order Specific Question:   Release to patient     Answer:   Routine Release []    Ambulatory Referral to Podiatry     Referral Priority:   Routine     Referral Type:   Consultation     Referral Reason:   Specialty Services Required     Requested Specialty:   Podiatry     Number of Visits Requested:   1    CBC & Differential     Standing Status:   Future     Number of Occurrences:   1     Order Specific Question:   Manual Differential     Answer:   No     Order Specific Question:   Release to patient     Answer:   Routine Release []     New Medications Ordered This Visit   Medications    fluticasone (FLONASE) 50 MCG/ACT nasal spray     Si sprays into the nostril(s) as directed by provider Daily.     Dispense:  16 g     Refill:  11          Follow Up   Return in about 6 months (around 2025) for f/u, collect labs today.  Patient was given  instructions and counseling regarding his condition or for health maintenance advice. Please see specific information pulled into the AVS if appropriate.

## 2024-08-23 LAB
BASOPHILS # BLD AUTO: 0.07 10*3/MM3 (ref 0–0.2)
BASOPHILS NFR BLD AUTO: 0.7 % (ref 0–1.5)
DEPRECATED RDW RBC AUTO: 42.8 FL (ref 37–54)
EOSINOPHIL # BLD AUTO: 0.28 10*3/MM3 (ref 0–0.4)
EOSINOPHIL NFR BLD AUTO: 2.8 % (ref 0.3–6.2)
ERYTHROCYTE [DISTWIDTH] IN BLOOD BY AUTOMATED COUNT: 11.8 % (ref 12.3–15.4)
HCT VFR BLD AUTO: 32.8 % (ref 37.5–51)
HGB BLD-MCNC: 10.7 G/DL (ref 13–17.7)
IMM GRANULOCYTES # BLD AUTO: 0.05 10*3/MM3 (ref 0–0.05)
IMM GRANULOCYTES NFR BLD AUTO: 0.5 % (ref 0–0.5)
LYMPHOCYTES # BLD AUTO: 2.8 10*3/MM3 (ref 0.7–3.1)
LYMPHOCYTES NFR BLD AUTO: 27.5 % (ref 19.6–45.3)
MCH RBC QN AUTO: 32.7 PG (ref 26.6–33)
MCHC RBC AUTO-ENTMCNC: 32.6 G/DL (ref 31.5–35.7)
MCV RBC AUTO: 100.3 FL (ref 79–97)
MONOCYTES # BLD AUTO: 0.7 10*3/MM3 (ref 0.1–0.9)
MONOCYTES NFR BLD AUTO: 6.9 % (ref 5–12)
NEUTROPHILS NFR BLD AUTO: 6.28 10*3/MM3 (ref 1.7–7)
NEUTROPHILS NFR BLD AUTO: 61.6 % (ref 42.7–76)
NRBC BLD AUTO-RTO: 0 /100 WBC (ref 0–0.2)
PLATELET # BLD AUTO: 262 10*3/MM3 (ref 140–450)
PMV BLD AUTO: 10.8 FL (ref 6–12)
RBC # BLD AUTO: 3.27 10*6/MM3 (ref 4.14–5.8)
WBC NRBC COR # BLD AUTO: 10.18 10*3/MM3 (ref 3.4–10.8)

## 2024-08-26 ENCOUNTER — TELEPHONE (OUTPATIENT)
Dept: INTERNAL MEDICINE | Facility: CLINIC | Age: 74
End: 2024-08-26
Payer: MEDICARE

## 2024-08-26 NOTE — TELEPHONE ENCOUNTER
Patient has been notified and verbalized understanding.  Went over some potassium rich foods with patient.  He does take an iron supplement daily.  He eats a lot of frozen meals.

## 2024-08-26 NOTE — TELEPHONE ENCOUNTER
----- Message from Laura Hicks sent at 8/26/2024  1:09 PM EDT -----  Anemia has gotten slightly worse.  Is he taking his iron supplement?    Vitamin D is low.  I recommend OTC vitamin D3 4000 units daily.    Potassium has improved, but is still mildly elevated.  Continue to hold the salt substitute and cut back on any potassium rich foods.    All other labs within acceptable limits.

## 2024-08-27 DIAGNOSIS — E87.6 HYPOKALEMIA: Primary | ICD-10-CM

## 2024-08-27 NOTE — TELEPHONE ENCOUNTER
Spoke with patient and he said the he has not been taking any iron.  His son is going to pick him some up.  He will try to get the lab in a few weeks.  He takes the [employer] bus so it is hard to schedule because they drop him off.  Then he has to wait for them to come back and pick him up. I advised to take iron once a day unless Laura says differently.

## 2024-09-12 ENCOUNTER — TELEPHONE (OUTPATIENT)
Dept: INTERNAL MEDICINE | Facility: CLINIC | Age: 74
End: 2024-09-12
Payer: MEDICARE

## 2024-09-12 NOTE — TELEPHONE ENCOUNTER
Caller: Obed Cotto Jr.    Relationship: Self    Best call back number     Which medication are you concerned about: VITAMIN D    Who prescribed you this medication: NICOLASA OLMOS    When did you start taking this medication:  HASN'T BEEN TAKING IT    What are your concerns:  PATIENT IS ASKING HOW MUCH OF A DOSAGE OF THIS MEDICATION HE IS TO BE TAKING    PLEASE CALL TO ADVISE

## 2024-09-14 DIAGNOSIS — Z86.73 HISTORY OF CVA (CEREBROVASCULAR ACCIDENT): ICD-10-CM

## 2024-09-14 DIAGNOSIS — N40.1 BENIGN PROSTATIC HYPERPLASIA WITH LOWER URINARY TRACT SYMPTOMS, SYMPTOM DETAILS UNSPECIFIED: ICD-10-CM

## 2024-09-16 RX ORDER — TAMSULOSIN HYDROCHLORIDE 0.4 MG/1
1 CAPSULE ORAL NIGHTLY
Qty: 90 CAPSULE | Refills: 1 | Status: SHIPPED | OUTPATIENT
Start: 2024-09-16

## 2024-09-16 RX ORDER — CLOPIDOGREL BISULFATE 75 MG/1
75 TABLET ORAL DAILY
Qty: 90 TABLET | Refills: 0 | Status: SHIPPED | OUTPATIENT
Start: 2024-09-16

## 2024-10-07 RX ORDER — FERROUS SULFATE 325(65) MG
TABLET ORAL
Qty: 180 TABLET | Refills: 1 | Status: SHIPPED | OUTPATIENT
Start: 2024-10-07

## 2024-10-07 NOTE — TELEPHONE ENCOUNTER
Rx Refill Note  Requested Prescriptions     Pending Prescriptions Disp Refills    FeroSul 325 (65 Fe) MG tablet [Pharmacy Med Name: FERROUS SULFATE 325MG (5GR) TABS] 180 tablet 1     Sig: TAKE 1 TABLET BY MOUTH TWICE DAILY WITH MEALS      Last office visit with prescribing clinician: 8/22/2024     Next office visit with prescribing clinician: 2/13/2025       Jerri May  10/07/24, 10:17 EDT

## 2024-10-21 RX ORDER — FAMOTIDINE 20 MG/1
TABLET, FILM COATED ORAL
Qty: 180 TABLET | Refills: 1 | Status: SHIPPED | OUTPATIENT
Start: 2024-10-21

## 2024-10-21 NOTE — TELEPHONE ENCOUNTER
Rx Refill Note  Requested Prescriptions     Pending Prescriptions Disp Refills    famotidine (PEPCID) 20 MG tablet [Pharmacy Med Name: FAMOTIDINE 20MG TABLETS] 60 tablet 2     Sig: TAKE 1 TABLET BY MOUTH TWICE DAILY      Last office visit with prescribing clinician: 8/22/2024   Last telemedicine visit with prescribing clinician: Visit date not found   Next office visit with prescribing clinician: 2/13/2025       Helena Patricio MA  10/21/24, 10:15 EDT

## 2024-11-06 RX ORDER — CHLORTHALIDONE 25 MG/1
25 TABLET ORAL DAILY
Qty: 30 TABLET | Refills: 5 | OUTPATIENT
Start: 2024-11-06

## 2024-11-19 DIAGNOSIS — N40.1 BENIGN PROSTATIC HYPERPLASIA WITH LOWER URINARY TRACT SYMPTOMS, SYMPTOM DETAILS UNSPECIFIED: ICD-10-CM

## 2024-11-19 RX ORDER — FINASTERIDE 5 MG/1
5 TABLET, FILM COATED ORAL DAILY
Qty: 90 TABLET | Refills: 0 | Status: SHIPPED | OUTPATIENT
Start: 2024-11-19 | End: 2024-11-19 | Stop reason: SDUPTHER

## 2024-11-19 RX ORDER — FINASTERIDE 5 MG/1
5 TABLET, FILM COATED ORAL DAILY
Qty: 90 TABLET | Refills: 0 | Status: SHIPPED | OUTPATIENT
Start: 2024-11-19

## 2024-11-19 NOTE — TELEPHONE ENCOUNTER
Caller: Obed Cotto Jr.    Relationship: Self    Best call back number: [phone]     Requested Prescriptions:   Requested Prescriptions     Pending Prescriptions Disp Refills    finasteride (PROSCAR) 5 MG tablet 90 tablet 0     Sig: Take 1 tablet by mouth Daily.        [id number] [place] [id number] [facility] AT [facility] - [phone] [facility] [phone]      Last office visit with prescribing clinician: 8/22/2024   Last telemedicine visit with prescribing clinician: Visit date not found   Next office visit with prescribing clinician: 2/13/2025     Additional details provided by patient: PATIENT HAS 1 DAY REMAINING OF THIS MEDICATION      Does the patient have less than a 3 day supply:  [x] Yes  [] No    Would you like a call back once the refill request has been completed: [] Yes [x] No    If the office needs to give you a call back, can they leave a voicemail: [] Yes [x] No    Stormy Garcia Rep   11/19/24 09:18 EST

## 2024-11-19 NOTE — TELEPHONE ENCOUNTER
Rx Refill Note  Requested Prescriptions     Pending Prescriptions Disp Refills    finasteride (PROSCAR) 5 MG tablet [Pharmacy Med Name: FINASTERIDE 5MG TABLETS] 90 tablet 0     Sig: TAKE 1 TABLET BY MOUTH DAILY      Last office visit with prescribing clinician: 8/22/2024     Next office visit with prescribing clinician: 2/13/2025       Jerri May  11/19/24, 07:42 EST

## 2024-11-19 NOTE — TELEPHONE ENCOUNTER
Rx Refill Note  Requested Prescriptions     Pending Prescriptions Disp Refills    finasteride (PROSCAR) 5 MG tablet 90 tablet 0     Sig: Take 1 tablet by mouth Daily.      Last office visit with prescribing clinician: 8/22/2024   Last telemedicine visit with prescribing clinician: Visit date not found   Next office visit with prescribing clinician: 2/13/2025     Helena Patricio MA  11/19/24, 10:22 EST

## 2024-12-13 DIAGNOSIS — Z86.73 HISTORY OF CVA (CEREBROVASCULAR ACCIDENT): ICD-10-CM

## 2024-12-13 RX ORDER — CLOPIDOGREL BISULFATE 75 MG/1
75 TABLET ORAL DAILY
Qty: 90 TABLET | Refills: 0 | OUTPATIENT
Start: 2024-12-13

## 2024-12-13 RX ORDER — CLOPIDOGREL BISULFATE 75 MG/1
75 TABLET ORAL DAILY
Qty: 90 TABLET | Refills: 0 | Status: SHIPPED | OUTPATIENT
Start: 2024-12-13

## 2024-12-13 NOTE — TELEPHONE ENCOUNTER
Caller: Obed Cotto Jr.    Relationship: Self    Best call back number: [phone]     Requested Prescriptions:   Requested Prescriptions     Pending Prescriptions Disp Refills    clopidogrel (PLAVIX) 75 MG tablet 90 tablet 0     Sig: Take 1 tablet by mouth Daily.        Pharmacy where request should be sent: [id number] [place] [address] [facility] - [phone] [facility] [phone] FX     Last office visit with prescribing clinician: 8/22/2024   Last telemedicine visit with prescribing clinician: Visit date not found   Next office visit with prescribing clinician: 2/13/2025     Additional details provided by patient:     Does the patient have less than a 3 day supply:  [x] Yes  [] No    Would you like a call back once the refill request has been completed: [] Yes [x] No    If the office needs to give you a call back, can they leave a voicemail: [] Yes [x] No    Stormy Schwartz Rep   12/13/24 13:44 EST

## 2024-12-17 DIAGNOSIS — Z86.73 HISTORY OF CVA (CEREBROVASCULAR ACCIDENT): ICD-10-CM

## 2024-12-17 DIAGNOSIS — I10 ESSENTIAL HYPERTENSION: ICD-10-CM

## 2024-12-17 RX ORDER — METOPROLOL SUCCINATE 25 MG/1
25 TABLET, EXTENDED RELEASE ORAL DAILY
Qty: 90 TABLET | Refills: 0 | Status: SHIPPED | OUTPATIENT
Start: 2024-12-17

## 2024-12-17 NOTE — TELEPHONE ENCOUNTER
Rx Refill Note  Requested Prescriptions     Pending Prescriptions Disp Refills    metoprolol succinate XL (TOPROL-XL) 25 MG 24 hr tablet 90 tablet 0     Sig: Take 1 tablet by mouth Daily.      Last office visit with prescribing clinician: 8/22/2024     Next office visit with prescribing clinician: 2/13/2025       Jerri May  12/17/24, 17:49 EST

## 2025-01-17 DIAGNOSIS — Z86.73 HISTORY OF CVA (CEREBROVASCULAR ACCIDENT): ICD-10-CM

## 2025-01-17 DIAGNOSIS — I10 ESSENTIAL HYPERTENSION: ICD-10-CM

## 2025-01-17 NOTE — TELEPHONE ENCOUNTER
Last office visit 08-  Next office visit 02-    Failed protocol   ACE Inhibitors Protocol [id number]/17/2025 03:17 AM   Protocol Details Normal serum potassium in past 12 months     Rx Refill Note  Requested Prescriptions     Pending Prescriptions Disp Refills    lisinopril (PRINIVIL,ZESTRIL) 10 MG tablet [Pharmacy Med Name: LISINOPRIL 10MG TABLETS] 90 tablet 1     Sig: TAKE 1 TABLET BY MOUTH DAILY      Last office visit with prescribing clinician: 8/22/2024   Last telemedicine visit with prescribing clinician: Visit date not found   Next office visit with prescribing clinician: 2/13/2025                         Would you like a call back once the refill request has been completed: [] Yes [] No    If the office needs to give you a call back, can they leave a voicemail: [] Yes [] No    Paola Cheung MA  01/17/25, 14:26 EST

## 2025-01-20 RX ORDER — LISINOPRIL 10 MG/1
10 TABLET ORAL DAILY
Qty: 90 TABLET | Refills: 1 | Status: SHIPPED | OUTPATIENT
Start: 2025-01-20

## 2025-01-25 DIAGNOSIS — Z86.73 HISTORY OF CVA (CEREBROVASCULAR ACCIDENT): ICD-10-CM

## 2025-01-25 DIAGNOSIS — E78.2 MIXED HYPERLIPIDEMIA: ICD-10-CM

## 2025-01-28 RX ORDER — ATORVASTATIN CALCIUM 80 MG/1
80 TABLET, FILM COATED ORAL
Qty: 30 TABLET | Refills: 0 | Status: SHIPPED | OUTPATIENT
Start: 2025-01-28

## 2025-02-10 RX ORDER — ESCITALOPRAM OXALATE 10 MG/1
5 TABLET ORAL DAILY
Qty: 45 TABLET | Refills: 0 | Status: SHIPPED | OUTPATIENT
Start: 2025-02-10

## 2025-02-10 RX ORDER — ESCITALOPRAM OXALATE 10 MG/1
TABLET ORAL
Qty: 45 TABLET | Refills: 0 | OUTPATIENT
Start: 2025-02-10

## 2025-02-10 NOTE — TELEPHONE ENCOUNTER
Caller: Obed Cotto Jr.    Relationship: Self    Best call back number: [phone]     Requested Prescriptions:   Requested Prescriptions     Pending Prescriptions Disp Refills    escitalopram (LEXAPRO) 10 MG tablet 45 tablet 0     Sig: Take 0.5 tablets by mouth Daily.        Pharm[facility] where request [facility] be sent: [id number] [place] [id number] [facility] AT [facility] [phone] [facility] [phone]      Last office visit with prescribing clinician: 8/22/2024   Last telemedicine visit with prescribing clinician: Visit date not found   Next office visit with prescribing clinician: 2/13/2025     Does the patient have less than a 3 day supply:  [x] Yes  [] No    Would you like a call back once the refill request has been completed: [] Yes [] No    If the office needs to give you a call back, can they leave a voicemail: [] Yes [] No    Stormy Kee Rep   02/10/25 09:26 EST

## 2025-02-13 ENCOUNTER — OFFICE VISIT (OUTPATIENT)
Dept: INTERNAL MEDICINE | Facility: CLINIC | Age: 75
End: 2025-02-13
Payer: MEDICARE

## 2025-02-13 ENCOUNTER — LAB (OUTPATIENT)
Dept: INTERNAL MEDICINE | Facility: CLINIC | Age: 75
End: 2025-02-13
Payer: MEDICARE

## 2025-02-13 VITALS
OXYGEN SATURATION: 97 % | HEIGHT: 72 IN | TEMPERATURE: 97.3 F | WEIGHT: 128 LBS | BODY MASS INDEX: 17.34 KG/M2 | RESPIRATION RATE: 16 BRPM | SYSTOLIC BLOOD PRESSURE: 150 MMHG | HEART RATE: 56 BPM | DIASTOLIC BLOOD PRESSURE: 64 MMHG

## 2025-02-13 DIAGNOSIS — Z86.73 HISTORY OF CVA (CEREBROVASCULAR ACCIDENT): Primary | ICD-10-CM

## 2025-02-13 DIAGNOSIS — E78.2 MIXED HYPERLIPIDEMIA: ICD-10-CM

## 2025-02-13 DIAGNOSIS — G81.94 LEFT HEMIPLEGIA: ICD-10-CM

## 2025-02-13 DIAGNOSIS — Z86.73 HISTORY OF CVA (CEREBROVASCULAR ACCIDENT): ICD-10-CM

## 2025-02-13 DIAGNOSIS — I10 ESSENTIAL HYPERTENSION: ICD-10-CM

## 2025-02-13 DIAGNOSIS — E87.6 HYPOKALEMIA: ICD-10-CM

## 2025-02-13 DIAGNOSIS — Z74.09 IMPAIRED MOBILITY: ICD-10-CM

## 2025-02-13 DIAGNOSIS — E55.9 VITAMIN D DEFICIENCY: ICD-10-CM

## 2025-02-13 DIAGNOSIS — R22.43 LOCALIZED SWELLING OF BOTH LOWER LEGS: ICD-10-CM

## 2025-02-13 LAB
25(OH)D3 SERPL-MCNC: 52.2 NG/ML (ref 30–100)
ALBUMIN SERPL-MCNC: 3.9 G/DL (ref 3.5–5.2)
ALBUMIN/GLOB SERPL: 1.4 G/DL
ALP SERPL-CCNC: 114 U/L (ref 39–117)
ALT SERPL W P-5'-P-CCNC: 27 U/L (ref 1–41)
ANION GAP SERPL CALCULATED.3IONS-SCNC: 9.4 MMOL/L (ref 5–15)
AST SERPL-CCNC: 30 U/L (ref 1–40)
BASOPHILS # BLD AUTO: 0.07 10*3/MM3 (ref 0–0.2)
BASOPHILS NFR BLD AUTO: 1 % (ref 0–1.5)
BILIRUB SERPL-MCNC: 0.5 MG/DL (ref 0–1.2)
BUN SERPL-MCNC: 15 MG/DL (ref 8–23)
BUN/CREAT SERPL: 15.3 (ref 7–25)
CALCIUM SPEC-SCNC: 9.4 MG/DL (ref 8.6–10.5)
CHLORIDE SERPL-SCNC: 105 MMOL/L (ref 98–107)
CHOLEST SERPL-MCNC: 89 MG/DL (ref 0–200)
CO2 SERPL-SCNC: 25.6 MMOL/L (ref 22–29)
CREAT SERPL-MCNC: 0.98 MG/DL (ref 0.76–1.27)
DEPRECATED RDW RBC AUTO: 40.9 FL (ref 37–54)
EGFRCR SERPLBLD CKD-EPI 2021: 80.4 ML/MIN/1.73
EOSINOPHIL # BLD AUTO: 0.42 10*3/MM3 (ref 0–0.4)
EOSINOPHIL NFR BLD AUTO: 5.9 % (ref 0.3–6.2)
ERYTHROCYTE [DISTWIDTH] IN BLOOD BY AUTOMATED COUNT: 11.6 % (ref 12.3–15.4)
GLOBULIN UR ELPH-MCNC: 2.7 GM/DL
GLUCOSE SERPL-MCNC: 88 MG/DL (ref 65–99)
HCT VFR BLD AUTO: 36.6 % (ref 37.5–51)
HDLC SERPL-MCNC: 36 MG/DL (ref 40–60)
HGB BLD-MCNC: 12.4 G/DL (ref 13–17.7)
IMM GRANULOCYTES # BLD AUTO: 0.02 10*3/MM3 (ref 0–0.05)
IMM GRANULOCYTES NFR BLD AUTO: 0.3 % (ref 0–0.5)
LDLC SERPL CALC-MCNC: 38 MG/DL (ref 0–100)
LDLC/HDLC SERPL: 1.11 {RATIO}
LYMPHOCYTES # BLD AUTO: 2.14 10*3/MM3 (ref 0.7–3.1)
LYMPHOCYTES NFR BLD AUTO: 30.2 % (ref 19.6–45.3)
MCH RBC QN AUTO: 33.3 PG (ref 26.6–33)
MCHC RBC AUTO-ENTMCNC: 33.9 G/DL (ref 31.5–35.7)
MCV RBC AUTO: 98.4 FL (ref 79–97)
MONOCYTES # BLD AUTO: 0.53 10*3/MM3 (ref 0.1–0.9)
MONOCYTES NFR BLD AUTO: 7.5 % (ref 5–12)
NEUTROPHILS NFR BLD AUTO: 3.91 10*3/MM3 (ref 1.7–7)
NEUTROPHILS NFR BLD AUTO: 55.1 % (ref 42.7–76)
NRBC BLD AUTO-RTO: 0 /100 WBC (ref 0–0.2)
PLATELET # BLD AUTO: 204 10*3/MM3 (ref 140–450)
PMV BLD AUTO: 10.8 FL (ref 6–12)
POTASSIUM SERPL-SCNC: 3.9 MMOL/L (ref 3.5–5.2)
PROT SERPL-MCNC: 6.6 G/DL (ref 6–8.5)
RBC # BLD AUTO: 3.72 10*6/MM3 (ref 4.14–5.8)
SODIUM SERPL-SCNC: 140 MMOL/L (ref 136–145)
TRIGL SERPL-MCNC: 65 MG/DL (ref 0–150)
TSH SERPL DL<=0.05 MIU/L-ACNC: 0.92 UIU/ML (ref 0.27–4.2)
VIT B12 BLD-MCNC: 484 PG/ML (ref 211–946)
VLDLC SERPL-MCNC: 15 MG/DL (ref 5–40)
WBC NRBC COR # BLD AUTO: 7.09 10*3/MM3 (ref 3.4–10.8)

## 2025-02-13 PROCEDURE — 99214 OFFICE O/P EST MOD 30 MIN: CPT | Performed by: NURSE PRACTITIONER

## 2025-02-13 PROCEDURE — G2211 COMPLEX E/M VISIT ADD ON: HCPCS | Performed by: NURSE PRACTITIONER

## 2025-02-13 PROCEDURE — 85025 COMPLETE CBC W/AUTO DIFF WBC: CPT | Performed by: NURSE PRACTITIONER

## 2025-02-13 PROCEDURE — 84443 ASSAY THYROID STIM HORMONE: CPT | Performed by: NURSE PRACTITIONER

## 2025-02-13 PROCEDURE — 36415 COLL VENOUS BLD VENIPUNCTURE: CPT | Performed by: NURSE PRACTITIONER

## 2025-02-13 PROCEDURE — 82306 VITAMIN D 25 HYDROXY: CPT | Performed by: NURSE PRACTITIONER

## 2025-02-13 PROCEDURE — 3077F SYST BP >= 140 MM HG: CPT | Performed by: NURSE PRACTITIONER

## 2025-02-13 PROCEDURE — 1126F AMNT PAIN NOTED NONE PRSNT: CPT | Performed by: NURSE PRACTITIONER

## 2025-02-13 PROCEDURE — 80061 LIPID PANEL: CPT | Performed by: NURSE PRACTITIONER

## 2025-02-13 PROCEDURE — 3078F DIAST BP <80 MM HG: CPT | Performed by: NURSE PRACTITIONER

## 2025-02-13 PROCEDURE — 80053 COMPREHEN METABOLIC PANEL: CPT | Performed by: NURSE PRACTITIONER

## 2025-02-13 PROCEDURE — 82607 VITAMIN B-12: CPT | Performed by: NURSE PRACTITIONER

## 2025-02-13 RX ORDER — LISINOPRIL 20 MG/1
20 TABLET ORAL DAILY
Qty: 90 TABLET | Refills: 1 | Status: SHIPPED | OUTPATIENT
Start: 2025-02-13

## 2025-02-13 NOTE — PROGRESS NOTES
Subjective   Obed Cotto Jr. is a 74 y.o. male    Chief Complaint   Patient presents with    Hypertension    Prediabetes     History of Present Illness     CVA -patient suffered an acute ischemic stroke on 5/30/2021.  This is left him with residual weakness of the left side.  He suffered a subsequent fall in November 2021 that resulted in a closed fracture of the left hip.     Had a recent f/u with neurology.  Was doing Botox for LLE spasticity, but insurance would not pay for this so he was changed back to a muscle relaxer.  States that his leg is getting tighter and tighter and his mobility has gotten worse.  PT was stopped b/c it was not helping.  Neuro increased his baclofen to 20 mg QID.  He is in the WC 95% of this day per his report.       Depression-chronic; current regimen is Lexapro 10 mg p.o. daily     Hypertension-chronic; current regimen is lisinopril 10 mg p.o. daily, metoprolol XL 25 mg daily and chlorthalidone 25 mg.       Hyperlipidemia-chronic; current regimen is Lipitor 80 mg p.o. daily.  He is fasting for labs  Lab Results   Component Value Date    CHOL 71 08/22/2024    CHLPL 87 (L) 02/22/2024    TRIG 46 08/22/2024    HDL 30 (L) 08/22/2024    LDL 28 08/22/2024     Pt is requesting a referral for a venous duplex to r/o a DVT due to immobility.  He does have swelling in his lower extremities, but denies warmth or redness.  He is taking plavix and ASA     COVID-4/10/2021 ([facility]); declines further  Flu shot-10/2023  Prevnar 13 -  3/1/2019  Pneumovax 23 - 2/23/2017  Low-dose lung cancer screening 12/3/2021; declines today  Colon - declines       The following portions of the patient's history were reviewed and updated as appropriate: allergies, current medications, past family history, past medical history, past social history, past surgical history, and problem list.    Current Outpatient Medications:     atorvastatin (LIPITOR) 80 MG tablet, TAKE 1 TABLET BY MOUTH EVERY NIGHT AT BEDTIME, Disp: 30  tablet, Rfl: 0    baclofen (LIORESAL) 20 MG tablet, Take 1 tablet by mouth 3 times a day. (Patient taking differently: Take 1 tablet by mouth 4 (Four) Times a Day.), Disp: , Rfl:     chlorthalidone (HYGROTON) 25 MG tablet, TAKE 1 TABLET BY MOUTH DAILY, Disp: 30 tablet, Rfl: 5    clopidogrel (PLAVIX) 75 MG tablet, Take 1 tablet by mouth Daily., Disp: 90 tablet, Rfl: 0    docusate sodium (Colace) 100 MG capsule, Take 1 capsule by mouth 2 (Two) Times a Day., Disp: 60 capsule, Rfl: 5    escitalopram (LEXAPRO) 10 MG tablet, Take 0.5 tablets by mouth Daily., Disp: 45 tablet, Rfl: 0    famotidine (PEPCID) 20 MG tablet, TAKE 1 TABLET BY MOUTH TWICE DAILY, Disp: 180 tablet, Rfl: 1    FeroSul 325 (65 Fe) MG tablet, TAKE 1 TABLET BY MOUTH TWICE DAILY WITH MEALS, Disp: 180 tablet, Rfl: 1    finasteride (PROSCAR) 5 MG tablet, Take 1 tablet by mouth Daily., Disp: 90 tablet, Rfl: 0    fluticasone (FLONASE) 50 MCG/ACT nasal spray, 2 sprays into the nostril(s) as directed by provider Daily., Disp: 16 g, Rfl: 11    metoprolol succinate XL (TOPROL-XL) 25 MG 24 hr tablet, Take 1 tablet by mouth Daily., Disp: 90 tablet, Rfl: 0    tamsulosin (FLOMAX) 0.4 MG capsule 24 hr capsule, TAKE 1 CAPSULE BY MOUTH EVERY NIGHT, Disp: 90 capsule, Rfl: 1    aspirin (aspirin) 81 MG EC tablet, Take 1 tablet by mouth Daily. (Patient not taking: Reported on 8/22/2024), Disp: 30 tablet, Rfl: 2    lisinopril (PRINIVIL,ZESTRIL) 20 MG tablet, Take 1 tablet by mouth Daily., Disp: 90 tablet, Rfl: 1     Review of Systems   Constitutional:  Negative for chills, fatigue and fever.   Respiratory:  Negative for cough and chest tightness.    Gastrointestinal:  Negative for abdominal pain, diarrhea, nausea and vomiting.   Endocrine: Negative for cold intolerance and heat intolerance.   Musculoskeletal:  Positive for arthralgias and gait problem (in power WC).        Spacticity   Neurological:  Positive for weakness. Negative for dizziness.       Objective   Physical  "Exam  Constitutional:       Appearance: He is well-developed.   HENT:      Head: Normocephalic and atraumatic.   Eyes:      Conjunctiva/sclera: Conjunctivae normal.      Pupils: Pupils are equal, round, and reactive to light.   Cardiovascular:      Rate and Rhythm: Normal rate and regular rhythm.      Heart sounds: Normal heart sounds.   Pulmonary:      Effort: Pulmonary effort is normal.      Breath sounds: Normal breath sounds.   Abdominal:      General: Bowel sounds are normal.      Palpations: Abdomen is soft.   Musculoskeletal:         General: Swelling present. Normal range of motion.      Cervical back: Normal range of motion.   Skin:     General: Skin is warm and dry.   Neurological:      Mental Status: He is alert and oriented to person, place, and time.      Motor: Weakness present.      Gait: Gait abnormal.      Deep Tendon Reflexes: Reflexes are normal and symmetric.      Comments: In power wheelchair   Psychiatric:         Behavior: Behavior normal.         Thought Content: Thought content normal.         Judgment: Judgment normal.       Vitals:    02/13/25 1122   BP: 150/64   BP Location: Right arm   Patient Position: Sitting   Cuff Size: Adult   Pulse: 56   Resp: 16   Temp: 97.3 °F (36.3 °C)   TempSrc: Infrared   SpO2: 97%   Weight: 58.1 kg (128 lb)   Height: 182.9 cm (72.01\")         Assessment & Plan   Diagnoses and all orders for this visit:    1. History of CVA (cerebrovascular accident) (Primary)  -     CBC & Differential; Future  -     Comprehensive Metabolic Panel; Future  -     Lipid Panel; Future  -     TSH; Future  -     Vitamin B12; Future  -     Vitamin D,25-Hydroxy; Future    2. Essential hypertension  -     CBC & Differential; Future  -     Comprehensive Metabolic Panel; Future  -     Lipid Panel; Future  -     TSH; Future  -     Vitamin B12; Future  -     Vitamin D,25-Hydroxy; Future  -     lisinopril (PRINIVIL,ZESTRIL) 20 MG tablet; Take 1 tablet by mouth Daily.  Dispense: 90 tablet; " Refill: 1    3. Mixed hyperlipidemia  -     CBC & Differential; Future  -     Comprehensive Metabolic Panel; Future  -     Lipid Panel; Future  -     TSH; Future  -     Vitamin B12; Future  -     Vitamin D,25-Hydroxy; Future    4. Left hemiplegia  -     CBC & Differential; Future  -     Comprehensive Metabolic Panel; Future  -     Lipid Panel; Future  -     TSH; Future  -     Vitamin B12; Future  -     Vitamin D,25-Hydroxy; Future    5. Impaired mobility  -     CBC & Differential; Future  -     Comprehensive Metabolic Panel; Future  -     Lipid Panel; Future  -     TSH; Future  -     Vitamin B12; Future  -     Vitamin D,25-Hydroxy; Future    6. Vitamin D deficiency  -     CBC & Differential; Future  -     Comprehensive Metabolic Panel; Future  -     Lipid Panel; Future  -     TSH; Future  -     Vitamin B12; Future  -     Vitamin D,25-Hydroxy; Future    7. Localized swelling of both lower legs  -     Duplex Venous Lower Extremity - Bilateral CAR; Future      Will ck a venous duplex of the BLE's  Labs sent  No refills needed today  Lisinopril increased to 20 mg daily  Return in about 6 months (around 8/13/2025) for Medicare Wellness, collect labs today.

## 2025-02-19 DIAGNOSIS — N40.1 BENIGN PROSTATIC HYPERPLASIA WITH LOWER URINARY TRACT SYMPTOMS, SYMPTOM DETAILS UNSPECIFIED: ICD-10-CM

## 2025-02-19 RX ORDER — FINASTERIDE 5 MG/1
5 TABLET, FILM COATED ORAL DAILY
Qty: 90 TABLET | Refills: 0 | Status: SHIPPED | OUTPATIENT
Start: 2025-02-19

## 2025-02-19 NOTE — TELEPHONE ENCOUNTER
Rx Refill Note  Requested Prescriptions     Pending Prescriptions Disp Refills    finasteride (PROSCAR) 5 MG tablet [Pharmacy Med Name: FINASTERIDE 5MG TABLETS] 90 tablet 0     Sig: TAKE 1 TABLET BY MOUTH DAILY      Last office visit with prescribing clinician: 2/13/2025     Next office visit with prescribing clinician: 3/13/2025       Kay Ayers MA  02/19/25, 12:54 EST    0

## 2025-02-28 DIAGNOSIS — E78.2 MIXED HYPERLIPIDEMIA: ICD-10-CM

## 2025-02-28 DIAGNOSIS — Z86.73 HISTORY OF CVA (CEREBROVASCULAR ACCIDENT): ICD-10-CM

## 2025-02-28 RX ORDER — ATORVASTATIN CALCIUM 80 MG/1
80 TABLET, FILM COATED ORAL
Qty: 90 TABLET | Refills: 1 | Status: SHIPPED | OUTPATIENT
Start: 2025-02-28

## 2025-03-12 DIAGNOSIS — N40.1 BENIGN PROSTATIC HYPERPLASIA WITH LOWER URINARY TRACT SYMPTOMS, SYMPTOM DETAILS UNSPECIFIED: ICD-10-CM

## 2025-03-12 DIAGNOSIS — Z86.73 HISTORY OF CVA (CEREBROVASCULAR ACCIDENT): ICD-10-CM

## 2025-03-13 ENCOUNTER — OFFICE VISIT (OUTPATIENT)
Dept: INTERNAL MEDICINE | Facility: CLINIC | Age: 75
End: 2025-03-13
Payer: MEDICARE

## 2025-03-13 VITALS
BODY MASS INDEX: 17.34 KG/M2 | RESPIRATION RATE: 14 BRPM | OXYGEN SATURATION: 96 % | TEMPERATURE: 98 F | SYSTOLIC BLOOD PRESSURE: 124 MMHG | DIASTOLIC BLOOD PRESSURE: 50 MMHG | WEIGHT: 128 LBS | HEIGHT: 72 IN | HEART RATE: 58 BPM

## 2025-03-13 DIAGNOSIS — I10 ESSENTIAL HYPERTENSION: Primary | ICD-10-CM

## 2025-03-13 DIAGNOSIS — Z86.73 HISTORY OF CVA (CEREBROVASCULAR ACCIDENT): ICD-10-CM

## 2025-03-13 RX ORDER — TAMSULOSIN HYDROCHLORIDE 0.4 MG/1
1 CAPSULE ORAL NIGHTLY
Qty: 90 CAPSULE | Refills: 1 | Status: SHIPPED | OUTPATIENT
Start: 2025-03-13

## 2025-03-13 RX ORDER — CLOPIDOGREL BISULFATE 75 MG/1
75 TABLET ORAL DAILY
Qty: 90 TABLET | Refills: 1 | Status: SHIPPED | OUTPATIENT
Start: 2025-03-13

## 2025-03-13 NOTE — PROGRESS NOTES
Subjective   Obed Cotto Jr. is a 75 y.o. male    Chief Complaint   Patient presents with    Hypertension     4 week recheck- increased lisinopril      History of Present Illness     Hypertension-chronic; current regimen is lisinopril 20 mg p.o. daily, metoprolol XL 25 mg daily and chlorthalidone 25 mg.   Lisinopril was increased at last visit.  BP looks better.  He denies any hypotensive sx's.      The following portions of the patient's history were reviewed and updated as appropriate: allergies, current medications, past family history, past medical history, past social history, past surgical history, and problem list.    Current Outpatient Medications:     atorvastatin (LIPITOR) 80 MG tablet, TAKE 1 TABLET BY MOUTH EVERY NIGHT AT BEDTIME, Disp: 90 tablet, Rfl: 1    baclofen (LIORESAL) 20 MG tablet, Take 1 tablet by mouth 3 times a day. (Patient taking differently: Take 1 tablet by mouth 4 (Four) Times a Day.), Disp: , Rfl:     chlorthalidone (HYGROTON) 25 MG tablet, TAKE 1 TABLET BY MOUTH DAILY, Disp: 30 tablet, Rfl: 5    clopidogrel (PLAVIX) 75 MG tablet, Take 1 tablet by mouth Daily., Disp: 90 tablet, Rfl: 0    docusate sodium (Colace) 100 MG capsule, Take 1 capsule by mouth 2 (Two) Times a Day., Disp: 60 capsule, Rfl: 5    escitalopram (LEXAPRO) 10 MG tablet, Take 0.5 tablets by mouth Daily., Disp: 45 tablet, Rfl: 0    famotidine (PEPCID) 20 MG tablet, TAKE 1 TABLET BY MOUTH TWICE DAILY, Disp: 180 tablet, Rfl: 1    FeroSul 325 (65 Fe) MG tablet, TAKE 1 TABLET BY MOUTH TWICE DAILY WITH MEALS, Disp: 180 tablet, Rfl: 1    finasteride (PROSCAR) 5 MG tablet, TAKE 1 TABLET BY MOUTH DAILY, Disp: 90 tablet, Rfl: 0    fluticasone (FLONASE) 50 MCG/ACT nasal spray, 2 sprays into the nostril(s) as directed by provider Daily., Disp: 16 g, Rfl: 11    lisinopril (PRINIVIL,ZESTRIL) 20 MG tablet, Take 1 tablet by mouth Daily., Disp: 90 tablet, Rfl: 1    metoprolol succinate XL (TOPROL-XL) 25 MG 24 hr tablet, Take 1 tablet by  "mouth Daily., Disp: 90 tablet, Rfl: 0    tamsulosin (FLOMAX) 0.4 MG capsule 24 hr capsule, TAKE 1 CAPSULE BY MOUTH EVERY NIGHT, Disp: 90 capsule, Rfl: 1     Review of Systems   Constitutional:  Negative for chills, fatigue and fever.   Respiratory:  Negative for cough, chest tightness and shortness of breath.    Cardiovascular:  Negative for chest pain.   Gastrointestinal:  Negative for abdominal pain, diarrhea, nausea and vomiting.   Endocrine: Negative for cold intolerance and heat intolerance.   Musculoskeletal:  Negative for arthralgias.   Neurological:  Negative for dizziness.       Objective   Physical Exam  Constitutional:       Appearance: He is well-developed.   HENT:      Head: Normocephalic and atraumatic.   Eyes:      Conjunctiva/sclera: Conjunctivae normal.      Pupils: Pupils are equal, round, and reactive to light.   Cardiovascular:      Rate and Rhythm: Normal rate and regular rhythm.      Heart sounds: Normal heart sounds.   Pulmonary:      Effort: Pulmonary effort is normal.      Breath sounds: Normal breath sounds.   Abdominal:      General: Bowel sounds are normal.      Palpations: Abdomen is soft.   Musculoskeletal:         General: Normal range of motion.      Cervical back: Normal range of motion.   Skin:     General: Skin is warm and dry.   Neurological:      Mental Status: He is alert and oriented to person, place, and time.      Deep Tendon Reflexes: Reflexes are normal and symmetric.   Psychiatric:         Behavior: Behavior normal.         Thought Content: Thought content normal.         Judgment: Judgment normal.       Vitals:    03/13/25 1309   BP: 124/50   Pulse: 58   Resp: 14   Temp: 98 °F (36.7 °C)   TempSrc: Infrared   SpO2: 96%   Weight: 58.1 kg (128 lb)   Height: 182.9 cm (72.01\")         Assessment & Plan   Diagnoses and all orders for this visit:    1. Essential hypertension (Primary)      Continue currrent meds w/o change  Return for Next scheduled follow up.             "

## 2025-03-13 NOTE — TELEPHONE ENCOUNTER
Rx Refill Note  Requested Prescriptions     Pending Prescriptions Disp Refills    tamsulosin (FLOMAX) 0.4 MG capsule 24 hr capsule [Pharmacy Med Name: TAMSULOSIN 0.4MG CAPSULES] 90 capsule 1     Sig: TAKE 1 CAPSULE BY MOUTH EVERY NIGHT    clopidogrel (PLAVIX) 75 MG tablet [Pharmacy Med Name: CLOPIDOGREL 75MG TABLETS] 90 tablet 1     Sig: TAKE 1 TABLET BY MOUTH EVERY DAY      Last office visit with prescribing clinician: 2/13/2025     Next office visit with prescribing clinician: 3/13/2025       Jerri May  03/13/25, 17:09 EDT

## 2025-03-14 RX ORDER — CLOPIDOGREL BISULFATE 75 MG/1
75 TABLET ORAL DAILY
Qty: 90 TABLET | Refills: 0 | OUTPATIENT
Start: 2025-03-14

## 2025-03-20 DIAGNOSIS — Z86.73 HISTORY OF CVA (CEREBROVASCULAR ACCIDENT): ICD-10-CM

## 2025-03-20 DIAGNOSIS — I10 ESSENTIAL HYPERTENSION: ICD-10-CM

## 2025-03-21 ENCOUNTER — HOSPITAL ENCOUNTER (OUTPATIENT)
Dept: CARDIOLOGY | Facility: HOSPITAL | Age: 75
Discharge: HOME | End: 2025-03-21
Payer: MEDICARE

## 2025-03-21 VITALS — WEIGHT: 128.09 LBS | BODY MASS INDEX: 17.35 KG/M2 | HEIGHT: 72 IN

## 2025-03-21 DIAGNOSIS — R22.43 LOCALIZED SWELLING OF BOTH LOWER LEGS: ICD-10-CM

## 2025-03-21 LAB
BH CV LOWER VASCULAR LEFT COMMON FEMORAL AUGMENT: NORMAL
BH CV LOWER VASCULAR LEFT COMMON FEMORAL COMPRESS: NORMAL
BH CV LOWER VASCULAR LEFT COMMON FEMORAL PHASIC: NORMAL
BH CV LOWER VASCULAR LEFT COMMON FEMORAL SPONT: NORMAL
BH CV LOWER VASCULAR LEFT DISTAL FEMORAL AUGMENT: NORMAL
BH CV LOWER VASCULAR LEFT DISTAL FEMORAL COMPRESS: NORMAL
BH CV LOWER VASCULAR LEFT DISTAL FEMORAL PHASIC: NORMAL
BH CV LOWER VASCULAR LEFT DISTAL FEMORAL SPONT: NORMAL
BH CV LOWER VASCULAR LEFT GASTRONEMIUS COMPRESS: NORMAL
BH CV LOWER VASCULAR LEFT GREATER SAPH AK COMPRESS: NORMAL
BH CV LOWER VASCULAR LEFT GREATER SAPH BK COMPRESS: NORMAL
BH CV LOWER VASCULAR LEFT LESSER SAPH COMPRESS: NORMAL
BH CV LOWER VASCULAR LEFT MID FEMORAL AUGMENT: NORMAL
BH CV LOWER VASCULAR LEFT MID FEMORAL COMPRESS: NORMAL
BH CV LOWER VASCULAR LEFT MID FEMORAL PHASIC: NORMAL
BH CV LOWER VASCULAR LEFT MID FEMORAL SPONT: NORMAL
BH CV LOWER VASCULAR LEFT PERONEAL COMPRESS: NORMAL
BH CV LOWER VASCULAR LEFT POPLITEAL AUGMENT: NORMAL
BH CV LOWER VASCULAR LEFT POPLITEAL COMPRESS: NORMAL
BH CV LOWER VASCULAR LEFT POPLITEAL PHASIC: NORMAL
BH CV LOWER VASCULAR LEFT POPLITEAL SPONT: NORMAL
BH CV LOWER VASCULAR LEFT POSTERIOR TIBIAL COMPRESS: NORMAL
BH CV LOWER VASCULAR LEFT PROFUNDA FEMORAL PHASIC: NORMAL
BH CV LOWER VASCULAR LEFT PROFUNDA FEMORAL SPONT: NORMAL
BH CV LOWER VASCULAR LEFT PROXIMAL FEMORAL AUGMENT: NORMAL
BH CV LOWER VASCULAR LEFT PROXIMAL FEMORAL COMPRESS: NORMAL
BH CV LOWER VASCULAR LEFT PROXIMAL FEMORAL PHASIC: NORMAL
BH CV LOWER VASCULAR LEFT PROXIMAL FEMORAL SPONT: NORMAL
BH CV LOWER VASCULAR LEFT SAPHENOFEMORAL JUNCTION AUGMENT: NORMAL
BH CV LOWER VASCULAR LEFT SAPHENOFEMORAL JUNCTION COMPRESS: NORMAL
BH CV LOWER VASCULAR LEFT SAPHENOFEMORAL JUNCTION PHASIC: NORMAL
BH CV LOWER VASCULAR LEFT SAPHENOFEMORAL JUNCTION SPONT: NORMAL
BH CV LOWER VASCULAR RIGHT COMMON FEMORAL AUGMENT: NORMAL
BH CV LOWER VASCULAR RIGHT COMMON FEMORAL COMPRESS: NORMAL
BH CV LOWER VASCULAR RIGHT COMMON FEMORAL PHASIC: NORMAL
BH CV LOWER VASCULAR RIGHT COMMON FEMORAL SPONT: NORMAL
BH CV LOWER VASCULAR RIGHT DISTAL FEMORAL AUGMENT: NORMAL
BH CV LOWER VASCULAR RIGHT DISTAL FEMORAL COMPRESS: NORMAL
BH CV LOWER VASCULAR RIGHT DISTAL FEMORAL PHASIC: NORMAL
BH CV LOWER VASCULAR RIGHT DISTAL FEMORAL SPONT: NORMAL
BH CV LOWER VASCULAR RIGHT GASTRONEMIUS COMPRESS: NORMAL
BH CV LOWER VASCULAR RIGHT GREATER SAPH AK COMPRESS: NORMAL
BH CV LOWER VASCULAR RIGHT GREATER SAPH BK COMPRESS: NORMAL
BH CV LOWER VASCULAR RIGHT MID FEMORAL AUGMENT: NORMAL
BH CV LOWER VASCULAR RIGHT MID FEMORAL COMPRESS: NORMAL
BH CV LOWER VASCULAR RIGHT MID FEMORAL PHASIC: NORMAL
BH CV LOWER VASCULAR RIGHT MID FEMORAL SPONT: NORMAL
BH CV LOWER VASCULAR RIGHT PERONEAL COMPRESS: NORMAL
BH CV LOWER VASCULAR RIGHT POPLITEAL AUGMENT: NORMAL
BH CV LOWER VASCULAR RIGHT POPLITEAL COMPRESS: NORMAL
BH CV LOWER VASCULAR RIGHT POPLITEAL PHASIC: NORMAL
BH CV LOWER VASCULAR RIGHT POPLITEAL SPONT: NORMAL
BH CV LOWER VASCULAR RIGHT POSTERIOR TIBIAL COMPRESS: NORMAL
BH CV LOWER VASCULAR RIGHT PROFUNDA FEMORAL PHASIC: NORMAL
BH CV LOWER VASCULAR RIGHT PROFUNDA FEMORAL SPONT: NORMAL
BH CV LOWER VASCULAR RIGHT PROXIMAL FEMORAL AUGMENT: NORMAL
BH CV LOWER VASCULAR RIGHT PROXIMAL FEMORAL COMPRESS: NORMAL
BH CV LOWER VASCULAR RIGHT PROXIMAL FEMORAL PHASIC: NORMAL
BH CV LOWER VASCULAR RIGHT PROXIMAL FEMORAL SPONT: NORMAL
BH CV LOWER VASCULAR RIGHT SAPHENOFEMORAL JUNCTION AUGMENT: NORMAL
BH CV LOWER VASCULAR RIGHT SAPHENOFEMORAL JUNCTION COMPRESS: NORMAL
BH CV LOWER VASCULAR RIGHT SAPHENOFEMORAL JUNCTION PHASIC: NORMAL
BH CV LOWER VASCULAR RIGHT SAPHENOFEMORAL JUNCTION SPONT: NORMAL

## 2025-03-21 PROCEDURE — 93970 EXTREMITY STUDY: CPT

## 2025-03-21 RX ORDER — METOPROLOL SUCCINATE 25 MG/1
25 TABLET, EXTENDED RELEASE ORAL DAILY
Qty: 90 TABLET | Refills: 1 | Status: SHIPPED | OUTPATIENT
Start: 2025-03-21

## 2025-04-01 RX ORDER — FERROUS SULFATE 325(65) MG
1 TABLET ORAL 2 TIMES DAILY WITH MEALS
Qty: 180 TABLET | Refills: 1 | Status: SHIPPED | OUTPATIENT
Start: 2025-04-01

## 2025-04-01 NOTE — TELEPHONE ENCOUNTER
Rx Refill Note  Requested Prescriptions     Pending Prescriptions Disp Refills    FeroSul 325 (65 Fe) MG tablet [Pharmacy Med Name: FERROUS SULFATE 325MG (5GR) TABS] 180 tablet 1     Sig: TAKE 1 TABLET BY MOUTH TWICE DAILY WITH MEALS      Last office visit with prescribing clinician: 3/13/2025     Next office visit with prescribing clinician: 8/25/2025       Jerri May  04/01/25, 08:40 EDT

## 2025-05-02 DIAGNOSIS — I10 ESSENTIAL HYPERTENSION: ICD-10-CM

## 2025-05-02 DIAGNOSIS — Z86.73 HISTORY OF CVA (CEREBROVASCULAR ACCIDENT): ICD-10-CM

## 2025-05-02 RX ORDER — LISINOPRIL 10 MG/1
10 TABLET ORAL DAILY
Qty: 90 TABLET | Refills: 1 | OUTPATIENT
Start: 2025-05-02

## 2025-05-13 RX ORDER — ESCITALOPRAM OXALATE 10 MG/1
5 TABLET ORAL DAILY
Qty: 45 TABLET | Refills: 1 | Status: SHIPPED | OUTPATIENT
Start: 2025-05-13

## 2025-05-16 RX ORDER — FAMOTIDINE 20 MG/1
20 TABLET, FILM COATED ORAL EVERY 12 HOURS SCHEDULED
Qty: 180 TABLET | Refills: 1 | Status: SHIPPED | OUTPATIENT
Start: 2025-05-16

## 2025-05-30 DIAGNOSIS — Z86.73 HISTORY OF CVA (CEREBROVASCULAR ACCIDENT): ICD-10-CM

## 2025-05-30 DIAGNOSIS — E78.2 MIXED HYPERLIPIDEMIA: ICD-10-CM

## 2025-05-30 RX ORDER — ATORVASTATIN CALCIUM 80 MG/1
80 TABLET, FILM COATED ORAL
Qty: 90 TABLET | Refills: 1 | OUTPATIENT
Start: 2025-05-30

## 2025-06-11 DIAGNOSIS — Z86.73 HISTORY OF CVA (CEREBROVASCULAR ACCIDENT): ICD-10-CM

## 2025-06-11 DIAGNOSIS — N40.1 BENIGN PROSTATIC HYPERPLASIA WITH LOWER URINARY TRACT SYMPTOMS, SYMPTOM DETAILS UNSPECIFIED: ICD-10-CM

## 2025-06-11 RX ORDER — TAMSULOSIN HYDROCHLORIDE 0.4 MG/1
1 CAPSULE ORAL NIGHTLY
Qty: 90 CAPSULE | Refills: 0 | Status: SHIPPED | OUTPATIENT
Start: 2025-06-11

## 2025-06-11 RX ORDER — CLOPIDOGREL BISULFATE 75 MG/1
75 TABLET ORAL DAILY
Qty: 90 TABLET | Refills: 0 | Status: SHIPPED | OUTPATIENT
Start: 2025-06-11

## 2025-06-11 NOTE — TELEPHONE ENCOUNTER
Caller: Obed Cotto Jr.    Relationship: Self    Best call back number: [phone]     Requested Prescriptions:   Requested Prescriptions     Pending Prescriptions Disp Refills    tamsulosin (FLOMAX) 0.4 MG capsule 24 hr capsule 90 capsule 1     Sig: Take 1 capsule by mouth Every Night.    clopidogrel (PLAVIX) 75 MG tablet 90 tablet 1     Sig: Take 1 tablet by mouth Daily.        Pharmacy where request should be sent: [facility] #[id number] [place] [address] [address]  AT [facility] [phone] [facility] [phone]      Last office visit with prescribing clinician: 3/13/2025   Last telemedicine visit with prescribing clinician: Visit date not found   Next office visit with prescribing clinician: 8/25/2025     Additional details provided by patient:     Does the patient have less than a 3 day supply:  [x] Yes  [] No    Would you like a call back once the refill request has been completed: [] Yes [x] No    If the office needs to give you a call back, can they leave a voicemail: [] Yes [x] No    Stormy Schwartz   06/11/25 11:45 EDT

## 2025-06-11 NOTE — TELEPHONE ENCOUNTER
Rx Refill Note  Requested Prescriptions     Signed Prescriptions Disp Refills    tamsulosin (FLOMAX) 0.4 MG capsule 24 hr capsule 90 capsule 0     Sig: Take 1 capsule by mouth Every Night.     Authorizing Provider: LEATHA OLMOS     Ordering User: EWELINA MAY    clopidogrel (PLAVIX) 75 MG tablet 90 tablet 0     Sig: Take 1 tablet by mouth Daily.     Authorizing Provider: LEATHA OLMOS     Ordering User: EWELINA MAY      Last office visit with prescribing clinician: 3/13/2025   Last telemedicine visit with prescribing clinician: Visit date not found   Next office visit with prescribing clinician: 8/25/2025     Ewelina May  06/11/25, 16:35 EDT

## 2025-06-12 RX ORDER — CLOPIDOGREL BISULFATE 75 MG/1
75 TABLET ORAL DAILY
Qty: 90 TABLET | Refills: 1 | OUTPATIENT
Start: 2025-06-12

## 2025-06-12 RX ORDER — TAMSULOSIN HYDROCHLORIDE 0.4 MG/1
1 CAPSULE ORAL NIGHTLY
Qty: 90 CAPSULE | Refills: 1 | OUTPATIENT
Start: 2025-06-12

## 2025-07-28 ENCOUNTER — HOSPITAL ENCOUNTER (EMERGENCY)
Facility: HOSPITAL | Age: 75
Discharge: HOME | End: 2025-07-28
Attending: EMERGENCY MEDICINE | Admitting: EMERGENCY MEDICINE
Payer: MEDICARE

## 2025-07-28 ENCOUNTER — APPOINTMENT (OUTPATIENT)
Dept: MRI IMAGING | Facility: HOSPITAL | Age: 75
End: 2025-07-28
Payer: MEDICARE

## 2025-07-28 ENCOUNTER — TELEPHONE (OUTPATIENT)
Dept: INTERNAL MEDICINE | Facility: CLINIC | Age: 75
End: 2025-07-28
Payer: MEDICARE

## 2025-07-28 VITALS
BODY MASS INDEX: 17.34 KG/M2 | HEART RATE: 63 BPM | OXYGEN SATURATION: 98 % | HEIGHT: 72 IN | TEMPERATURE: 98.3 F | RESPIRATION RATE: 16 BRPM | DIASTOLIC BLOOD PRESSURE: 73 MMHG | WEIGHT: 128 LBS | SYSTOLIC BLOOD PRESSURE: 166 MMHG

## 2025-07-28 DIAGNOSIS — R53.1 GENERALIZED WEAKNESS: Primary | ICD-10-CM

## 2025-07-28 DIAGNOSIS — R41.89 BRAIN FOG: ICD-10-CM

## 2025-07-28 LAB
ALBUMIN SERPL-MCNC: 4.2 G/DL (ref 3.5–5.2)
ALBUMIN/GLOB SERPL: 1.7 G/DL
ALP SERPL-CCNC: 110 U/L (ref 39–117)
ALT SERPL W P-5'-P-CCNC: 31 U/L (ref 1–41)
ANION GAP SERPL CALCULATED.3IONS-SCNC: 13.4 MMOL/L (ref 5–15)
APTT PPP: 27.2 SECONDS (ref 60–90)
AST SERPL-CCNC: 37 U/L (ref 1–40)
BACTERIA UR QL AUTO: NORMAL /HPF
BASOPHILS # BLD AUTO: 0.07 10*3/MM3 (ref 0–0.2)
BASOPHILS NFR BLD AUTO: 0.6 % (ref 0–1.5)
BILIRUB SERPL-MCNC: 0.8 MG/DL (ref 0–1.2)
BILIRUB UR QL STRIP: NEGATIVE
BUN SERPL-MCNC: 15 MG/DL (ref 8–23)
BUN/CREAT SERPL: 15 (ref 7–25)
CALCIUM SPEC-SCNC: 9.5 MG/DL (ref 8.6–10.5)
CHLORIDE SERPL-SCNC: 106 MMOL/L (ref 98–107)
CLARITY UR: ABNORMAL
CO2 SERPL-SCNC: 22.6 MMOL/L (ref 22–29)
COLOR UR: YELLOW
CREAT SERPL-MCNC: 1 MG/DL (ref 0.76–1.27)
DEPRECATED RDW RBC AUTO: 49.7 FL (ref 37–54)
EGFRCR SERPLBLD CKD-EPI 2021: 78 ML/MIN/1.73
EOSINOPHIL # BLD AUTO: 0.3 10*3/MM3 (ref 0–0.4)
EOSINOPHIL NFR BLD AUTO: 2.7 % (ref 0.3–6.2)
ERYTHROCYTE [DISTWIDTH] IN BLOOD BY AUTOMATED COUNT: 13.3 % (ref 12.3–15.4)
GLOBULIN UR ELPH-MCNC: 2.5 GM/DL
GLUCOSE SERPL-MCNC: 105 MG/DL (ref 65–99)
GLUCOSE UR STRIP-MCNC: NEGATIVE MG/DL
HCT VFR BLD AUTO: 35.2 % (ref 37.5–51)
HGB BLD-MCNC: 11.2 G/DL (ref 13–17.7)
HGB UR QL STRIP.AUTO: NEGATIVE
HYALINE CASTS UR QL AUTO: NORMAL /LPF
IMM GRANULOCYTES # BLD AUTO: 0.05 10*3/MM3 (ref 0–0.05)
IMM GRANULOCYTES NFR BLD AUTO: 0.4 % (ref 0–0.5)
INR PPP: 0.99 (ref 0.89–1.12)
KETONES UR QL STRIP: NEGATIVE
LEUKOCYTE ESTERASE UR QL STRIP.AUTO: NEGATIVE
LYMPHOCYTES # BLD AUTO: 2.65 10*3/MM3 (ref 0.7–3.1)
LYMPHOCYTES NFR BLD AUTO: 23.8 % (ref 19.6–45.3)
MAGNESIUM SERPL-MCNC: 2 MG/DL (ref 1.6–2.4)
MCH RBC QN AUTO: 32.2 PG (ref 26.6–33)
MCHC RBC AUTO-ENTMCNC: 31.8 G/DL (ref 31.5–35.7)
MCV RBC AUTO: 101.1 FL (ref 79–97)
MONOCYTES # BLD AUTO: 0.98 10*3/MM3 (ref 0.1–0.9)
MONOCYTES NFR BLD AUTO: 8.8 % (ref 5–12)
NEUTROPHILS NFR BLD AUTO: 63.7 % (ref 42.7–76)
NEUTROPHILS NFR BLD AUTO: 7.09 10*3/MM3 (ref 1.7–7)
NITRITE UR QL STRIP: NEGATIVE
NRBC BLD AUTO-RTO: 0 /100 WBC (ref 0–0.2)
PH UR STRIP.AUTO: 5.5 [PH] (ref 5–8)
PLATELET # BLD AUTO: 273 10*3/MM3 (ref 140–450)
PMV BLD AUTO: 10.4 FL (ref 6–12)
POTASSIUM SERPL-SCNC: 4.3 MMOL/L (ref 3.5–5.2)
PROT SERPL-MCNC: 6.7 G/DL (ref 6–8.5)
PROT UR QL STRIP: ABNORMAL
PROTHROMBIN TIME: 13.7 SECONDS (ref 12.2–15.3)
RBC # BLD AUTO: 3.48 10*6/MM3 (ref 4.14–5.8)
RBC # UR STRIP: NORMAL /HPF
REF LAB TEST METHOD: NORMAL
SODIUM SERPL-SCNC: 142 MMOL/L (ref 136–145)
SP GR UR STRIP: 1.01 (ref 1–1.03)
SQUAMOUS #/AREA URNS HPF: NORMAL /HPF
TSH SERPL DL<=0.05 MIU/L-ACNC: 1.23 UIU/ML (ref 0.27–4.2)
UROBILINOGEN UR QL STRIP: ABNORMAL
WBC # UR STRIP: NORMAL /HPF
WBC NRBC COR # BLD AUTO: 11.14 10*3/MM3 (ref 3.4–10.8)

## 2025-07-28 PROCEDURE — 83735 ASSAY OF MAGNESIUM: CPT | Performed by: EMERGENCY MEDICINE

## 2025-07-28 PROCEDURE — 84443 ASSAY THYROID STIM HORMONE: CPT | Performed by: EMERGENCY MEDICINE

## 2025-07-28 PROCEDURE — 93005 ELECTROCARDIOGRAM TRACING: CPT | Performed by: EMERGENCY MEDICINE

## 2025-07-28 PROCEDURE — 85610 PROTHROMBIN TIME: CPT | Performed by: EMERGENCY MEDICINE

## 2025-07-28 PROCEDURE — 85025 COMPLETE CBC W/AUTO DIFF WBC: CPT | Performed by: EMERGENCY MEDICINE

## 2025-07-28 PROCEDURE — 85730 THROMBOPLASTIN TIME PARTIAL: CPT | Performed by: EMERGENCY MEDICINE

## 2025-07-28 PROCEDURE — 81001 URINALYSIS AUTO W/SCOPE: CPT | Performed by: EMERGENCY MEDICINE

## 2025-07-28 PROCEDURE — 80053 COMPREHEN METABOLIC PANEL: CPT | Performed by: EMERGENCY MEDICINE

## 2025-07-28 PROCEDURE — 70551 MRI BRAIN STEM W/O DYE: CPT

## 2025-07-28 PROCEDURE — 99284 EMERGENCY DEPT VISIT MOD MDM: CPT

## 2025-07-28 NOTE — TELEPHONE ENCOUNTER
Patient has been having memory issues.  He has had tightness and numb feeling in his left side.  He is now having tightness in his right leg.  I have advised patient to go to ER.  Patient has verbalized understanding and will call his friend to take him.  I advised I can call an ambulance if he wanted me to.  He said will call his friend.

## 2025-07-28 NOTE — ED PROVIDER NOTES
"Subjective   History of Present Illness    Pt presents with AMS.      He says he had a stroke in May 2021 with left sided weakness.  He feels like his symptoms from that were stable for years until last week.    He is feeling \"spaced out\" and is more forgetful than usual.  He is feeling some R sided weakness as well.  He does not feel he has had any arm weakness.  He is having trouble walking now, normally uses a walker and a power chair.    He says he was getting PT for a long time and felt like it was helping him but insurance stopped covering it and he feels like he is regressing some.    He called PCP and they directed him to the ED for evaluation.    History provided by:  Patient      Review of Systems    Past Medical History:   Diagnosis Date    Acute ischemic stroke 05/30/2021     posterior limb of the right internal capsule    Arthritis     BPH with elevated PSA     Chronic bronchitis     Closed fracture of left hip with routine healing 11/30/2021    Hyperlipidemia     Hypertension     Lacunar infarction     Noncompliance with medication regimen     pt states he never took his metoprolol    Prediabetes        Allergies   Allergen Reactions    Penicillins Hives       Past Surgical History:   Procedure Laterality Date    FINGER SURGERY  1984    R ring finger, partial amputation repair    FRACTURE SURGERY  11 30 2022    ORIF HIP FRACTURE Left 12/02/2021    Procedure: HIP OPEN REDUCTION INTERNAL FIXATION WITH FEMORAL NECK SYSTEM LEFT;  Surgeon: Aditya Ruano MD;  Location: [facility];  Service: Orthopedics;  Laterality: Left;       Family History   Problem Relation Age of Onset    Lymphoma Mother     Lung disease Father     No Known Problems Son     Dementia Maternal Grandfather        Social History     Socioeconomic History    Marital status: Single   Tobacco Use    Smoking status: Former     Current packs/day: 0.00     Average packs/day: 1 pack/day for 48.0 years (48.0 ttl pk-yrs)     Types: Cigarettes     " Start date: 1973     Quit date: 2021     Years since quittin.1    Smokeless tobacco: Never   Vaping Use    Vaping status: Never Used   Substance and Sexual Activity    Alcohol use: No    Drug use: No    Sexual activity: Not Currently     Comment:            Objective   Physical Exam  Vitals and nursing note reviewed.   Constitutional:       General: He is not in acute distress.     Appearance: Normal appearance. He is not ill-appearing.   HENT:      Head: Normocephalic and atraumatic.      Mouth/Throat:      Mouth: Mucous membranes are moist.   Eyes:      General: No scleral icterus.        Right eye: No discharge.         Left eye: No discharge.      Conjunctiva/sclera: Conjunctivae normal.   Cardiovascular:      Rate and Rhythm: Normal rate and regular rhythm.      Heart sounds: No murmur heard.  Pulmonary:      Effort: Pulmonary effort is normal. No respiratory distress.      Breath sounds: Normal breath sounds. No wheezing.   Abdominal:      General: Bowel sounds are normal. There is no distension.      Palpations: Abdomen is soft.      Tenderness: There is no abdominal tenderness. There is no guarding or rebound.   Musculoskeletal:         General: No swelling. Normal range of motion.      Cervical back: Normal range of motion and neck supple.   Skin:     General: Skin is warm and dry.      Findings: No rash.   Neurological:      Mental Status: He is alert and oriented to person, place, and time.      Comments: Mild left sided weakness. Normal RUE strength proximal and distal.  Able to hold both legs off the chair against gravity with some difficulty.  Speech and mentation are normal.   Psychiatric:         Mood and Affect: Mood normal.         Behavior: Behavior normal.         Thought Content: Thought content normal.         Procedures           ED Course    I reviewed outside records.  MRI brain 21 showed a small acute infarct in the R IC.  CTA head and neck at that time showed minimal  L carotid disease with no flow-limiting stenosis.      Labs, UA benign.    EKG read by me NSR rate 61 bpm normal axis.    MRI brain read by me no acute process.  Report reviewed.    Patient stable on serial rechecks.  Discussed findings, concerns, plan of care, expected course, reasons to return and followup.  Provided the opportunity to ask questions.                                                     Medical Decision Making  Problems Addressed:  Brain fog: complicated acute illness or injury  Generalized weakness: complicated acute illness or injury with systemic symptoms    Amount and/or Complexity of Data Reviewed  Labs: ordered. Decision-making details documented in ED Course.  Radiology: ordered and independent interpretation performed. Decision-making details documented in ED Course.  ECG/medicine tests: ordered and independent interpretation performed. Decision-making details documented in ED Course.    Risk  Prescription drug management.  Decision regarding hospitalization.        Final diagnoses:   Generalized weakness   Brain fog       ED Disposition  ED Disposition       ED Disposition   Discharge    Condition   Stable    Comment   --               Laura Hicks, APRN  [address] [address]  [phone]    Schedule an appointment as soon as possible for a visit       [facility] EMERGENCY DEPARTMENT  [address] [facility] [id number] [phone]    If symptoms worsen         Medication List        Changed      baclofen 20 MG tablet  Commonly known as: LIORESAL  What changed: when to take this                 Zeke Plasencia MD  07/29/25 0010

## 2025-08-01 LAB
QT INTERVAL: 412 MS
QTC INTERVAL: 414 MS

## 2025-08-10 DIAGNOSIS — I10 ESSENTIAL HYPERTENSION: ICD-10-CM

## 2025-08-11 RX ORDER — LISINOPRIL 20 MG/1
20 TABLET ORAL DAILY
Qty: 90 TABLET | Refills: 1 | Status: SHIPPED | OUTPATIENT
Start: 2025-08-11

## 2025-08-16 DIAGNOSIS — K59.00 CONSTIPATION, UNSPECIFIED CONSTIPATION TYPE: ICD-10-CM

## 2025-08-16 DIAGNOSIS — N40.1 BENIGN PROSTATIC HYPERPLASIA WITH LOWER URINARY TRACT SYMPTOMS, SYMPTOM DETAILS UNSPECIFIED: ICD-10-CM

## 2025-08-18 RX ORDER — FINASTERIDE 5 MG/1
5 TABLET, FILM COATED ORAL DAILY
Qty: 90 TABLET | Refills: 1 | Status: ON HOLD | OUTPATIENT
Start: 2025-08-18

## 2025-08-18 RX ORDER — FAMOTIDINE 20 MG/1
20 TABLET, FILM COATED ORAL EVERY 12 HOURS SCHEDULED
Qty: 180 TABLET | Refills: 1 | Status: ON HOLD | OUTPATIENT
Start: 2025-08-18

## 2025-08-18 RX ORDER — DOCUSATE SODIUM 100 MG/1
100 CAPSULE, LIQUID FILLED ORAL 2 TIMES DAILY
Qty: 180 CAPSULE | Refills: 1 | Status: ON HOLD | OUTPATIENT
Start: 2025-08-18

## 2025-08-20 ENCOUNTER — HOSPITAL ENCOUNTER (EMERGENCY)
Facility: HOSPITAL | Age: 75
Discharge: HOME | End: 2025-08-20
Payer: MEDICARE

## 2025-08-20 ENCOUNTER — APPOINTMENT (OUTPATIENT)
Dept: GENERAL RADIOLOGY | Facility: HOSPITAL | Age: 75
End: 2025-08-20
Payer: MEDICARE

## 2025-08-21 ENCOUNTER — APPOINTMENT (OUTPATIENT)
Dept: CT IMAGING | Facility: HOSPITAL | Age: 75
End: 2025-08-21
Payer: MEDICARE

## 2025-08-21 ENCOUNTER — HOSPITAL ENCOUNTER (INPATIENT)
Facility: HOSPITAL | Age: 75
LOS: 6 days | Discharge: NURSING, REHAB OR LONG-TERM CARE | End: 2025-08-28
Attending: EMERGENCY MEDICINE | Admitting: INTERNAL MEDICINE
Payer: MEDICARE

## 2025-08-21 ENCOUNTER — APPOINTMENT (OUTPATIENT)
Dept: GENERAL RADIOLOGY | Facility: HOSPITAL | Age: 75
End: 2025-08-21
Payer: MEDICARE

## 2025-08-21 DIAGNOSIS — R41.82 ALTERED MENTAL STATUS, UNSPECIFIED ALTERED MENTAL STATUS TYPE: Primary | ICD-10-CM

## 2025-08-21 DIAGNOSIS — R09.02 HYPOXIA: ICD-10-CM

## 2025-08-21 DIAGNOSIS — R41.3 MEMORY IMPAIRMENT: ICD-10-CM

## 2025-08-21 DIAGNOSIS — N40.1 BENIGN PROSTATIC HYPERPLASIA WITH LOWER URINARY TRACT SYMPTOMS, SYMPTOM DETAILS UNSPECIFIED: ICD-10-CM

## 2025-08-21 DIAGNOSIS — R13.12 OROPHARYNGEAL DYSPHAGIA: ICD-10-CM

## 2025-08-21 DIAGNOSIS — R33.9 URINE RETENTION: ICD-10-CM

## 2025-08-21 DIAGNOSIS — J18.9 PNEUMONIA OF RIGHT LOWER LOBE DUE TO INFECTIOUS ORGANISM: ICD-10-CM

## 2025-08-21 LAB
ALBUMIN SERPL-MCNC: 3.7 G/DL (ref 3.5–5.2)
ALBUMIN/GLOB SERPL: 1.5 G/DL
ALP SERPL-CCNC: 129 U/L (ref 39–117)
ALT SERPL W P-5'-P-CCNC: 26 U/L (ref 1–41)
AMPHET+METHAMPHET UR QL: NEGATIVE
AMPHETAMINES UR QL: NEGATIVE
ANION GAP SERPL CALCULATED.3IONS-SCNC: 10.7 MMOL/L (ref 5–15)
AST SERPL-CCNC: 27 U/L (ref 1–40)
B PARAPERT DNA SPEC QL NAA+PROBE: NOT DETECTED
B PERT DNA SPEC QL NAA+PROBE: NOT DETECTED
BACTERIA UR QL AUTO: NORMAL /HPF
BARBITURATES UR QL SCN: NEGATIVE
BASOPHILS # BLD AUTO: 0.03 10*3/MM3 (ref 0–0.2)
BASOPHILS NFR BLD AUTO: 0.2 % (ref 0–1.5)
BENZODIAZ UR QL SCN: NEGATIVE
BILIRUB SERPL-MCNC: 0.7 MG/DL (ref 0–1.2)
BILIRUB UR QL STRIP: NEGATIVE
BUN SERPL-MCNC: 21.1 MG/DL (ref 8–23)
BUN/CREAT SERPL: 22 (ref 7–25)
BUPRENORPHINE SERPL-MCNC: NEGATIVE NG/ML
C PNEUM DNA NPH QL NAA+NON-PROBE: NOT DETECTED
CALCIUM SPEC-SCNC: 9.4 MG/DL (ref 8.6–10.5)
CANNABINOIDS SERPL QL: NEGATIVE
CHLORIDE SERPL-SCNC: 109 MMOL/L (ref 98–107)
CLARITY UR: CLEAR
CO2 SERPL-SCNC: 25.3 MMOL/L (ref 22–29)
COCAINE UR QL: NEGATIVE
COLOR UR: YELLOW
CREAT SERPL-MCNC: 0.96 MG/DL (ref 0.76–1.27)
D-LACTATE SERPL-SCNC: 1.9 MMOL/L (ref 0.5–2)
DEPRECATED RDW RBC AUTO: 49.2 FL (ref 37–54)
EGFRCR SERPLBLD CKD-EPI 2021: 81.9 ML/MIN/1.73
EOSINOPHIL # BLD AUTO: 0.22 10*3/MM3 (ref 0–0.4)
EOSINOPHIL NFR BLD AUTO: 1.7 % (ref 0.3–6.2)
ERYTHROCYTE [DISTWIDTH] IN BLOOD BY AUTOMATED COUNT: 13.3 % (ref 12.3–15.4)
ETHANOL BLD-MCNC: <10 MG/DL (ref 0–10)
FENTANYL UR-MCNC: NEGATIVE NG/ML
FLUAV SUBTYP SPEC NAA+PROBE: NOT DETECTED
FLUBV RNA NPH QL NAA+NON-PROBE: NOT DETECTED
GEN 5 1HR TROPONIN T REFLEX: 37 NG/L
GLOBULIN UR ELPH-MCNC: 2.5 GM/DL
GLUCOSE SERPL-MCNC: 118 MG/DL (ref 65–99)
GLUCOSE UR STRIP-MCNC: NEGATIVE MG/DL
HADV DNA SPEC NAA+PROBE: NOT DETECTED
HCOV 229E RNA SPEC QL NAA+PROBE: NOT DETECTED
HCOV HKU1 RNA SPEC QL NAA+PROBE: NOT DETECTED
HCOV NL63 RNA SPEC QL NAA+PROBE: NOT DETECTED
HCOV OC43 RNA SPEC QL NAA+PROBE: NOT DETECTED
HCT VFR BLD AUTO: 34.1 % (ref 37.5–51)
HGB BLD-MCNC: 11.3 G/DL (ref 13–17.7)
HGB UR QL STRIP.AUTO: NEGATIVE
HMPV RNA NPH QL NAA+NON-PROBE: NOT DETECTED
HOLD SPECIMEN: NORMAL
HPIV1 RNA ISLT QL NAA+PROBE: NOT DETECTED
HPIV2 RNA SPEC QL NAA+PROBE: NOT DETECTED
HPIV3 RNA NPH QL NAA+PROBE: NOT DETECTED
HPIV4 P GENE NPH QL NAA+PROBE: NOT DETECTED
HYALINE CASTS UR QL AUTO: NORMAL /LPF
IMM GRANULOCYTES # BLD AUTO: 0.09 10*3/MM3 (ref 0–0.05)
IMM GRANULOCYTES NFR BLD AUTO: 0.7 % (ref 0–0.5)
KETONES UR QL STRIP: NEGATIVE
LEUKOCYTE ESTERASE UR QL STRIP.AUTO: NEGATIVE
LYMPHOCYTES # BLD AUTO: 1.56 10*3/MM3 (ref 0.7–3.1)
LYMPHOCYTES NFR BLD AUTO: 12 % (ref 19.6–45.3)
M PNEUMO IGG SER IA-ACNC: NOT DETECTED
MAGNESIUM SERPL-MCNC: 2 MG/DL (ref 1.6–2.4)
MCH RBC QN AUTO: 33.2 PG (ref 26.6–33)
MCHC RBC AUTO-ENTMCNC: 33.1 G/DL (ref 31.5–35.7)
MCV RBC AUTO: 100.3 FL (ref 79–97)
METHADONE UR QL SCN: NEGATIVE
MONOCYTES # BLD AUTO: 1.09 10*3/MM3 (ref 0.1–0.9)
MONOCYTES NFR BLD AUTO: 8.4 % (ref 5–12)
NEUTROPHILS NFR BLD AUTO: 77 % (ref 42.7–76)
NEUTROPHILS NFR BLD AUTO: 9.97 10*3/MM3 (ref 1.7–7)
NITRITE UR QL STRIP: NEGATIVE
NRBC BLD AUTO-RTO: 0 /100 WBC (ref 0–0.2)
OPIATES UR QL: NEGATIVE
OXYCODONE UR QL SCN: NEGATIVE
PCP UR QL SCN: NEGATIVE
PH UR STRIP.AUTO: 5.5 [PH] (ref 5–8)
PLATELET # BLD AUTO: 238 10*3/MM3 (ref 140–450)
PMV BLD AUTO: 10.3 FL (ref 6–12)
POTASSIUM SERPL-SCNC: 4 MMOL/L (ref 3.5–5.2)
PROT SERPL-MCNC: 6.2 G/DL (ref 6–8.5)
PROT UR QL STRIP: ABNORMAL
RBC # BLD AUTO: 3.4 10*6/MM3 (ref 4.14–5.8)
RBC # UR STRIP: NORMAL /HPF
REF LAB TEST METHOD: NORMAL
RHINOVIRUS RNA SPEC NAA+PROBE: NOT DETECTED
RSV RNA NPH QL NAA+NON-PROBE: NOT DETECTED
SARS-COV-2 RNA RESP QL NAA+PROBE: NOT DETECTED
SODIUM SERPL-SCNC: 145 MMOL/L (ref 136–145)
SP GR UR STRIP: 1.02 (ref 1–1.03)
SQUAMOUS #/AREA URNS HPF: NORMAL /HPF
TRICYCLICS UR QL SCN: NEGATIVE
TROPONIN T % DELTA: -3
TROPONIN T NUMERIC DELTA: -1 NG/L
TROPONIN T SERPL HS-MCNC: 38 NG/L
UROBILINOGEN UR QL STRIP: ABNORMAL
WBC # UR STRIP: NORMAL /HPF
WBC NRBC COR # BLD AUTO: 12.96 10*3/MM3 (ref 3.4–10.8)
WHOLE BLOOD HOLD COAG: NORMAL
WHOLE BLOOD HOLD SPECIMEN: NORMAL

## 2025-08-21 PROCEDURE — 71275 CT ANGIOGRAPHY CHEST: CPT

## 2025-08-21 PROCEDURE — 71045 X-RAY EXAM CHEST 1 VIEW: CPT

## 2025-08-21 PROCEDURE — 84145 PROCALCITONIN (PCT): CPT | Performed by: FAMILY MEDICINE

## 2025-08-21 PROCEDURE — 80307 DRUG TEST PRSMV CHEM ANLYZR: CPT | Performed by: EMERGENCY MEDICINE

## 2025-08-21 PROCEDURE — 83605 ASSAY OF LACTIC ACID: CPT | Performed by: EMERGENCY MEDICINE

## 2025-08-21 PROCEDURE — 87040 BLOOD CULTURE FOR BACTERIA: CPT | Performed by: EMERGENCY MEDICINE

## 2025-08-21 PROCEDURE — 25510000001 IOPAMIDOL PER 1 ML: Performed by: EMERGENCY MEDICINE

## 2025-08-21 PROCEDURE — 0202U NFCT DS 22 TRGT SARS-COV-2: CPT | Performed by: EMERGENCY MEDICINE

## 2025-08-21 PROCEDURE — 81001 URINALYSIS AUTO W/SCOPE: CPT | Performed by: EMERGENCY MEDICINE

## 2025-08-21 PROCEDURE — 70450 CT HEAD/BRAIN W/O DYE: CPT

## 2025-08-21 PROCEDURE — 74177 CT ABD & PELVIS W/CONTRAST: CPT

## 2025-08-21 PROCEDURE — 83735 ASSAY OF MAGNESIUM: CPT | Performed by: EMERGENCY MEDICINE

## 2025-08-21 PROCEDURE — 93005 ELECTROCARDIOGRAM TRACING: CPT | Performed by: EMERGENCY MEDICINE

## 2025-08-21 PROCEDURE — 85025 COMPLETE CBC W/AUTO DIFF WBC: CPT | Performed by: EMERGENCY MEDICINE

## 2025-08-21 PROCEDURE — 25810000003 SODIUM CHLORIDE 0.9 % SOLUTION: Performed by: EMERGENCY MEDICINE

## 2025-08-21 PROCEDURE — 80053 COMPREHEN METABOLIC PANEL: CPT | Performed by: EMERGENCY MEDICINE

## 2025-08-21 PROCEDURE — 99222 1ST HOSP IP/OBS MODERATE 55: CPT | Performed by: FAMILY MEDICINE

## 2025-08-21 PROCEDURE — 87449 NOS EACH ORGANISM AG IA: CPT | Performed by: STUDENT IN AN ORGANIZED HEALTH CARE EDUCATION/TRAINING PROGRAM

## 2025-08-21 PROCEDURE — 84484 ASSAY OF TROPONIN QUANT: CPT | Performed by: EMERGENCY MEDICINE

## 2025-08-21 PROCEDURE — 82077 ASSAY SPEC XCP UR&BREATH IA: CPT | Performed by: EMERGENCY MEDICINE

## 2025-08-21 PROCEDURE — 25010000002 HYDROMORPHONE 1 MG/ML SOLUTION: Performed by: EMERGENCY MEDICINE

## 2025-08-21 RX ORDER — SODIUM CHLORIDE 0.9 % (FLUSH) 0.9 %
10 SYRINGE (ML) INJECTION AS NEEDED
Status: DISCONTINUED | OUTPATIENT
Start: 2025-08-21 | End: 2025-08-28 | Stop reason: HOSPADM

## 2025-08-21 RX ORDER — IOPAMIDOL 755 MG/ML
85 INJECTION, SOLUTION INTRAVASCULAR
Status: COMPLETED | OUTPATIENT
Start: 2025-08-21 | End: 2025-08-21

## 2025-08-21 RX ADMIN — HYDROMORPHONE HYDROCHLORIDE 1 MG: 1 INJECTION, SOLUTION INTRAMUSCULAR; INTRAVENOUS; SUBCUTANEOUS at 21:25

## 2025-08-21 RX ADMIN — IOPAMIDOL 85 ML: 755 INJECTION, SOLUTION INTRAVENOUS at 21:54

## 2025-08-21 RX ADMIN — SODIUM CHLORIDE 1000 ML: 9 INJECTION, SOLUTION INTRAVENOUS at 19:39

## 2025-08-22 PROBLEM — R41.82 AMS (ALTERED MENTAL STATUS): Status: ACTIVE | Noted: 2025-08-22

## 2025-08-22 LAB
ANION GAP SERPL CALCULATED.3IONS-SCNC: 10 MMOL/L (ref 5–15)
ARTERIAL PATENCY WRIST A: ABNORMAL
ATMOSPHERIC PRESS: ABNORMAL MM[HG]
B PARAPERT DNA SPEC QL NAA+PROBE: NOT DETECTED
B PERT DNA SPEC QL NAA+PROBE: NOT DETECTED
BASE EXCESS BLDA CALC-SCNC: -4 MMOL/L (ref 0–2)
BDY SITE: ABNORMAL
BODY TEMPERATURE: 37
BUN SERPL-MCNC: 17.8 MG/DL (ref 8–23)
BUN/CREAT SERPL: 21.2 (ref 7–25)
C PNEUM DNA NPH QL NAA+NON-PROBE: NOT DETECTED
CALCIUM SPEC-SCNC: 8.4 MG/DL (ref 8.6–10.5)
CHLORIDE SERPL-SCNC: 112 MMOL/L (ref 98–107)
CO2 BLDA-SCNC: 23.2 MMOL/L (ref 22–33)
CO2 SERPL-SCNC: 21 MMOL/L (ref 22–29)
COHGB MFR BLD: 1 % (ref 0–2)
CREAT SERPL-MCNC: 0.84 MG/DL (ref 0.76–1.27)
DEPRECATED RDW RBC AUTO: 50.1 FL (ref 37–54)
EGFRCR SERPLBLD CKD-EPI 2021: 90.4 ML/MIN/1.73
EPAP: 0
ERYTHROCYTE [DISTWIDTH] IN BLOOD BY AUTOMATED COUNT: 13.2 % (ref 12.3–15.4)
FLUAV SUBTYP SPEC NAA+PROBE: NOT DETECTED
FLUBV RNA NPH QL NAA+NON-PROBE: NOT DETECTED
GLUCOSE BLDC GLUCOMTR-MCNC: 109 MG/DL (ref 70–130)
GLUCOSE BLDC GLUCOMTR-MCNC: 114 MG/DL (ref 70–130)
GLUCOSE BLDC GLUCOMTR-MCNC: 118 MG/DL (ref 70–130)
GLUCOSE BLDC GLUCOMTR-MCNC: 151 MG/DL (ref 70–130)
GLUCOSE SERPL-MCNC: 107 MG/DL (ref 65–99)
HADV DNA SPEC NAA+PROBE: NOT DETECTED
HCO3 BLDA-SCNC: 21.9 MMOL/L (ref 20–26)
HCOV 229E RNA SPEC QL NAA+PROBE: NOT DETECTED
HCOV HKU1 RNA SPEC QL NAA+PROBE: NOT DETECTED
HCOV NL63 RNA SPEC QL NAA+PROBE: NOT DETECTED
HCOV OC43 RNA SPEC QL NAA+PROBE: NOT DETECTED
HCT VFR BLD AUTO: 31.7 % (ref 37.5–51)
HCT VFR BLD CALC: 32.7 % (ref 38–51)
HGB BLD-MCNC: 9.9 G/DL (ref 13–17.7)
HGB BLDA-MCNC: 10.7 G/DL (ref 13.5–17.5)
HMPV RNA NPH QL NAA+NON-PROBE: NOT DETECTED
HPIV1 RNA ISLT QL NAA+PROBE: NOT DETECTED
HPIV2 RNA SPEC QL NAA+PROBE: NOT DETECTED
HPIV3 RNA NPH QL NAA+PROBE: NOT DETECTED
HPIV4 P GENE NPH QL NAA+PROBE: NOT DETECTED
INHALED O2 CONCENTRATION: 21 %
IPAP: 0
L PNEUMO1 AG UR QL IA: NEGATIVE
Lab: NORMAL
Lab: NORMAL
M PNEUMO IGG SER IA-ACNC: NOT DETECTED
MCH RBC QN AUTO: 32.2 PG (ref 26.6–33)
MCHC RBC AUTO-ENTMCNC: 31.2 G/DL (ref 31.5–35.7)
MCV RBC AUTO: 103.3 FL (ref 79–97)
METHGB BLD QL: 0.1 % (ref 0–1.5)
MODALITY: ABNORMAL
OXYHGB MFR BLDV: 95 % (ref 94–99)
PAW @ PEAK INSP FLOW SETTING VENT: 0 CMH2O
PCO2 BLDA: 42.5 MM HG (ref 35–45)
PCO2 TEMP ADJ BLD: 42.5 MM HG (ref 35–48)
PH BLDA: 7.32 PH UNITS (ref 7.35–7.45)
PH, TEMP CORRECTED: 7.32 PH UNITS
PLATELET # BLD AUTO: 211 10*3/MM3 (ref 140–450)
PMV BLD AUTO: 10.6 FL (ref 6–12)
PO2 BLDA: 83.9 MM HG (ref 83–108)
PO2 TEMP ADJ BLD: 83.9 MM HG (ref 83–108)
POTASSIUM SERPL-SCNC: 3.9 MMOL/L (ref 3.5–5.2)
PROCALCITONIN SERPL-MCNC: 0.13 NG/ML (ref 0–0.25)
RBC # BLD AUTO: 3.07 10*6/MM3 (ref 4.14–5.8)
RHINOVIRUS RNA SPEC NAA+PROBE: NOT DETECTED
RSV RNA NPH QL NAA+NON-PROBE: NOT DETECTED
S PNEUM AG SPEC QL LA: NEGATIVE
SARS-COV-2 RNA RESP QL NAA+PROBE: NOT DETECTED
SODIUM SERPL-SCNC: 143 MMOL/L (ref 136–145)
TOTAL RATE: 0 BREATHS/MINUTE
WBC NRBC COR # BLD AUTO: 16.91 10*3/MM3 (ref 3.4–10.8)

## 2025-08-22 PROCEDURE — 80048 BASIC METABOLIC PNL TOTAL CA: CPT | Performed by: STUDENT IN AN ORGANIZED HEALTH CARE EDUCATION/TRAINING PROGRAM

## 2025-08-22 PROCEDURE — 92610 EVALUATE SWALLOWING FUNCTION: CPT

## 2025-08-22 PROCEDURE — 82948 REAGENT STRIP/BLOOD GLUCOSE: CPT

## 2025-08-22 PROCEDURE — 25010000002 DOXYCYCLINE 100 MG RECONSTITUTED SOLUTION 1 EACH VIAL: Performed by: FAMILY MEDICINE

## 2025-08-22 PROCEDURE — 25010000002 MORPHINE PER 10 MG: Performed by: FAMILY MEDICINE

## 2025-08-22 PROCEDURE — 25010000002 CEFTRIAXONE PER 250 MG: Performed by: FAMILY MEDICINE

## 2025-08-22 PROCEDURE — 85018 HEMOGLOBIN: CPT

## 2025-08-22 PROCEDURE — 82805 BLOOD GASES W/O2 SATURATION: CPT

## 2025-08-22 PROCEDURE — 25010000002 DOXYCYCLINE 100 MG RECONSTITUTED SOLUTION 1 EACH VIAL: Performed by: EMERGENCY MEDICINE

## 2025-08-22 PROCEDURE — 25810000003 SODIUM CHLORIDE 0.9 % SOLUTION: Performed by: FAMILY MEDICINE

## 2025-08-22 PROCEDURE — 82375 ASSAY CARBOXYHB QUANT: CPT

## 2025-08-22 PROCEDURE — 85027 COMPLETE CBC AUTOMATED: CPT | Performed by: STUDENT IN AN ORGANIZED HEALTH CARE EDUCATION/TRAINING PROGRAM

## 2025-08-22 PROCEDURE — 99232 SBSQ HOSP IP/OBS MODERATE 35: CPT | Performed by: STUDENT IN AN ORGANIZED HEALTH CARE EDUCATION/TRAINING PROGRAM

## 2025-08-22 PROCEDURE — 83050 HGB METHEMOGLOBIN QUAN: CPT

## 2025-08-22 PROCEDURE — 25010000002 ONDANSETRON PER 1 MG: Performed by: FAMILY MEDICINE

## 2025-08-22 PROCEDURE — 25010000002 ENOXAPARIN PER 10 MG: Performed by: FAMILY MEDICINE

## 2025-08-22 PROCEDURE — 36600 WITHDRAWAL OF ARTERIAL BLOOD: CPT

## 2025-08-22 PROCEDURE — 0202U NFCT DS 22 TRGT SARS-COV-2: CPT | Performed by: FAMILY MEDICINE

## 2025-08-22 PROCEDURE — 97166 OT EVAL MOD COMPLEX 45 MIN: CPT

## 2025-08-22 PROCEDURE — 97162 PT EVAL MOD COMPLEX 30 MIN: CPT

## 2025-08-22 PROCEDURE — 25010000002 CEFTRIAXONE PER 250 MG: Performed by: EMERGENCY MEDICINE

## 2025-08-22 RX ORDER — ATORVASTATIN CALCIUM 40 MG/1
80 TABLET, FILM COATED ORAL DAILY
Status: DISCONTINUED | OUTPATIENT
Start: 2025-08-22 | End: 2025-08-28 | Stop reason: HOSPADM

## 2025-08-22 RX ORDER — FAMOTIDINE 20 MG/1
20 TABLET, FILM COATED ORAL EVERY 12 HOURS SCHEDULED
Status: DISCONTINUED | OUTPATIENT
Start: 2025-08-22 | End: 2025-08-28 | Stop reason: HOSPADM

## 2025-08-22 RX ORDER — METOPROLOL SUCCINATE 25 MG/1
25 TABLET, EXTENDED RELEASE ORAL DAILY
Status: DISCONTINUED | OUTPATIENT
Start: 2025-08-22 | End: 2025-08-28 | Stop reason: HOSPADM

## 2025-08-22 RX ORDER — ONDANSETRON 2 MG/ML
4 INJECTION INTRAMUSCULAR; INTRAVENOUS EVERY 6 HOURS PRN
Status: DISCONTINUED | OUTPATIENT
Start: 2025-08-22 | End: 2025-08-22 | Stop reason: SDUPTHER

## 2025-08-22 RX ORDER — FINASTERIDE 5 MG/1
5 TABLET, FILM COATED ORAL DAILY
Status: DISCONTINUED | OUTPATIENT
Start: 2025-08-22 | End: 2025-08-28 | Stop reason: HOSPADM

## 2025-08-22 RX ORDER — CLOPIDOGREL BISULFATE 75 MG/1
75 TABLET ORAL DAILY
Status: DISCONTINUED | OUTPATIENT
Start: 2025-08-22 | End: 2025-08-28 | Stop reason: HOSPADM

## 2025-08-22 RX ORDER — LISINOPRIL 20 MG/1
20 TABLET ORAL DAILY
Status: DISCONTINUED | OUTPATIENT
Start: 2025-08-22 | End: 2025-08-22

## 2025-08-22 RX ORDER — LISINOPRIL 20 MG/1
20 TABLET ORAL DAILY
Status: DISCONTINUED | OUTPATIENT
Start: 2025-08-22 | End: 2025-08-28 | Stop reason: HOSPADM

## 2025-08-22 RX ORDER — ONDANSETRON 2 MG/ML
4 INJECTION INTRAMUSCULAR; INTRAVENOUS EVERY 6 HOURS PRN
Status: DISCONTINUED | OUTPATIENT
Start: 2025-08-22 | End: 2025-08-28 | Stop reason: HOSPADM

## 2025-08-22 RX ORDER — ESCITALOPRAM OXALATE 10 MG/1
5 TABLET ORAL DAILY
Status: DISCONTINUED | OUTPATIENT
Start: 2025-08-22 | End: 2025-08-28 | Stop reason: HOSPADM

## 2025-08-22 RX ORDER — CHLORTHALIDONE 25 MG/1
25 TABLET ORAL DAILY
Status: DISCONTINUED | OUTPATIENT
Start: 2025-08-22 | End: 2025-08-28 | Stop reason: HOSPADM

## 2025-08-22 RX ORDER — SODIUM CHLORIDE 0.9 % (FLUSH) 0.9 %
10 SYRINGE (ML) INJECTION EVERY 12 HOURS SCHEDULED
Status: DISCONTINUED | OUTPATIENT
Start: 2025-08-22 | End: 2025-08-28 | Stop reason: HOSPADM

## 2025-08-22 RX ORDER — BISACODYL 5 MG/1
5 TABLET, DELAYED RELEASE ORAL DAILY PRN
Status: DISCONTINUED | OUTPATIENT
Start: 2025-08-22 | End: 2025-08-25

## 2025-08-22 RX ORDER — ENOXAPARIN SODIUM 100 MG/ML
30 INJECTION SUBCUTANEOUS DAILY
Status: DISCONTINUED | OUTPATIENT
Start: 2025-08-22 | End: 2025-08-28 | Stop reason: HOSPADM

## 2025-08-22 RX ORDER — POLYETHYLENE GLYCOL 3350 17 G/17G
17 POWDER, FOR SOLUTION ORAL DAILY PRN
Status: DISCONTINUED | OUTPATIENT
Start: 2025-08-22 | End: 2025-08-25

## 2025-08-22 RX ORDER — SODIUM CHLORIDE 9 MG/ML
100 INJECTION, SOLUTION INTRAVENOUS CONTINUOUS
Status: ACTIVE | OUTPATIENT
Start: 2025-08-22 | End: 2025-08-23

## 2025-08-22 RX ORDER — SODIUM CHLORIDE 0.9 % (FLUSH) 0.9 %
10 SYRINGE (ML) INJECTION AS NEEDED
Status: DISCONTINUED | OUTPATIENT
Start: 2025-08-22 | End: 2025-08-28 | Stop reason: HOSPADM

## 2025-08-22 RX ORDER — TAMSULOSIN HYDROCHLORIDE 0.4 MG/1
0.4 CAPSULE ORAL NIGHTLY
Status: DISCONTINUED | OUTPATIENT
Start: 2025-08-22 | End: 2025-08-27

## 2025-08-22 RX ORDER — MORPHINE SULFATE 2 MG/ML
2 INJECTION, SOLUTION INTRAMUSCULAR; INTRAVENOUS EVERY 4 HOURS PRN
Status: DISPENSED | OUTPATIENT
Start: 2025-08-22 | End: 2025-08-27

## 2025-08-22 RX ORDER — FERROUS SULFATE 325(65) MG
325 TABLET ORAL 2 TIMES DAILY WITH MEALS
Status: DISCONTINUED | OUTPATIENT
Start: 2025-08-22 | End: 2025-08-28 | Stop reason: HOSPADM

## 2025-08-22 RX ORDER — AMOXICILLIN 250 MG
2 CAPSULE ORAL 2 TIMES DAILY PRN
Status: DISCONTINUED | OUTPATIENT
Start: 2025-08-22 | End: 2025-08-25

## 2025-08-22 RX ORDER — BISACODYL 10 MG
10 SUPPOSITORY, RECTAL RECTAL DAILY PRN
Status: DISCONTINUED | OUTPATIENT
Start: 2025-08-22 | End: 2025-08-25

## 2025-08-22 RX ORDER — SODIUM CHLORIDE 9 MG/ML
40 INJECTION, SOLUTION INTRAVENOUS AS NEEDED
Status: DISCONTINUED | OUTPATIENT
Start: 2025-08-22 | End: 2025-08-28 | Stop reason: HOSPADM

## 2025-08-22 RX ADMIN — MORPHINE SULFATE 2 MG: 2 INJECTION, SOLUTION INTRAMUSCULAR; INTRAVENOUS at 21:15

## 2025-08-22 RX ADMIN — CEFTRIAXONE SODIUM 1000 MG: 1 INJECTION, POWDER, FOR SOLUTION INTRAMUSCULAR; INTRAVENOUS at 20:58

## 2025-08-22 RX ADMIN — CEFTRIAXONE SODIUM 1000 MG: 1 INJECTION, POWDER, FOR SOLUTION INTRAMUSCULAR; INTRAVENOUS at 00:33

## 2025-08-22 RX ADMIN — MORPHINE SULFATE 2 MG: 2 INJECTION, SOLUTION INTRAMUSCULAR; INTRAVENOUS at 05:24

## 2025-08-22 RX ADMIN — DOXYCYCLINE 100 MG: 100 INJECTION, POWDER, LYOPHILIZED, FOR SOLUTION INTRAVENOUS at 13:18

## 2025-08-22 RX ADMIN — ENOXAPARIN SODIUM 30 MG: 100 INJECTION SUBCUTANEOUS at 09:14

## 2025-08-22 RX ADMIN — DOXYCYCLINE 100 MG: 100 INJECTION, POWDER, LYOPHILIZED, FOR SOLUTION INTRAVENOUS at 02:43

## 2025-08-22 RX ADMIN — DOXYCYCLINE 100 MG: 100 INJECTION, POWDER, LYOPHILIZED, FOR SOLUTION INTRAVENOUS at 23:32

## 2025-08-22 RX ADMIN — SODIUM CHLORIDE 100 ML/HR: 9 INJECTION, SOLUTION INTRAVENOUS at 05:24

## 2025-08-22 RX ADMIN — Medication 10 ML: at 09:14

## 2025-08-22 RX ADMIN — TAMSULOSIN HYDROCHLORIDE 0.4 MG: 0.4 CAPSULE ORAL at 20:59

## 2025-08-22 RX ADMIN — MORPHINE SULFATE 2 MG: 2 INJECTION, SOLUTION INTRAMUSCULAR; INTRAVENOUS at 09:12

## 2025-08-22 RX ADMIN — ONDANSETRON 4 MG: 2 INJECTION, SOLUTION INTRAMUSCULAR; INTRAVENOUS at 00:42

## 2025-08-22 RX ADMIN — SODIUM CHLORIDE 1000 ML: 9 INJECTION, SOLUTION INTRAVENOUS at 02:43

## 2025-08-22 RX ADMIN — FAMOTIDINE 20 MG: 20 TABLET, FILM COATED ORAL at 21:00

## 2025-08-22 RX ADMIN — FERROUS SULFATE TAB 325 MG (65 MG ELEMENTAL FE) 325 MG: 325 (65 FE) TAB at 16:57

## 2025-08-23 ENCOUNTER — APPOINTMENT (OUTPATIENT)
Dept: GENERAL RADIOLOGY | Facility: HOSPITAL | Age: 75
End: 2025-08-23
Payer: MEDICARE

## 2025-08-23 LAB
ALBUMIN SERPL-MCNC: 2.9 G/DL (ref 3.5–5.2)
ALBUMIN/GLOB SERPL: 1.5 G/DL
ALP SERPL-CCNC: 84 U/L (ref 39–117)
ALT SERPL W P-5'-P-CCNC: 20 U/L (ref 1–41)
ANION GAP SERPL CALCULATED.3IONS-SCNC: 8 MMOL/L (ref 5–15)
AST SERPL-CCNC: 45 U/L (ref 1–40)
BASOPHILS # BLD AUTO: 0.02 10*3/MM3 (ref 0–0.2)
BASOPHILS NFR BLD AUTO: 0.2 % (ref 0–1.5)
BILIRUB SERPL-MCNC: 0.5 MG/DL (ref 0–1.2)
BUN SERPL-MCNC: 17.9 MG/DL (ref 8–23)
BUN/CREAT SERPL: 21.8 (ref 7–25)
CALCIUM SPEC-SCNC: 8.3 MG/DL (ref 8.6–10.5)
CHLORIDE SERPL-SCNC: 112 MMOL/L (ref 98–107)
CO2 SERPL-SCNC: 25 MMOL/L (ref 22–29)
CREAT SERPL-MCNC: 0.82 MG/DL (ref 0.76–1.27)
DEPRECATED RDW RBC AUTO: 50 FL (ref 37–54)
EGFRCR SERPLBLD CKD-EPI 2021: 91 ML/MIN/1.73
EOSINOPHIL # BLD AUTO: 0.23 10*3/MM3 (ref 0–0.4)
EOSINOPHIL NFR BLD AUTO: 2 % (ref 0.3–6.2)
ERYTHROCYTE [DISTWIDTH] IN BLOOD BY AUTOMATED COUNT: 13.6 % (ref 12.3–15.4)
GLOBULIN UR ELPH-MCNC: 2 GM/DL
GLUCOSE SERPL-MCNC: 91 MG/DL (ref 65–99)
HCT VFR BLD AUTO: 27.9 % (ref 37.5–51)
HGB BLD-MCNC: 8.9 G/DL (ref 13–17.7)
IMM GRANULOCYTES # BLD AUTO: 0.05 10*3/MM3 (ref 0–0.05)
IMM GRANULOCYTES NFR BLD AUTO: 0.4 % (ref 0–0.5)
LYMPHOCYTES # BLD AUTO: 1.63 10*3/MM3 (ref 0.7–3.1)
LYMPHOCYTES NFR BLD AUTO: 14.5 % (ref 19.6–45.3)
MAGNESIUM SERPL-MCNC: 1.6 MG/DL (ref 1.6–2.4)
MCH RBC QN AUTO: 32.4 PG (ref 26.6–33)
MCHC RBC AUTO-ENTMCNC: 31.9 G/DL (ref 31.5–35.7)
MCV RBC AUTO: 101.5 FL (ref 79–97)
MONOCYTES # BLD AUTO: 1.13 10*3/MM3 (ref 0.1–0.9)
MONOCYTES NFR BLD AUTO: 10.1 % (ref 5–12)
NEUTROPHILS NFR BLD AUTO: 72.8 % (ref 42.7–76)
NEUTROPHILS NFR BLD AUTO: 8.17 10*3/MM3 (ref 1.7–7)
NRBC BLD AUTO-RTO: 0 /100 WBC (ref 0–0.2)
PLATELET # BLD AUTO: 199 10*3/MM3 (ref 140–450)
PMV BLD AUTO: 10.4 FL (ref 6–12)
POTASSIUM SERPL-SCNC: 3.6 MMOL/L (ref 3.5–5.2)
POTASSIUM SERPL-SCNC: 4 MMOL/L (ref 3.5–5.2)
PROT SERPL-MCNC: 4.9 G/DL (ref 6–8.5)
RBC # BLD AUTO: 2.75 10*6/MM3 (ref 4.14–5.8)
SODIUM SERPL-SCNC: 145 MMOL/L (ref 136–145)
WBC NRBC COR # BLD AUTO: 11.23 10*3/MM3 (ref 3.4–10.8)

## 2025-08-23 PROCEDURE — 25010000002 DOXYCYCLINE 100 MG RECONSTITUTED SOLUTION 1 EACH VIAL: Performed by: STUDENT IN AN ORGANIZED HEALTH CARE EDUCATION/TRAINING PROGRAM

## 2025-08-23 PROCEDURE — 84132 ASSAY OF SERUM POTASSIUM: CPT | Performed by: STUDENT IN AN ORGANIZED HEALTH CARE EDUCATION/TRAINING PROGRAM

## 2025-08-23 PROCEDURE — 25010000002 ENOXAPARIN PER 10 MG: Performed by: FAMILY MEDICINE

## 2025-08-23 PROCEDURE — 25010000002 MORPHINE PER 10 MG: Performed by: FAMILY MEDICINE

## 2025-08-23 PROCEDURE — 85025 COMPLETE CBC W/AUTO DIFF WBC: CPT | Performed by: STUDENT IN AN ORGANIZED HEALTH CARE EDUCATION/TRAINING PROGRAM

## 2025-08-23 PROCEDURE — 83735 ASSAY OF MAGNESIUM: CPT | Performed by: STUDENT IN AN ORGANIZED HEALTH CARE EDUCATION/TRAINING PROGRAM

## 2025-08-23 PROCEDURE — 92611 MOTION FLUOROSCOPY/SWALLOW: CPT

## 2025-08-23 PROCEDURE — 99232 SBSQ HOSP IP/OBS MODERATE 35: CPT | Performed by: STUDENT IN AN ORGANIZED HEALTH CARE EDUCATION/TRAINING PROGRAM

## 2025-08-23 PROCEDURE — 25010000002 CEFTRIAXONE PER 250 MG: Performed by: STUDENT IN AN ORGANIZED HEALTH CARE EDUCATION/TRAINING PROGRAM

## 2025-08-23 PROCEDURE — 74230 X-RAY XM SWLNG FUNCJ C+: CPT

## 2025-08-23 PROCEDURE — 80053 COMPREHEN METABOLIC PANEL: CPT | Performed by: FAMILY MEDICINE

## 2025-08-23 RX ORDER — BACLOFEN 10 MG/1
20 TABLET ORAL 3 TIMES DAILY PRN
Status: DISCONTINUED | OUTPATIENT
Start: 2025-08-23 | End: 2025-08-28 | Stop reason: HOSPADM

## 2025-08-23 RX ORDER — POTASSIUM CHLORIDE 1.5 G/1.58G
40 POWDER, FOR SOLUTION ORAL EVERY 4 HOURS
Status: COMPLETED | OUTPATIENT
Start: 2025-08-23 | End: 2025-08-23

## 2025-08-23 RX ADMIN — MORPHINE SULFATE 2 MG: 2 INJECTION, SOLUTION INTRAMUSCULAR; INTRAVENOUS at 23:41

## 2025-08-23 RX ADMIN — LISINOPRIL 20 MG: 20 TABLET ORAL at 10:26

## 2025-08-23 RX ADMIN — ESCITALOPRAM 5 MG: 10 TABLET, FILM COATED ORAL at 10:28

## 2025-08-23 RX ADMIN — BACLOFEN 20 MG: 10 TABLET ORAL at 21:37

## 2025-08-23 RX ADMIN — FERROUS SULFATE TAB 325 MG (65 MG ELEMENTAL FE) 325 MG: 325 (65 FE) TAB at 17:38

## 2025-08-23 RX ADMIN — CLOPIDOGREL BISULFATE 75 MG: 75 TABLET, FILM COATED ORAL at 10:29

## 2025-08-23 RX ADMIN — BARIUM SULFATE 100 ML: 0.81 POWDER, FOR SUSPENSION ORAL at 09:16

## 2025-08-23 RX ADMIN — ENOXAPARIN SODIUM 30 MG: 100 INJECTION SUBCUTANEOUS at 10:27

## 2025-08-23 RX ADMIN — METOPROLOL SUCCINATE 25 MG: 25 TABLET, EXTENDED RELEASE ORAL at 10:29

## 2025-08-23 RX ADMIN — Medication 10 ML: at 21:27

## 2025-08-23 RX ADMIN — FAMOTIDINE 20 MG: 20 TABLET, FILM COATED ORAL at 21:26

## 2025-08-23 RX ADMIN — Medication 10 ML: at 10:30

## 2025-08-23 RX ADMIN — DOXYCYCLINE 100 MG: 100 INJECTION, POWDER, LYOPHILIZED, FOR SOLUTION INTRAVENOUS at 10:27

## 2025-08-23 RX ADMIN — FERROUS SULFATE TAB 325 MG (65 MG ELEMENTAL FE) 325 MG: 325 (65 FE) TAB at 10:26

## 2025-08-23 RX ADMIN — POTASSIUM CHLORIDE 40 MEQ: 1.5 POWDER, FOR SOLUTION ORAL at 11:50

## 2025-08-23 RX ADMIN — MORPHINE SULFATE 2 MG: 2 INJECTION, SOLUTION INTRAMUSCULAR; INTRAVENOUS at 06:37

## 2025-08-23 RX ADMIN — BACLOFEN 20 MG: 10 TABLET ORAL at 02:44

## 2025-08-23 RX ADMIN — MORPHINE SULFATE 2 MG: 2 INJECTION, SOLUTION INTRAMUSCULAR; INTRAVENOUS at 01:28

## 2025-08-23 RX ADMIN — DOXYCYCLINE 100 MG: 100 INJECTION, POWDER, LYOPHILIZED, FOR SOLUTION INTRAVENOUS at 23:36

## 2025-08-23 RX ADMIN — CHLORTHALIDONE 25 MG: 25 TABLET ORAL at 10:29

## 2025-08-23 RX ADMIN — FAMOTIDINE 20 MG: 20 TABLET, FILM COATED ORAL at 10:29

## 2025-08-23 RX ADMIN — POTASSIUM CHLORIDE 40 MEQ: 1.5 POWDER, FOR SOLUTION ORAL at 16:46

## 2025-08-23 RX ADMIN — BACLOFEN 20 MG: 10 TABLET ORAL at 11:50

## 2025-08-23 RX ADMIN — Medication 2.5 MG: at 21:27

## 2025-08-23 RX ADMIN — ATORVASTATIN CALCIUM 80 MG: 40 TABLET, FILM COATED ORAL at 10:26

## 2025-08-23 RX ADMIN — CEFTRIAXONE SODIUM 1000 MG: 1 INJECTION, POWDER, FOR SOLUTION INTRAMUSCULAR; INTRAVENOUS at 21:28

## 2025-08-23 RX ADMIN — FINASTERIDE 5 MG: 5 TABLET, FILM COATED ORAL at 10:26

## 2025-08-23 RX ADMIN — BARIUM SULFATE 20 ML: 400 PASTE ORAL at 09:16

## 2025-08-23 RX ADMIN — TAMSULOSIN HYDROCHLORIDE 0.4 MG: 0.4 CAPSULE ORAL at 21:27

## 2025-08-24 LAB
ALBUMIN SERPL-MCNC: 2.8 G/DL (ref 3.5–5.2)
ALBUMIN/GLOB SERPL: 1.3 G/DL
ALP SERPL-CCNC: 78 U/L (ref 39–117)
ALT SERPL W P-5'-P-CCNC: 29 U/L (ref 1–41)
ANION GAP SERPL CALCULATED.3IONS-SCNC: 9.3 MMOL/L (ref 5–15)
AST SERPL-CCNC: 56 U/L (ref 1–40)
BILIRUB SERPL-MCNC: 0.5 MG/DL (ref 0–1.2)
BUN SERPL-MCNC: 14.7 MG/DL (ref 8–23)
BUN/CREAT SERPL: 18.8 (ref 7–25)
CALCIUM SPEC-SCNC: 8.4 MG/DL (ref 8.6–10.5)
CHLORIDE SERPL-SCNC: 109 MMOL/L (ref 98–107)
CO2 SERPL-SCNC: 21.7 MMOL/L (ref 22–29)
CREAT SERPL-MCNC: 0.78 MG/DL (ref 0.76–1.27)
DEPRECATED RDW RBC AUTO: 49.5 FL (ref 37–54)
EGFRCR SERPLBLD CKD-EPI 2021: 92.4 ML/MIN/1.73
ERYTHROCYTE [DISTWIDTH] IN BLOOD BY AUTOMATED COUNT: 13.2 % (ref 12.3–15.4)
FERRITIN SERPL-MCNC: 625 NG/ML (ref 30–400)
FOLATE SERPL-MCNC: 7.14 NG/ML (ref 4.78–24.2)
GLOBULIN UR ELPH-MCNC: 2.1 GM/DL
GLUCOSE SERPL-MCNC: 89 MG/DL (ref 65–99)
HCT VFR BLD AUTO: 29.5 % (ref 37.5–51)
HGB BLD-MCNC: 9.3 G/DL (ref 13–17.7)
IRON 24H UR-MRATE: 59 MCG/DL (ref 59–158)
IRON SATN MFR SERPL: 35 % (ref 20–50)
MAGNESIUM SERPL-MCNC: 1.8 MG/DL (ref 1.6–2.4)
MCH RBC QN AUTO: 32 PG (ref 26.6–33)
MCHC RBC AUTO-ENTMCNC: 31.5 G/DL (ref 31.5–35.7)
MCV RBC AUTO: 101.4 FL (ref 79–97)
PLATELET # BLD AUTO: 197 10*3/MM3 (ref 140–450)
PMV BLD AUTO: 10.5 FL (ref 6–12)
POTASSIUM SERPL-SCNC: 3.9 MMOL/L (ref 3.5–5.2)
PROT SERPL-MCNC: 4.9 G/DL (ref 6–8.5)
RBC # BLD AUTO: 2.91 10*6/MM3 (ref 4.14–5.8)
SODIUM SERPL-SCNC: 140 MMOL/L (ref 136–145)
TIBC SERPL-MCNC: 170 MCG/DL (ref 298–536)
TRANSFERRIN SERPL-MCNC: 114 MG/DL (ref 200–360)
VIT B12 BLD-MCNC: 266 PG/ML (ref 211–946)
WBC NRBC COR # BLD AUTO: 10.89 10*3/MM3 (ref 3.4–10.8)

## 2025-08-24 PROCEDURE — 80053 COMPREHEN METABOLIC PANEL: CPT | Performed by: FAMILY MEDICINE

## 2025-08-24 PROCEDURE — 83735 ASSAY OF MAGNESIUM: CPT | Performed by: STUDENT IN AN ORGANIZED HEALTH CARE EDUCATION/TRAINING PROGRAM

## 2025-08-24 PROCEDURE — 82728 ASSAY OF FERRITIN: CPT | Performed by: STUDENT IN AN ORGANIZED HEALTH CARE EDUCATION/TRAINING PROGRAM

## 2025-08-24 PROCEDURE — 25010000002 CEFTRIAXONE PER 250 MG: Performed by: STUDENT IN AN ORGANIZED HEALTH CARE EDUCATION/TRAINING PROGRAM

## 2025-08-24 PROCEDURE — 83540 ASSAY OF IRON: CPT | Performed by: STUDENT IN AN ORGANIZED HEALTH CARE EDUCATION/TRAINING PROGRAM

## 2025-08-24 PROCEDURE — 99231 SBSQ HOSP IP/OBS SF/LOW 25: CPT | Performed by: NURSE PRACTITIONER

## 2025-08-24 PROCEDURE — 85027 COMPLETE CBC AUTOMATED: CPT | Performed by: STUDENT IN AN ORGANIZED HEALTH CARE EDUCATION/TRAINING PROGRAM

## 2025-08-24 PROCEDURE — 82746 ASSAY OF FOLIC ACID SERUM: CPT | Performed by: STUDENT IN AN ORGANIZED HEALTH CARE EDUCATION/TRAINING PROGRAM

## 2025-08-24 PROCEDURE — 82607 VITAMIN B-12: CPT | Performed by: STUDENT IN AN ORGANIZED HEALTH CARE EDUCATION/TRAINING PROGRAM

## 2025-08-24 PROCEDURE — 25010000002 ENOXAPARIN PER 10 MG: Performed by: FAMILY MEDICINE

## 2025-08-24 PROCEDURE — 25010000002 DOXYCYCLINE 100 MG RECONSTITUTED SOLUTION 1 EACH VIAL: Performed by: STUDENT IN AN ORGANIZED HEALTH CARE EDUCATION/TRAINING PROGRAM

## 2025-08-24 PROCEDURE — 84466 ASSAY OF TRANSFERRIN: CPT | Performed by: STUDENT IN AN ORGANIZED HEALTH CARE EDUCATION/TRAINING PROGRAM

## 2025-08-24 RX ORDER — DOXYCYCLINE 100 MG/1
100 CAPSULE ORAL EVERY 12 HOURS SCHEDULED
Status: COMPLETED | OUTPATIENT
Start: 2025-08-24 | End: 2025-08-26

## 2025-08-24 RX ADMIN — Medication 10 ML: at 09:00

## 2025-08-24 RX ADMIN — ESCITALOPRAM 5 MG: 10 TABLET, FILM COATED ORAL at 08:59

## 2025-08-24 RX ADMIN — Medication 10 ML: at 20:20

## 2025-08-24 RX ADMIN — CEFTRIAXONE SODIUM 1000 MG: 1 INJECTION, POWDER, FOR SOLUTION INTRAMUSCULAR; INTRAVENOUS at 20:22

## 2025-08-24 RX ADMIN — LISINOPRIL 20 MG: 20 TABLET ORAL at 08:59

## 2025-08-24 RX ADMIN — FINASTERIDE 5 MG: 5 TABLET, FILM COATED ORAL at 08:59

## 2025-08-24 RX ADMIN — Medication 2.5 MG: at 20:19

## 2025-08-24 RX ADMIN — FAMOTIDINE 20 MG: 20 TABLET, FILM COATED ORAL at 08:59

## 2025-08-24 RX ADMIN — CLOPIDOGREL BISULFATE 75 MG: 75 TABLET, FILM COATED ORAL at 08:59

## 2025-08-24 RX ADMIN — DOXYCYCLINE 100 MG: 100 CAPSULE ORAL at 20:19

## 2025-08-24 RX ADMIN — DOXYCYCLINE 100 MG: 100 INJECTION, POWDER, LYOPHILIZED, FOR SOLUTION INTRAVENOUS at 09:00

## 2025-08-24 RX ADMIN — FERROUS SULFATE TAB 325 MG (65 MG ELEMENTAL FE) 325 MG: 325 (65 FE) TAB at 17:40

## 2025-08-24 RX ADMIN — ENOXAPARIN SODIUM 30 MG: 100 INJECTION SUBCUTANEOUS at 08:58

## 2025-08-24 RX ADMIN — METOPROLOL SUCCINATE 25 MG: 25 TABLET, EXTENDED RELEASE ORAL at 08:59

## 2025-08-24 RX ADMIN — ATORVASTATIN CALCIUM 80 MG: 40 TABLET, FILM COATED ORAL at 08:59

## 2025-08-24 RX ADMIN — FERROUS SULFATE TAB 325 MG (65 MG ELEMENTAL FE) 325 MG: 325 (65 FE) TAB at 08:59

## 2025-08-24 RX ADMIN — CHLORTHALIDONE 25 MG: 25 TABLET ORAL at 08:59

## 2025-08-24 RX ADMIN — FAMOTIDINE 20 MG: 20 TABLET, FILM COATED ORAL at 20:19

## 2025-08-24 RX ADMIN — TAMSULOSIN HYDROCHLORIDE 0.4 MG: 0.4 CAPSULE ORAL at 20:19

## 2025-08-25 LAB
ALBUMIN SERPL-MCNC: 3 G/DL (ref 3.5–5.2)
ALBUMIN/GLOB SERPL: 1.4 G/DL
ALP SERPL-CCNC: 94 U/L (ref 39–117)
ALT SERPL W P-5'-P-CCNC: 35 U/L (ref 1–41)
ANION GAP SERPL CALCULATED.3IONS-SCNC: 7.5 MMOL/L (ref 5–15)
AST SERPL-CCNC: 52 U/L (ref 1–40)
BASOPHILS # BLD AUTO: 0.03 10*3/MM3 (ref 0–0.2)
BASOPHILS NFR BLD AUTO: 0.3 % (ref 0–1.5)
BILIRUB SERPL-MCNC: 0.5 MG/DL (ref 0–1.2)
BUN SERPL-MCNC: 12.7 MG/DL (ref 8–23)
BUN/CREAT SERPL: 16.5 (ref 7–25)
CALCIUM SPEC-SCNC: 8.7 MG/DL (ref 8.6–10.5)
CHLORIDE SERPL-SCNC: 106 MMOL/L (ref 98–107)
CO2 SERPL-SCNC: 26.5 MMOL/L (ref 22–29)
CREAT SERPL-MCNC: 0.77 MG/DL (ref 0.76–1.27)
DEPRECATED RDW RBC AUTO: 47.9 FL (ref 37–54)
EGFRCR SERPLBLD CKD-EPI 2021: 92.8 ML/MIN/1.73
EOSINOPHIL # BLD AUTO: 0.47 10*3/MM3 (ref 0–0.4)
EOSINOPHIL NFR BLD AUTO: 4.1 % (ref 0.3–6.2)
ERYTHROCYTE [DISTWIDTH] IN BLOOD BY AUTOMATED COUNT: 13.2 % (ref 12.3–15.4)
GLOBULIN UR ELPH-MCNC: 2.2 GM/DL
GLUCOSE SERPL-MCNC: 108 MG/DL (ref 65–99)
HCT VFR BLD AUTO: 30.8 % (ref 37.5–51)
HGB BLD-MCNC: 9.8 G/DL (ref 13–17.7)
IMM GRANULOCYTES # BLD AUTO: 0.15 10*3/MM3 (ref 0–0.05)
IMM GRANULOCYTES NFR BLD AUTO: 1.3 % (ref 0–0.5)
LYMPHOCYTES # BLD AUTO: 2.42 10*3/MM3 (ref 0.7–3.1)
LYMPHOCYTES NFR BLD AUTO: 21.3 % (ref 19.6–45.3)
MCH RBC QN AUTO: 31.7 PG (ref 26.6–33)
MCHC RBC AUTO-ENTMCNC: 31.8 G/DL (ref 31.5–35.7)
MCV RBC AUTO: 99.7 FL (ref 79–97)
MONOCYTES # BLD AUTO: 1.01 10*3/MM3 (ref 0.1–0.9)
MONOCYTES NFR BLD AUTO: 8.9 % (ref 5–12)
NEUTROPHILS NFR BLD AUTO: 64.1 % (ref 42.7–76)
NEUTROPHILS NFR BLD AUTO: 7.27 10*3/MM3 (ref 1.7–7)
NRBC BLD AUTO-RTO: 0 /100 WBC (ref 0–0.2)
PLATELET # BLD AUTO: 234 10*3/MM3 (ref 140–450)
PMV BLD AUTO: 10.6 FL (ref 6–12)
POTASSIUM SERPL-SCNC: 4.1 MMOL/L (ref 3.5–5.2)
PROT SERPL-MCNC: 5.2 G/DL (ref 6–8.5)
QT INTERVAL: 376 MS
QT INTERVAL: 386 MS
QTC INTERVAL: 425 MS
QTC INTERVAL: 449 MS
RBC # BLD AUTO: 3.09 10*6/MM3 (ref 4.14–5.8)
SODIUM SERPL-SCNC: 140 MMOL/L (ref 136–145)
WBC NRBC COR # BLD AUTO: 11.35 10*3/MM3 (ref 3.4–10.8)

## 2025-08-25 PROCEDURE — 25010000002 CEFTRIAXONE PER 250 MG: Performed by: STUDENT IN AN ORGANIZED HEALTH CARE EDUCATION/TRAINING PROGRAM

## 2025-08-25 PROCEDURE — 99232 SBSQ HOSP IP/OBS MODERATE 35: CPT | Performed by: NURSE PRACTITIONER

## 2025-08-25 PROCEDURE — 80053 COMPREHEN METABOLIC PANEL: CPT | Performed by: FAMILY MEDICINE

## 2025-08-25 PROCEDURE — 97530 THERAPEUTIC ACTIVITIES: CPT

## 2025-08-25 PROCEDURE — 97116 GAIT TRAINING THERAPY: CPT

## 2025-08-25 PROCEDURE — 25010000002 ENOXAPARIN PER 10 MG: Performed by: FAMILY MEDICINE

## 2025-08-25 PROCEDURE — 85025 COMPLETE CBC W/AUTO DIFF WBC: CPT | Performed by: NURSE PRACTITIONER

## 2025-08-25 RX ORDER — POLYETHYLENE GLYCOL 3350 17 G/17G
17 POWDER, FOR SOLUTION ORAL DAILY PRN
Status: DISCONTINUED | OUTPATIENT
Start: 2025-08-25 | End: 2025-08-28 | Stop reason: HOSPADM

## 2025-08-25 RX ORDER — BISACODYL 5 MG/1
5 TABLET, DELAYED RELEASE ORAL DAILY PRN
Status: DISCONTINUED | OUTPATIENT
Start: 2025-08-25 | End: 2025-08-28 | Stop reason: HOSPADM

## 2025-08-25 RX ORDER — AMOXICILLIN 250 MG
2 CAPSULE ORAL 2 TIMES DAILY
Status: DISCONTINUED | OUTPATIENT
Start: 2025-08-25 | End: 2025-08-28 | Stop reason: HOSPADM

## 2025-08-25 RX ORDER — BISACODYL 10 MG
10 SUPPOSITORY, RECTAL RECTAL DAILY PRN
Status: DISCONTINUED | OUTPATIENT
Start: 2025-08-25 | End: 2025-08-28 | Stop reason: HOSPADM

## 2025-08-25 RX ADMIN — CHLORTHALIDONE 25 MG: 25 TABLET ORAL at 09:08

## 2025-08-25 RX ADMIN — METOPROLOL SUCCINATE 25 MG: 25 TABLET, EXTENDED RELEASE ORAL at 09:07

## 2025-08-25 RX ADMIN — CEFTRIAXONE SODIUM 1000 MG: 1 INJECTION, POWDER, FOR SOLUTION INTRAMUSCULAR; INTRAVENOUS at 21:07

## 2025-08-25 RX ADMIN — DOXYCYCLINE 100 MG: 100 CAPSULE ORAL at 09:08

## 2025-08-25 RX ADMIN — DOXYCYCLINE 100 MG: 100 CAPSULE ORAL at 20:24

## 2025-08-25 RX ADMIN — BISACODYL 10 MG: 10 SUPPOSITORY RECTAL at 14:09

## 2025-08-25 RX ADMIN — ESCITALOPRAM 5 MG: 10 TABLET, FILM COATED ORAL at 09:07

## 2025-08-25 RX ADMIN — BACLOFEN 20 MG: 10 TABLET ORAL at 09:13

## 2025-08-25 RX ADMIN — FAMOTIDINE 20 MG: 20 TABLET, FILM COATED ORAL at 09:08

## 2025-08-25 RX ADMIN — Medication 2.5 MG: at 20:23

## 2025-08-25 RX ADMIN — SENNOSIDES, DOCUSATE SODIUM 2 TABLET: 50; 8.6 TABLET, FILM COATED ORAL at 20:23

## 2025-08-25 RX ADMIN — FERROUS SULFATE TAB 325 MG (65 MG ELEMENTAL FE) 325 MG: 325 (65 FE) TAB at 18:23

## 2025-08-25 RX ADMIN — FAMOTIDINE 20 MG: 20 TABLET, FILM COATED ORAL at 20:24

## 2025-08-25 RX ADMIN — ATORVASTATIN CALCIUM 80 MG: 40 TABLET, FILM COATED ORAL at 09:08

## 2025-08-25 RX ADMIN — FINASTERIDE 5 MG: 5 TABLET, FILM COATED ORAL at 09:07

## 2025-08-25 RX ADMIN — TAMSULOSIN HYDROCHLORIDE 0.4 MG: 0.4 CAPSULE ORAL at 20:23

## 2025-08-25 RX ADMIN — FERROUS SULFATE TAB 325 MG (65 MG ELEMENTAL FE) 325 MG: 325 (65 FE) TAB at 09:07

## 2025-08-25 RX ADMIN — CLOPIDOGREL BISULFATE 75 MG: 75 TABLET, FILM COATED ORAL at 09:07

## 2025-08-25 RX ADMIN — SENNOSIDES, DOCUSATE SODIUM 2 TABLET: 50; 8.6 TABLET, FILM COATED ORAL at 11:03

## 2025-08-25 RX ADMIN — Medication 10 ML: at 20:24

## 2025-08-25 RX ADMIN — Medication 10 ML: at 09:13

## 2025-08-25 RX ADMIN — LISINOPRIL 20 MG: 20 TABLET ORAL at 09:07

## 2025-08-25 RX ADMIN — ENOXAPARIN SODIUM 30 MG: 100 INJECTION SUBCUTANEOUS at 09:08

## 2025-08-26 LAB
BACTERIA SPEC AEROBE CULT: NORMAL
BACTERIA SPEC AEROBE CULT: NORMAL
BACTERIA UR QL AUTO: ABNORMAL /HPF
BILIRUB UR QL STRIP: NEGATIVE
CLARITY UR: CLEAR
COLOR UR: YELLOW
GLUCOSE UR STRIP-MCNC: NEGATIVE MG/DL
HGB UR QL STRIP.AUTO: NEGATIVE
HYALINE CASTS UR QL AUTO: ABNORMAL /LPF
KETONES UR QL STRIP: NEGATIVE
LEUKOCYTE ESTERASE UR QL STRIP.AUTO: ABNORMAL
NITRITE UR QL STRIP: NEGATIVE
PH UR STRIP.AUTO: 6.5 [PH] (ref 5–8)
PROT UR QL STRIP: ABNORMAL
RBC # UR STRIP: ABNORMAL /HPF
REF LAB TEST METHOD: ABNORMAL
SP GR UR STRIP: 1.02 (ref 1–1.03)
SQUAMOUS #/AREA URNS HPF: ABNORMAL /HPF
UROBILINOGEN UR QL STRIP: ABNORMAL
WBC # UR STRIP: ABNORMAL /HPF

## 2025-08-26 PROCEDURE — 25010000002 ENOXAPARIN PER 10 MG: Performed by: FAMILY MEDICINE

## 2025-08-26 PROCEDURE — 81001 URINALYSIS AUTO W/SCOPE: CPT | Performed by: NURSE PRACTITIONER

## 2025-08-26 PROCEDURE — 99232 SBSQ HOSP IP/OBS MODERATE 35: CPT | Performed by: NURSE PRACTITIONER

## 2025-08-26 PROCEDURE — 92526 ORAL FUNCTION THERAPY: CPT

## 2025-08-26 PROCEDURE — 97530 THERAPEUTIC ACTIVITIES: CPT

## 2025-08-26 PROCEDURE — 97535 SELF CARE MNGMENT TRAINING: CPT

## 2025-08-26 RX ORDER — BISACODYL 10 MG
10 SUPPOSITORY, RECTAL RECTAL ONCE
Status: COMPLETED | OUTPATIENT
Start: 2025-08-26 | End: 2025-08-26

## 2025-08-26 RX ADMIN — ENOXAPARIN SODIUM 30 MG: 100 INJECTION SUBCUTANEOUS at 08:45

## 2025-08-26 RX ADMIN — ATORVASTATIN CALCIUM 80 MG: 40 TABLET, FILM COATED ORAL at 08:44

## 2025-08-26 RX ADMIN — CLOPIDOGREL BISULFATE 75 MG: 75 TABLET, FILM COATED ORAL at 08:45

## 2025-08-26 RX ADMIN — SENNOSIDES, DOCUSATE SODIUM 2 TABLET: 50; 8.6 TABLET, FILM COATED ORAL at 08:45

## 2025-08-26 RX ADMIN — Medication 10 ML: at 20:16

## 2025-08-26 RX ADMIN — FERROUS SULFATE TAB 325 MG (65 MG ELEMENTAL FE) 325 MG: 325 (65 FE) TAB at 19:26

## 2025-08-26 RX ADMIN — FINASTERIDE 5 MG: 5 TABLET, FILM COATED ORAL at 08:45

## 2025-08-26 RX ADMIN — FAMOTIDINE 20 MG: 20 TABLET, FILM COATED ORAL at 20:15

## 2025-08-26 RX ADMIN — SENNOSIDES, DOCUSATE SODIUM 2 TABLET: 50; 8.6 TABLET, FILM COATED ORAL at 20:15

## 2025-08-26 RX ADMIN — DOXYCYCLINE 100 MG: 100 CAPSULE ORAL at 11:46

## 2025-08-26 RX ADMIN — ESCITALOPRAM 5 MG: 10 TABLET, FILM COATED ORAL at 08:45

## 2025-08-26 RX ADMIN — Medication 2.5 MG: at 20:15

## 2025-08-26 RX ADMIN — BISACODYL 10 MG: 10 SUPPOSITORY RECTAL at 14:07

## 2025-08-26 RX ADMIN — TAMSULOSIN HYDROCHLORIDE 0.4 MG: 0.4 CAPSULE ORAL at 20:15

## 2025-08-26 RX ADMIN — FAMOTIDINE 20 MG: 20 TABLET, FILM COATED ORAL at 08:45

## 2025-08-26 RX ADMIN — CHLORTHALIDONE 25 MG: 25 TABLET ORAL at 08:45

## 2025-08-26 RX ADMIN — METOPROLOL SUCCINATE 25 MG: 25 TABLET, EXTENDED RELEASE ORAL at 08:45

## 2025-08-26 RX ADMIN — LISINOPRIL 20 MG: 20 TABLET ORAL at 08:45

## 2025-08-26 RX ADMIN — FERROUS SULFATE TAB 325 MG (65 MG ELEMENTAL FE) 325 MG: 325 (65 FE) TAB at 08:45

## 2025-08-27 PROCEDURE — 99232 SBSQ HOSP IP/OBS MODERATE 35: CPT | Performed by: NURSE PRACTITIONER

## 2025-08-27 PROCEDURE — 97530 THERAPEUTIC ACTIVITIES: CPT

## 2025-08-27 PROCEDURE — 25010000002 ENOXAPARIN PER 10 MG: Performed by: FAMILY MEDICINE

## 2025-08-27 RX ORDER — TAMSULOSIN HYDROCHLORIDE 0.4 MG/1
0.4 CAPSULE ORAL DAILY
Status: DISCONTINUED | OUTPATIENT
Start: 2025-08-27 | End: 2025-08-27

## 2025-08-27 RX ORDER — TAMSULOSIN HYDROCHLORIDE 0.4 MG/1
0.8 CAPSULE ORAL DAILY
Status: DISCONTINUED | OUTPATIENT
Start: 2025-08-28 | End: 2025-08-28 | Stop reason: HOSPADM

## 2025-08-27 RX ORDER — PHENAZOPYRIDINE HYDROCHLORIDE 100 MG/1
100 TABLET, FILM COATED ORAL
Status: DISCONTINUED | OUTPATIENT
Start: 2025-08-27 | End: 2025-08-28 | Stop reason: HOSPADM

## 2025-08-27 RX ADMIN — Medication 2.5 MG: at 20:35

## 2025-08-27 RX ADMIN — CHLORTHALIDONE 25 MG: 25 TABLET ORAL at 08:57

## 2025-08-27 RX ADMIN — CLOPIDOGREL BISULFATE 75 MG: 75 TABLET, FILM COATED ORAL at 08:57

## 2025-08-27 RX ADMIN — Medication 10 ML: at 20:34

## 2025-08-27 RX ADMIN — ENOXAPARIN SODIUM 30 MG: 100 INJECTION SUBCUTANEOUS at 08:57

## 2025-08-27 RX ADMIN — LISINOPRIL 20 MG: 20 TABLET ORAL at 08:57

## 2025-08-27 RX ADMIN — METOPROLOL SUCCINATE 25 MG: 25 TABLET, EXTENDED RELEASE ORAL at 08:57

## 2025-08-27 RX ADMIN — SENNOSIDES, DOCUSATE SODIUM 2 TABLET: 50; 8.6 TABLET, FILM COATED ORAL at 20:35

## 2025-08-27 RX ADMIN — SENNOSIDES, DOCUSATE SODIUM 2 TABLET: 50; 8.6 TABLET, FILM COATED ORAL at 08:57

## 2025-08-27 RX ADMIN — FINASTERIDE 5 MG: 5 TABLET, FILM COATED ORAL at 08:57

## 2025-08-27 RX ADMIN — ESCITALOPRAM 5 MG: 10 TABLET, FILM COATED ORAL at 08:57

## 2025-08-27 RX ADMIN — ATORVASTATIN CALCIUM 80 MG: 40 TABLET, FILM COATED ORAL at 08:57

## 2025-08-27 RX ADMIN — FERROUS SULFATE TAB 325 MG (65 MG ELEMENTAL FE) 325 MG: 325 (65 FE) TAB at 08:57

## 2025-08-27 RX ADMIN — TAMSULOSIN HYDROCHLORIDE 0.4 MG: 0.4 CAPSULE ORAL at 08:56

## 2025-08-27 RX ADMIN — PHENAZOPYRIDINE 100 MG: 100 TABLET ORAL at 20:36

## 2025-08-27 RX ADMIN — FAMOTIDINE 20 MG: 20 TABLET, FILM COATED ORAL at 08:57

## 2025-08-27 RX ADMIN — FAMOTIDINE 20 MG: 20 TABLET, FILM COATED ORAL at 20:35

## 2025-08-28 VITALS
RESPIRATION RATE: 18 BRPM | BODY MASS INDEX: 17.35 KG/M2 | HEART RATE: 83 BPM | DIASTOLIC BLOOD PRESSURE: 61 MMHG | HEIGHT: 72 IN | WEIGHT: 128.09 LBS | SYSTOLIC BLOOD PRESSURE: 132 MMHG | TEMPERATURE: 98.6 F | OXYGEN SATURATION: 91 %

## 2025-08-28 PROCEDURE — 99239 HOSP IP/OBS DSCHRG MGMT >30: CPT | Performed by: NURSE PRACTITIONER

## 2025-08-28 PROCEDURE — 25010000002 ENOXAPARIN PER 10 MG: Performed by: FAMILY MEDICINE

## 2025-08-28 RX ORDER — TAMSULOSIN HYDROCHLORIDE 0.4 MG/1
2 CAPSULE ORAL NIGHTLY
Qty: 90 CAPSULE | Refills: 0 | Status: SHIPPED | OUTPATIENT
Start: 2025-08-28

## 2025-08-28 RX ORDER — BACLOFEN 20 MG/1
20 TABLET ORAL 3 TIMES DAILY
Start: 2025-08-28

## 2025-08-28 RX ADMIN — ENOXAPARIN SODIUM 30 MG: 100 INJECTION SUBCUTANEOUS at 09:00

## 2025-08-28 RX ADMIN — FINASTERIDE 5 MG: 5 TABLET, FILM COATED ORAL at 08:59

## 2025-08-28 RX ADMIN — SENNOSIDES, DOCUSATE SODIUM 2 TABLET: 50; 8.6 TABLET, FILM COATED ORAL at 08:59

## 2025-08-28 RX ADMIN — CHLORTHALIDONE 25 MG: 25 TABLET ORAL at 09:00

## 2025-08-28 RX ADMIN — LISINOPRIL 20 MG: 20 TABLET ORAL at 09:00

## 2025-08-28 RX ADMIN — METOPROLOL SUCCINATE 25 MG: 25 TABLET, EXTENDED RELEASE ORAL at 08:59

## 2025-08-28 RX ADMIN — FAMOTIDINE 20 MG: 20 TABLET, FILM COATED ORAL at 08:59

## 2025-08-28 RX ADMIN — TAMSULOSIN HYDROCHLORIDE 0.8 MG: 0.4 CAPSULE ORAL at 09:00

## 2025-08-28 RX ADMIN — ESCITALOPRAM 5 MG: 10 TABLET, FILM COATED ORAL at 08:59

## 2025-08-28 RX ADMIN — Medication 10 ML: at 09:05

## 2025-08-28 RX ADMIN — ATORVASTATIN CALCIUM 80 MG: 40 TABLET, FILM COATED ORAL at 09:00

## 2025-08-28 RX ADMIN — FERROUS SULFATE TAB 325 MG (65 MG ELEMENTAL FE) 325 MG: 325 (65 FE) TAB at 08:59

## 2025-08-28 RX ADMIN — CLOPIDOGREL BISULFATE 75 MG: 75 TABLET, FILM COATED ORAL at 08:59

## 2025-08-28 RX ADMIN — PHENAZOPYRIDINE 100 MG: 100 TABLET ORAL at 08:59

## (undated) DEVICE — ASMBL REAMR ADJ W/CANN/DRL/BIT/10.2X251MM 12.5MM

## (undated) DEVICE — Device

## (undated) DEVICE — GLV SURG PREMIERPRO MIC LTX PF SZ8.5 BRN

## (undated) DEVICE — SYS CLS SKIN PREMIERPRO EXOFINFUSION 22CM

## (undated) DEVICE — GLV SURG SENSICARE W/ALOE PF LF 9 STRL

## (undated) DEVICE — GW FOR TROCH NAIL 3.2X400MM

## (undated) DEVICE — ANTIBACTERIAL UNDYED BRAIDED (POLYGLACTIN 910), SYNTHETIC ABSORBABLE SUTURE: Brand: COATED VICRYL

## (undated) DEVICE — PK MAJ FX HIP 10

## (undated) DEVICE — 3M™ MEDIPORE™ H SOFT CLOTH SURGICAL TAPE, 2863, 3 IN X 10 YD, 12/CASE: Brand: 3M™ MEDIPORE™

## (undated) DEVICE — CVR HNDL LIGHT RIGID

## (undated) DEVICE — IMPLANTABLE DEVICE
Type: IMPLANTABLE DEVICE | Site: HIP | Status: NON-FUNCTIONAL
Removed: 2021-12-02

## (undated) DEVICE — GOWN,REINF,POLY,ECL,PP SLV,3XL,XLONG: Brand: MEDLINE